# Patient Record
Sex: MALE | Race: WHITE | NOT HISPANIC OR LATINO | URBAN - METROPOLITAN AREA
[De-identification: names, ages, dates, MRNs, and addresses within clinical notes are randomized per-mention and may not be internally consistent; named-entity substitution may affect disease eponyms.]

---

## 2017-04-18 ENCOUNTER — EMERGENCY (EMERGENCY)
Facility: HOSPITAL | Age: 82
LOS: 1 days | Discharge: HOME | End: 2017-04-18
Attending: EMERGENCY MEDICINE | Admitting: EMERGENCY MEDICINE
Payer: MEDICARE

## 2017-04-18 VITALS
DIASTOLIC BLOOD PRESSURE: 51 MMHG | TEMPERATURE: 98 F | HEART RATE: 60 BPM | SYSTOLIC BLOOD PRESSURE: 138 MMHG | OXYGEN SATURATION: 100 % | RESPIRATION RATE: 18 BRPM

## 2017-04-18 VITALS
RESPIRATION RATE: 18 BRPM | DIASTOLIC BLOOD PRESSURE: 55 MMHG | TEMPERATURE: 98 F | SYSTOLIC BLOOD PRESSURE: 173 MMHG | HEART RATE: 60 BPM | OXYGEN SATURATION: 100 %

## 2017-04-18 LAB
ALBUMIN SERPL ELPH-MCNC: 3.9 G/DL — SIGNIFICANT CHANGE UP (ref 3.3–5)
ALP SERPL-CCNC: 76 U/L — SIGNIFICANT CHANGE UP (ref 40–120)
ALT FLD-CCNC: 13 U/L — SIGNIFICANT CHANGE UP (ref 4–41)
AST SERPL-CCNC: 23 U/L — SIGNIFICANT CHANGE UP (ref 4–40)
BASE EXCESS BLDV CALC-SCNC: -0.7 MMOL/L — SIGNIFICANT CHANGE UP
BASOPHILS # BLD AUTO: 0.02 K/UL — SIGNIFICANT CHANGE UP (ref 0–0.2)
BASOPHILS NFR BLD AUTO: 0.2 % — SIGNIFICANT CHANGE UP (ref 0–2)
BILIRUB SERPL-MCNC: 0.3 MG/DL — SIGNIFICANT CHANGE UP (ref 0.2–1.2)
BLOOD GAS VENOUS - CREATININE: 1.95 MG/DL — HIGH (ref 0.5–1.3)
BUN SERPL-MCNC: 39 MG/DL — HIGH (ref 7–23)
CALCIUM SERPL-MCNC: 9.6 MG/DL — SIGNIFICANT CHANGE UP (ref 8.4–10.5)
CHLORIDE BLDV-SCNC: 104 MMOL/L — SIGNIFICANT CHANGE UP (ref 96–108)
CHLORIDE SERPL-SCNC: 102 MMOL/L — SIGNIFICANT CHANGE UP (ref 98–107)
CO2 SERPL-SCNC: 23 MMOL/L — SIGNIFICANT CHANGE UP (ref 22–31)
CREAT SERPL-MCNC: 1.9 MG/DL — HIGH (ref 0.5–1.3)
EOSINOPHIL # BLD AUTO: 0.17 K/UL — SIGNIFICANT CHANGE UP (ref 0–0.5)
EOSINOPHIL NFR BLD AUTO: 2.1 % — SIGNIFICANT CHANGE UP (ref 0–6)
GAS PNL BLDV: 137 MMOL/L — SIGNIFICANT CHANGE UP (ref 136–146)
GLUCOSE BLDV-MCNC: 135 — HIGH (ref 70–99)
GLUCOSE SERPL-MCNC: 137 MG/DL — HIGH (ref 70–99)
HCO3 BLDV-SCNC: 22 MMOL/L — SIGNIFICANT CHANGE UP (ref 20–27)
HCT VFR BLD CALC: 32.5 % — LOW (ref 39–50)
HCT VFR BLDV CALC: 32.1 % — LOW (ref 39–51)
HGB BLD-MCNC: 10.2 G/DL — LOW (ref 13–17)
HGB BLDV-MCNC: 10.4 G/DL — LOW (ref 13–17)
IMM GRANULOCYTES NFR BLD AUTO: 0.2 % — SIGNIFICANT CHANGE UP (ref 0–1.5)
LACTATE BLDV-MCNC: 1.9 MMOL/L — SIGNIFICANT CHANGE UP (ref 0.5–2)
LYMPHOCYTES # BLD AUTO: 0.95 K/UL — LOW (ref 1–3.3)
LYMPHOCYTES # BLD AUTO: 11.5 % — LOW (ref 13–44)
MCHC RBC-ENTMCNC: 31.4 % — LOW (ref 32–36)
MCHC RBC-ENTMCNC: 31.5 PG — SIGNIFICANT CHANGE UP (ref 27–34)
MCV RBC AUTO: 100.3 FL — HIGH (ref 80–100)
MONOCYTES # BLD AUTO: 1.01 K/UL — HIGH (ref 0–0.9)
MONOCYTES NFR BLD AUTO: 12.2 % — SIGNIFICANT CHANGE UP (ref 2–14)
NEUTROPHILS # BLD AUTO: 6.1 K/UL — SIGNIFICANT CHANGE UP (ref 1.8–7.4)
NEUTROPHILS NFR BLD AUTO: 73.8 % — SIGNIFICANT CHANGE UP (ref 43–77)
PCO2 BLDV: 48 MMHG — SIGNIFICANT CHANGE UP (ref 41–51)
PH BLDV: 7.33 PH — SIGNIFICANT CHANGE UP (ref 7.32–7.43)
PLATELET # BLD AUTO: 221 K/UL — SIGNIFICANT CHANGE UP (ref 150–400)
PMV BLD: 10.8 FL — SIGNIFICANT CHANGE UP (ref 7–13)
PO2 BLDV: < 24 MMHG — LOW (ref 35–40)
POTASSIUM BLDV-SCNC: 4.9 MMOL/L — HIGH (ref 3.4–4.5)
POTASSIUM SERPL-MCNC: 5.1 MMOL/L — SIGNIFICANT CHANGE UP (ref 3.5–5.3)
POTASSIUM SERPL-SCNC: 5.1 MMOL/L — SIGNIFICANT CHANGE UP (ref 3.5–5.3)
PROT SERPL-MCNC: 7.2 G/DL — SIGNIFICANT CHANGE UP (ref 6–8.3)
RBC # BLD: 3.24 M/UL — LOW (ref 4.2–5.8)
RBC # FLD: 12.4 % — SIGNIFICANT CHANGE UP (ref 10.3–14.5)
SAO2 % BLDV: 19.5 % — LOW (ref 60–85)
SODIUM SERPL-SCNC: 137 MMOL/L — SIGNIFICANT CHANGE UP (ref 135–145)
WBC # BLD: 8.27 K/UL — SIGNIFICANT CHANGE UP (ref 3.8–10.5)
WBC # FLD AUTO: 8.27 K/UL — SIGNIFICANT CHANGE UP (ref 3.8–10.5)

## 2017-04-18 PROCEDURE — 99284 EMERGENCY DEPT VISIT MOD MDM: CPT | Mod: GC

## 2017-04-18 PROCEDURE — 93971 EXTREMITY STUDY: CPT | Mod: 26,LT

## 2017-04-18 PROCEDURE — 73630 X-RAY EXAM OF FOOT: CPT | Mod: 26,RT

## 2017-04-18 NOTE — ED PROVIDER NOTE - OBJECTIVE STATEMENT
89M PMH DM, HTN, peripheral vascular disease p/w 89M PMH DM, HTN, peripheral vascular disease presents from PMD's office for evaluation of chronic ulcerations and venous stasis ulcers. Pt reports some pain, denies fever/chills/NVD. +ambulatory. Under the care of a visiting wound nurse for his chronic ulcerations.

## 2017-04-18 NOTE — ED PROVIDER NOTE - ATTENDING CONTRIBUTION TO CARE
Dr Bloch- Patient c/o swollen leg with ulcer, seen by podiatry, no acute infection as per podiatry, Well appearing NAD, HEENT nml lungs clear, abd soft, ext right leg swollen with some blisters and 1 cm ulcer on pad under 1st MT head.  plan, labs podiatry to recheck, pulses dopplerable, US right leg to r/o dvt.

## 2017-04-18 NOTE — ED PROVIDER NOTE - MEDICAL DECISION MAKING DETAILS
89M with chronic ulcerations, peripheral vascular disease sent for r/o osteomyelitis. Will evaluate with cbc, cmp, imaging, podiatry vs vascular evaluation.

## 2017-04-18 NOTE — ED PROVIDER NOTE - NOTES
Saw and evaluated patient; wounds all chronic. No concern for osteomyelitis from their perspective. Can be d/c'd home when remainder of evaluation complete. No abx.

## 2017-04-18 NOTE — ED ADULT TRIAGE NOTE - CHIEF COMPLAINT QUOTE
pt sent to ED from PMD for vascular surger consult to r/o osteomylitis and gangrene in bilateral feet.

## 2017-04-18 NOTE — ED ADULT NURSE NOTE - OBJECTIVE STATEMENT
Pt received to spot #8. AAOx4, sent in by his vascular surgeon for r/o osteomyelitis and gangrene to right foot. Ulcer noted underneath right foot approx size of a quarter with tunneling. RLE swollen and redness to be noted. Left ulcer noted to left leg behind ankle. Pt denies fever/chills. MD munoz in progress at bedside. #20g IV placed to left ac, labs drawn and sent. VSS. Pt taken to x-ray at this time. no acute distress. Awaiting further plan of care.

## 2017-04-18 NOTE — ED PROVIDER NOTE - PHYSICAL EXAMINATION
Cap refill in RLE longer than LLE, both extremities warm with chronic ulcerations, slight weeping, no purulent drainage or erythema. RLE edematous compared to LLE.

## 2017-04-19 LAB
SPECIMEN SOURCE: SIGNIFICANT CHANGE UP
SPECIMEN SOURCE: SIGNIFICANT CHANGE UP

## 2017-04-23 LAB
BACTERIA BLD CULT: SIGNIFICANT CHANGE UP
BACTERIA BLD CULT: SIGNIFICANT CHANGE UP

## 2017-04-28 ENCOUNTER — APPOINTMENT (OUTPATIENT)
Dept: WOUND CARE | Facility: CLINIC | Age: 82
End: 2017-04-28

## 2017-04-28 DIAGNOSIS — Z86.39 PERSONAL HISTORY OF OTHER ENDOCRINE, NUTRITIONAL AND METABOLIC DISEASE: ICD-10-CM

## 2017-04-28 DIAGNOSIS — L97.513 NON-PRESSURE CHRONIC ULCER OF OTHER PART OF RIGHT FOOT WITH NECROSIS OF MUSCLE: ICD-10-CM

## 2017-06-13 ENCOUNTER — APPOINTMENT (OUTPATIENT)
Dept: VASCULAR SURGERY | Facility: CLINIC | Age: 82
End: 2017-06-13

## 2017-06-13 VITALS
HEART RATE: 60 BPM | HEIGHT: 67 IN | BODY MASS INDEX: 23.54 KG/M2 | WEIGHT: 150 LBS | SYSTOLIC BLOOD PRESSURE: 180 MMHG | DIASTOLIC BLOOD PRESSURE: 78 MMHG | TEMPERATURE: 98.7 F

## 2017-06-13 DIAGNOSIS — L03.115 CELLULITIS OF RIGHT LOWER LIMB: ICD-10-CM

## 2017-06-13 DIAGNOSIS — I87.2 VENOUS INSUFFICIENCY (CHRONIC) (PERIPHERAL): ICD-10-CM

## 2017-06-13 DIAGNOSIS — I83.93 ASYMPTOMATIC VARICOSE VEINS OF BILATERAL LOWER EXTREMITIES: ICD-10-CM

## 2017-06-13 RX ORDER — BLOOD SUGAR DIAGNOSTIC
STRIP MISCELLANEOUS
Qty: 200 | Refills: 0 | Status: ACTIVE | COMMUNITY
Start: 2016-10-03

## 2017-06-16 ENCOUNTER — INPATIENT (INPATIENT)
Facility: HOSPITAL | Age: 82
LOS: 13 days | Discharge: NURSING, REHAB OR LONG-TERM CARE | End: 2017-06-30
Attending: SURGERY | Admitting: SURGERY
Payer: MEDICARE

## 2017-06-16 VITALS
OXYGEN SATURATION: 99 % | SYSTOLIC BLOOD PRESSURE: 164 MMHG | TEMPERATURE: 98 F | HEART RATE: 60 BPM | RESPIRATION RATE: 15 BRPM | DIASTOLIC BLOOD PRESSURE: 66 MMHG

## 2017-06-16 DIAGNOSIS — L97.509 NON-PRESSURE CHRONIC ULCER OF OTHER PART OF UNSPECIFIED FOOT WITH UNSPECIFIED SEVERITY: ICD-10-CM

## 2017-06-16 DIAGNOSIS — I73.9 PERIPHERAL VASCULAR DISEASE, UNSPECIFIED: ICD-10-CM

## 2017-06-16 LAB
ALBUMIN SERPL ELPH-MCNC: 3.7 G/DL — SIGNIFICANT CHANGE UP (ref 3.3–5)
ALP SERPL-CCNC: 72 U/L — SIGNIFICANT CHANGE UP (ref 40–120)
ALT FLD-CCNC: 12 U/L — SIGNIFICANT CHANGE UP (ref 4–41)
APTT BLD: 26.7 SEC — LOW (ref 27.5–37.4)
AST SERPL-CCNC: 17 U/L — SIGNIFICANT CHANGE UP (ref 4–40)
BASOPHILS # BLD AUTO: 0.03 K/UL — SIGNIFICANT CHANGE UP (ref 0–0.2)
BASOPHILS NFR BLD AUTO: 0.3 % — SIGNIFICANT CHANGE UP (ref 0–2)
BILIRUB SERPL-MCNC: 0.5 MG/DL — SIGNIFICANT CHANGE UP (ref 0.2–1.2)
BLD GP AB SCN SERPL QL: NEGATIVE — SIGNIFICANT CHANGE UP
BUN SERPL-MCNC: 33 MG/DL — HIGH (ref 7–23)
CALCIUM SERPL-MCNC: 9.6 MG/DL — SIGNIFICANT CHANGE UP (ref 8.4–10.5)
CHLORIDE SERPL-SCNC: 103 MMOL/L — SIGNIFICANT CHANGE UP (ref 98–107)
CO2 SERPL-SCNC: 28 MMOL/L — SIGNIFICANT CHANGE UP (ref 22–31)
CREAT SERPL-MCNC: 1.24 MG/DL — SIGNIFICANT CHANGE UP (ref 0.5–1.3)
EOSINOPHIL # BLD AUTO: 0.2 K/UL — SIGNIFICANT CHANGE UP (ref 0–0.5)
EOSINOPHIL NFR BLD AUTO: 2.2 % — SIGNIFICANT CHANGE UP (ref 0–6)
GLUCOSE SERPL-MCNC: 138 MG/DL — HIGH (ref 70–99)
HCT VFR BLD CALC: 33.6 % — LOW (ref 39–50)
HGB BLD-MCNC: 10.2 G/DL — LOW (ref 13–17)
IMM GRANULOCYTES NFR BLD AUTO: 0.1 % — SIGNIFICANT CHANGE UP (ref 0–1.5)
INR BLD: 1.1 — SIGNIFICANT CHANGE UP (ref 0.88–1.17)
LYMPHOCYTES # BLD AUTO: 1.01 K/UL — SIGNIFICANT CHANGE UP (ref 1–3.3)
LYMPHOCYTES # BLD AUTO: 11.3 % — LOW (ref 13–44)
MCHC RBC-ENTMCNC: 30.4 % — LOW (ref 32–36)
MCHC RBC-ENTMCNC: 30.9 PG — SIGNIFICANT CHANGE UP (ref 27–34)
MCV RBC AUTO: 101.8 FL — HIGH (ref 80–100)
MONOCYTES # BLD AUTO: 1.11 K/UL — HIGH (ref 0–0.9)
MONOCYTES NFR BLD AUTO: 12.4 % — SIGNIFICANT CHANGE UP (ref 2–14)
NEUTROPHILS # BLD AUTO: 6.58 K/UL — SIGNIFICANT CHANGE UP (ref 1.8–7.4)
NEUTROPHILS NFR BLD AUTO: 73.7 % — SIGNIFICANT CHANGE UP (ref 43–77)
PLATELET # BLD AUTO: 252 K/UL — SIGNIFICANT CHANGE UP (ref 150–400)
PMV BLD: 10.6 FL — SIGNIFICANT CHANGE UP (ref 7–13)
POTASSIUM SERPL-MCNC: 4.8 MMOL/L — SIGNIFICANT CHANGE UP (ref 3.5–5.3)
POTASSIUM SERPL-SCNC: 4.8 MMOL/L — SIGNIFICANT CHANGE UP (ref 3.5–5.3)
PROT SERPL-MCNC: 6.9 G/DL — SIGNIFICANT CHANGE UP (ref 6–8.3)
PROTHROM AB SERPL-ACNC: 12.4 SEC — SIGNIFICANT CHANGE UP (ref 9.8–13.1)
RBC # BLD: 3.3 M/UL — LOW (ref 4.2–5.8)
RBC # FLD: 12.8 % — SIGNIFICANT CHANGE UP (ref 10.3–14.5)
RH IG SCN BLD-IMP: POSITIVE — SIGNIFICANT CHANGE UP
SODIUM SERPL-SCNC: 142 MMOL/L — SIGNIFICANT CHANGE UP (ref 135–145)
WBC # BLD: 8.94 K/UL — SIGNIFICANT CHANGE UP (ref 3.8–10.5)
WBC # FLD AUTO: 8.94 K/UL — SIGNIFICANT CHANGE UP (ref 3.8–10.5)

## 2017-06-16 PROCEDURE — 99222 1ST HOSP IP/OBS MODERATE 55: CPT

## 2017-06-16 RX ORDER — CILOSTAZOL 100 MG/1
50 TABLET ORAL
Qty: 0 | Refills: 0 | Status: DISCONTINUED | OUTPATIENT
Start: 2017-06-16 | End: 2017-06-19

## 2017-06-16 RX ORDER — COLLAGENASE CLOSTRIDIUM HIST. 250 UNIT/G
1 OINTMENT (GRAM) TOPICAL DAILY
Qty: 0 | Refills: 0 | Status: DISCONTINUED | OUTPATIENT
Start: 2017-06-16 | End: 2017-06-30

## 2017-06-16 RX ORDER — HEPARIN SODIUM 5000 [USP'U]/ML
5000 INJECTION INTRAVENOUS; SUBCUTANEOUS EVERY 8 HOURS
Qty: 0 | Refills: 0 | Status: DISCONTINUED | OUTPATIENT
Start: 2017-06-16 | End: 2017-06-30

## 2017-06-16 RX ORDER — BRIMONIDINE TARTRATE 2 MG/MG
1 SOLUTION/ DROPS OPHTHALMIC DAILY
Qty: 0 | Refills: 0 | Status: DISCONTINUED | OUTPATIENT
Start: 2017-06-16 | End: 2017-06-16

## 2017-06-16 RX ORDER — INSULIN LISPRO 100/ML
VIAL (ML) SUBCUTANEOUS AT BEDTIME
Qty: 0 | Refills: 0 | Status: DISCONTINUED | OUTPATIENT
Start: 2017-06-16 | End: 2017-06-16

## 2017-06-16 RX ORDER — TIMOLOL 0.5 %
1 DROPS OPHTHALMIC (EYE) DAILY
Qty: 0 | Refills: 0 | Status: DISCONTINUED | OUTPATIENT
Start: 2017-06-16 | End: 2017-06-16

## 2017-06-16 RX ORDER — FUROSEMIDE 40 MG
20 TABLET ORAL
Qty: 0 | Refills: 0 | Status: DISCONTINUED | OUTPATIENT
Start: 2017-06-16 | End: 2017-06-19

## 2017-06-16 RX ORDER — BRIMONIDINE TARTRATE 2 MG/MG
1 SOLUTION/ DROPS OPHTHALMIC
Qty: 0 | Refills: 0 | Status: DISCONTINUED | OUTPATIENT
Start: 2017-06-16 | End: 2017-06-30

## 2017-06-16 RX ORDER — PIPERACILLIN AND TAZOBACTAM 4; .5 G/20ML; G/20ML
3.38 INJECTION, POWDER, LYOPHILIZED, FOR SOLUTION INTRAVENOUS EVERY 8 HOURS
Qty: 0 | Refills: 0 | Status: DISCONTINUED | OUTPATIENT
Start: 2017-06-16 | End: 2017-06-29

## 2017-06-16 RX ORDER — PIPERACILLIN AND TAZOBACTAM 4; .5 G/20ML; G/20ML
3.38 INJECTION, POWDER, LYOPHILIZED, FOR SOLUTION INTRAVENOUS ONCE
Qty: 0 | Refills: 0 | Status: COMPLETED | OUTPATIENT
Start: 2017-06-16 | End: 2017-06-16

## 2017-06-16 RX ORDER — TIMOLOL 0.5 %
1 DROPS OPHTHALMIC (EYE)
Qty: 0 | Refills: 0 | Status: DISCONTINUED | OUTPATIENT
Start: 2017-06-16 | End: 2017-06-30

## 2017-06-16 RX ORDER — INSULIN LISPRO 100/ML
VIAL (ML) SUBCUTANEOUS AT BEDTIME
Qty: 0 | Refills: 0 | Status: DISCONTINUED | OUTPATIENT
Start: 2017-06-16 | End: 2017-06-30

## 2017-06-16 RX ORDER — HYDRALAZINE HCL 50 MG
10 TABLET ORAL
Qty: 0 | Refills: 0 | Status: DISCONTINUED | OUTPATIENT
Start: 2017-06-16 | End: 2017-06-30

## 2017-06-16 RX ORDER — CLOPIDOGREL BISULFATE 75 MG/1
75 TABLET, FILM COATED ORAL DAILY
Qty: 0 | Refills: 0 | Status: DISCONTINUED | OUTPATIENT
Start: 2017-06-16 | End: 2017-06-19

## 2017-06-16 RX ORDER — INSULIN LISPRO 100/ML
VIAL (ML) SUBCUTANEOUS
Qty: 0 | Refills: 0 | Status: DISCONTINUED | OUTPATIENT
Start: 2017-06-16 | End: 2017-06-30

## 2017-06-16 RX ORDER — SIMVASTATIN 20 MG/1
20 TABLET, FILM COATED ORAL AT BEDTIME
Qty: 0 | Refills: 0 | Status: DISCONTINUED | OUTPATIENT
Start: 2017-06-16 | End: 2017-06-30

## 2017-06-16 RX ADMIN — SIMVASTATIN 20 MILLIGRAM(S): 20 TABLET, FILM COATED ORAL at 21:44

## 2017-06-16 RX ADMIN — Medication 10 MILLIGRAM(S): at 17:31

## 2017-06-16 RX ADMIN — Medication 20 MILLIGRAM(S): at 17:31

## 2017-06-16 RX ADMIN — PIPERACILLIN AND TAZOBACTAM 25 GRAM(S): 4; .5 INJECTION, POWDER, LYOPHILIZED, FOR SOLUTION INTRAVENOUS at 21:43

## 2017-06-16 RX ADMIN — CILOSTAZOL 50 MILLIGRAM(S): 100 TABLET ORAL at 19:57

## 2017-06-16 RX ADMIN — PIPERACILLIN AND TAZOBACTAM 25 GRAM(S): 4; .5 INJECTION, POWDER, LYOPHILIZED, FOR SOLUTION INTRAVENOUS at 13:52

## 2017-06-16 RX ADMIN — HEPARIN SODIUM 5000 UNIT(S): 5000 INJECTION INTRAVENOUS; SUBCUTANEOUS at 21:44

## 2017-06-16 RX ADMIN — BRIMONIDINE TARTRATE 1 DROP(S): 2 SOLUTION/ DROPS OPHTHALMIC at 17:32

## 2017-06-16 RX ADMIN — PIPERACILLIN AND TAZOBACTAM 200 GRAM(S): 4; .5 INJECTION, POWDER, LYOPHILIZED, FOR SOLUTION INTRAVENOUS at 10:24

## 2017-06-16 RX ADMIN — CLOPIDOGREL BISULFATE 75 MILLIGRAM(S): 75 TABLET, FILM COATED ORAL at 13:06

## 2017-06-16 RX ADMIN — Medication 1 DROP(S): at 17:33

## 2017-06-16 RX ADMIN — HEPARIN SODIUM 5000 UNIT(S): 5000 INJECTION INTRAVENOUS; SUBCUTANEOUS at 13:52

## 2017-06-16 NOTE — PROGRESS NOTE ADULT - SUBJECTIVE AND OBJECTIVE BOX
Patient is a 89y old  Male who presents with a chief complaint of 'My Dr sent me  for redness of right leg' (16 Jun 2017 13:32)      HPI:  89 M w PVD DM HTN p/w 2 wk of RLE redness and swelling. Was seen by visiting doctor who d/w Dr. Aguilar who recommended pt come to the hospital for an angioplasty. Denies pain, fever or chills. Pt sees Dr. Schroeder regularly as out-pt, been applying aquacel ag daily, poor healing due to lack of blood flow.       PAST MEDICAL & SURGICAL HISTORY:  Duodenal ulcer  DM (diabetes mellitus)  HTN (hypertension)  S/P appendectomy: 50 yrs ago      MEDICATIONS  (STANDING):  piperacillin/tazobactam IVPB. 3.375Gram(s) IV Intermittent every 8 hours  clopidogrel Tablet 75milliGRAM(s) Oral daily  heparin  Injectable 5000Unit(s) SubCutaneous every 8 hours  insulin lispro (HumaLOG) corrective regimen sliding scale  SubCutaneous at bedtime  insulin lispro (HumaLOG) corrective regimen sliding scale  SubCutaneous three times a day before meals  furosemide    Tablet 20milliGRAM(s) Oral two times a day  hydrALAZINE 10milliGRAM(s) Oral two times a day  simvastatin 20milliGRAM(s) Oral at bedtime  cilostazol 50milliGRAM(s) Oral two times a day  brimonidine 0.2% Ophthalmic Solution 1Drop(s) Right EYE two times a day  timolol 0.5% Solution 1Drop(s) Right EYE two times a day  collagenase Ointment 1Application(s) Topical daily    MEDICATIONS  (PRN):    Allergies    No Known Allergies    Intolerances    VITALS:    Vital Signs Last 24 Hrs  T(C): 36.7, Max: 36.7 (06-16 @ 17:59)  T(F): 98, Max: 98 (06-16 @ 17:59)  HR: 60 (60 - 61)  BP: 160/59 (152/52 - 167/84)  BP(mean): --  RR: 15 (14 - 20)  SpO2: 94% (94% - 100%)    LABS:                        10.2   8.94  )-----------( 252      ( 16 Jun 2017 07:52 )             33.6       06-16    142  |  103  |  33<H>  ----------------------------<  138<H>  4.8   |  28  |  1.24    Ca    9.6      16 Jun 2017 07:52    TPro  6.9  /  Alb  3.7  /  TBili  0.5  /  DBili  x   /  AST  17  /  ALT  12  /  AlkPhos  72  06-16      CAPILLARY BLOOD GLUCOSE  111 (16 Jun 2017 13:46)  202 (16 Jun 2017 12:22)      PT/INR - ( 16 Jun 2017 07:52 )   PT: 12.4 SEC;   INR: 1.10          PTT - ( 16 Jun 2017 07:52 )  PTT:26.7 SEC    LOWER EXTREMITY PHYSICAL EXAM:    Vasular: DP/PT 0/4, B/L, Temperature gradient slightly cool to R foot, B/L.   Neuro: Epicritic sensation diminished to the level of the forefoot, B/L.  Musculoskeletal/Ortho: No gross pedal deformities noted, B/L.   Skin: R foot sub 1st met head ulcer to subQ, granular base w/ mild fibrosis, slight undermining, slight maceration, no purulence, no erythema or edema, mild serous drainage, no necrosis, erythema noted to the R calf.      RADIOLOGY & ADDITIONAL STUDIES:  Xray foot pending

## 2017-06-16 NOTE — ED ADULT TRIAGE NOTE - CHIEF COMPLAINT QUOTE
Pt sent by MD for admission regarding poor vascular circulation in right leg.  Pt denies any complaints.

## 2017-06-16 NOTE — H&P ADULT - ASSESSMENT
Patient is a 89 year old male with cellulitis and PAD in the RLE.  -Reg diet  -Zosyn for abx coverage  -SQH for DVT px.  -Continue plavix and pletal.  -ISS for diabetic control.  -Plan for OR next week for RLE angiogram.

## 2017-06-16 NOTE — PROGRESS NOTE ADULT - PROBLEM SELECTOR PLAN 1
- Pt seen and examined  - Low concern for infection  - Ulcer stable. No pod sx intervention planned  - Santyl ordered  - Continue with local wound care.   - Will follow while in house  - Angio Mon, will f/u.  - Discussed w/ Dr Schroeder who is in agreement.

## 2017-06-16 NOTE — ED PROVIDER NOTE - ATTENDING CONTRIBUTION TO CARE
DANIEL Attending Note - Dr. Montano 89 M w PVD DM HTN p/w 2 wk of RLE redness and swelling. Was seen by visiting doctor who d/w Dr. Aguilar who recommended pt come to the hospital for an angioplasty. PE: pt is alert and oriented, perrl, ent normal, membranes are moist, neck supple. no lymphadenopathy or thyroid enlargement, No JVD.  Chest clear to P&A, Heart- reg rhythm without murmur, rubs or gallops, radial pulses equal bilaterally.  Abd is soft, non-tender, Bowel sounds are active. no mass or organomegaly. : No CVA tenderness. Neuro:  Pt alert and oriented x 3. Perrl    Distal neurosensory is intact. Motor function is 5/5 strength bilaterally.  No focal deficits. Extremities: Right leg is red swollen to knee and edematous.  Skin: warm and dry.

## 2017-06-16 NOTE — ED ADULT NURSE NOTE - OBJECTIVE STATEMENT
89 yo male received in spot 17.  Pt is A&Ox4.  Pt comes to ED for admission for vascular issue in RLE.  Pt RLE Is swollen and edenomous.  Pt has 3cm x 2cm diabetic ulcer to bottom of right foot.  States the wound is getting worse over last month.  Nurse makes visit to house x3 days a week to treat wound.  Pt was scheduled to have a cath done on Monday 6/19 to determine if there is a clot in leg.  LE apears normal.  20G R arm. 89 yo male received in spot 17.  Pt is A&Ox4.  Pt comes to ED for admission for vascular issue in RLE.  Pt RLE Is swollen and edenomous.  Pt has 3cm x 2cm diabetic ulcer to bottom of right foot.  States the wound is getting worse over last month.  dressing applied.  Nurse makes visit to house x3 days a week to treat wound.  Pt was scheduled to have a cath done on Monday 6/19 to determine if there is a clot in leg.  LE apears normal.  20G R arm.

## 2017-06-16 NOTE — H&P ADULT - HISTORY OF PRESENT ILLNESS
Patient is a 89 year old male with a PMH of DM II and HTN who is having RLE swelling and redness for a couple of months.  Per patient, he tried a dose of abx as an outpatient and it did not help.  He lives in an apartment with a visiting nurse who helps.  He walks at home.  He saw Dr. Aguilar in the office as an outpatient and had NIMO/PVRs which showed a Right NIMO of .48 and a left NIMO of .6.

## 2017-06-17 LAB
BASOPHILS # BLD AUTO: 0.02 K/UL — SIGNIFICANT CHANGE UP (ref 0–0.2)
BASOPHILS NFR BLD AUTO: 0.2 % — SIGNIFICANT CHANGE UP (ref 0–2)
BUN SERPL-MCNC: 29 MG/DL — HIGH (ref 7–23)
CALCIUM SERPL-MCNC: 9 MG/DL — SIGNIFICANT CHANGE UP (ref 8.4–10.5)
CHLORIDE SERPL-SCNC: 100 MMOL/L — SIGNIFICANT CHANGE UP (ref 98–107)
CO2 SERPL-SCNC: 24 MMOL/L — SIGNIFICANT CHANGE UP (ref 22–31)
CREAT SERPL-MCNC: 1.19 MG/DL — SIGNIFICANT CHANGE UP (ref 0.5–1.3)
EOSINOPHIL # BLD AUTO: 0.08 K/UL — SIGNIFICANT CHANGE UP (ref 0–0.5)
EOSINOPHIL NFR BLD AUTO: 0.7 % — SIGNIFICANT CHANGE UP (ref 0–6)
GLUCOSE SERPL-MCNC: 163 MG/DL — HIGH (ref 70–99)
HBA1C BLD-MCNC: 6.6 % — HIGH (ref 4–5.6)
HCT VFR BLD CALC: 34.9 % — LOW (ref 39–50)
HGB BLD-MCNC: 10.9 G/DL — LOW (ref 13–17)
IMM GRANULOCYTES NFR BLD AUTO: 0.2 % — SIGNIFICANT CHANGE UP (ref 0–1.5)
LYMPHOCYTES # BLD AUTO: 1.19 K/UL — SIGNIFICANT CHANGE UP (ref 1–3.3)
LYMPHOCYTES # BLD AUTO: 9.7 % — LOW (ref 13–44)
MAGNESIUM SERPL-MCNC: 1.9 MG/DL — SIGNIFICANT CHANGE UP (ref 1.6–2.6)
MCHC RBC-ENTMCNC: 31.2 % — LOW (ref 32–36)
MCHC RBC-ENTMCNC: 31.3 PG — SIGNIFICANT CHANGE UP (ref 27–34)
MCV RBC AUTO: 100.3 FL — HIGH (ref 80–100)
MONOCYTES # BLD AUTO: 0.79 K/UL — SIGNIFICANT CHANGE UP (ref 0–0.9)
MONOCYTES NFR BLD AUTO: 6.4 % — SIGNIFICANT CHANGE UP (ref 2–14)
NEUTROPHILS # BLD AUTO: 10.16 K/UL — HIGH (ref 1.8–7.4)
NEUTROPHILS NFR BLD AUTO: 82.8 % — HIGH (ref 43–77)
PHOSPHATE SERPL-MCNC: 2.8 MG/DL — SIGNIFICANT CHANGE UP (ref 2.5–4.5)
PLATELET # BLD AUTO: 240 K/UL — SIGNIFICANT CHANGE UP (ref 150–400)
PMV BLD: 10.5 FL — SIGNIFICANT CHANGE UP (ref 7–13)
POTASSIUM SERPL-MCNC: 4 MMOL/L — SIGNIFICANT CHANGE UP (ref 3.5–5.3)
POTASSIUM SERPL-SCNC: 4 MMOL/L — SIGNIFICANT CHANGE UP (ref 3.5–5.3)
RBC # BLD: 3.48 M/UL — LOW (ref 4.2–5.8)
RBC # FLD: 12.6 % — SIGNIFICANT CHANGE UP (ref 10.3–14.5)
SODIUM SERPL-SCNC: 138 MMOL/L — SIGNIFICANT CHANGE UP (ref 135–145)
WBC # BLD: 12.26 K/UL — HIGH (ref 3.8–10.5)
WBC # FLD AUTO: 12.26 K/UL — HIGH (ref 3.8–10.5)

## 2017-06-17 PROCEDURE — 73620 X-RAY EXAM OF FOOT: CPT | Mod: 26,RT

## 2017-06-17 PROCEDURE — 71010: CPT | Mod: 26

## 2017-06-17 PROCEDURE — 99231 SBSQ HOSP IP/OBS SF/LOW 25: CPT

## 2017-06-17 RX ORDER — LOSARTAN POTASSIUM 100 MG/1
100 TABLET, FILM COATED ORAL DAILY
Qty: 0 | Refills: 0 | Status: DISCONTINUED | OUTPATIENT
Start: 2017-06-17 | End: 2017-06-19

## 2017-06-17 RX ORDER — LOSARTAN POTASSIUM 100 MG/1
100 TABLET, FILM COATED ORAL DAILY
Qty: 0 | Refills: 0 | Status: DISCONTINUED | OUTPATIENT
Start: 2017-06-17 | End: 2017-06-17

## 2017-06-17 RX ADMIN — Medication 2: at 17:19

## 2017-06-17 RX ADMIN — PIPERACILLIN AND TAZOBACTAM 25 GRAM(S): 4; .5 INJECTION, POWDER, LYOPHILIZED, FOR SOLUTION INTRAVENOUS at 21:38

## 2017-06-17 RX ADMIN — Medication 2: at 08:11

## 2017-06-17 RX ADMIN — HEPARIN SODIUM 5000 UNIT(S): 5000 INJECTION INTRAVENOUS; SUBCUTANEOUS at 21:39

## 2017-06-17 RX ADMIN — Medication 1 DROP(S): at 05:35

## 2017-06-17 RX ADMIN — Medication 2: at 13:06

## 2017-06-17 RX ADMIN — CILOSTAZOL 50 MILLIGRAM(S): 100 TABLET ORAL at 17:22

## 2017-06-17 RX ADMIN — PIPERACILLIN AND TAZOBACTAM 25 GRAM(S): 4; .5 INJECTION, POWDER, LYOPHILIZED, FOR SOLUTION INTRAVENOUS at 14:16

## 2017-06-17 RX ADMIN — PIPERACILLIN AND TAZOBACTAM 25 GRAM(S): 4; .5 INJECTION, POWDER, LYOPHILIZED, FOR SOLUTION INTRAVENOUS at 05:35

## 2017-06-17 RX ADMIN — CILOSTAZOL 50 MILLIGRAM(S): 100 TABLET ORAL at 05:35

## 2017-06-17 RX ADMIN — BRIMONIDINE TARTRATE 1 DROP(S): 2 SOLUTION/ DROPS OPHTHALMIC at 05:35

## 2017-06-17 RX ADMIN — SIMVASTATIN 20 MILLIGRAM(S): 20 TABLET, FILM COATED ORAL at 21:38

## 2017-06-17 RX ADMIN — Medication 1 DROP(S): at 17:20

## 2017-06-17 RX ADMIN — CLOPIDOGREL BISULFATE 75 MILLIGRAM(S): 75 TABLET, FILM COATED ORAL at 13:06

## 2017-06-17 RX ADMIN — BRIMONIDINE TARTRATE 1 DROP(S): 2 SOLUTION/ DROPS OPHTHALMIC at 17:20

## 2017-06-17 RX ADMIN — Medication 20 MILLIGRAM(S): at 05:35

## 2017-06-17 RX ADMIN — HEPARIN SODIUM 5000 UNIT(S): 5000 INJECTION INTRAVENOUS; SUBCUTANEOUS at 05:35

## 2017-06-17 RX ADMIN — HEPARIN SODIUM 5000 UNIT(S): 5000 INJECTION INTRAVENOUS; SUBCUTANEOUS at 14:20

## 2017-06-17 RX ADMIN — Medication 10 MILLIGRAM(S): at 02:05

## 2017-06-17 RX ADMIN — Medication 20 MILLIGRAM(S): at 17:22

## 2017-06-17 RX ADMIN — Medication 1 APPLICATION(S): at 11:19

## 2017-06-17 RX ADMIN — LOSARTAN POTASSIUM 100 MILLIGRAM(S): 100 TABLET, FILM COATED ORAL at 05:36

## 2017-06-17 RX ADMIN — Medication 10 MILLIGRAM(S): at 17:22

## 2017-06-17 NOTE — PROVIDER CONTACT NOTE (MEDICATION) - SITUATION
Patient moving up in bed; now complaining of and difficulty inhaling. Patient previously ambulating to bathroom with no complaints

## 2017-06-17 NOTE — PHYSICAL THERAPY INITIAL EVALUATION ADULT - PERTINENT HX OF CURRENT PROBLEM, REHAB EVAL
Pt is a 89 year old male with a history  of DM II and HTN who was having RLE swelling and redness for a couple of months.

## 2017-06-17 NOTE — PROGRESS NOTE ADULT - SUBJECTIVE AND OBJECTIVE BOX
C Team Surgery   Progress Note     S: No acute events overnight,     ICU Vital Signs Last 24 Hrs  T(C): 37.1, Max: 37.1 (06-17 @ 05:19)  T(F): 98.7, Max: 98.7 (06-17 @ 05:19)  HR: 62 (60 - 64)  BP: 171/72 (154/57 - 171/72)  BP(mean): --  ABP: --  ABP(mean): --  RR: 20 (14 - 20)  SpO2: 93% (86% - 100%)    I&O's Summary    I & Os for current day (as of 17 Jun 2017 08:38)  =============================================  IN: 100 ml / OUT: 1100 ml / NET: -1000 ml    Gen: NAD   RLE: stable ulcer   Vasc: R AT Dopp, L DP/PT Dopp

## 2017-06-17 NOTE — PROGRESS NOTE ADULT - ASSESSMENT
89 year old male with right first toe ulcer, leg cellulitis   - pain control   - SQH / plavix / pletal   - wean NC as tolerated   - Angio next week   - F/u podiatry recs

## 2017-06-18 LAB
APTT BLD: 28.5 SEC — SIGNIFICANT CHANGE UP (ref 27.5–37.4)
BLD GP AB SCN SERPL QL: NEGATIVE — SIGNIFICANT CHANGE UP
BUN SERPL-MCNC: 30 MG/DL — HIGH (ref 7–23)
CALCIUM SERPL-MCNC: 9 MG/DL — SIGNIFICANT CHANGE UP (ref 8.4–10.5)
CHLORIDE SERPL-SCNC: 100 MMOL/L — SIGNIFICANT CHANGE UP (ref 98–107)
CO2 SERPL-SCNC: 26 MMOL/L — SIGNIFICANT CHANGE UP (ref 22–31)
CREAT SERPL-MCNC: 1.37 MG/DL — HIGH (ref 0.5–1.3)
GLUCOSE SERPL-MCNC: 129 MG/DL — HIGH (ref 70–99)
HCT VFR BLD CALC: 31.1 % — LOW (ref 39–50)
HGB BLD-MCNC: 10.3 G/DL — LOW (ref 13–17)
INR BLD: 1.16 — SIGNIFICANT CHANGE UP (ref 0.88–1.17)
MAGNESIUM SERPL-MCNC: 1.7 MG/DL — SIGNIFICANT CHANGE UP (ref 1.6–2.6)
MCHC RBC-ENTMCNC: 32.4 PG — SIGNIFICANT CHANGE UP (ref 27–34)
MCHC RBC-ENTMCNC: 33.1 % — SIGNIFICANT CHANGE UP (ref 32–36)
MCV RBC AUTO: 97.8 FL — SIGNIFICANT CHANGE UP (ref 80–100)
PHOSPHATE SERPL-MCNC: 2.8 MG/DL — SIGNIFICANT CHANGE UP (ref 2.5–4.5)
PLATELET # BLD AUTO: 221 K/UL — SIGNIFICANT CHANGE UP (ref 150–400)
PMV BLD: 10.5 FL — SIGNIFICANT CHANGE UP (ref 7–13)
POTASSIUM SERPL-MCNC: 3.5 MMOL/L — SIGNIFICANT CHANGE UP (ref 3.5–5.3)
POTASSIUM SERPL-SCNC: 3.5 MMOL/L — SIGNIFICANT CHANGE UP (ref 3.5–5.3)
PROTHROM AB SERPL-ACNC: 13 SEC — SIGNIFICANT CHANGE UP (ref 9.8–13.1)
RBC # BLD: 3.18 M/UL — LOW (ref 4.2–5.8)
RBC # FLD: 12.6 % — SIGNIFICANT CHANGE UP (ref 10.3–14.5)
RH IG SCN BLD-IMP: POSITIVE — SIGNIFICANT CHANGE UP
SODIUM SERPL-SCNC: 141 MMOL/L — SIGNIFICANT CHANGE UP (ref 135–145)
WBC # BLD: 9.29 K/UL — SIGNIFICANT CHANGE UP (ref 3.8–10.5)
WBC # FLD AUTO: 9.29 K/UL — SIGNIFICANT CHANGE UP (ref 3.8–10.5)

## 2017-06-18 PROCEDURE — 93010 ELECTROCARDIOGRAM REPORT: CPT

## 2017-06-18 RX ORDER — SODIUM CHLORIDE 9 MG/ML
1000 INJECTION, SOLUTION INTRAVENOUS
Qty: 0 | Refills: 0 | Status: DISCONTINUED | OUTPATIENT
Start: 2017-06-18 | End: 2017-06-19

## 2017-06-18 RX ORDER — SODIUM CHLORIDE 9 MG/ML
1000 INJECTION, SOLUTION INTRAVENOUS
Qty: 0 | Refills: 0 | Status: DISCONTINUED | OUTPATIENT
Start: 2017-06-18 | End: 2017-06-18

## 2017-06-18 RX ADMIN — PIPERACILLIN AND TAZOBACTAM 25 GRAM(S): 4; .5 INJECTION, POWDER, LYOPHILIZED, FOR SOLUTION INTRAVENOUS at 22:08

## 2017-06-18 RX ADMIN — BRIMONIDINE TARTRATE 1 DROP(S): 2 SOLUTION/ DROPS OPHTHALMIC at 06:09

## 2017-06-18 RX ADMIN — PIPERACILLIN AND TAZOBACTAM 25 GRAM(S): 4; .5 INJECTION, POWDER, LYOPHILIZED, FOR SOLUTION INTRAVENOUS at 15:17

## 2017-06-18 RX ADMIN — Medication 1 APPLICATION(S): at 07:16

## 2017-06-18 RX ADMIN — LOSARTAN POTASSIUM 100 MILLIGRAM(S): 100 TABLET, FILM COATED ORAL at 06:09

## 2017-06-18 RX ADMIN — CILOSTAZOL 50 MILLIGRAM(S): 100 TABLET ORAL at 18:22

## 2017-06-18 RX ADMIN — Medication 20 MILLIGRAM(S): at 18:22

## 2017-06-18 RX ADMIN — BRIMONIDINE TARTRATE 1 DROP(S): 2 SOLUTION/ DROPS OPHTHALMIC at 18:20

## 2017-06-18 RX ADMIN — HEPARIN SODIUM 5000 UNIT(S): 5000 INJECTION INTRAVENOUS; SUBCUTANEOUS at 15:17

## 2017-06-18 RX ADMIN — PIPERACILLIN AND TAZOBACTAM 25 GRAM(S): 4; .5 INJECTION, POWDER, LYOPHILIZED, FOR SOLUTION INTRAVENOUS at 06:08

## 2017-06-18 RX ADMIN — Medication 2: at 18:21

## 2017-06-18 RX ADMIN — HEPARIN SODIUM 5000 UNIT(S): 5000 INJECTION INTRAVENOUS; SUBCUTANEOUS at 22:09

## 2017-06-18 RX ADMIN — Medication 20 MILLIGRAM(S): at 06:09

## 2017-06-18 RX ADMIN — HEPARIN SODIUM 5000 UNIT(S): 5000 INJECTION INTRAVENOUS; SUBCUTANEOUS at 06:09

## 2017-06-18 RX ADMIN — Medication 1 DROP(S): at 06:09

## 2017-06-18 RX ADMIN — Medication 1 DROP(S): at 18:23

## 2017-06-18 RX ADMIN — Medication 10 MILLIGRAM(S): at 06:09

## 2017-06-18 RX ADMIN — CLOPIDOGREL BISULFATE 75 MILLIGRAM(S): 75 TABLET, FILM COATED ORAL at 12:23

## 2017-06-18 RX ADMIN — Medication 10 MILLIGRAM(S): at 18:22

## 2017-06-18 RX ADMIN — SIMVASTATIN 20 MILLIGRAM(S): 20 TABLET, FILM COATED ORAL at 22:08

## 2017-06-18 RX ADMIN — Medication 2: at 12:22

## 2017-06-18 RX ADMIN — CILOSTAZOL 50 MILLIGRAM(S): 100 TABLET ORAL at 06:09

## 2017-06-18 NOTE — CHART NOTE - NSCHARTNOTEFT_GEN_A_CORE
Diagnosis: LLE Ulcer  Procedure: LLE Angiogram    T(C): 36.7, Max: 36.9 (06-17 @ 17:49)  HR: 60 (60 - 62)  BP: 144/57 (133/59 - 165/62)  RR: 17 (17 - 18)  SpO2: 99% (93% - 99%)  Wt(kg): --        Pertinent Labs:                            10.3   9.29  )-----------( 221      ( 18 Jun 2017 05:15 )             31.1       06-18    141  |  100  |  30<H>  ----------------------------<  129<H>  3.5   |  26  |  1.37<H>    Ca    9.0      18 Jun 2017 05:15  Phos  2.8     06-18  Mg     1.7     06-18        PT/INR - ( 18 Jun 2017 05:15 )   PT: 13.0 SEC;   INR: 1.16          PTT - ( 18 Jun 2017 05:15 )  PTT:28.5 SEC      Permit: Signed and in chart    Blood: None needed    Plan:  NPO after midnight  IVF  FS q6  Yecenia Tellez PGY1

## 2017-06-18 NOTE — PROGRESS NOTE ADULT - ASSESSMENT
89 year old male with right first toe ulcer, leg cellulitis   - SQH / plavix / pletal   - Angio next week   - F/u podiatry recs

## 2017-06-18 NOTE — PROGRESS NOTE ADULT - SUBJECTIVE AND OBJECTIVE BOX
Vascular Surgery daily Progress note    S: No acute events overnight. Pain controlled.     T(C): 36.9, Max: 36.9 (06-17 @ 17:49)  HR: 62 (60 - 62)  BP: 149/64 (133/59 - 165/62)  RR: 18 (16 - 20)  SpO2: 94% (93% - 100%)  Wt(kg): --  I&O's Detail    I & Os for current day (as of 18 Jun 2017 07:59)  =============================================  IN:    Total IN: 0 ml  ---------------------------------------------  OUT:    Voided: 975 ml    Total OUT: 975 ml  ---------------------------------------------  Total NET: -975 ml      Gen: NAD   Vasc:  L DP/PT Dopp. R 1st toe ulcer with minimal surrounding cellulitis

## 2017-06-19 DIAGNOSIS — L03.115 CELLULITIS OF RIGHT LOWER LIMB: ICD-10-CM

## 2017-06-19 DIAGNOSIS — I87.2 VENOUS INSUFFICIENCY (CHRONIC) (PERIPHERAL): ICD-10-CM

## 2017-06-19 DIAGNOSIS — I73.9 PERIPHERAL VASCULAR DISEASE, UNSPECIFIED: ICD-10-CM

## 2017-06-19 DIAGNOSIS — I77.1 STRICTURE OF ARTERY: ICD-10-CM

## 2017-06-19 LAB
APTT BLD: 28.4 SEC — SIGNIFICANT CHANGE UP (ref 27.5–37.4)
BUN SERPL-MCNC: 33 MG/DL — HIGH (ref 7–23)
CALCIUM SERPL-MCNC: 9 MG/DL — SIGNIFICANT CHANGE UP (ref 8.4–10.5)
CHLORIDE SERPL-SCNC: 100 MMOL/L — SIGNIFICANT CHANGE UP (ref 98–107)
CO2 SERPL-SCNC: 26 MMOL/L — SIGNIFICANT CHANGE UP (ref 22–31)
CREAT SERPL-MCNC: 1.63 MG/DL — HIGH (ref 0.5–1.3)
GLUCOSE SERPL-MCNC: 143 MG/DL — HIGH (ref 70–99)
HCT VFR BLD CALC: 31.4 % — LOW (ref 39–50)
HGB BLD-MCNC: 10 G/DL — LOW (ref 13–17)
INR BLD: 1.17 — SIGNIFICANT CHANGE UP (ref 0.88–1.17)
MAGNESIUM SERPL-MCNC: 1.8 MG/DL — SIGNIFICANT CHANGE UP (ref 1.6–2.6)
MCHC RBC-ENTMCNC: 31.5 PG — SIGNIFICANT CHANGE UP (ref 27–34)
MCHC RBC-ENTMCNC: 31.8 % — LOW (ref 32–36)
MCV RBC AUTO: 99.1 FL — SIGNIFICANT CHANGE UP (ref 80–100)
PHOSPHATE SERPL-MCNC: 2.9 MG/DL — SIGNIFICANT CHANGE UP (ref 2.5–4.5)
PLATELET # BLD AUTO: 233 K/UL — SIGNIFICANT CHANGE UP (ref 150–400)
PMV BLD: 10.6 FL — SIGNIFICANT CHANGE UP (ref 7–13)
POTASSIUM SERPL-MCNC: 3.4 MMOL/L — LOW (ref 3.5–5.3)
POTASSIUM SERPL-SCNC: 3.4 MMOL/L — LOW (ref 3.5–5.3)
PROTHROM AB SERPL-ACNC: 13.2 SEC — HIGH (ref 9.8–13.1)
RBC # BLD: 3.17 M/UL — LOW (ref 4.2–5.8)
RBC # FLD: 12.7 % — SIGNIFICANT CHANGE UP (ref 10.3–14.5)
SODIUM SERPL-SCNC: 140 MMOL/L — SIGNIFICANT CHANGE UP (ref 135–145)
WBC # BLD: 9.74 K/UL — SIGNIFICANT CHANGE UP (ref 3.8–10.5)
WBC # FLD AUTO: 9.74 K/UL — SIGNIFICANT CHANGE UP (ref 3.8–10.5)

## 2017-06-19 PROCEDURE — 99232 SBSQ HOSP IP/OBS MODERATE 35: CPT

## 2017-06-19 PROCEDURE — 99223 1ST HOSP IP/OBS HIGH 75: CPT | Mod: GC

## 2017-06-19 RX ORDER — ACETYLCYSTEINE 200 MG/ML
1200 VIAL (ML) MISCELLANEOUS
Qty: 0 | Refills: 0 | Status: DISCONTINUED | OUTPATIENT
Start: 2017-06-19 | End: 2017-06-20

## 2017-06-19 RX ORDER — ACETYLCYSTEINE 200 MG/ML
600 VIAL (ML) MISCELLANEOUS
Qty: 0 | Refills: 0 | Status: DISCONTINUED | OUTPATIENT
Start: 2017-06-19 | End: 2017-06-19

## 2017-06-19 RX ORDER — SODIUM CHLORIDE 9 MG/ML
1000 INJECTION INTRAMUSCULAR; INTRAVENOUS; SUBCUTANEOUS
Qty: 0 | Refills: 0 | Status: DISCONTINUED | OUTPATIENT
Start: 2017-06-19 | End: 2017-06-20

## 2017-06-19 RX ORDER — POTASSIUM CHLORIDE 20 MEQ
20 PACKET (EA) ORAL ONCE
Qty: 0 | Refills: 0 | Status: COMPLETED | OUTPATIENT
Start: 2017-06-19 | End: 2017-06-19

## 2017-06-19 RX ADMIN — BRIMONIDINE TARTRATE 1 DROP(S): 2 SOLUTION/ DROPS OPHTHALMIC at 17:47

## 2017-06-19 RX ADMIN — CILOSTAZOL 50 MILLIGRAM(S): 100 TABLET ORAL at 05:49

## 2017-06-19 RX ADMIN — Medication 10 MILLIGRAM(S): at 05:49

## 2017-06-19 RX ADMIN — Medication 1 DROP(S): at 17:46

## 2017-06-19 RX ADMIN — SODIUM CHLORIDE 30 MILLILITER(S): 9 INJECTION INTRAMUSCULAR; INTRAVENOUS; SUBCUTANEOUS at 21:09

## 2017-06-19 RX ADMIN — Medication 20 MILLIGRAM(S): at 05:49

## 2017-06-19 RX ADMIN — HEPARIN SODIUM 5000 UNIT(S): 5000 INJECTION INTRAVENOUS; SUBCUTANEOUS at 13:51

## 2017-06-19 RX ADMIN — PIPERACILLIN AND TAZOBACTAM 25 GRAM(S): 4; .5 INJECTION, POWDER, LYOPHILIZED, FOR SOLUTION INTRAVENOUS at 22:15

## 2017-06-19 RX ADMIN — BRIMONIDINE TARTRATE 1 DROP(S): 2 SOLUTION/ DROPS OPHTHALMIC at 05:50

## 2017-06-19 RX ADMIN — SODIUM CHLORIDE 30 MILLILITER(S): 9 INJECTION INTRAMUSCULAR; INTRAVENOUS; SUBCUTANEOUS at 17:04

## 2017-06-19 RX ADMIN — PIPERACILLIN AND TAZOBACTAM 25 GRAM(S): 4; .5 INJECTION, POWDER, LYOPHILIZED, FOR SOLUTION INTRAVENOUS at 05:48

## 2017-06-19 RX ADMIN — HEPARIN SODIUM 5000 UNIT(S): 5000 INJECTION INTRAVENOUS; SUBCUTANEOUS at 05:50

## 2017-06-19 RX ADMIN — Medication 20 MILLIEQUIVALENT(S): at 07:30

## 2017-06-19 RX ADMIN — Medication 1200 MILLIGRAM(S): at 17:47

## 2017-06-19 RX ADMIN — PIPERACILLIN AND TAZOBACTAM 25 GRAM(S): 4; .5 INJECTION, POWDER, LYOPHILIZED, FOR SOLUTION INTRAVENOUS at 13:50

## 2017-06-19 RX ADMIN — HEPARIN SODIUM 5000 UNIT(S): 5000 INJECTION INTRAVENOUS; SUBCUTANEOUS at 21:18

## 2017-06-19 RX ADMIN — Medication 10 MILLIGRAM(S): at 17:46

## 2017-06-19 RX ADMIN — Medication 1 DROP(S): at 05:50

## 2017-06-19 RX ADMIN — Medication 4: at 12:11

## 2017-06-19 RX ADMIN — SIMVASTATIN 20 MILLIGRAM(S): 20 TABLET, FILM COATED ORAL at 21:18

## 2017-06-19 RX ADMIN — LOSARTAN POTASSIUM 100 MILLIGRAM(S): 100 TABLET, FILM COATED ORAL at 05:49

## 2017-06-19 NOTE — PROGRESS NOTE ADULT - PROBLEM SELECTOR PLAN 1
- Pt seen and examined  - no acute sign of infection  - Ulcer stable. No pod sx intervention planned  - continue with Santyl and local wound care, gauze and comfort, Plesase don't use mepilex, maceration periwound noted.  - Continue with local wound care.   - Angio tues, will f/u.

## 2017-06-19 NOTE — PROGRESS NOTE ADULT - SUBJECTIVE AND OBJECTIVE BOX
Pt resting comfort in bed.    Vital Signs Last 24 Hrs  T(C): 36.9, Max: 37.3  HR: 60 - 63  BP: 145/54 (137/69 - 153/60)  RR: 20   SpO2: 96%     I & Os for 24h ending 18 Jun 2017 07:00  =============================================  IN:  Total IN: 0 ml  ---------------------------------------------  OUT:    Voided: 975 ml  ---------------------------------------------  Total NET: -975 ml    I & Os for current day (as of 19 Jun 2017 06:43)  =============================================  Total IN: 0 ml  ---------------------------------------------  OUT:    Voided: 950 ml  ---------------------------------------------  Total NET: -950 ml    PHYSICAL EXAM:  Pt awake and alert  RLE minimal erythema, right AT signal on Doppler    89M with right first toe ulcer, RLE cellulitis  - Wound as per Podiatry  - continue antibiotics  - angiogram tomorrow VASCULAR SURGERY NOTE     Pt resting comfort in bed.Pt states right leg redness sig improved and feeling better    Vital Signs Last 24 Hrs  T(C): 36.9, Max: 37.3  HR: 60 - 63  BP: 145/54 (137/69 - 153/60)  RR: 20   SpO2: 96%     I & Os for 24h ending 18 Jun 2017 07:00  =============================================  IN:  Total IN: 0 ml  ---------------------------------------------  OUT:    Voided: 975 ml  ---------------------------------------------  Total NET: -975 ml    I & Os for current day (as of 19 Jun 2017 06:43)  =============================================  Total IN: 0 ml  ---------------------------------------------  OUT:    Voided: 950 ml  ---------------------------------------------  Total NET: -950 ml               MEDICATIONS  (STANDING):  piperacillin/tazobactam IVPB. 3.375Gram(s) IV Intermittent every 8 hours  clopidogrel Tablet 75milliGRAM(s) Oral daily  heparin  Injectable 5000Unit(s) SubCutaneous every 8 hours  insulin lispro (HumaLOG) corrective regimen sliding scale  SubCutaneous at bedtime  insulin lispro (HumaLOG) corrective regimen sliding scale  SubCutaneous three times a day before meals  furosemide    Tablet 20milliGRAM(s) Oral two times a day  hydrALAZINE 10milliGRAM(s) Oral two times a day  simvastatin 20milliGRAM(s) Oral at bedtime  cilostazol 50milliGRAM(s) Oral two times a day  brimonidine 0.2% Ophthalmic Solution 1Drop(s) Right EYE two times a day  timolol 0.5% Solution 1Drop(s) Right EYE two times a day  collagenase Ointment 1Application(s) Topical daily  losartan 100milliGRAM(s) Oral daily               10.0   9.74  )-----------( 233      ( 19 Jun 2017 05:05 )             31.4   06-19    140  |  100  |  33<H>  ----------------------------<  143<H>  3.4<L>   |  26  |  1.63<H>    Ca    9.0      19 Jun 2017 05:05  Phos  2.9     06-19  Mg     1.8     06-19    PT/INR - ( 19 Jun 2017 05:05 )   PT: 13.2 SEC;   INR: 1.17          PTT - ( 19 Jun 2017 05:05 )  PTT:28.4 SEC    PHYSICAL EXAM:  Neuro A and O x3  Pt awake and alert  Abd benidn  ID right leg cellultis sig improved  only in the 6-8 cm above the ankle  Vascular right AT signal on Doppler wound dressing c/d/i/    89M with right first toe ulcer, RLE cellulitis  - Wound as per Podiatry  - continue antibiotics  - angiogram tomorrow

## 2017-06-19 NOTE — CONSULT NOTE ADULT - ASSESSMENT
89 yo M       to be d/w attending 89 yo M DM2, HTN, s/p PPM, PAD with RLE erythema/edema, on Abx for cellulitis, awaiting angiogram/possible angioplasty tomorrow. Noted to have rising creatinine, likely SCOT on CKD (II - III?).     SCOT on CKD  - would obtain urinalysis, and urine studies to calculate FeUrea (as patient on diuretics)  - strict INOs, no suprapubic tenderness/fullness and is making urine, however could also consider renal ultrasound to r/o hydronephrosis  - as patient does not appear fluid overloaded, please consider holding lasix at this time  - home medications reviewed with patient and aide at bedside, please consider discontinuing losartan   - renal dose medications, avoid nephrotoxins  - minimize contrast load if possible during angiogram, consider administration of IV fluids pre and post procedure to decrease risk of contrast induced nephropathy    to be d/w attending 89 yo M DM2, HTN, s/p PPM, PAD with RLE erythema/edema, on Abx for cellulitis, awaiting angiogram/possible angioplasty tomorrow. Noted to have rising creatinine, likely SCOT on CKD (II - III?).     SCOT on CKD  - would obtain urinalysis, and urine studies to calculate FeUrea (as patient on diuretics)  - strict INOs, no suprapubic tenderness/fullness and is making urine, however could also consider renal ultrasound to r/o hydronephrosis  - as patient does not appear fluid overloaded, please consider holding lasix at this time  - home medications reviewed with patient and aide at bedside, please consider discontinuing losartan   - renal dose medications, avoid nephrotoxins  - minimize contrast load if possible during angiogram, consider administration of IV fluids pre and post procedure to decrease risk of contrast induced nephropathy    d/w renal attending, plan d/w Surgical PA...

## 2017-06-19 NOTE — CONSULT NOTE ADULT - SUBJECTIVE AND OBJECTIVE BOX
[facility] DIVISION OF KIDNEY DISEASES AND HYPERTENSION -- INITIAL CONSULT NOTE  --------------------------------------------------------------------------------  HPI:        PAST HISTORY  --------------------------------------------------------------------------------  PAST MEDICAL & SURGICAL HISTORY:  Duodenal ulcer  DM (diabetes mellitus)  HTN (hypertension)  S/P appendectomy: 50 yrs ago    FAMILY HISTORY:  No pertinent family history in first degree relatives    PAST SOCIAL HISTORY:        ALLERGIES & MEDICATIONS  --------------------------------------------------------------------------------  Allergies  No Known Allergies  Intolerances    Standing Inpatient Medications  piperacillin/tazobactam IVPB. 3.375Gram(s) IV Intermittent every 8 hours  clopidogrel Tablet 75milliGRAM(s) Oral daily  heparin  Injectable 5000Unit(s) SubCutaneous every 8 hours  insulin lispro (HumaLOG) corrective regimen sliding scale  SubCutaneous at bedtime  insulin lispro (HumaLOG) corrective regimen sliding scale  SubCutaneous three times a day before meals  furosemide    Tablet 20milliGRAM(s) Oral two times a day  hydrALAZINE 10milliGRAM(s) Oral two times a day  simvastatin 20milliGRAM(s) Oral at bedtime  cilostazol 50milliGRAM(s) Oral two times a day  brimonidine 0.2% Ophthalmic Solution 1Drop(s) Right EYE two times a day  timolol 0.5% Solution 1Drop(s) Right EYE two times a day  collagenase Ointment 1Application(s) Topical daily  losartan 100milliGRAM(s) Oral daily    PRN Inpatient Medications      REVIEW OF SYSTEMS  --------------------------------------------------------------------------------  Gen:   Skin:   Head/Eyes/Ears/Mouth:   Respiratory:   CV:   GI:   :   MSK:   Neuro:  Heme:   Endo:   Psych:     All other systems were reviewed and are negative, except as noted.    VITALS/PHYSICAL EXAM  --------------------------------------------------------------------------------  T(C): 36.5, Max: 37.3 (06-19 @ 01:42)  HR: 59 (59 - 63)  BP: 123/52 (123/52 - 153/60)  RR: 19 (17 - 20)  SpO2: 95% (95% - 100%)  Wt(kg): --        I & Os for current day (as of 06-19-17 @ 09:49)  =============================================  IN: 0 ml / OUT: 950 ml / NET: -950 ml    Physical Exam:  	Gen:  	HEENT:   	Pulm:   	CV:   	Back:   	Abd:   	:  	UE:   	LE:   	Neuro:   	Psych:   	Skin:   	Vascular access:    LABS/STUDIES  --------------------------------------------------------------------------------              10.0   9.74  >-----------<  233      [06-19-17 @ 05:05]              31.4     140  |  100  |  33  ----------------------------<  143      [06-19-17 @ 05:05]  3.4   |  26  |  1.63        Ca     9.0     [06-19-17 @ 05:05]      Mg     1.8     [06-19-17 @ 05:05]      Phos  2.9     [06-19-17 @ 05:05]      PT/INR: PT 13.2 , INR 1.17       [06-19-17 @ 05:05]  PTT: 28.4       [06-19-17 @ 05:05]      Creatinine Trend:  SCr 1.63 [06-19 @ 05:05]  SCr 1.37 [06-18 @ 05:15]  SCr 1.19 [06-17 @ 05:23]  SCr 1.24 [06-16 @ 07:52]    Urinalysis - [11-25-15 @ 06:30]      Color YELLOW / Appearance CLEAR / SG 1.019 / pH 5.5      Gluc NEGATIVE / Ketone NEGATIVE  / Bili NEGATIVE / Urobili NORMAL       Blood NEGATIVE / Protein 100 / Leuk Est NEGATIVE / Nitrite NEGATIVE      RBC 0-2 / WBC 0-2 / Hyaline  / Gran  / Sq Epi  / Non Sq Epi  / Bacteria       HbA1c 6.6      [06-17-17 @ 05:23] [facility] DIVISION OF KIDNEY DISEASES AND HYPERTENSION -- INITIAL CONSULT NOTE  --------------------------------------------------------------------------------  HPI: 89 yo M DM2 (A1C 6.6, on PO medications), HTN, s/p PPM, PAD, with RLE swelling and erythema for >1 month, sent in by Dr. Aguilar for angiogram/possible angioplasty (had right NIMO .48 as outpatient). Seen here by podiatry, no acute signs of infection, no podiatry surgical intervention planned at this time. On Zosyn, denies fever, chills, reports swelling and redness in RLE has improved. Planned for angiogram tomorrow...    Cr 1.63 today from 1.37 yesterday. Unclear baseline, but was 1.24 on admission, 1.02 in November 2015. Denies prior history of kidney problems, does not have a nephrologist. Denies dysuria, hesitancy or any other issues with urination. Reports normal PO intake. Did have 1 episode of diarrhea this today.       Home medications: furosemide 20 mg bid, Plavix 75mg, glipizide XL 5 mg, hydralazine 10 mg bid, simvastatin 20 mg bid, Cilostazol 50 mg bid, sucralfate 1 g, combigan eye drops (right eye).       PAST HISTORY  --------------------------------------------------------------------------------  PAST MEDICAL & SURGICAL HISTORY:  Duodenal ulcer  DM (diabetes mellitus)  HTN (hypertension)  S/P appendectomy: 50 yrs ago    FAMILY HISTORY:  No pertinent family history in first degree relatives    PAST SOCIAL HISTORY:  Lives alone, has aide at apartment, denies smoking/alcohol/drugs.      ALLERGIES & MEDICATIONS  --------------------------------------------------------------------------------  Allergies  No Known Allergies  Intolerances    Standing Inpatient Medications  piperacillin/tazobactam IVPB. 3.375Gram(s) IV Intermittent every 8 hours  clopidogrel Tablet 75milliGRAM(s) Oral daily  heparin  Injectable 5000Unit(s) SubCutaneous every 8 hours  insulin lispro (HumaLOG) corrective regimen sliding scale  SubCutaneous at bedtime  insulin lispro (HumaLOG) corrective regimen sliding scale  SubCutaneous three times a day before meals  furosemide    Tablet 20milliGRAM(s) Oral two times a day  hydrALAZINE 10milliGRAM(s) Oral two times a day  simvastatin 20milliGRAM(s) Oral at bedtime  cilostazol 50milliGRAM(s) Oral two times a day  brimonidine 0.2% Ophthalmic Solution 1Drop(s) Right EYE two times a day  timolol 0.5% Solution 1Drop(s) Right EYE two times a day  collagenase Ointment 1Application(s) Topical daily  losartan 100milliGRAM(s) Oral daily    PRN Inpatient Medications      REVIEW OF SYSTEMS  --------------------------------------------------------------------------------  Gen: No fever/chills  Skin: RLE erythema improving  Head/Eyes/Ears/Mouth: no issues  Respiratory: No cough/shortness of breath  CV: No chest pain/palpitations, RLE edema improved  GI: No nausea/vomiting/abdominal pain, 1 episode of diarrhea  : No dysuria/hesitancy/incontinence  MSK: No joint pains  Neuro: No headache/no focal weakness, has decreased sensation RLE  Heme: No bleeding  Endo: diabetes well controlled  Psych: no issues    All other systems were reviewed and are negative, except as noted.    VITALS/PHYSICAL EXAM  --------------------------------------------------------------------------------  T(C): 36.5, Max: 37.3 (06-19 @ 01:42)  HR: 59 (59 - 63)  BP: 123/52 (123/52 - 153/60)  RR: 19 (17 - 20)  SpO2: 95% (95% - 100%)  Wt(kg): --        I & Os for current day (as of 06-19-17 @ 09:49)  =============================================  IN: 0 ml / OUT: 950 ml / NET: -950 ml    Physical Exam:  	Gen: Elderly male, NAD sitting in chair, protective "boot" on right foot  	HEENT: EOMI, MMM  	Pulm: No respiratory distress on RA, speaking in full sentences, CTAB  	CV: S1S2, RRR +PPM  	Back: non-tender to palpation, no R CVA tenderness, very mild L CVA tenderness  	Abd: soft, non-tender to palpation, non-tender +BS  	: no suprapubic tenderness, no tee  	UE: no UE edema  	LE: RLE erythema to mid shin, not warm, no drainage, no LE edema, right foot sub 1st metatarsal head ulcer, granular base, no purulence, no necrosis, right great toe slightly cool to the touch  	Neuro: Alert, orientated, conversant, moving all extremities, decreased sensation to light touch RLE  	Psych: calm  	Skin: ecchymosis forearms, RLE as above  	Vascular access: N/A    LABS/STUDIES  --------------------------------------------------------------------------------              10.0   9.74  >-----------<  233      [06-19-17 @ 05:05]              31.4     140  |  100  |  33  ----------------------------<  143      [06-19-17 @ 05:05]  3.4   |  26  |  1.63        Ca     9.0     [06-19-17 @ 05:05]      Mg     1.8     [06-19-17 @ 05:05]      Phos  2.9     [06-19-17 @ 05:05]      PT/INR: PT 13.2 , INR 1.17       [06-19-17 @ 05:05]  PTT: 28.4       [06-19-17 @ 05:05]      Creatinine Trend:  SCr 1.63 [06-19 @ 05:05]  SCr 1.37 [06-18 @ 05:15]  SCr 1.19 [06-17 @ 05:23]  SCr 1.24 [06-16 @ 07:52]    Urinalysis - [11-25-15 @ 06:30]      Color YELLOW / Appearance CLEAR / SG 1.019 / pH 5.5      Gluc NEGATIVE / Ketone NEGATIVE  / Bili NEGATIVE / Urobili NORMAL       Blood NEGATIVE / Protein 100 / Leuk Est NEGATIVE / Nitrite NEGATIVE      RBC 0-2 / WBC 0-2 / Hyaline  / Gran  / Sq Epi  / Non Sq Epi  / Bacteria       HbA1c 6.6      [06-17-17 @ 05:23] [facility] DIVISION OF KIDNEY DISEASES AND HYPERTENSION -- INITIAL CONSULT NOTE  --------------------------------------------------------------------------------  HPI: 89 yo M DM2 (A1C 6.6, on PO medications), HTN, s/p PPM, PAD, with RLE swelling and erythema for >1 month, sent in by Dr. Aguilar for angiogram/possible angioplasty (had right NIMO .48 as outpatient). Seen here by podiatry, no acute signs of infection, no podiatry surgical intervention planned at this time. On Zosyn, denies fever, chills, reports swelling and redness in RLE has improved. Planned for angiogram tomorrow...    Cr 1.63 today from 1.37 yesterday. Unclear baseline, but was 1.24 on admission, 1.02 in November 2015. Denies prior history of kidney problems, does not have a nephrologist. Denies dysuria, hesitancy or any other issues with urination. Reports normal PO intake. Did have 1 episode of diarrhea this today.       Home medications: furosemide 20 mg bid, Plavix 75mg, glipizide XL 5 mg, hydralazine 10 mg bid, simvastatin 20 mg bid, Cilostazol 50 mg bid, sucralfate 1 g, combigan eye drops (right eye).       PAST HISTORY  --------------------------------------------------------------------------------  PAST MEDICAL & SURGICAL HISTORY:  Duodenal ulcer  DM (diabetes mellitus)  HTN (hypertension)  S/P appendectomy: 50 yrs ago    FAMILY HISTORY:  No pertinent family history in first degree relatives    PAST SOCIAL HISTORY:  Lives alone, has aide at apartment, denies smoking/alcohol/drugs.      ALLERGIES & MEDICATIONS  --------------------------------------------------------------------------------  Allergies  No Known Allergies  Intolerances    Standing Inpatient Medications  piperacillin/tazobactam IVPB. 3.375Gram(s) IV Intermittent every 8 hours  clopidogrel Tablet 75milliGRAM(s) Oral daily  heparin  Injectable 5000Unit(s) SubCutaneous every 8 hours  insulin lispro (HumaLOG) corrective regimen sliding scale  SubCutaneous at bedtime  insulin lispro (HumaLOG) corrective regimen sliding scale  SubCutaneous three times a day before meals  furosemide    Tablet 20milliGRAM(s) Oral two times a day  hydrALAZINE 10milliGRAM(s) Oral two times a day  simvastatin 20milliGRAM(s) Oral at bedtime  cilostazol 50milliGRAM(s) Oral two times a day  brimonidine 0.2% Ophthalmic Solution 1Drop(s) Right EYE two times a day  timolol 0.5% Solution 1Drop(s) Right EYE two times a day  collagenase Ointment 1Application(s) Topical daily  losartan 100milliGRAM(s) Oral daily    PRN Inpatient Medications      REVIEW OF SYSTEMS  --------------------------------------------------------------------------------  Gen: No fever/chills  Skin: RLE erythema improving  Head/Eyes/Ears/Mouth: no issues  Respiratory: No cough/shortness of breath  CV: No chest pain/palpitations, RLE edema improved  GI: No nausea/vomiting/abdominal pain, 1 episode of diarrhea  : No dysuria/hesitancy/incontinence  MSK: No joint pains  Neuro: No headache/no focal weakness, has decreased sensation RLE  Heme: No bleeding  Endo: diabetes well controlled  Psych: no issues    All other systems were reviewed and are negative, except as noted.    VITALS/PHYSICAL EXAM  --------------------------------------------------------------------------------  T(C): 36.5, Max: 37.3 (06-19 @ 01:42)  HR: 59 (59 - 63)  BP: 123/52 (123/52 - 153/60)  RR: 19 (17 - 20)  SpO2: 95% (95% - 100%)  Wt(kg): --        I & Os for current day (as of 06-19-17 @ 09:49)  =============================================  IN: 0 ml / OUT: 950 ml / NET: -950 ml    Physical Exam:  	Gen: Elderly male, NAD sitting in chair, protective "boot" on right foot  	HEENT: EOMI, MMM  	Pulm: No respiratory distress on RA, speaking in full sentences, CTAB  	CV: S1S2, RRR +PPM  	Back: non-tender to palpation, no R CVA tenderness, very mild L CVA tenderness  	Abd: soft, non-tender to palpation, non-tender +BS  	: no suprapubic tenderness, no tee  	UE: no UE edema  	LE: RLE erythema to mid shin, not warm, no drainage, no LE edema, right foot sub 1st metatarsal head ulcer, granular base, no purulence, no necrosis, right great toe slightly cool to the touch  	Neuro: Alert, orientated, conversant, moving all extremities, decreased sensation to light touch RLE  	Psych: calm  	Skin: ecchymosis forearms, RLE as above    LABS/STUDIES  --------------------------------------------------------------------------------              10.0   9.74  >-----------<  233      [06-19-17 @ 05:05]              31.4     140  |  100  |  33  ----------------------------<  143      [06-19-17 @ 05:05]  3.4   |  26  |  1.63        Ca     9.0     [06-19-17 @ 05:05]      Mg     1.8     [06-19-17 @ 05:05]      Phos  2.9     [06-19-17 @ 05:05]      PT/INR: PT 13.2 , INR 1.17       [06-19-17 @ 05:05]  PTT: 28.4       [06-19-17 @ 05:05]      Creatinine Trend:  SCr 1.63 [06-19 @ 05:05]  SCr 1.37 [06-18 @ 05:15]  SCr 1.19 [06-17 @ 05:23]  SCr 1.24 [06-16 @ 07:52]    Urinalysis - [11-25-15 @ 06:30]      Color YELLOW / Appearance CLEAR / SG 1.019 / pH 5.5      Gluc NEGATIVE / Ketone NEGATIVE  / Bili NEGATIVE / Urobili NORMAL       Blood NEGATIVE / Protein 100 / Leuk Est NEGATIVE / Nitrite NEGATIVE      RBC 0-2 / WBC 0-2 / Hyaline  / Gran  / Sq Epi  / Non Sq Epi  / Bacteria       HbA1c 6.6      [06-17-17 @ 05:23]

## 2017-06-19 NOTE — CONSULT NOTE ADULT - SUBJECTIVE AND OBJECTIVE BOX
Patient is a 89y old  Male who presents with a chief complaint of RLE swelling (2017 21:55)  PI: 89 yo M DM2 (A1C 6.6, on PO medications), HTN, s/p PPM, PAD, TIA, CAD with RLE swelling and erythema for >1 month, sent in by Dr. Aguilar for angiogram/possible angioplasty (had right NIMO .48 as outpatient). Seen here by podiatry, no acute signs of infection, no podiatry surgical intervention planned at this time. On Zosyn, denies fever, chills, reports swelling and redness in RLE has improved. Planned for angiogram tomorrow...    Cr 1.63 today from 1.37 yesterday. Cardiology consulted fo evaluation prior to angiogram .      Home medications: furosemide 20 mg bid, Plavix 75mg, glipizide XL 5 mg, hydralazine 10 mg bid, simvastatin 20 mg bid, Cilostazol 50 mg bid, sucralfate 1 g, combigan    HPI:    PAST MEDICAL & SURGICAL HISTORY:  Duodenal ulcer  DM (diabetes mellitus)  HTN (hypertension)  S/P appendectomy: 50 yrs ago      Allergies    No Known Allergies    Intolerances      MEDICATIONS  (STANDING):  piperacillin/tazobactam IVPB. 3.375Gram(s) IV Intermittent every 8 hours  heparin  Injectable 5000Unit(s) SubCutaneous every 8 hours  insulin lispro (HumaLOG) corrective regimen sliding scale  SubCutaneous at bedtime  insulin lispro (HumaLOG) corrective regimen sliding scale  SubCutaneous three times a day before meals  hydrALAZINE 10milliGRAM(s) Oral two times a day  simvastatin 20milliGRAM(s) Oral at bedtime  brimonidine 0.2% Ophthalmic Solution 1Drop(s) Right EYE two times a day  timolol 0.5% Solution 1Drop(s) Right EYE two times a day  collagenase Ointment 1Application(s) Topical daily  sodium chloride 0.9%. 1000milliLiter(s) IV Continuous <Continuous>  acetylcysteine  Oral Solution 1200milliGRAM(s) Oral two times a day    MEDICATIONS  (PRN):      Vital Signs Last 24 Hrs  T(C): 36.9, Max: 37.3 ( @ 01:42)  T(F): 98.5, Max: 99.1 ( @ 01:42)  HR: 68 (59 - 68)  BP: 155/64 (123/52 - 155/64)  BP(mean): --  RR: 16 (16 - 20)  SpO2: 98% (95% - 100%)  Daily     Daily Weight in k.5 (2017 01:42)  I&O's Detail  I & Os for 24h ending 2017 07:00  =============================================  IN:    Total IN: 0 ml  ---------------------------------------------  OUT:    Voided: 950 ml    Total OUT: 950 ml  ---------------------------------------------  Total NET: -950 ml    I & Os for current day (as of 2017 17:44)  =============================================  IN:    Total IN: 0 ml  ---------------------------------------------  OUT:    Voided: 200 ml    Total OUT: 200 ml  ---------------------------------------------  Total NET: -200 ml      PHYSICAL EXAM:  Eyes:Anicteric   Neck:  No Sig JVD  Lungs:  CTA  Cor: SM 2/6   Abd:   Soft NT + BS  Ext:  Trace  Edema   B/l LE dressing Feet  Pulses: reduced  Neuro:  AxOx3    LABS  PT/INR - ( 2017 05:05 )   PT: 13.2 SEC;   INR: 1.17          PTT - ( 2017 05:05 )  PTT:28.4 SEC        CBC Full  -  ( 2017 05:05 )  WBC Count : 9.74 K/uL  Hemoglobin : 10.0 g/dL  Hematocrit : 31.4 %  Platelet Count - Automated : 233 K/uL  Mean Cell Volume : 99.1 fL  Mean Cell Hemoglobin : 31.5 pg  Mean Cell Hemoglobin Concentration : 31.8 %  Auto Neutrophil # : x  Auto Lymphocyte # : x  Auto Monocyte # : x  Auto Eosinophil # : x  Auto Basophil # : x  Auto Neutrophil % : x  Auto Lymphocyte % : x  Auto Monocyte % : x  Auto Eosinophil % : x  Auto Basophil % : x    -    140  |  100  |  33<H>  ----------------------------<  143<H>  3.4<L>   |  26  |  1.63<H>    Ca    9.0      2017 05:05  Phos  2.9       Mg     1.8           Lipid panel  TSH:  Digoxin level    ECHO  17  NL LV function , Mild AS ,Moderate MR , Severe TR , Severe Pulmonary HTN 78 mmHg     Nuclear Stress Old inferior MI ,  No Ischemia  NL EF  56%    ECG:  V- Paced     TELEMETRY: V-paced     RADIOLOGY & ADDITIONAL STUDIES:    ASSESSMENT & PLAN:  Patient with PAD for angiogram of RLE on 17. He is S/P recent cardiac work up with Old Inferior MI no clear evidence OF ischemia . Normal LV function. Echo with Severe TR ,Moderate MR ,Normal LV function & Pulmonary HTN .Agree with D/C  Diuretics and Cozaar . Restart Plavix after Angiogram if ok with Vascular .on IV hydration prior to Angiogram  , Lasix PRN  , Follow electrolytes ,Keep Potassium close to 4 mmol/l . will follow

## 2017-06-19 NOTE — PROGRESS NOTE PEDS - PROBLEM SELECTOR PLAN 1
- Pt seen and examined  - No acute sign of infection noted  - Ulcer stable. No pod sx intervention planned  - Santyl ordered  - Continue with local wound care. please dress with Gauze and comfort, notice some maceration with mepilex  - Angio tues will f/u.

## 2017-06-19 NOTE — PROGRESS NOTE PEDS - SUBJECTIVE AND OBJECTIVE BOX
Patient is a 89y old  Male who presents with a chief complaint of 'My Dr sent me  for redness of right leg' (16 Jun 2017 13:32)      HPI:  89 M w PVD DM HTN p/w 2 wk of RLE redness and swelling. Was seen by visiting doctor who d/w Dr. Aguilar who recommended pt come to the hospital for an angioplasty. Denies pain, fever or chills. Pt sees Dr. Schroeder regularly as out-pt, been applying aquacel ag daily, poor healing due to lack of blood flow.       PAST MEDICAL & SURGICAL HISTORY:  Duodenal ulcer  DM (diabetes mellitus)  HTN (hypertension)  S/P appendectomy: 50 yrs ago      MEDICATIONS  (STANDING):  piperacillin/tazobactam IVPB. 3.375Gram(s) IV Intermittent every 8 hours  clopidogrel Tablet 75milliGRAM(s) Oral daily  heparin  Injectable 5000Unit(s) SubCutaneous every 8 hours  insulin lispro (HumaLOG) corrective regimen sliding scale  SubCutaneous at bedtime  insulin lispro (HumaLOG) corrective regimen sliding scale  SubCutaneous three times a day before meals  furosemide    Tablet 20milliGRAM(s) Oral two times a day  hydrALAZINE 10milliGRAM(s) Oral two times a day  simvastatin 20milliGRAM(s) Oral at bedtime  cilostazol 50milliGRAM(s) Oral two times a day  brimonidine 0.2% Ophthalmic Solution 1Drop(s) Right EYE two times a day  timolol 0.5% Solution 1Drop(s) Right EYE two times a day  collagenase Ointment 1Application(s) Topical daily    MEDICATIONS  (PRN):    Allergies    No Known Allergies    Intolerances  ICU Vital Signs Last 24 Hrs  T(C): 36.9, Max: 37.3 (06-19 @ 01:42)  T(F): 98.5, Max: 99.1 (06-19 @ 01:42)  HR: 61 (60 - 63)  BP: 145/54 (137/69 - 153/60)  BP(mean): --  ABP: --  ABP(mean): --  RR: 20 (17 - 20)  SpO2: 96% (96% - 100%)      LABS:                                   10.0   9.74  )-----------( 233      ( 19 Jun 2017 05:05 )             31.4            LOWER EXTREMITY PHYSICAL EXAM:    Vasular: DP/PT 0/4, B/L, Temperature gradient slightly cool to R foot, B/L.   Neuro: Epicritic sensation diminished to the level of the forefoot, B/L.  Musculoskeletal/Ortho: No gross pedal deformities noted, B/L.   Skin: R foot sub 1st met head ulcer to subQ, granular base w/ mild fibrosis, slight undermining, slight maceration, no purulence, no erythema or edema, mild serous drainage, no necrosis, erythema noted to the R calf.      RADIOLOGY & ADDITIONAL STUDIES:  Xray foot pending

## 2017-06-19 NOTE — PROGRESS NOTE ADULT - SUBJECTIVE AND OBJECTIVE BOX
PRE-OP NOTE  Procedure: right leg angiogram, possible angioplasty, possible stent  Surgeon: Dr. Arthur    CXR:  Mild congestion and edema, small right pleural effusion. No gross left pleural effusion.   EC/19 paced  T&S:   UA: Pending               10.0   9.74  )-----------( 233                   31.4     140  |  100  |  33<H>  ----------------------------<  143<H>  3.4<L>   |  26  |  1.63<H>    Ca    9.0      Phos  2.9     Mg     1.8         89M with right hallux ulcer, cellulitis  - Plan for angiogram tomorrow, NPO p MD, IVF  - Increasing Creatinine, Nephrology consulted, mucomyst and IVF ordered, losartan and lasix D/C'ed  - Contacted PMD, Dr. Porter will evaluate patient, ok to D/C lasix  - Plavix and Pletal D/C'ed

## 2017-06-20 DIAGNOSIS — N18.4 CHRONIC KIDNEY DISEASE, STAGE 4 (SEVERE): ICD-10-CM

## 2017-06-20 DIAGNOSIS — N18.3 CHRONIC KIDNEY DISEASE, STAGE 3 (MODERATE): ICD-10-CM

## 2017-06-20 LAB
APPEARANCE UR: CLEAR — SIGNIFICANT CHANGE UP
APTT BLD: 26.5 SEC — LOW (ref 27.5–37.4)
BILIRUB UR-MCNC: NEGATIVE — SIGNIFICANT CHANGE UP
BLOOD UR QL VISUAL: NEGATIVE — SIGNIFICANT CHANGE UP
BUN SERPL-MCNC: 32 MG/DL — HIGH (ref 7–23)
CALCIUM SERPL-MCNC: 8.9 MG/DL — SIGNIFICANT CHANGE UP (ref 8.4–10.5)
CHLORIDE SERPL-SCNC: 104 MMOL/L — SIGNIFICANT CHANGE UP (ref 98–107)
CO2 SERPL-SCNC: 26 MMOL/L — SIGNIFICANT CHANGE UP (ref 22–31)
COLOR SPEC: YELLOW — SIGNIFICANT CHANGE UP
CREAT SERPL-MCNC: 1.57 MG/DL — HIGH (ref 0.5–1.3)
EOSINOPHIL NFR URNS MANUAL: SIGNIFICANT CHANGE UP (ref 0–0)
GLUCOSE SERPL-MCNC: 137 MG/DL — HIGH (ref 70–99)
GLUCOSE UR-MCNC: NEGATIVE — SIGNIFICANT CHANGE UP
HCT VFR BLD CALC: 32.2 % — LOW (ref 39–50)
HGB BLD-MCNC: 10.4 G/DL — LOW (ref 13–17)
HYALINE CASTS # UR AUTO: SIGNIFICANT CHANGE UP (ref 0–?)
INR BLD: 1.1 — SIGNIFICANT CHANGE UP (ref 0.88–1.17)
KETONES UR-MCNC: NEGATIVE — SIGNIFICANT CHANGE UP
LEUKOCYTE ESTERASE UR-ACNC: NEGATIVE — SIGNIFICANT CHANGE UP
MCHC RBC-ENTMCNC: 32 PG — SIGNIFICANT CHANGE UP (ref 27–34)
MCHC RBC-ENTMCNC: 32.3 % — SIGNIFICANT CHANGE UP (ref 32–36)
MCV RBC AUTO: 99.1 FL — SIGNIFICANT CHANGE UP (ref 80–100)
MUCOUS THREADS # UR AUTO: SIGNIFICANT CHANGE UP
NITRITE UR-MCNC: NEGATIVE — SIGNIFICANT CHANGE UP
PH UR: 6 — SIGNIFICANT CHANGE UP (ref 4.6–8)
PLATELET # BLD AUTO: 225 K/UL — SIGNIFICANT CHANGE UP (ref 150–400)
PMV BLD: 11 FL — SIGNIFICANT CHANGE UP (ref 7–13)
POTASSIUM SERPL-MCNC: 3.8 MMOL/L — SIGNIFICANT CHANGE UP (ref 3.5–5.3)
POTASSIUM SERPL-SCNC: 3.8 MMOL/L — SIGNIFICANT CHANGE UP (ref 3.5–5.3)
PROT UR-MCNC: 30 — SIGNIFICANT CHANGE UP
PROTHROM AB SERPL-ACNC: 12.4 SEC — SIGNIFICANT CHANGE UP (ref 9.8–13.1)
RBC # BLD: 3.25 M/UL — LOW (ref 4.2–5.8)
RBC # FLD: 12.5 % — SIGNIFICANT CHANGE UP (ref 10.3–14.5)
RBC CASTS # UR COMP ASSIST: SIGNIFICANT CHANGE UP (ref 0–?)
SODIUM SERPL-SCNC: 144 MMOL/L — SIGNIFICANT CHANGE UP (ref 135–145)
SP GR SPEC: 1.01 — SIGNIFICANT CHANGE UP (ref 1–1.03)
UROBILINOGEN FLD QL: 0.2 E.U. — SIGNIFICANT CHANGE UP (ref 0.1–0.2)
WBC # BLD: 8.87 K/UL — SIGNIFICANT CHANGE UP (ref 3.8–10.5)
WBC # FLD AUTO: 8.87 K/UL — SIGNIFICANT CHANGE UP (ref 3.8–10.5)
WBC UR QL: SIGNIFICANT CHANGE UP (ref 0–?)

## 2017-06-20 PROCEDURE — 99232 SBSQ HOSP IP/OBS MODERATE 35: CPT

## 2017-06-20 PROCEDURE — 99233 SBSQ HOSP IP/OBS HIGH 50: CPT | Mod: GC

## 2017-06-20 RX ORDER — ASCORBIC ACID 60 MG
500 TABLET,CHEWABLE ORAL DAILY
Qty: 0 | Refills: 0 | Status: COMPLETED | OUTPATIENT
Start: 2017-06-20 | End: 2017-06-23

## 2017-06-20 RX ORDER — ACETYLCYSTEINE 200 MG/ML
1200 VIAL (ML) MISCELLANEOUS
Qty: 0 | Refills: 0 | Status: COMPLETED | OUTPATIENT
Start: 2017-06-20 | End: 2017-06-22

## 2017-06-20 RX ORDER — SODIUM CHLORIDE 9 MG/ML
1000 INJECTION, SOLUTION INTRAVENOUS
Qty: 0 | Refills: 0 | Status: DISCONTINUED | OUTPATIENT
Start: 2017-06-21 | End: 2017-06-22

## 2017-06-20 RX ADMIN — Medication: at 07:38

## 2017-06-20 RX ADMIN — Medication 1200 MILLIGRAM(S): at 06:39

## 2017-06-20 RX ADMIN — HEPARIN SODIUM 5000 UNIT(S): 5000 INJECTION INTRAVENOUS; SUBCUTANEOUS at 05:08

## 2017-06-20 RX ADMIN — PIPERACILLIN AND TAZOBACTAM 25 GRAM(S): 4; .5 INJECTION, POWDER, LYOPHILIZED, FOR SOLUTION INTRAVENOUS at 14:13

## 2017-06-20 RX ADMIN — HEPARIN SODIUM 5000 UNIT(S): 5000 INJECTION INTRAVENOUS; SUBCUTANEOUS at 23:39

## 2017-06-20 RX ADMIN — Medication 10 MILLIGRAM(S): at 18:28

## 2017-06-20 RX ADMIN — HEPARIN SODIUM 5000 UNIT(S): 5000 INJECTION INTRAVENOUS; SUBCUTANEOUS at 14:13

## 2017-06-20 RX ADMIN — Medication 2: at 18:26

## 2017-06-20 RX ADMIN — SODIUM CHLORIDE 30 MILLILITER(S): 9 INJECTION INTRAMUSCULAR; INTRAVENOUS; SUBCUTANEOUS at 11:49

## 2017-06-20 RX ADMIN — Medication 1 APPLICATION(S): at 16:25

## 2017-06-20 RX ADMIN — Medication 1200 MILLIGRAM(S): at 18:29

## 2017-06-20 RX ADMIN — SIMVASTATIN 20 MILLIGRAM(S): 20 TABLET, FILM COATED ORAL at 23:40

## 2017-06-20 RX ADMIN — PIPERACILLIN AND TAZOBACTAM 25 GRAM(S): 4; .5 INJECTION, POWDER, LYOPHILIZED, FOR SOLUTION INTRAVENOUS at 23:40

## 2017-06-20 RX ADMIN — Medication 500 MILLIGRAM(S): at 18:28

## 2017-06-20 RX ADMIN — BRIMONIDINE TARTRATE 1 DROP(S): 2 SOLUTION/ DROPS OPHTHALMIC at 18:29

## 2017-06-20 RX ADMIN — Medication 1 DROP(S): at 18:27

## 2017-06-20 RX ADMIN — Medication 1 DROP(S): at 05:10

## 2017-06-20 RX ADMIN — PIPERACILLIN AND TAZOBACTAM 25 GRAM(S): 4; .5 INJECTION, POWDER, LYOPHILIZED, FOR SOLUTION INTRAVENOUS at 05:10

## 2017-06-20 RX ADMIN — BRIMONIDINE TARTRATE 1 DROP(S): 2 SOLUTION/ DROPS OPHTHALMIC at 05:08

## 2017-06-20 RX ADMIN — Medication 10 MILLIGRAM(S): at 05:10

## 2017-06-20 NOTE — PROGRESS NOTE ADULT - ASSESSMENT
89M with right first toe ulcer, RLE cellulitis  - Wound as per Podiatry  - continue antibiotics  - angiogram tentative for today

## 2017-06-20 NOTE — PROGRESS NOTE ADULT - ASSESSMENT
89 yo M DM2, HTN, s/p PPM, PAD with RLE erythema/edema, on Abx for cellulitis, with SCOT on CKD, Cr stable (1.57 from 1.63), awaiting angiogram today, on NAC and small amount of IV fluids.     SCOT on CKD, awaiting angiogram  - c/w NAC and IV fluid hydration (pre and post procedure) ...   - continue to monitor renal fxn, UOP  - continue to hold losartan  - holding lasix, does not appear fluid overloaded, appreciate cardiology input    to be d/w attending 89 yo M DM2, HTN, s/p PPM, PAD with RLE erythema/edema, on Abx for cellulitis, with SCOT on CKD, Cr stable (1.57 from 1.63), awaiting angiogram today, on NAC and small amount of IV fluids.     SCOT on CKD, awaiting angiogram  - c/w NAC and IV fluid hydration (pre and post procedure) ...   - continue to monitor renal fxn, UOP  - continue to hold losartan  - holding lasix, does not appear fluid overloaded, appreciate cardiology input

## 2017-06-20 NOTE — PROGRESS NOTE ADULT - ASSESSMENT
89 year old male with a PMH of DM II and HTN on 6/16 a/w RLE cellulitis  CKD stage 3 89 year old male with a PMH of DM II and HTN on 6/16 a/w RLE cellulitis  CKD stage 4

## 2017-06-20 NOTE — PROGRESS NOTE ADULT - SUBJECTIVE AND OBJECTIVE BOX
Dr. Caitlin Layne  [facility] [phone]        Patient seen and examined sitting in chair, no complaints        MEDICATIONS  (STANDING):  piperacillin/tazobactam IVPB. 3.375Gram(s) IV Intermittent every 8 hours  heparin  Injectable 5000Unit(s) SubCutaneous every 8 hours  insulin lispro (HumaLOG) corrective regimen sliding scale  SubCutaneous at bedtime  insulin lispro (HumaLOG) corrective regimen sliding scale  SubCutaneous three times a day before meals  hydrALAZINE 10milliGRAM(s) Oral two times a day  simvastatin 20milliGRAM(s) Oral at bedtime  brimonidine 0.2% Ophthalmic Solution 1Drop(s) Right EYE two times a day  timolol 0.5% Solution 1Drop(s) Right EYE two times a day  collagenase Ointment 1Application(s) Topical daily  sodium chloride 0.9%. 1000milliLiter(s) IV Continuous <Continuous>      VITAL:  T(C): , Max: 37.1 ( @ 05:00)  T(F): , Max: 98.8 ( @ 05:00)  HR: 90  BP: 151/56  BP(mean): --  RR: 17  SpO2: 100%  Wt(kg): --    I and O's:  I & Os for 24h ending  @ 07:00  =============================================  IN: 820 ml / OUT: 400 ml / NET: 420 ml    I & Os for current day (as of  @ 12:05)  =============================================  IN: 0 ml / OUT: 200 ml / NET: -200 ml    Height (cm): 177.8 ( @ 23:35)  Weight (kg): 75.1 ( @ 23:35)  BMI (kg/m2): 23.8 ( @ 23:35)  BSA (m2): 1.93 ( @ 23:35)    PHYSICAL EXAM:    Constitutional: NAD  HEENT: PERRLA    Neck:  No JVD  Respiratory: CTAB/L  Cardiovascular: S1 and S2  Gastrointestinal: BS+, soft, NT/ND  Extremities: No peripheral edema; +erythema b/l  : No Isaacs  Skin: No rashes      LABS:                        10.4   8.87  )-----------( 225      ( 2017 05:13 )             32.2     06-20    144  |  104  |  32<H>  ----------------------------<  137<H>  3.8   |  26  |  1.57<H>    Ca    8.9      2017 05:13  Phos  2.9     06-19  Mg     1.8     06-19            Urine Studies:  Urinalysis Basic - ( 2017 00:50 )    Color: YELLOW / Appearance: CLEAR / S.015 / pH: 6.0  Gluc: NEGATIVE / Ketone: NEGATIVE  / Bili: NEGATIVE / Urobili: 0.2 E.U.   Blood: NEGATIVE / Protein: 30 / Nitrite: NEGATIVE   Leuk Esterase: NEGATIVE / RBC: 0-2 / WBC 2-5   Sq Epi: x / Non Sq Epi: x / Bacteria: x Dr. Caitlin Layne  [facility] [phone]    Asked by surgical residents to assume care        Patient seen and examined sitting in chair, no complaints        MEDICATIONS  (STANDING):  piperacillin/tazobactam IVPB. 3.375Gram(s) IV Intermittent every 8 hours  heparin  Injectable 5000Unit(s) SubCutaneous every 8 hours  insulin lispro (HumaLOG) corrective regimen sliding scale  SubCutaneous at bedtime  insulin lispro (HumaLOG) corrective regimen sliding scale  SubCutaneous three times a day before meals  hydrALAZINE 10milliGRAM(s) Oral two times a day  simvastatin 20milliGRAM(s) Oral at bedtime  brimonidine 0.2% Ophthalmic Solution 1Drop(s) Right EYE two times a day  timolol 0.5% Solution 1Drop(s) Right EYE two times a day  collagenase Ointment 1Application(s) Topical daily  sodium chloride 0.9%. 1000milliLiter(s) IV Continuous <Continuous>      VITAL:  T(C): , Max: 37.1 ( @ 05:00)  T(F): , Max: 98.8 ( @ 05:00)  HR: 90  BP: 151/56  BP(mean): --  RR: 17  SpO2: 100%  Wt(kg): --    I and O's:  I & Os for 24h ending  @ 07:00  =============================================  IN: 820 ml / OUT: 400 ml / NET: 420 ml    I & Os for current day (as of  @ 12:05)  =============================================  IN: 0 ml / OUT: 200 ml / NET: -200 ml    Height (cm): 177.8 ( @ 23:35)  Weight (kg): 75.1 ( @ 23:35)  BMI (kg/m2): 23.8 ( @ 23:35)  BSA (m2): 1.93 ( @ 23:35)    PHYSICAL EXAM:    Constitutional: NAD  HEENT: PERRLA    Neck:  No JVD  Respiratory: CTAB/L  Cardiovascular: S1 and S2  Gastrointestinal: BS+, soft, NT/ND  Extremities: No peripheral edema; +erythema b/l  : No Isaacs  Skin: No rashes      LABS:                        10.4   8.87  )-----------( 225      ( 2017 05:13 )             32.2         144  |  104  |  32<H>  ----------------------------<  137<H>  3.8   |  26  |  1.57<H>    Ca    8.9      2017 05:13  Phos  2.9       Mg     1.8                 Urine Studies:  Urinalysis Basic - ( 2017 00:50 )    Color: YELLOW / Appearance: CLEAR / S.015 / pH: 6.0  Gluc: NEGATIVE / Ketone: NEGATIVE  / Bili: NEGATIVE / Urobili: 0.2 E.U.   Blood: NEGATIVE / Protein: 30 / Nitrite: NEGATIVE   Leuk Esterase: NEGATIVE / RBC: 0-2 / WBC 2-5   Sq Epi: x / Non Sq Epi: x / Bacteria: x

## 2017-06-20 NOTE — PROGRESS NOTE ADULT - SUBJECTIVE AND OBJECTIVE BOX
[facility] DIVISION OF KIDNEY DISEASES AND HYPERTENSION -- FOLLOW UP NOTE  --------------------------------------------------------------------------------  Chief Complaint:    24 hour events/subjective:  No acute events overnight. Awaiting angiogram, was NPO for procedure. Denies CP/SOB/Palpitations, no LE edema. No fever/chills/nausea/vomiting. No issues urinating. Still with loose bowel movements (not watery). Some discomfort from sitting on the chair, PCA states will provide patient with cushion.       PAST HISTORY  --------------------------------------------------------------------------------  No significant changes to PMH, PSH, FHx, SHx, unless otherwise noted    ALLERGIES & MEDICATIONS  --------------------------------------------------------------------------------  Allergies  No Known Allergies    Standing Inpatient Medications  piperacillin/tazobactam IVPB. 3.375Gram(s) IV Intermittent every 8 hours  heparin  Injectable 5000Unit(s) SubCutaneous every 8 hours  insulin lispro (HumaLOG) corrective regimen sliding scale  SubCutaneous at bedtime  insulin lispro (HumaLOG) corrective regimen sliding scale  SubCutaneous three times a day before meals  hydrALAZINE 10milliGRAM(s) Oral two times a day  simvastatin 20milliGRAM(s) Oral at bedtime  brimonidine 0.2% Ophthalmic Solution 1Drop(s) Right EYE two times a day  timolol 0.5% Solution 1Drop(s) Right EYE two times a day  collagenase Ointment 1Application(s) Topical daily  sodium chloride 0.9%. 1000milliLiter(s) IV Continuous <Continuous>  acetylcysteine  Oral Solution 1200milliGRAM(s) Oral two times a day    PRN Inpatient Medications    REVIEW OF SYSTEMS  --------------------------------------------------------------------------------  Please see interval HPI    All other systems were reviewed and are negative, except as noted.    VITALS/PHYSICAL EXAM  --------------------------------------------------------------------------------  T(C): 37.1, Max: 37.1 (06-20 @ 05:00)  HR: 64 (59 - 68)  BP: 151/65 (126/62 - 155/64)  RR: 17 (16 - 17)  SpO2: 100% (96% - 100%)  Wt(kg): --  Height (cm): 177.8 (06-19-17 @ 23:35)  Weight (kg): 75.1 (06-19-17 @ 23:35)  BMI (kg/m2): 23.8 (06-19-17 @ 23:35)  BSA (m2): 1.93 (06-19-17 @ 23:35)    I & Os for 24h ending 06-20-17 @ 07:00  =============================================  IN: 820 ml / OUT: 400 ml / NET: 420 ml    I & Os for current day (as of 06-20-17 @ 10:34)  =============================================  IN: 0 ml / OUT: 200 ml / NET: -200 ml    Physical Exam:  	Gen: Elderly male, NAD sitting in chair, wearing boot on right foot  	HEENT: EOMI, MMM  	Pulm: No respiratory distress on RA, speaking in full sentences, CTAB  	CV: S1S2, RRR +PPM  	Abd: soft, non-tender to palpation, non-tender +BS  	: no suprapubic tenderness, no tee  	Extremities: RLE erythema to mid shin, no LE edema  	Neuro: Alert, orientated, conversant, moving all extremities  	Psych: calm  	Skin: ecchymosis forearms, R foot with sub 1st metatarsal head ulcer, dressing in place    LABS/STUDIES  --------------------------------------------------------------------------------              10.4   8.87  >-----------<  225      [06-20-17 @ 05:13]              32.2     144  |  104  |  32  ----------------------------<  137      [06-20-17 @ 05:13]  3.8   |  26  |  1.57        Ca     8.9     [06-20-17 @ 05:13]      Mg     1.8     [06-19-17 @ 05:05]      Phos  2.9     [06-19-17 @ 05:05]      PT/INR: PT 12.4 , INR 1.10       [06-20-17 @ 05:13]  PTT: 26.5       [06-20-17 @ 05:13]    Creatinine Trend:  SCr 1.57 [06-20 @ 05:13]  SCr 1.63 [06-19 @ 05:05]  SCr 1.37 [06-18 @ 05:15]  SCr 1.19 [06-17 @ 05:23]  SCr 1.24 [06-16 @ 07:52]    Urinalysis - [06-20-17 @ 00:50]      Color YELLOW / Appearance CLEAR / SG 1.015 / pH 6.0      Gluc NEGATIVE / Ketone NEGATIVE  / Bili NEGATIVE / Urobili 0.2       Blood NEGATIVE / Protein 30 / Leuk Est NEGATIVE / Nitrite NEGATIVE      RBC 0-2 / WBC 2-5 / Hyaline 2-5 / Gran  / Sq Epi  / Non Sq Epi  / Bacteria   Urine eosinophils: negative

## 2017-06-20 NOTE — PROGRESS NOTE ADULT - SUBJECTIVE AND OBJECTIVE BOX
Vascular Surgery daily Progress note    S: Patient resting comfortably. Pain controlled.     T(C): 37.1, Max: 37.1 (06-20 @ 05:00)  HR: 64 (59 - 68)  BP: 151/65 (123/52 - 155/64)  RR: 17 (16 - 19)  SpO2: 100% (95% - 100%)  Wt(kg): --  I&O's Detail  I & Os for 24h ending 19 Jun 2017 07:00  =============================================  IN:    Total IN: 0 ml  ---------------------------------------------  OUT:    Voided: 950 ml    Total OUT: 950 ml  ---------------------------------------------  Total NET: -950 ml    I & Os for current day (as of 20 Jun 2017 06:49)  =============================================  IN:    sodium chloride 0.9%.: 480 ml    Oral Fluid: 240 ml    IV PiggyBack: 100 ml    Total IN: 820 ml  ---------------------------------------------  OUT:    Voided: 400 ml    Total OUT: 400 ml  ---------------------------------------------  Total NET: 420 ml      Gen: NAD   Ext: improving RLE cellulitis    Vasc: R AT Dopp, L DP/PT Dopp Vascular Surgery daily Progress note    S: Patient resting comfortably. Pain controlled.     T(C): 37.1, Max: 37.1 (06-20 @ 05:00)  HR: 64 (59 - 68)  BP: 151/65 (123/52 - 155/64)  RR: 17 (16 - 19)  SpO2: 100% (95% - 100%)  Wt(kg): --  I&O's Detail  I & Os for 24h ending 19 Jun 2017 07:00  =============================================  IN:    Total IN: 0 ml  ---------------------------------------------  OUT:    Voided: 950 ml    Total OUT: 950 ml  ---------------------------------------------  Total NET: -950 ml    I & Os for current day (as of 20 Jun 2017 06:49)  =============================================  IN:    sodium chloride 0.9%.: 480 ml    Oral Fluid: 240 ml    IV PiggyBack: 100 ml    Total IN: 820 ml  ---------------------------------------------  OUT:    Voided: 400 ml    Total OUT: 400 ml  ---------------------------------------------  Total NET: 420 ml                          10.4   8.87  )-----------( 225      ( 20 Jun 2017 05:13 )             32.2     06-20    144  |  104  |  32<H>  ----------------------------<  137<H>  3.8   |  26  |  1.57<H>    Ca    8.9      20 Jun 2017 05:13  Phos  2.9     06-19  Mg     1.8     06-19    PT/INR - ( 20 Jun 2017 05:13 )   PT: 12.4 SEC;   INR: 1.10          PTT - ( 20 Jun 2017 05:13 )  PTT:26.5 SEC      Gen: NAD   Ext: improving RLE cellulitis    Vasc: R AT Dopp, L DP/PT Dopp no additional ischemic changes at this time

## 2017-06-20 NOTE — PROGRESS NOTE ADULT - PROBLEM SELECTOR PLAN 1
pt creatinine tends to fluctuate between 1.2-1.4  overall stable renal dysfunction  can start ppx for tentative procedure in AM w/ mucomyst, IVF (1ml/kg), and vit C on IV abx

## 2017-06-20 NOTE — PROGRESS NOTE ADULT - SUBJECTIVE AND OBJECTIVE BOX
FOLLOW UP VISIT:  SYMPTOMS:Patient comfortable , OOB to chair earlier ,   Denies Cp , Sob ,Dizziness or Palpitation   For RLE angiogram in AM     MEDICATIONS  (STANDING):  piperacillin/tazobactam IVPB. 3.375Gram(s) IV Intermittent every 8 hours  heparin  Injectable 5000Unit(s) SubCutaneous every 8 hours  insulin lispro (HumaLOG) corrective regimen sliding scale  SubCutaneous at bedtime  insulin lispro (HumaLOG) corrective regimen sliding scale  SubCutaneous three times a day before meals  hydrALAZINE 10milliGRAM(s) Oral two times a day  simvastatin 20milliGRAM(s) Oral at bedtime  brimonidine 0.2% Ophthalmic Solution 1Drop(s) Right EYE two times a day  timolol 0.5% Solution 1Drop(s) Right EYE two times a day  collagenase Ointment 1Application(s) Topical daily  acetylcysteine  Oral Solution 1200milliGRAM(s) Oral two times a day  ascorbic acid 500milliGRAM(s) Oral daily    MEDICATIONS  (PRN):      Vital Signs Last 24 Hrs  T(C): 36.3, Max: 37.1 (-20 @ 05:00)  T(F): 97.3, Max: 98.8 (06-20 @ 05:00)  HR: 60 (59 - 90)  BP: 161/66 (140/57 - 161/66)  BP(mean): --  RR: 18 (17 - 18)  SpO2: 100% (96% - 100%)    Daily Height in cm: 177.8 (2017 23:35)    Daily     I&O's Detail  I & Os for 24h ending 2017 07:00  =============================================  IN:    sodium chloride 0.9%: 480 ml    Oral Fluid: 240 ml    IV PiggyBack: 100 ml    Total IN: 820 ml  ---------------------------------------------  OUT:    Voided: 400 ml    Total OUT: 400 ml  ---------------------------------------------  Total NET: 420 ml    I & Os for current day (as of 2017 17:34)  =============================================  IN:    sodium chloride 0.9%: 120 ml    IV PiggyBack: 100 ml    Total IN: 220 ml  ---------------------------------------------  OUT:    Voided: 200 ml    Total OUT: 200 ml  ---------------------------------------------  Total NET: 20 ml      Physical Exam  Eyes  anicteric   Neck no JVD   Lungs  CTa   Cor  SM   3/6  +ppm  Abd  soft Nt + bs  Ext  trace edema  dressing in place   Pulses  reduced  Neuro AxOx3    LABS  PT/INR - ( 2017 05:13 )   PT: 12.4 SEC;   INR: 1.10          PTT - ( 2017 05:13 )  PTT:26.5 SEC  Urinalysis Basic - ( 2017 00:50 )    Color: YELLOW / Appearance: CLEAR / S.015 / pH: 6.0  Gluc: NEGATIVE / Ketone: NEGATIVE  / Bili: NEGATIVE / Urobili: 0.2 E.U.   Blood: NEGATIVE / Protein: 30 / Nitrite: NEGATIVE   Leuk Esterase: NEGATIVE / RBC: 0-2 / WBC 2-5   Sq Epi: x / Non Sq Epi: x / Bacteria: x          CBC Full  -  ( 2017 05:13 )  WBC Count : 8.87 K/uL  Hemoglobin : 10.4 g/dL  Hematocrit : 32.2 %  Platelet Count - Automated : 225 K/uL  Mean Cell Volume : 99.1 fL  Mean Cell Hemoglobin : 32.0 pg  Mean Cell Hemoglobin Concentration : 32.3 %  Auto Neutrophil # : x  Auto Lymphocyte # : x  Auto Monocyte # : x  Auto Eosinophil # : x  Auto Basophil # : x  Auto Neutrophil % : x  Auto Lymphocyte % : x  Auto Monocyte % : x  Auto Eosinophil % : x  Auto Basophil % : x    06-20    144  |  104  |  32<H>  ----------------------------<  137<H>  3.8   |  26  |  1.57<H>    Ca    8.9      2017 05:13  Phos  2.9     06-19  Mg     1.8     06-19      Lipid panel  TSH:  Digoxin level    ECHO  Nl LV function   Mild As , Severe TR , Moderate MR  LAE , Pulmonary HTN     Nuclear Stress Inferior Infarct , no ischemia     ECG: V - paced     Telemetry: V paced     RADIOLOGY & ADDITIONAL STUDIES:    ASSESSMENT & PLAN: 89 year old male with Hx of HTN ,CAD ,TIA ,CRI,  PAD ,DM admitted  with RLE cellulitis  & R DF ulcer ( 2x3) . Patient off Lasix , currently on IV fluid Mucomyst & Zosyn . He is with recent cardiac w/u ,NL LV function ,Severe TR ,Pulmonary HTN & TR . He is Schdueled for RLE angiogram in AM . IV fluid on Hold to be restarted in AM prior to Angiogram .   Follow I s Os  ,Lasix PRN .  Continue Current BP meds , will Reassess BP after  angiogram while off IV fluid .

## 2017-06-20 NOTE — PROGRESS NOTE ADULT - PROBLEM SELECTOR PLAN 3
LE angio cancelled today 2/2 scheduling issue  plan per vascular surgery Pt was update re YONATHAN(in laymans language.  He wishes to proceed)  1/2 ns in am  Mucomyst and Vit C

## 2017-06-21 LAB
APTT BLD: 28.7 SEC — SIGNIFICANT CHANGE UP (ref 27.5–37.4)
BLD GP AB SCN SERPL QL: NEGATIVE — SIGNIFICANT CHANGE UP
BUN SERPL-MCNC: 28 MG/DL — HIGH (ref 7–23)
CALCIUM SERPL-MCNC: 9.2 MG/DL — SIGNIFICANT CHANGE UP (ref 8.4–10.5)
CHLORIDE SERPL-SCNC: 99 MMOL/L — SIGNIFICANT CHANGE UP (ref 98–107)
CO2 SERPL-SCNC: 22 MMOL/L — SIGNIFICANT CHANGE UP (ref 22–31)
CREAT SERPL-MCNC: 1.28 MG/DL — SIGNIFICANT CHANGE UP (ref 0.5–1.3)
GLUCOSE SERPL-MCNC: 143 MG/DL — HIGH (ref 70–99)
HCT VFR BLD CALC: 31.7 % — LOW (ref 39–50)
HGB BLD-MCNC: 10 G/DL — LOW (ref 13–17)
INR BLD: 1.17 — SIGNIFICANT CHANGE UP (ref 0.88–1.17)
MCHC RBC-ENTMCNC: 31.1 PG — SIGNIFICANT CHANGE UP (ref 27–34)
MCHC RBC-ENTMCNC: 31.5 % — LOW (ref 32–36)
MCV RBC AUTO: 98.4 FL — SIGNIFICANT CHANGE UP (ref 80–100)
PLATELET # BLD AUTO: 219 K/UL — SIGNIFICANT CHANGE UP (ref 150–400)
PMV BLD: 10.8 FL — SIGNIFICANT CHANGE UP (ref 7–13)
POTASSIUM SERPL-MCNC: 3.3 MMOL/L — LOW (ref 3.5–5.3)
POTASSIUM SERPL-SCNC: 3.3 MMOL/L — LOW (ref 3.5–5.3)
PROTHROM AB SERPL-ACNC: 13.1 SEC — SIGNIFICANT CHANGE UP (ref 9.8–13.1)
RBC # BLD: 3.22 M/UL — LOW (ref 4.2–5.8)
RBC # FLD: 12.6 % — SIGNIFICANT CHANGE UP (ref 10.3–14.5)
RH IG SCN BLD-IMP: POSITIVE — SIGNIFICANT CHANGE UP
SODIUM SERPL-SCNC: 138 MMOL/L — SIGNIFICANT CHANGE UP (ref 135–145)
WBC # BLD: 11.6 K/UL — HIGH (ref 3.8–10.5)
WBC # FLD AUTO: 11.6 K/UL — HIGH (ref 3.8–10.5)

## 2017-06-21 PROCEDURE — 75710 ARTERY X-RAYS ARM/LEG: CPT | Mod: 26,RT

## 2017-06-21 PROCEDURE — 99223 1ST HOSP IP/OBS HIGH 75: CPT | Mod: GC

## 2017-06-21 PROCEDURE — 36245 INS CATH ABD/L-EXT ART 1ST: CPT | Mod: RT

## 2017-06-21 PROCEDURE — 75625 CONTRAST EXAM ABDOMINL AORTA: CPT | Mod: 26

## 2017-06-21 RX ORDER — METOPROLOL TARTRATE 50 MG
25 TABLET ORAL
Qty: 0 | Refills: 0 | Status: DISCONTINUED | OUTPATIENT
Start: 2017-06-21 | End: 2017-06-30

## 2017-06-21 RX ADMIN — HEPARIN SODIUM 5000 UNIT(S): 5000 INJECTION INTRAVENOUS; SUBCUTANEOUS at 19:38

## 2017-06-21 RX ADMIN — Medication: at 15:00

## 2017-06-21 RX ADMIN — Medication 10 MILLIGRAM(S): at 07:19

## 2017-06-21 RX ADMIN — PIPERACILLIN AND TAZOBACTAM 25 GRAM(S): 4; .5 INJECTION, POWDER, LYOPHILIZED, FOR SOLUTION INTRAVENOUS at 21:29

## 2017-06-21 RX ADMIN — PIPERACILLIN AND TAZOBACTAM 25 GRAM(S): 4; .5 INJECTION, POWDER, LYOPHILIZED, FOR SOLUTION INTRAVENOUS at 07:19

## 2017-06-21 RX ADMIN — Medication 500 MILLIGRAM(S): at 15:37

## 2017-06-21 RX ADMIN — Medication 1200 MILLIGRAM(S): at 19:40

## 2017-06-21 RX ADMIN — SIMVASTATIN 20 MILLIGRAM(S): 20 TABLET, FILM COATED ORAL at 21:29

## 2017-06-21 RX ADMIN — BRIMONIDINE TARTRATE 1 DROP(S): 2 SOLUTION/ DROPS OPHTHALMIC at 21:28

## 2017-06-21 RX ADMIN — BRIMONIDINE TARTRATE 1 DROP(S): 2 SOLUTION/ DROPS OPHTHALMIC at 11:00

## 2017-06-21 RX ADMIN — Medication 1200 MILLIGRAM(S): at 07:19

## 2017-06-21 RX ADMIN — Medication 10 MILLIGRAM(S): at 19:40

## 2017-06-21 RX ADMIN — Medication 1 DROP(S): at 11:00

## 2017-06-21 RX ADMIN — PIPERACILLIN AND TAZOBACTAM 25 GRAM(S): 4; .5 INJECTION, POWDER, LYOPHILIZED, FOR SOLUTION INTRAVENOUS at 15:00

## 2017-06-21 RX ADMIN — Medication 1 DROP(S): at 21:29

## 2017-06-21 NOTE — CONSULT NOTE ADULT - ASSESSMENT
Mr Bruno is a 89 year old male with a history of PVD, pacemaker, diastolic dysfunction who comes in for cellulitis and found to have RLE occlusion requiring a fem-pop bypapss. He was diagnosed with pulmonary hypertension and requires optimization. At this point there is no evidence to suggest that the patient has pulmonary hypertension due to anything other than Group II which is primarily cardiac in nature. Though group 4 is a possibility (CTEPH) he is not hypoxemic nor does he have a history of DVT or PE in the past. It is unlikely his pulm hypertension is due to a rheumatologic disease nor does it appear to be due to a primary pulmonary disease. He is too told to have an onset of primary pulmonary hypertension, and he does not meet any of the other diagnosis for group V pulmonary hypertension.     The treatment of Group II pulmonary hypertension is diuresis or fixing  the underlying cardiac problem. Therefore, he should be optimized from a cardiology stand point, and he should be diuresed daily to maintain a negative fluid balance.     - Daily diuresis to maintain negative fluid balance before and after surgery.  - Would speak with anesthesia about performing the surgery without the use of intubation if possible.   - Optimize patient from a cardiology stand point  - If the risk outweights the benefit, the patient should proceed with his surgery. Mr Bruno is a 89 year old male with a history of PVD, pacemaker, diastolic dysfunction who comes in for cellulitis and found to have RLE occlusion requiring a fem-pop bypapss. He was diagnosed with pulmonary hypertension and requires optimization. At this point there is no evidence to suggest that the patient has pulmonary hypertension due to anything other than Group II which is primarily cardiac in nature. Though group 4 is a possibility (CTEPH) he is not hypoxemic nor does he have a history of DVT or PE in the past. It is unlikely his pulm hypertension is due to a rheumatologic disease nor does it appear to be due to a primary pulmonary disease. He is too told to have an onset of primary pulmonary hypertension, and he does not meet any of the other diagnosis for group V pulmonary hypertension.     The treatment of Group II pulmonary hypertension is diuresis or fixing  the underlying cardiac problem. Therefore, he should be optimized from a cardiology stand point, and he should be diuresed daily to maintain a negative fluid balance.     - Please get official echo to evaluate pulmonary hypertension  - Daily diuresis to maintain negative fluid balance before and after surgery.  - Would speak with anesthesia about performing the surgery without the use of intubation if possible.   - Optimize patient from a cardiology stand point  - If the risk outweights the benefit, the patient should proceed with his surgery.    Pulmonary Consult Service  Jason Jesus DO MPH  [id number]

## 2017-06-21 NOTE — PROGRESS NOTE ADULT - SUBJECTIVE AND OBJECTIVE BOX
C Team Surgery   Progress Note     S: No acute events overnight     ICU Vital Signs Last 24 Hrs  T(C): 37.1, Max: 37.3 (06-20 @ 21:17)  T(F): 98.8, Max: 99.1 (06-20 @ 21:17)  HR: 61 (60 - 90)  BP: 157/57 (151/56 - 161/66)  BP(mean): --  ABP: --  ABP(mean): --  RR: 18 (17 - 18)  SpO2: 96% (96% - 100%)  I&O's Summary  I & Os for 24h ending 20 Jun 2017 07:00  =============================================  IN: 820 ml / OUT: 400 ml / NET: 420 ml    I & Os for current day (as of 21 Jun 2017 06:49)  =============================================  IN: 220 ml / OUT: 750 ml / NET: -530 ml    Gen: NAD   RLE: stable leg cellulitis   Vasc: R AT Dopp/ L DP/PT Dopp

## 2017-06-21 NOTE — CONSULT NOTE ADULT - ATTENDING COMMENTS
Amarilys on CKD. would stop Losartan as was not on it at home and may have contributed to his AMARILYS.   his edema is much improved so would stop lasix as well.  would give mucocyst 600-1200mg two doses before and after the contrast,   gentle hydration 6 hours before and after the contrast,   would not expect worsening od the renal function as amount of contrast is usually minimal with such procedures,.   D/W Dr. Fierro
Reportedly severe pulm hypertension on outpt TTE, which is likely 2/2 left heart disease. Would suggest repeat TTE. If that also shows evidence of severe pulm HTN then would avoid GA and all procedures should be performed under regional anesthesia. Would keep overall negative fluid balance.

## 2017-06-21 NOTE — PROGRESS NOTE ADULT - SUBJECTIVE AND OBJECTIVE BOX
FOLLOW UP VISIT:  SYMPTOMS :Patient Comfortable  S/P Angiogram Of RLE , Denies Cp ,Sob   For Possible RLE Bypass    MEDICATIONS  (STANDING):  piperacillin/tazobactam IVPB. 3.375Gram(s) IV Intermittent every 8 hours  heparin  Injectable 5000Unit(s) SubCutaneous every 8 hours  insulin lispro (HumaLOG) corrective regimen sliding scale  SubCutaneous at bedtime  insulin lispro (HumaLOG) corrective regimen sliding scale  SubCutaneous three times a day before meals  hydrALAZINE 10milliGRAM(s) Oral two times a day  simvastatin 20milliGRAM(s) Oral at bedtime  brimonidine 0.2% Ophthalmic Solution 1Drop(s) Right EYE two times a day  timolol 0.5% Solution 1Drop(s) Right EYE two times a day  collagenase Ointment 1Application(s) Topical daily  acetylcysteine  Oral Solution 1200milliGRAM(s) Oral two times a day  ascorbic acid 500milliGRAM(s) Oral daily  sodium chloride 0.45%. 1000milliLiter(s) IV Continuous <Continuous>    MEDICATIONS  (PRN):      Vital Signs Last 24 Hrs  T(C): 37.6, Max: 37.6 ( @ 17:42)  T(F): 99.7, Max: 99.7 (- @ 17:42)  HR: 60 (60 - 62)  BP: 171/66 (153/58 - 173/65)  BP(mean): --  RR: 16 (16 - 18)  SpO2: 96% (93% - 100%)    Daily     Daily Weight in k.7 (2017 01:34)    I&O's Detail  I & Os for 24h ending 2017 07:00  =============================================  IN:    sodium chloride 0.9%: 120 ml    IV PiggyBack: 100 ml    Total IN: 220 ml  ---------------------------------------------  OUT:    Voided: 750 ml    Total OUT: 750 ml  ---------------------------------------------  Total NET: -530 ml    I & Os for current day (as of 2017 19:57)  =============================================  IN:    Total IN: 0 ml  ---------------------------------------------  OUT:    Voided: 450 ml    Total OUT: 450 ml  ---------------------------------------------  Total NET: -450 ml      Physical Exam  Eyes Anicteric  Neck  No JVD  Lungs  CTa  Cor  SM 3/6   Abd  Soft Nt + BS   Ext  Dressing in place   Pulses  Reduced   Neuro  AxOx3     LABS  PT/INR - ( 2017 06:00 )   PT: 13.1 SEC;   INR: 1.17          PTT - ( 2017 06:00 )  PTT:28.7 SEC  Urinalysis Basic - ( 2017 00:50 )    Color: YELLOW / Appearance: CLEAR / S.015 / pH: 6.0  Gluc: NEGATIVE / Ketone: NEGATIVE  / Bili: NEGATIVE / Urobili: 0.2 E.U.   Blood: NEGATIVE / Protein: 30 / Nitrite: NEGATIVE   Leuk Esterase: NEGATIVE / RBC: 0-2 / WBC 2-5   Sq Epi: x / Non Sq Epi: x / Bacteria: x          CBC Full  -  ( 2017 06:00 )  WBC Count : 11.60 K/uL  Hemoglobin : 10.0 g/dL  Hematocrit : 31.7 %  Platelet Count - Automated : 219 K/uL  Mean Cell Volume : 98.4 fL  Mean Cell Hemoglobin : 31.1 pg  Mean Cell Hemoglobin Concentration : 31.5 %  Auto Neutrophil # : x  Auto Lymphocyte # : x  Auto Monocyte # : x  Auto Eosinophil # : x  Auto Basophil # : x  Auto Neutrophil % : x  Auto Lymphocyte % : x  Auto Monocyte % : x  Auto Eosinophil % : x  Auto Basophil % : x    -    138  |  99  |  28<H>  ----------------------------<  143<H>  3.3<L>   |  22  |  1.28    Ca    9.2      2017 06:00      Lipid panel  TSH:  Digoxin level    ECHO NL LV function ,Sever TR ,PPM ,Moderate MR ,mild AR     Nuclear Stress  Inferior Infarct , No Ischemia    ECG: V -Paced     Telemetry:    RADIOLOGY & ADDITIONAL STUDIES:    ASSESSMENT & PLAN :89 year old male with Hx of HTN ,CAD ,TIA ,CRI,  PAD ,DM admitted  with RLE cellulitis  & R DF ulcer ( 2x3) . Patient off Lasix , currently on IV fluid & Zosyn . He is with recent cardiac w/u ,NL LV function ,Severe TR ,Pulmonary HTN . PPM & Moderate MR. 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 Patient with Ischemic RLE s/p Angiogram for possible RLE bypass surgery . .Start lopressor 25 mg q12h .Follow SBP.If persistent HTN increase Hydralazine to 10 mg q8h . Continue Zocor . Patient with significant Pulmonary HTn ,Pulmonary Consult .Keep potassium close to 4 mmol/l .

## 2017-06-21 NOTE — PROGRESS NOTE ADULT - SUBJECTIVE AND OBJECTIVE BOX
Dr. Caitlin Layne  [facility] [phone]        Patient seen and examined @ bedside, no new complaints        MEDICATIONS  (STANDING):  piperacillin/tazobactam IVPB. 3.375Gram(s) IV Intermittent every 8 hours  heparin  Injectable 5000Unit(s) SubCutaneous every 8 hours  insulin lispro (HumaLOG) corrective regimen sliding scale  SubCutaneous at bedtime  insulin lispro (HumaLOG) corrective regimen sliding scale  SubCutaneous three times a day before meals  hydrALAZINE 10milliGRAM(s) Oral two times a day  simvastatin 20milliGRAM(s) Oral at bedtime  brimonidine 0.2% Ophthalmic Solution 1Drop(s) Right EYE two times a day  timolol 0.5% Solution 1Drop(s) Right EYE two times a day  collagenase Ointment 1Application(s) Topical daily  acetylcysteine  Oral Solution 1200milliGRAM(s) Oral two times a day  ascorbic acid 500milliGRAM(s) Oral daily  sodium chloride 0.45%. 1000milliLiter(s) IV Continuous <Continuous>      VITAL:  T(C): , Max: 37.5 ( @ 07:07)  T(F): , Max: 99.5 ( @ 07:07)  HR: 61  BP: 173/65  BP(mean): --  RR: 18  SpO2: 93%  Wt(kg): --    I and O's:    I & Os for current day (as of  @ 09:19)  =============================================  IN: 220 ml / OUT: 750 ml / NET: -530 ml        PHYSICAL EXAM:    Constitutional: NAD  HEENT: PERRLA    Neck:  No JVD  Respiratory: CTAB/L  Cardiovascular: S1 and S2  Gastrointestinal: BS+, soft, NT/ND  Extremities: No peripheral edema  : No Isaacs  Skin: No rashes      LABS:                        10.0   11.60 )-----------( 219      ( 2017 06:00 )             31.7     06-21    138  |  99  |  28<H>  ----------------------------<  143<H>  3.3   |  22  |  1.28    Ca    9.2      2017 06:00            Urine Studies:  Urinalysis Basic - ( 2017 00:50 )    Color: YELLOW / Appearance: CLEAR / S.015 / pH: 6.0  Gluc: NEGATIVE / Ketone: NEGATIVE  / Bili: NEGATIVE / Urobili: 0.2 E.U.   Blood: NEGATIVE / Protein: 30 / Nitrite: NEGATIVE   Leuk Esterase: NEGATIVE / RBC: 0-2 / WBC 2-5   Sq Epi: x / Non Sq Epi: x / Bacteria: x            RADIOLOGY & ADDITIONAL STUDIES:    [address] [place] [address] [id number] [phone]  Cath Lab Report -- Comprehensive Report  Patient: PHILOMENA ANGULO  Study date: 2017  Account number: [id number]  MR number: [id number]  : 10/15/1921  Gender: Male  Race: W  Case Physician(s):  Miguelito Aguilar M.D.  Referring Physician:  Miguelito Aguilar M.D.  INDICATIONS: 89 y old male w rt foot ischemic ulcer and arterial insuff  PROCEDURE:  --  Untys-bhcn-tklauzqgi angiography.  --  Right leg angiography.  --  Right common iliac angiography.  --  Failed Hemostasis with Mynx.  TECHNIQUE: Oxygen 2 L/min.  Local anesthetic given. Left femoral artery access. Nqlbo-zchv-xgusncygw  angiography. A catheter was positioned. Right leg angiography. A catheter  was positioned. Right common iliac angiography. A catheter was positioned.  Failed Hemostasis with Mynx. RADIATION EXPOSURE: 4.1 min.  CONTRAST GIVEN: Visipaque 19 ml.  MEDICATIONS GIVEN: Fentanyl, 25 mcg, IV. Midazolam, 0.5 mg, IV. Midazolam,  0.5 mg, IV. Fentanyl, 25 mcg, IV. 2% Lidocaine, 10 ml, subcutaneously.  LEFT LOWER EXTREMITY VESSELS: Ostial left common iliac: Angiography showed  mild atherosclerosis. left common fem artery access 5 fr sheath placed  using guidewire and ABS Medical tech omniflush catheter was advanced  reformed and parked into the infrarenal aorta dx aortogram w corona iliac  runoff performed then using guidewire and ABS Medical tech the omniflush  catheter was advanced reformed and parked into the distal right external  iliac artery rle angio performed. the infrarenalm aorta is ectatic and  calcified Proximal left common iliac: Angiography showed mild  atherosclerosis. the artery is ectatic and calcified Mid left common  iliac: Angiography showed mild atherosclerosis.the artery is ectatic and  calcified Distal left common iliac: Angiography showed mild  atherosclerosis.the artery is ectatic and calcified Proximal left internal  iliac: Angiography showed mild atherosclerosis. Mid left internal iliac:  Angiography showed mild atherosclerosis. Distal left internal iliac:  Angiography showed mild atherosclerosis. Proximal left external iliac:  Angiography showed mild atherosclerosis. Mid left external iliac:  Angiography showed mild atherosclerosis. Distal leftexternal iliac:  Angiography showed mild atherosclerosis. Proximal left common femoral:  Angiography showed mild atherosclerosis. Mid left common femoral:  Angiography showed mild atherosclerosis. Distal left common femoral:  Angiography showed mild atherosclerosis.  RIGHT LOWER EXTREMITY VESSELS: Ostial right common iliac: Angiography  showed mild atherosclerosis. Proximal right common iliac: Angiography  showed mild atherosclerosis.the artery is ectatic and calcified Mid right  common iliac: Angiography showed mild atherosclerosis.the artery is  ectatic and calcified Distal right common iliac: Angiography showed mild  atherosclerosis. Proximal right internal iliac: Angiography showed mild  atherosclerosis. Mid right internal iliac: Angiography showed mild  atherosclerosis. Distal right internal iliac: Angiography showed mild  atherosclerosis. Proximal right external iliac: Angiography showed mild  atherosclerosis. Mid right external iliac: Angiography showed mild  atherosclerosis. Distal right external iliac: Angiography showed mild  atherosclerosis. Proximal right common femoral: Angiography showed mild  atherosclerosis. Mid right common femoral: Angiography showed mild  atherosclerosis. Distal right common femoral: Angiography showed mild  atherosclerosis. Ostial right superficial femoral: Angiography showed mild  atherosclerosis. Proximal right superficial femoral: There was a 100 %  stenosis. Mid right superficial femoral: There was a 100 % stenosis.  Distal right superficial femoral: Angiography showed mild atherosclerosis.  Ostial right deep femoral: Angiography showed mild atherosclerosis.  Proximal right deep femoral: Angiography showed mild atherosclerosis. Mid  right deep femoral: Angiography showed mild atherosclerosis. Distal right  deep femoral: Angiography showed mild atherosclerosis. Proximal right  popliteal: Angiography showed mild atherosclerosis. Mid right popliteal:  Angiography showed mild atherosclerosis. Distal right popliteal:  Angiography showed mild atherosclerosis. Ostial right anterior tibial:  There was a 100 % stenosis. Proximal right anterior tibial: There was a  100 % stenosis. Mid right anterior tibial: There was a 100 % stenosis.  Distal right anterior tibial: There was a 100 % stenosis. 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  Ostial right tibio-peroneal: Angiography showed mild atherosclerosis.  Ostial right posterior tibial: There was a 100 % stenosis. Proximal right  posterior tibial: There was a 100 % stenosis. Mid right posterior tibial:  There was a 100 % stenosis. Distal right posterior tibial: There was a 100  % stenosis. Ostial right peroneal: Angiography showed mild  atherosclerosis. Proximal right peroneal: Angiography showed mild  atherosclerosis. Mid right peroneal: There was a 100 % stenosis. Distal  right peroneal: Angiography showed mild atherosclerosis. this vessel  reconstitutes from unnamed collaterals there is flow via the posterior  division into the medial and lateral plantar vessels  Prepared and signed by

## 2017-06-21 NOTE — CONSULT NOTE ADULT - SUBJECTIVE AND OBJECTIVE BOX
HPI:    PAST MEDICAL & SURGICAL HISTORY:  Duodenal ulcer  DM (diabetes mellitus)  HTN (hypertension)  S/P appendectomy: 50 yrs ago  s/p PPM  PAD   TIA   CAD     FAMILY HISTORY:  No pertinent family history in first degree relatives      SOCIAL HISTORY:  Smoking: [ ] Never Smoked [ ] Former Smoker (__ packs x ___ years) [ ] Current Smoker  (__ packs x ___ years)  Substance Use: [ ] Never Used [ ] Used ____  EtOH Use:  Marital Status: [ ] Single [ ]  [ ]  [ ]   Sexual History:   Occupation:  Recent Travel:  Country of Birth:  Advance Directives:    Allergies    No Known Allergies    Intolerances        HOME MEDICATIONS:    REVIEW OF SYSTEMS:  Constitutional: [ ] fevers [ ] chills [ ] weight loss [ ] weight gain  HEENT: [ ] dry eyes [ ] eye irritation [ ] postnasal drip [ ] nasal congestion  CV: [ ] chest pain [ ] orthopnea [ ] palpitations [ ] murmur  Resp: [ ] cough [ ] shortness of breath [ ] dyspnea [ ] wheezing [ ] sputum [ ] hemoptysis  GI: [ ] nausea [ ] vomiting [ ] diarrhea [ ] constipation [ ] abd pain [ ] dysphagia   : [ ] dysuria [ ] nocturia [ ] hematuria [ ] increased urinary frequency  Musculoskeletal: [ ] back pain [ ] myalgias [ ] arthralgias [ ] fracture  Skin: [ ] rash [ ] itch  Neurological: [ ] headache [ ] dizziness [ ] syncope [ ] weakness [ ] numbness  Psychiatric: [ ] anxiety [ ] depression  Endocrine: [ ] diabetes [ ] thyroid problem  Hematologic/Lymphatic: [ ] anemia [ ] bleeding problem  Allergic/Immunologic: [ ] itchy eyes [ ] nasal discharge [ ] hives [ ] angioedema  [ ] All other systems negative  [ ] Unable to assess ROS because ________    OBJECTIVE:  ICU Vital Signs Last 24 Hrs  T(C): 37.5, Max: 37.5 (- @ 07:07)  T(F): 99.5, Max: 99.5 (- @ 07:07)  HR: 61 (60 - 90)  BP: 173/65 (151/56 - 173/65)  BP(mean): --  ABP: --  ABP(mean): --  RR: 18 (17 - 18)  SpO2: 93% (93% - 100%)        I & Os for current day (as of  @ 09:35)  =============================================  IN: 220 ml / OUT: 750 ml / NET: -530 ml    CAPILLARY BLOOD GLUCOSE  158 (2017 06:58)      PHYSICAL EXAM:  General:   HEENT:   Lymph Nodes:  Neck:   Respiratory:   Cardiovascular:   Abdomen:   Extremities:   Skin:   Neurological:  Psychiatry:    HOSPITAL MEDICATIONS:  MEDICATIONS  (STANDING):  piperacillin/tazobactam IVPB. 3.375Gram(s) IV Intermittent every 8 hours  heparin  Injectable 5000Unit(s) SubCutaneous every 8 hours  insulin lispro (HumaLOG) corrective regimen sliding scale  SubCutaneous at bedtime  insulin lispro (HumaLOG) corrective regimen sliding scale  SubCutaneous three times a day before meals  hydrALAZINE 10milliGRAM(s) Oral two times a day  simvastatin 20milliGRAM(s) Oral at bedtime  brimonidine 0.2% Ophthalmic Solution 1Drop(s) Right EYE two times a day  timolol 0.5% Solution 1Drop(s) Right EYE two times a day  collagenase Ointment 1Application(s) Topical daily  acetylcysteine  Oral Solution 1200milliGRAM(s) Oral two times a day  ascorbic acid 500milliGRAM(s) Oral daily  sodium chloride 0.45%. 1000milliLiter(s) IV Continuous <Continuous>    MEDICATIONS  (PRN):      LABS:                        10.0   11.60 )-----------( 219      ( 2017 06:00 )             31.7     Hgb Trend: 10.0<--, 10.4<--, 10.0<--, 10.3<--, 10.9<--      138  |  99  |  28<H>  ----------------------------<  143<H>  3.3<L>   |  22  |  1.28    Ca    9.2      2017 06:00      Creatinine Trend: 1.28<--, 1.57<--, 1.63<--, 1.37<--, 1.19<--, 1.24<--  PT/INR - ( 2017 06:00 )   PT: 13.1 SEC;   INR: 1.17          PTT - ( 2017 06:00 )  PTT:28.7 SEC  Urinalysis Basic - ( 2017 00:50 )    Color: YELLOW / Appearance: CLEAR / S.015 / pH: 6.0  Gluc: NEGATIVE / Ketone: NEGATIVE  / Bili: NEGATIVE / Urobili: 0.2 E.U.   Blood: NEGATIVE / Protein: 30 / Nitrite: NEGATIVE   Leuk Esterase: NEGATIVE / RBC: 0-2 / WBC 2-5   Sq Epi: x / Non Sq Epi: x / Bacteria: x      MICROBIOLOGY:     RADIOLOGY:  [x ] Reviewed and interpreted by me    PULMONARY FUNCTION TESTS: None in allscripts    ECHO  17  NL LV function , Mild AS ,Moderate MR , Severe TR , Severe Pulmonary HTN 78 mmHg HPI:  Mr. Bruno is a 89 year old male with PAD and CAD who was sent in by Dr. Aguilar for an angiogram due to NIMO of 0.48. He has also experienced RLE swelling and erythema for >1 month. He was seen in the ED by podiatry who suggested there was no acute signs of infection and no surgical intervention planned at that time. Was ordered Zosyn for what appears to be RLE cellulitis. He underwent an angiogram of his RLE yesterday and is now suggested to received a femoral bypass due to the occlusion. Consult was for medial optimization of his pulmonary hypertension. Per the patient, he is unaware of his pulmonary hypertension. Records on Allscripts suggest that his echo in  showed mild pulmonary hypertension but an echo in 2017 shows moderate to severe pulmonary pressures. He has never seen a pulmonologist. Is suppose to be on diuretics for his diastolic heart disease.     PAST MEDICAL & SURGICAL HISTORY:  Duodenal ulcer  DM (diabetes mellitus)  HTN (hypertension)  S/P appendectomy: 50 yrs ago  s/p PPM  PAD   TIA   CAD     FAMILY HISTORY:  No pertinent family history in first degree relatives      SOCIAL HISTORY:  Smoking: [ ] Never Smoked [ ] Former Smoker (__ packs x ___ years) [ ] Current Smoker  (__ packs x ___ years)  Substance Use: [ ] Never Used [ ] Used ____  EtOH Use:  Marital Status: [ ] Single [ ]  [ ]  [ ]   Sexual History:   Occupation:  Recent Travel:  Country of Birth:  Advance Directives:    Allergies    No Known Allergies    Intolerances        HOME MEDICATIONS: as per Med rec    REVIEW OF SYSTEMS:  Constitutional: [ ] fevers [ ] chills [ ] weight loss [ ] weight gain  HEENT: [ ] dry eyes [ ] eye irritation [ ] postnasal drip [ ] nasal congestion  CV: [ ] chest pain [ ] orthopnea [ ] palpitations [ ] murmur  Resp: [ ] cough [ ] shortness of breath [ ] dyspnea [ ] wheezing [ ] sputum [ ] hemoptysis  GI: [ ] nausea [ ] vomiting [ ] diarrhea [ ] constipation [ ] abd pain [ ] dysphagia   : [ ] dysuria [ ] nocturia [ ] hematuria [ ] increased urinary frequency  Musculoskeletal: [ ] back pain [ ] myalgias [ ] arthralgias [ ] fracture  Skin: [ ] rash [ ] itch  Neurological: [ ] headache [ ] dizziness [ ] syncope [ ] weakness [ ] numbness  Psychiatric: [ ] anxiety [ ] depression  Endocrine: [ ] diabetes [ ] thyroid problem  Hematologic/Lymphatic: [ ] anemia [ ] bleeding problem  Allergic/Immunologic: [ ] itchy eyes [ ] nasal discharge [ ] hives [ ] angioedema  [ ] All other systems negative  [ ] Unable to assess ROS because ________    OBJECTIVE:  ICU Vital Signs Last 24 Hrs  T(C): 37.5, Max: 37.5 ( @ 07:07)  T(F): 99.5, Max: 99.5 ( @ 07:07)  HR: 61 (60 - 90)  BP: 173/65 (151/56 - 173/65)  BP(mean): --  ABP: --  ABP(mean): --  RR: 18 (17 - 18)  SpO2: 93% (93% - 100%)        I & Os for current day (as of  @ 09:35)  =============================================  IN: 220 ml / OUT: 750 ml / NET: -530 ml    CAPILLARY BLOOD GLUCOSE  158 (2017 06:58)      PHYSICAL EXAM:  General:   HEENT:   Lymph Nodes:  Neck:   Respiratory:   Cardiovascular:   Abdomen:   Extremities:   Skin:   Neurological:  Psychiatry:    HOSPITAL MEDICATIONS:  MEDICATIONS  (STANDING):  piperacillin/tazobactam IVPB. 3.375Gram(s) IV Intermittent every 8 hours  heparin  Injectable 5000Unit(s) SubCutaneous every 8 hours  insulin lispro (HumaLOG) corrective regimen sliding scale  SubCutaneous at bedtime  insulin lispro (HumaLOG) corrective regimen sliding scale  SubCutaneous three times a day before meals  hydrALAZINE 10milliGRAM(s) Oral two times a day  simvastatin 20milliGRAM(s) Oral at bedtime  brimonidine 0.2% Ophthalmic Solution 1Drop(s) Right EYE two times a day  timolol 0.5% Solution 1Drop(s) Right EYE two times a day  collagenase Ointment 1Application(s) Topical daily  acetylcysteine  Oral Solution 1200milliGRAM(s) Oral two times a day  ascorbic acid 500milliGRAM(s) Oral daily  sodium chloride 0.45%. 1000milliLiter(s) IV Continuous <Continuous>    MEDICATIONS  (PRN):      LABS:                        10.0   11.60 )-----------( 219      ( 2017 06:00 )             31.7     Hgb Trend: 10.0<--, 10.4<--, 10.0<--, 10.3<--, 10.9<--  06-21    138  |  99  |  28<H>  ----------------------------<  143<H>  3.3<L>   |  22  |  1.28    Ca    9.2      2017 06:00      Creatinine Trend: 1.28<--, 1.57<--, 1.63<--, 1.37<--, 1.19<--, 1.24<--  PT/INR - ( 2017 06:00 )   PT: 13.1 SEC;   INR: 1.17          PTT - ( 2017 06:00 )  PTT:28.7 SEC  Urinalysis Basic - ( 2017 00:50 )    Color: YELLOW / Appearance: CLEAR / S.015 / pH: 6.0  Gluc: NEGATIVE / Ketone: NEGATIVE  / Bili: NEGATIVE / Urobili: 0.2 E.U.   Blood: NEGATIVE / Protein: 30 / Nitrite: NEGATIVE   Leuk Esterase: NEGATIVE / RBC: 0-2 / WBC 2-5   Sq Epi: x / Non Sq Epi: x / Bacteria: x      MICROBIOLOGY:     RADIOLOGY:  [x ] Reviewed and interpreted by me  CXR:     PULMONARY FUNCTION TESTS: None in allscripts    ECHO  17  NL LV function , Mild AS ,Moderate MR , Severe TR , Severe Pulmonary HTN 78 mmHg HPI:  Mr. Bruno is a 89 year old male with PAD and CAD who was sent in by Dr. Aguilar for an angiogram due to NIMO of 0.48. He has also experienced RLE swelling and erythema for >1 month. He was seen in the ED by podiatry who suggested there was no acute signs of infection and no surgical intervention planned at that time. Was ordered Zosyn for what appears to be RLE cellulitis. He underwent an angiogram of his RLE yesterday and is now suggested to received a femoral bypass due to the occlusion. Consult was for medial optimization of his pulmonary hypertension. Per the patient, he is unaware of his pulmonary hypertension. Records on [facility] suggest that his echo in  showed mild pulmonary hypertension but an echo in 2017 shows moderate to severe pulmonary pressures. He has never seen a pulmonologist. Is suppose to be on diuretics for his diastolic heart disease.     PAST MEDICAL & SURGICAL HISTORY:  Duodenal ulcer  DM (diabetes mellitus)  HTN (hypertension)  S/P appendectomy: 50 yrs ago  s/p PPM  PAD   TIA   CAD     FAMILY HISTORY:  No pertinent family history in first degree relatives      SOCIAL HISTORY:  Smoking: [ ] Never Smoked [x ] Former Smoker (1 packs x  50 years) [ ] Current Smoker  (__ packs x ___ years)  Substance Use: [ x] Never Used [ ] Used ____  EtOH Use: Denies  Marital Status: [ ] Single [ ]  [ ]  [x ]   Sexual History: Noncontributory  Occupation: Retired  Recent Travel: None  Country of Birth: [place]  Advance Directives: Full    Allergies    No Known Allergies    Intolerances        HOME MEDICATIONS: as per Med rec    REVIEW OF SYSTEMS:  Constitutional: [ ] fevers [ ] chills [ ] weight loss [ ] weight gain  HEENT: [ ] dry eyes [ ] eye irritation [ ] postnasal drip [ ] nasal congestion  CV: [ ] chest pain [ ] orthopnea [ ] palpitations [ ] murmur  Resp: [ ] cough [ ] shortness of breath [ ] dyspnea [ ] wheezing [ ] sputum [ ] hemoptysis  GI: [ ] nausea [ ] vomiting [ ] diarrhea [ ] constipation [ ] abd pain [ ] dysphagia   : [ ] dysuria [ ] nocturia [ ] hematuria [ ] increased urinary frequency  Musculoskeletal: [ ] back pain [ ] myalgias [ ] arthralgias [ ] fracture  Skin: [x ] rash [ ] itch  Neurological: [ ] headache [ ] dizziness [ ] syncope [ ] weakness [ ] numbness  Psychiatric: [ ] anxiety [ ] depression  Endocrine: [ ] diabetes [ ] thyroid problem  Hematologic/Lymphatic: [ ] anemia [ ] bleeding problem  Allergic/Immunologic: [ ] itchy eyes [ ] nasal discharge [ ] hives [ ] angioedema  [ x] All other systems negative  [ ] Unable to assess ROS because ________    OBJECTIVE:  ICU Vital Signs Last 24 Hrs  T(C): 37.5, Max: 37.5 ( @ 07:07)  T(F): 99.5, Max: 99.5 ( @ 07:07)  HR: 61 (60 - 90)  BP: 173/65 (151/56 - 173/65)  BP(mean): --  ABP: --  ABP(mean): --  RR: 18 (17 - 18)  SpO2: 93% (93% - 100%)        I & Os for current day (as of  @ 09:35)  =============================================  IN: 220 ml / OUT: 750 ml / NET: -530 ml    CAPILLARY BLOOD GLUCOSE  158 (2017 06:58)      PHYSICAL EXAM:  General: UNAD  HEENT: edentulous  Lymph Nodes: Non appreciated  Neck: Normal circumference  Respiratory: CTA B/L  Cardiovascular:  RRR, Normal S1 and S2, No M/R/G  Abdomen: SNT, +  Extremities:   Skin:   Neurological:  Psychiatry:    HOSPITAL MEDICATIONS:  MEDICATIONS  (STANDING):  piperacillin/tazobactam IVPB. 3.375Gram(s) IV Intermittent every 8 hours  heparin  Injectable 5000Unit(s) SubCutaneous every 8 hours  insulin lispro (HumaLOG) corrective regimen sliding scale  SubCutaneous at bedtime  insulin lispro (HumaLOG) corrective regimen sliding scale  SubCutaneous three times a day before meals  hydrALAZINE 10milliGRAM(s) Oral two times a day  simvastatin 20milliGRAM(s) Oral at bedtime  brimonidine 0.2% Ophthalmic Solution 1Drop(s) Right EYE two times a day  timolol 0.5% Solution 1Drop(s) Right EYE two times a day  collagenase Ointment 1Application(s) Topical daily  acetylcysteine  Oral Solution 1200milliGRAM(s) Oral two times a day  ascorbic acid 500milliGRAM(s) Oral daily  sodium chloride 0.45%. 1000milliLiter(s) IV Continuous <Continuous>    MEDICATIONS  (PRN):      LABS:                        10.0   11.60 )-----------( 219      ( 2017 06:00 )             31.7     Hgb Trend: 10.0<--, 10.4<--, 10.0<--, 10.3<--, 10.9<--  06-21    138  |  99  |  28<H>  ----------------------------<  143<H>  3.3<L>   |  22  |  1.28    Ca    9.2      2017 06:00      Creatinine Trend: 1.28<--, 1.57<--, 1.63<--, 1.37<--, 1.19<--, 1.24<--  PT/INR - ( 2017 06:00 )   PT: 13.1 SEC;   INR: 1.17          PTT - ( 2017 06:00 )  PTT:28.7 SEC  Urinalysis Basic - ( 2017 00:50 )    Color: YELLOW / Appearance: CLEAR / S.015 / pH: 6.0  Gluc: NEGATIVE / Ketone: NEGATIVE  / Bili: NEGATIVE / Urobili: 0.2 E.U.   Blood: NEGATIVE / Protein: 30 / Nitrite: NEGATIVE   Leuk Esterase: NEGATIVE / RBC: 0-2 / WBC 2-5   Sq Epi: x / Non Sq Epi: x / Bacteria: x      MICROBIOLOGY:     RADIOLOGY:  [x ] Reviewed and interpreted by me  CXR:     PULMONARY FUNCTION TESTS: None in allscripts    ECHO  17  NL LV function , Mild AS ,Moderate MR , Severe TR , Severe Pulmonary HTN 78 mmHg

## 2017-06-21 NOTE — PROGRESS NOTE ADULT - PROBLEM SELECTOR PLAN 2
s/p LE angio w/ 19ml contrast load  no intervention, just diagnostic  management per vascular, report as above s/p LE angio w/ 19ml contrast load  no intervention, just diagnostic  management per vascular, report as above.

## 2017-06-22 LAB
BUN SERPL-MCNC: 28 MG/DL — HIGH (ref 7–23)
CALCIUM SERPL-MCNC: 9.2 MG/DL — SIGNIFICANT CHANGE UP (ref 8.4–10.5)
CHLORIDE SERPL-SCNC: 97 MMOL/L — LOW (ref 98–107)
CO2 SERPL-SCNC: 23 MMOL/L — SIGNIFICANT CHANGE UP (ref 22–31)
CREAT SERPL-MCNC: 1.26 MG/DL — SIGNIFICANT CHANGE UP (ref 0.5–1.3)
GLUCOSE SERPL-MCNC: 147 MG/DL — HIGH (ref 70–99)
HCT VFR BLD CALC: 33.4 % — LOW (ref 39–50)
HGB BLD-MCNC: 10.7 G/DL — LOW (ref 13–17)
MCHC RBC-ENTMCNC: 31.4 PG — SIGNIFICANT CHANGE UP (ref 27–34)
MCHC RBC-ENTMCNC: 32 % — SIGNIFICANT CHANGE UP (ref 32–36)
MCV RBC AUTO: 97.9 FL — SIGNIFICANT CHANGE UP (ref 80–100)
PLATELET # BLD AUTO: 218 K/UL — SIGNIFICANT CHANGE UP (ref 150–400)
PMV BLD: 11.2 FL — SIGNIFICANT CHANGE UP (ref 7–13)
POTASSIUM SERPL-MCNC: 3.6 MMOL/L — SIGNIFICANT CHANGE UP (ref 3.5–5.3)
POTASSIUM SERPL-SCNC: 3.6 MMOL/L — SIGNIFICANT CHANGE UP (ref 3.5–5.3)
RBC # BLD: 3.41 M/UL — LOW (ref 4.2–5.8)
RBC # FLD: 12.6 % — SIGNIFICANT CHANGE UP (ref 10.3–14.5)
SODIUM SERPL-SCNC: 139 MMOL/L — SIGNIFICANT CHANGE UP (ref 135–145)
WBC # BLD: 12.3 K/UL — HIGH (ref 3.8–10.5)
WBC # FLD AUTO: 12.3 K/UL — HIGH (ref 3.8–10.5)

## 2017-06-22 PROCEDURE — 93306 TTE W/DOPPLER COMPLETE: CPT | Mod: 26

## 2017-06-22 PROCEDURE — 99232 SBSQ HOSP IP/OBS MODERATE 35: CPT

## 2017-06-22 PROCEDURE — 73080 X-RAY EXAM OF ELBOW: CPT | Mod: 26,RT

## 2017-06-22 RX ORDER — SODIUM CHLORIDE 9 MG/ML
1000 INJECTION INTRAMUSCULAR; INTRAVENOUS; SUBCUTANEOUS
Qty: 0 | Refills: 0 | Status: DISCONTINUED | OUTPATIENT
Start: 2017-06-22 | End: 2017-06-23

## 2017-06-22 RX ORDER — POTASSIUM CHLORIDE 20 MEQ
40 PACKET (EA) ORAL ONCE
Qty: 0 | Refills: 0 | Status: COMPLETED | OUTPATIENT
Start: 2017-06-22 | End: 2017-06-22

## 2017-06-22 RX ADMIN — HEPARIN SODIUM 5000 UNIT(S): 5000 INJECTION INTRAVENOUS; SUBCUTANEOUS at 14:53

## 2017-06-22 RX ADMIN — Medication: at 07:28

## 2017-06-22 RX ADMIN — HEPARIN SODIUM 5000 UNIT(S): 5000 INJECTION INTRAVENOUS; SUBCUTANEOUS at 23:02

## 2017-06-22 RX ADMIN — Medication 25 MILLIGRAM(S): at 17:38

## 2017-06-22 RX ADMIN — Medication 2: at 17:34

## 2017-06-22 RX ADMIN — HEPARIN SODIUM 5000 UNIT(S): 5000 INJECTION INTRAVENOUS; SUBCUTANEOUS at 00:47

## 2017-06-22 RX ADMIN — PIPERACILLIN AND TAZOBACTAM 25 GRAM(S): 4; .5 INJECTION, POWDER, LYOPHILIZED, FOR SOLUTION INTRAVENOUS at 05:02

## 2017-06-22 RX ADMIN — Medication 1200 MILLIGRAM(S): at 05:01

## 2017-06-22 RX ADMIN — PIPERACILLIN AND TAZOBACTAM 25 GRAM(S): 4; .5 INJECTION, POWDER, LYOPHILIZED, FOR SOLUTION INTRAVENOUS at 23:04

## 2017-06-22 RX ADMIN — Medication 40 MILLIEQUIVALENT(S): at 09:23

## 2017-06-22 RX ADMIN — HEPARIN SODIUM 5000 UNIT(S): 5000 INJECTION INTRAVENOUS; SUBCUTANEOUS at 05:01

## 2017-06-22 RX ADMIN — PIPERACILLIN AND TAZOBACTAM 25 GRAM(S): 4; .5 INJECTION, POWDER, LYOPHILIZED, FOR SOLUTION INTRAVENOUS at 14:53

## 2017-06-22 RX ADMIN — Medication 1 APPLICATION(S): at 09:20

## 2017-06-22 RX ADMIN — Medication 1 DROP(S): at 17:37

## 2017-06-22 RX ADMIN — BRIMONIDINE TARTRATE 1 DROP(S): 2 SOLUTION/ DROPS OPHTHALMIC at 05:01

## 2017-06-22 RX ADMIN — BRIMONIDINE TARTRATE 1 DROP(S): 2 SOLUTION/ DROPS OPHTHALMIC at 17:36

## 2017-06-22 RX ADMIN — SIMVASTATIN 20 MILLIGRAM(S): 20 TABLET, FILM COATED ORAL at 23:02

## 2017-06-22 RX ADMIN — Medication 10 MILLIGRAM(S): at 05:01

## 2017-06-22 RX ADMIN — Medication 25 MILLIGRAM(S): at 05:02

## 2017-06-22 RX ADMIN — Medication 4: at 13:18

## 2017-06-22 RX ADMIN — Medication 1 DROP(S): at 05:02

## 2017-06-22 RX ADMIN — Medication 10 MILLIGRAM(S): at 17:38

## 2017-06-22 RX ADMIN — Medication 500 MILLIGRAM(S): at 13:19

## 2017-06-22 NOTE — PROGRESS NOTE ADULT - SUBJECTIVE AND OBJECTIVE BOX
[facility] [phone]        Patient seen and examined in bed.  He felt well and offered no complaints        MEDICATIONS  (STANDING):  piperacillin/tazobactam IVPB. 3.375Gram(s) IV Intermittent every 8 hours  heparin  Injectable 5000Unit(s) SubCutaneous every 8 hours  insulin lispro (HumaLOG) corrective regimen sliding scale  SubCutaneous at bedtime  insulin lispro (HumaLOG) corrective regimen sliding scale  SubCutaneous three times a day before meals  hydrALAZINE 10milliGRAM(s) Oral two times a day  simvastatin 20milliGRAM(s) Oral at bedtime  brimonidine 0.2% Ophthalmic Solution 1Drop(s) Right EYE two times a day  timolol 0.5% Solution 1Drop(s) Right EYE two times a day  collagenase Ointment 1Application(s) Topical daily  ascorbic acid 500milliGRAM(s) Oral daily  metoprolol 25milliGRAM(s) Oral two times a day      VITAL:  T(C): , Max: 37.6 (06-21 @ 17:42)  T(F): , Max: 99.7 (06-21 @ 17:42)  HR: 60  BP: 142/56  BP(mean): --  RR: 17  SpO2: 99%  Wt(kg): --    I and O's:    I & Os for current day (as of 06-22 @ 16:13)  =============================================  IN: 0 ml / OUT: 650 ml / NET: -650 ml        PHYSICAL EXAM:    Constitutional: NAD  HEENT: PERRLA    Neck:  No JVD  Respiratory: CTAB/L  Cardiovascular: S1 and S2  Gastrointestinal: BS+, soft, NT/ND  Extremities: No peripheral edema  Neurological: A/O x 3, no focal deficits  Psychiatric: Normal mood, normal affect  : No Isaacs  Skin: No rashes  Access: Not applicable    LABS:                        10.7   12.30 )-----------( 218      ( 22 Jun 2017 06:15 )             33.4     06-22    139  |  97<L>  |  28<H>  ----------------------------<  147<H>  3.6   |  23  |  1.26    Ca    9.2      22 Jun 2017 06:15            Urine Studies:          RADIOLOGY & ADDITIONAL STUDIES:

## 2017-06-22 NOTE — PROGRESS NOTE ADULT - SUBJECTIVE AND OBJECTIVE BOX
Patient is a 89y old  Male who presents with a chief complaint of RLE swelling (16 Jun 2017 21:55)      Vascular Surgery Attending Progress Note    Interval HPI: pt has no new c/o    Medications:  piperacillin/tazobactam IVPB. 3.375Gram(s) IV Intermittent every 8 hours  heparin  Injectable 5000Unit(s) SubCutaneous every 8 hours  insulin lispro (HumaLOG) corrective regimen sliding scale  SubCutaneous at bedtime  insulin lispro (HumaLOG) corrective regimen sliding scale  SubCutaneous three times a day before meals  hydrALAZINE 10milliGRAM(s) Oral two times a day  simvastatin 20milliGRAM(s) Oral at bedtime  brimonidine 0.2% Ophthalmic Solution 1Drop(s) Right EYE two times a day  timolol 0.5% Solution 1Drop(s) Right EYE two times a day  collagenase Ointment 1Application(s) Topical daily  ascorbic acid 500milliGRAM(s) Oral daily  metoprolol 25milliGRAM(s) Oral two times a day      Vital Signs Last 24 Hrs  T(C): 36.6, Max: 37.6 (06-21 @ 17:42)  T(F): 97.9, Max: 99.7 (06-21 @ 17:42)  HR: 62 (60 - 69)  BP: 155/75 (155/75 - 171/66)  BP(mean): --  RR: 16 (16 - 18)  SpO2: 98% (94% - 98%)  I&O's Summary    I & Os for current day (as of 22 Jun 2017 11:50)  =============================================  IN: 0 ml / OUT: 650 ml / NET: -650 ml      Physical Exam:  Neuro  A&Ox3 VSS  Vascular:  right leg stable no additional ischemia  Extremity: dressing c/d/i/  Musculoskeletal: wnl    LABS:                        10.7   12.30 )-----------( 218      ( 22 Jun 2017 06:15 )             33.4     06-22    139  |  97<L>  |  28<H>  ----------------------------<  147<H>  3.6   |  23  |  1.26    Ca    9.2      22 Jun 2017 06:15      PT/INR - ( 21 Jun 2017 06:00 )   PT: 13.1 SEC;   INR: 1.17          PTT - ( 21 Jun 2017 06:00 )  PTT:28.7 SEC    HINA SOTOMAYOR MD  [phone]

## 2017-06-22 NOTE — PROGRESS NOTE ADULT - SUBJECTIVE AND OBJECTIVE BOX
Vascular Surgery daily Progress note    S: No acute events overnight. Pain controlled.     T(C): 36.9, Max: 37.6 (06-21 @ 17:42)  HR: 63 (60 - 69)  BP: 157/62 (156/61 - 173/65)  RR: 18 (16 - 18)  SpO2: 94% (93% - 100%)  Wt(kg): --  I&O's Detail  I & Os for 24h ending 21 Jun 2017 07:00  =============================================  IN:    sodium chloride 0.9%: 120 ml    IV PiggyBack: 100 ml    Total IN: 220 ml  ---------------------------------------------  OUT:    Voided: 750 ml    Total OUT: 750 ml  ---------------------------------------------  Total NET: -530 ml    I & Os for current day (as of 22 Jun 2017 06:52)  =============================================  IN:    Total IN: 0 ml  ---------------------------------------------  OUT:    Voided: 650 ml    Total OUT: 650 ml  ---------------------------------------------  Total NET: -650 ml      Gen: NAD   RLE: stable leg cellulitis   Vasc: R AT Dopp/ L DP/PT Dopp

## 2017-06-22 NOTE — PROGRESS NOTE ADULT - SUBJECTIVE AND OBJECTIVE BOX
FOLLOW UP VISIT:  SYMPTOMS:  Patient comfortable , Denies Cp, Sob or Dizziness , Supine in Bed   He agreed for RLE bypass , pulmonary consult appreciated   MEDICATIONS  (STANDING):  piperacillin/tazobactam IVPB. 3.375Gram(s) IV Intermittent every 8 hours  heparin  Injectable 5000Unit(s) SubCutaneous every 8 hours  insulin lispro (HumaLOG) corrective regimen sliding scale  SubCutaneous at bedtime  insulin lispro (HumaLOG) corrective regimen sliding scale  SubCutaneous three times a day before meals  hydrALAZINE 10milliGRAM(s) Oral two times a day  simvastatin 20milliGRAM(s) Oral at bedtime  brimonidine 0.2% Ophthalmic Solution 1Drop(s) Right EYE two times a day  timolol 0.5% Solution 1Drop(s) Right EYE two times a day  collagenase Ointment 1Application(s) Topical daily  ascorbic acid 500milliGRAM(s) Oral daily  metoprolol 25milliGRAM(s) Oral two times a day  sodium chloride 0.9%. 1000milliLiter(s) IV Continuous <Continuous>    MEDICATIONS  (PRN):      Vital Signs Last 24 Hrs  T(C): 36.7, Max: 37.3 (06-21 @ 22:14)  T(F): 98.1, Max: 99.2 (06-21 @ 22:14)  HR: 60 (60 - 69)  BP: 148/56 (142/56 - 157/62)  BP(mean): --  RR: 18 (16 - 18)  SpO2: 96% (94% - 99%)    Daily     Daily     I&O's Detail    I & Os for current day (as of 22 Jun 2017 18:20)  =============================================  IN:    Total IN: 0 ml  ---------------------------------------------  OUT:    Voided: 650 ml    Total OUT: 650 ml  ---------------------------------------------  Total NET: -650 ml      Physical Exam  Eyes  anicteric   Neck  no JVD  Lungs  CTA  Cor  SM 3/6   Abd  Soft NT + BS  Ext  RLE dressing   Pulses  reduced  Neuro  AxOx3    LABS  PT/INR - ( 21 Jun 2017 06:00 )   PT: 13.1 SEC;   INR: 1.17          PTT - ( 21 Jun 2017 06:00 )  PTT:28.7 SEC        CBC Full  -  ( 22 Jun 2017 06:15 )  WBC Count : 12.30 K/uL  Hemoglobin : 10.7 g/dL  Hematocrit : 33.4 %  Platelet Count - Automated : 218 K/uL  Mean Cell Volume : 97.9 fL  Mean Cell Hemoglobin : 31.4 pg  Mean Cell Hemoglobin Concentration : 32.0 %  Auto Neutrophil # : x  Auto Lymphocyte # : x  Auto Monocyte # : x  Auto Eosinophil # : x  Auto Basophil # : x  Auto Neutrophil % : x  Auto Lymphocyte % : x  Auto Monocyte % : x  Auto Eosinophil % : x  Auto Basophil % : x    06-22    139  |  97<L>  |  28<H>  ----------------------------<  147<H>  3.6   |  23  |  1.26    Ca    9.2      22 Jun 2017 06:15      Lipid panel  TSH:  Digoxin level    ECHO 6/22/17 MAC ,Mild MR ,AR & Tr PAPs  47 mmhg  , Mild Pulmonary HTN , NL LV function ,PPM wire     Nuclear Stress    ECG:    Telemetry:    RADIOLOGY & ADDITIONAL STUDIES:    ASSESSMENT & PLAN:5 year old male with Hx of HTN ,CAD ,TIA ,CRI,  PAD ,DM admitted  with RLE cellulitis  & R DF ulcer ( 2x3) . . Patient with Ischemic RLE s/p Angiogram for RLE bypass surgery   ECHO with NL LV function ,Mild MR ,TR ,AR   PAPs 47 mmhg , .He is on  lopressor 25 mg q12h .Improved SBP . based on  PE recent Cardiac evaluation, ( old inferior MI ,No ischemia , NL LV function) . He is optimized from Cardio -Vascular stand point for RLE vascular surgery  under General anesthesia . He is at High franco-operative risk . Post -op SICU care , Adequate pain management , keep SBP less than 160/90 mmhg & HR less than 90 bpm . Consider IV vasotec & lopressor for BP & rate control.

## 2017-06-22 NOTE — PROGRESS NOTE ADULT - ASSESSMENT
89M with right first toe ulcer, RLE cellulitis  - [facility] [place]  - f/u pulm recs  - OR planning for RLE bypass  - Zosyn

## 2017-06-22 NOTE — PRE-OP CHECKLIST - 2.
sacrum area with 1x1x0.04cm stage 2 pressure ulcer. left hip 1x1cm blister wound. sacrum area with 1x1x0.04cm stage 2 pressure ulcer. left hip 1x1cm blister wound.left forearm posterior area with dressing covered poss skin tear.

## 2017-06-23 ENCOUNTER — TRANSCRIPTION ENCOUNTER (OUTPATIENT)
Age: 82
End: 2017-06-23

## 2017-06-23 LAB
APTT BLD: 26.7 SEC — LOW (ref 27.5–37.4)
BASE EXCESS BLDA CALC-SCNC: -2.2 MMOL/L — SIGNIFICANT CHANGE UP
BASE EXCESS BLDA CALC-SCNC: -4.1 MMOL/L — SIGNIFICANT CHANGE UP
BLD GP AB SCN SERPL QL: NEGATIVE — SIGNIFICANT CHANGE UP
BUN SERPL-MCNC: 39 MG/DL — HIGH (ref 7–23)
CA-I BLDA-SCNC: 1.18 MMOL/L — SIGNIFICANT CHANGE UP (ref 1.15–1.29)
CA-I BLDA-SCNC: 1.21 MMOL/L — SIGNIFICANT CHANGE UP (ref 1.15–1.29)
CALCIUM SERPL-MCNC: 9 MG/DL — SIGNIFICANT CHANGE UP (ref 8.4–10.5)
CHLORIDE SERPL-SCNC: 103 MMOL/L — SIGNIFICANT CHANGE UP (ref 98–107)
CO2 SERPL-SCNC: 22 MMOL/L — SIGNIFICANT CHANGE UP (ref 22–31)
CREAT SERPL-MCNC: 1.39 MG/DL — HIGH (ref 0.5–1.3)
CRP SERPL-MCNC: 102.6 MG/L — HIGH (ref 0.3–5)
ERYTHROCYTE [SEDIMENTATION RATE] IN BLOOD: 98 MM/HR — HIGH (ref 1–15)
GLUCOSE BLDA-MCNC: 142 MG/DL — HIGH (ref 70–99)
GLUCOSE BLDA-MCNC: 154 MG/DL — HIGH (ref 70–99)
GLUCOSE SERPL-MCNC: 141 MG/DL — HIGH (ref 70–99)
HCO3 BLDA-SCNC: 21 MMOL/L — LOW (ref 22–26)
HCO3 BLDA-SCNC: 23 MMOL/L — SIGNIFICANT CHANGE UP (ref 22–26)
HCT VFR BLD CALC: 30.9 % — LOW (ref 39–50)
HCT VFR BLDA CALC: 27.3 % — LOW (ref 39–51)
HCT VFR BLDA CALC: 27.6 % — LOW (ref 39–51)
HGB BLD-MCNC: 9.9 G/DL — LOW (ref 13–17)
HGB BLDA-MCNC: 8.8 G/DL — LOW (ref 13–17)
HGB BLDA-MCNC: 8.9 G/DL — LOW (ref 13–17)
INR BLD: 1.1 — SIGNIFICANT CHANGE UP (ref 0.88–1.17)
MAGNESIUM SERPL-MCNC: 2.1 MG/DL — SIGNIFICANT CHANGE UP (ref 1.6–2.6)
MCHC RBC-ENTMCNC: 31.1 PG — SIGNIFICANT CHANGE UP (ref 27–34)
MCHC RBC-ENTMCNC: 32 % — SIGNIFICANT CHANGE UP (ref 32–36)
MCV RBC AUTO: 97.2 FL — SIGNIFICANT CHANGE UP (ref 80–100)
PCO2 BLDA: 31 MMHG — LOW (ref 35–48)
PCO2 BLDA: 32 MMHG — LOW (ref 35–48)
PH BLDA: 7.4 PH — SIGNIFICANT CHANGE UP (ref 7.35–7.45)
PH BLDA: 7.46 PH — HIGH (ref 7.35–7.45)
PHOSPHATE SERPL-MCNC: 2.6 MG/DL — SIGNIFICANT CHANGE UP (ref 2.5–4.5)
PLATELET # BLD AUTO: 211 K/UL — SIGNIFICANT CHANGE UP (ref 150–400)
PMV BLD: 10.8 FL — SIGNIFICANT CHANGE UP (ref 7–13)
PO2 BLDA: 129 MMHG — HIGH (ref 83–108)
PO2 BLDA: 137 MMHG — HIGH (ref 83–108)
POTASSIUM BLDA-SCNC: 3.9 MMOL/L — SIGNIFICANT CHANGE UP (ref 3.4–4.5)
POTASSIUM BLDA-SCNC: 4.2 MMOL/L — SIGNIFICANT CHANGE UP (ref 3.4–4.5)
POTASSIUM SERPL-MCNC: 3.5 MMOL/L — SIGNIFICANT CHANGE UP (ref 3.5–5.3)
POTASSIUM SERPL-SCNC: 3.5 MMOL/L — SIGNIFICANT CHANGE UP (ref 3.5–5.3)
PROTHROM AB SERPL-ACNC: 12.3 SEC — SIGNIFICANT CHANGE UP (ref 9.8–13.1)
RBC # BLD: 3.18 M/UL — LOW (ref 4.2–5.8)
RBC # FLD: 12.5 % — SIGNIFICANT CHANGE UP (ref 10.3–14.5)
RH IG SCN BLD-IMP: POSITIVE — SIGNIFICANT CHANGE UP
SAO2 % BLDA: 99.3 % — HIGH (ref 95–99)
SAO2 % BLDA: 99.6 % — HIGH (ref 95–99)
SODIUM BLDA-SCNC: 135 MMOL/L — LOW (ref 136–146)
SODIUM BLDA-SCNC: 137 MMOL/L — SIGNIFICANT CHANGE UP (ref 136–146)
SODIUM SERPL-SCNC: 138 MMOL/L — SIGNIFICANT CHANGE UP (ref 135–145)
WBC # BLD: 8.19 K/UL — SIGNIFICANT CHANGE UP (ref 3.8–10.5)
WBC # FLD AUTO: 8.19 K/UL — SIGNIFICANT CHANGE UP (ref 3.8–10.5)

## 2017-06-23 PROCEDURE — 99233 SBSQ HOSP IP/OBS HIGH 50: CPT | Mod: GC

## 2017-06-23 PROCEDURE — 71010: CPT | Mod: 26

## 2017-06-23 PROCEDURE — 35656 BPG FEMORAL-POPLITEAL: CPT | Mod: RT

## 2017-06-23 RX ORDER — POTASSIUM PHOSPHATE, MONOBASIC POTASSIUM PHOSPHATE, DIBASIC 236; 224 MG/ML; MG/ML
15 INJECTION, SOLUTION INTRAVENOUS ONCE
Qty: 0 | Refills: 0 | Status: COMPLETED | OUTPATIENT
Start: 2017-06-23 | End: 2017-06-23

## 2017-06-23 RX ORDER — POTASSIUM CHLORIDE 20 MEQ
40 PACKET (EA) ORAL ONCE
Qty: 0 | Refills: 0 | Status: COMPLETED | OUTPATIENT
Start: 2017-06-23 | End: 2017-06-23

## 2017-06-23 RX ORDER — SODIUM CHLORIDE 9 MG/ML
1000 INJECTION INTRAMUSCULAR; INTRAVENOUS; SUBCUTANEOUS
Qty: 0 | Refills: 0 | Status: DISCONTINUED | OUTPATIENT
Start: 2017-06-23 | End: 2017-06-23

## 2017-06-23 RX ORDER — ACETAMINOPHEN 500 MG
650 TABLET ORAL EVERY 6 HOURS
Qty: 0 | Refills: 0 | Status: COMPLETED | OUTPATIENT
Start: 2017-06-23 | End: 2017-06-24

## 2017-06-23 RX ORDER — OXYCODONE HYDROCHLORIDE 5 MG/1
2.5 TABLET ORAL EVERY 4 HOURS
Qty: 0 | Refills: 0 | Status: DISCONTINUED | OUTPATIENT
Start: 2017-06-23 | End: 2017-06-29

## 2017-06-23 RX ORDER — ONDANSETRON 8 MG/1
4 TABLET, FILM COATED ORAL ONCE
Qty: 0 | Refills: 0 | Status: DISCONTINUED | OUTPATIENT
Start: 2017-06-23 | End: 2017-06-23

## 2017-06-23 RX ORDER — HYDROMORPHONE HYDROCHLORIDE 2 MG/ML
0.25 INJECTION INTRAMUSCULAR; INTRAVENOUS; SUBCUTANEOUS
Qty: 0 | Refills: 0 | Status: DISCONTINUED | OUTPATIENT
Start: 2017-06-23 | End: 2017-06-23

## 2017-06-23 RX ORDER — OXYCODONE HYDROCHLORIDE 5 MG/1
5 TABLET ORAL EVERY 4 HOURS
Qty: 0 | Refills: 0 | Status: DISCONTINUED | OUTPATIENT
Start: 2017-06-23 | End: 2017-06-29

## 2017-06-23 RX ORDER — SODIUM CHLORIDE 9 MG/ML
3 INJECTION INTRAMUSCULAR; INTRAVENOUS; SUBCUTANEOUS EVERY 8 HOURS
Qty: 0 | Refills: 0 | Status: DISCONTINUED | OUTPATIENT
Start: 2017-06-23 | End: 2017-06-26

## 2017-06-23 RX ORDER — CILOSTAZOL 100 MG/1
50 TABLET ORAL
Qty: 0 | Refills: 0 | Status: DISCONTINUED | OUTPATIENT
Start: 2017-06-23 | End: 2017-06-30

## 2017-06-23 RX ADMIN — HEPARIN SODIUM 5000 UNIT(S): 5000 INJECTION INTRAVENOUS; SUBCUTANEOUS at 13:58

## 2017-06-23 RX ADMIN — Medication 500 MILLIGRAM(S): at 12:01

## 2017-06-23 RX ADMIN — PIPERACILLIN AND TAZOBACTAM 25 GRAM(S): 4; .5 INJECTION, POWDER, LYOPHILIZED, FOR SOLUTION INTRAVENOUS at 05:57

## 2017-06-23 RX ADMIN — Medication 25 MILLIGRAM(S): at 17:36

## 2017-06-23 RX ADMIN — Medication 10 MILLIGRAM(S): at 17:36

## 2017-06-23 RX ADMIN — SODIUM CHLORIDE 30 MILLILITER(S): 9 INJECTION INTRAMUSCULAR; INTRAVENOUS; SUBCUTANEOUS at 09:07

## 2017-06-23 RX ADMIN — Medication 40 MILLIEQUIVALENT(S): at 09:06

## 2017-06-23 RX ADMIN — BRIMONIDINE TARTRATE 1 DROP(S): 2 SOLUTION/ DROPS OPHTHALMIC at 17:36

## 2017-06-23 RX ADMIN — Medication 10 MILLIGRAM(S): at 05:56

## 2017-06-23 RX ADMIN — Medication 1 DROP(S): at 05:55

## 2017-06-23 RX ADMIN — HEPARIN SODIUM 5000 UNIT(S): 5000 INJECTION INTRAVENOUS; SUBCUTANEOUS at 05:56

## 2017-06-23 RX ADMIN — Medication 25 MILLIGRAM(S): at 05:56

## 2017-06-23 RX ADMIN — BRIMONIDINE TARTRATE 1 DROP(S): 2 SOLUTION/ DROPS OPHTHALMIC at 05:56

## 2017-06-23 RX ADMIN — PIPERACILLIN AND TAZOBACTAM 25 GRAM(S): 4; .5 INJECTION, POWDER, LYOPHILIZED, FOR SOLUTION INTRAVENOUS at 13:58

## 2017-06-23 RX ADMIN — HYDROMORPHONE HYDROCHLORIDE 0.25 MILLIGRAM(S): 2 INJECTION INTRAMUSCULAR; INTRAVENOUS; SUBCUTANEOUS at 23:00

## 2017-06-23 RX ADMIN — PIPERACILLIN AND TAZOBACTAM 25 GRAM(S): 4; .5 INJECTION, POWDER, LYOPHILIZED, FOR SOLUTION INTRAVENOUS at 22:49

## 2017-06-23 RX ADMIN — SODIUM CHLORIDE 30 MILLILITER(S): 9 INJECTION INTRAMUSCULAR; INTRAVENOUS; SUBCUTANEOUS at 00:21

## 2017-06-23 RX ADMIN — Medication 1 DROP(S): at 17:36

## 2017-06-23 RX ADMIN — SODIUM CHLORIDE 70 MILLILITER(S): 9 INJECTION INTRAMUSCULAR; INTRAVENOUS; SUBCUTANEOUS at 17:37

## 2017-06-23 RX ADMIN — Medication 1 APPLICATION(S): at 06:52

## 2017-06-23 RX ADMIN — HYDROMORPHONE HYDROCHLORIDE 0.25 MILLIGRAM(S): 2 INJECTION INTRAMUSCULAR; INTRAVENOUS; SUBCUTANEOUS at 22:49

## 2017-06-23 RX ADMIN — POTASSIUM PHOSPHATE, MONOBASIC POTASSIUM PHOSPHATE, DIBASIC 62.5 MILLIMOLE(S): 236; 224 INJECTION, SOLUTION INTRAVENOUS at 09:06

## 2017-06-23 NOTE — PROGRESS NOTE ADULT - SUBJECTIVE AND OBJECTIVE BOX
CHIEF COMPLAINT:    Interval Events: Afebrile    REVIEW OF SYSTEMS:    X ] All other systems negative  [ ] Unable to assess ROS because ________    OBJECTIVE:  ICU Vital Signs Last 24 Hrs  T(C): 36.6, Max: 36.9 (06-22 @ 14:51)  T(F): 97.9, Max: 98.4 (06-22 @ 14:51)  HR: 67 (60 - 67)  BP: 157/64 (139/62 - 157/64)  BP(mean): --  ABP: --  ABP(mean): --  RR: 17 (16 - 18)  SpO2: 95% (95% - 99%)        I & Os for current day (as of 06-23 @ 08:28)  =============================================  IN: 290 ml / OUT: 425 ml / NET: -135 ml    CAPILLARY BLOOD GLUCOSE  146 (23 Jun 2017 07:08)      PHYSICAL EXAM:  General: UNAD  HEENT: edentulous  Lymph Nodes: Non appreciated  Neck: Normal circumference  Respiratory: CTA B/L  Cardiovascular:  RRR, Normal S1 and S2, No M/R/G  Abdomen: SNT, +  Extremities:   Skin:   Neurological:  Psychiatry:    HOSPITAL MEDICATIONS:  MEDICATIONS  (STANDING):  piperacillin/tazobactam IVPB. 3.375Gram(s) IV Intermittent every 8 hours  heparin  Injectable 5000Unit(s) SubCutaneous every 8 hours  insulin lispro (HumaLOG) corrective regimen sliding scale  SubCutaneous at bedtime  insulin lispro (HumaLOG) corrective regimen sliding scale  SubCutaneous three times a day before meals  hydrALAZINE 10milliGRAM(s) Oral two times a day  simvastatin 20milliGRAM(s) Oral at bedtime  brimonidine 0.2% Ophthalmic Solution 1Drop(s) Right EYE two times a day  timolol 0.5% Solution 1Drop(s) Right EYE two times a day  collagenase Ointment 1Application(s) Topical daily  ascorbic acid 500milliGRAM(s) Oral daily  metoprolol 25milliGRAM(s) Oral two times a day  sodium chloride 0.9%. 1000milliLiter(s) IV Continuous <Continuous>  potassium chloride    Tablet ER 40milliEquivalent(s) Oral once  potassium phosphate IVPB 15milliMole(s) IV Intermittent once    MEDICATIONS  (PRN):      LABS:                        9.9    8.19  )-----------( 211      ( 23 Jun 2017 05:19 )             30.9     06-23    138  |  103  |  39<H>  ----------------------------<  141<H>  3.5   |  22  |  1.39<H>    Ca    9.0      23 Jun 2017 05:19  Phos  2.6     06-23  Mg     2.1     06-23      PT/INR - ( 23 Jun 2017 05:19 )   PT: 12.3 SEC;   INR: 1.10          PTT - ( 23 Jun 2017 05:19 )  PTT:26.7 SEC          MICROBIOLOGY:     RADIOLOGY:  [X ] Reviewed and interpreted by me    A+P    Mr Bruno is a 89 year old male with a history of PVD, pacemaker, diastolic dysfunction who comes in for cellulitis and found to have RLE occlusion requiring a fem-pop bypapss. He was diagnosed with pulmonary hypertension and requires optimization. At this point there is no evidence to suggest that the patient has pulmonary hypertension due to anything other than Group II which is primarily cardiac in nature. Though group 4 is a possibility (CTEPH) he is not hypoxemic nor does he have a history of DVT or PE in the past. It is unlikely his pulm hypertension is due to a rheumatologic disease nor does it appear to be due to a primary pulmonary disease. He is too told to have an onset of primary pulmonary hypertension, and he does not meet any of the other diagnosis for group V pulmonary hypertension.     The treatment of Group II pulmonary hypertension is diuresis or fixing  the underlying cardiac problem. Therefore, he should be optimized from a cardiology stand point, and he should be diuresed daily to maintain a negative fluid balance.     - Daily diuresis to maintain negative fluid balance before and after surgery.  - Would speak with anesthesia about performing the surgery without the use of intubation if possible as the fall in blood pressure with induction of GA can be exaggerated in these patients with pulmonary HTN  - His risk of postoperative pulmonary complications is intermediate(13.2%) by the ARISCAT score with the caveat that his score is borderline for high risk("high risk" is 45 and his score is 43). May benefit from post op incentive spirometer    Thank you for the privilege of assisting in Mr. Bruno's care  Please feel free to contact us with any ? at [phone]

## 2017-06-23 NOTE — PROGRESS NOTE ADULT - ASSESSMENT
A/P: 89yo male right foot submet head 1 wound down to bone   ·	Patient seen and examined   ·	No surgical plan at this time   ·	Will continue to monitor appearance   ·	Continue with daily dressing changes with santyl and DSD  ·	Continue with IV antibiotics

## 2017-06-23 NOTE — PROVIDER CONTACT NOTE (OTHER) - SITUATION
pt complaining of R elbow pain, pt denies any numbness or tingling but when asked to move fingers, pt unable to do so

## 2017-06-23 NOTE — PROGRESS NOTE ADULT - ASSESSMENT
89M with right first toe ulcer, RLE cellulitis  - [facility]  - f/u pulm recs  - OR planning for RLE bypass  - Zosyn 89M with right first toe ulcer, RLE cellulitis  - f/u pulm recs  - f/u official read of R elbow x ray. Ortho consult  - OR planning for RLE bypass tentative for today  - Zosyn

## 2017-06-23 NOTE — PROGRESS NOTE ADULT - SUBJECTIVE AND OBJECTIVE BOX
HPI:   This is a 89yMale admitted for management of RLE ulcer pending RLE bypass. He developed pain and decreased ROM of R elbow while in hospital. Atraumatic. No antecedent symptoms. No previous pain in elbow. XR shows no evidence of acute trauma, but extensive degenerative changes. Upon exam, he also complained of pain in wrist and hand, particularly over 4th MC joint. Of note, has history of got    Review of systems: Denies fever, chills, nausea, vomiting, recent infection, previous fractures.    Past Medical History: HTN, HLD, DM2, gout    Medications: MEDICATIONS  (STANDING):  piperacillin/tazobactam IVPB. 3.375Gram(s) IV Intermittent every 8 hours  heparin  Injectable 5000Unit(s) SubCutaneous every 8 hours  insulin lispro (HumaLOG) corrective regimen sliding scale  SubCutaneous at bedtime  insulin lispro (HumaLOG) corrective regimen sliding scale  SubCutaneous three times a day before meals  hydrALAZINE 10milliGRAM(s) Oral two times a day  simvastatin 20milliGRAM(s) Oral at bedtime  brimonidine 0.2% Ophthalmic Solution 1Drop(s) Right EYE two times a day  timolol 0.5% Solution 1Drop(s) Right EYE two times a day  collagenase Ointment 1Application(s) Topical daily  metoprolol 25milliGRAM(s) Oral two times a day  sodium chloride 0.9%. 1000milliLiter(s) IV Continuous <Continuous>    T(C): 37, Max: 37 (06-23 @ 11:03)  HR: 60 (60 - 67)  BP: 145/72 (139/62 - 157/64)  RR: 16 (16 - 18)  SpO2: 99% (95% - 99%)  Wt(kg): --                          9.9    8.19  )-----------( 211      ( 23 Jun 2017 05:19 )             30.9     06-23    138  |  103  |  39<H>  ----------------------------<  141<H>  3.5   |  22  |  1.39<H>    Ca    9.0      23 Jun 2017 05:19  Phos  2.6     06-23  Mg     2.1     06-23    XR R elbow: extensive degenerative changes, mild effusion, no fracture/dislocation    EXAM:  Awake, Alert and in no acute distress  Pleasant and cooperative.  - Gen: No erythema, minimal swelling about elbow, moderate enlargement of olecranon bursa; elbow hedl in 90' of flexion, ROM ; TTP directly over olecranon and 4th MC head; SILT VSF/VIF/FDWS; TUP/A/AOK intact; decreased ROM at wrist and hand      A/P: This is a 89y Male who presents today with RUE pain in elbow, wrist and hand - likely olecranon bursitis vs crystalline arthropathy    - Pain control  - NSAIDs if not medically contraindicated  - RUE WBAT  - No acute orthopedic intervention  - Should follow up with Dr Garcia as outpatient in 1-2 wk - [phone]

## 2017-06-23 NOTE — PROGRESS NOTE ADULT - SUBJECTIVE AND OBJECTIVE BOX
Dr. Caitlin Layne  [facility] [phone]        Patient seen and examined @ bedside, no new complaints        MEDICATIONS  (STANDING):  piperacillin/tazobactam IVPB. 3.375Gram(s) IV Intermittent every 8 hours  heparin  Injectable 5000Unit(s) SubCutaneous every 8 hours  insulin lispro (HumaLOG) corrective regimen sliding scale  SubCutaneous at bedtime  insulin lispro (HumaLOG) corrective regimen sliding scale  SubCutaneous three times a day before meals  hydrALAZINE 10milliGRAM(s) Oral two times a day  simvastatin 20milliGRAM(s) Oral at bedtime  brimonidine 0.2% Ophthalmic Solution 1Drop(s) Right EYE two times a day  timolol 0.5% Solution 1Drop(s) Right EYE two times a day  collagenase Ointment 1Application(s) Topical daily  ascorbic acid 500milliGRAM(s) Oral daily  metoprolol 25milliGRAM(s) Oral two times a day  sodium chloride 0.9%. 1000milliLiter(s) IV Continuous <Continuous>      VITAL:  T(C): , Max: 36.9 (06-22 @ 14:51)  T(F): , Max: 98.4 (06-22 @ 14:51)  HR: 67  BP: 157/64  BP(mean): --  RR: 17  SpO2: 95%  Wt(kg): --    I and O's:  I & Os for 24h ending 06-23 @ 07:00  =============================================  IN: 290 ml / OUT: 425 ml / NET: -135 ml    I & Os for current day (as of 06-23 @ 10:01)  =============================================  IN: 0 ml / OUT: 200 ml / NET: -200 ml        PHYSICAL EXAM:    Constitutional: NAD  HEENT: PERRLA    Neck:  No JVD  Respiratory: CTAB/L  Cardiovascular: S1 and S2  Gastrointestinal: BS+, soft, NT/ND  Extremities: No peripheral edema  : No Isaacs  Skin: No rashes      LABS:                        9.9    8.19  )-----------( 211      ( 23 Jun 2017 05:19 )             30.9     06-23    138  |  103  |  39<H>  ----------------------------<  141<H>  3.5   |  22  |  1.39<H>    Ca    9.0      23 Jun 2017 05:19  Phos  2.6     06-23  Mg     2.1     06-23            Urine Studies:          RADIOLOGY & ADDITIONAL STUDIES:

## 2017-06-23 NOTE — PROGRESS NOTE ADULT - SUBJECTIVE AND OBJECTIVE BOX
Vascular Surgery daily Progress note    S: No acute events overnight. Pain controlled. RN informed coverage overnight that patient is c/o weakness of right hand, 2/2 elbow pain. States it has been present for a long time        Gen: NAD   RLE: stable leg cellulitis   Vasc: R AT Dopp/ L DP/PT Dopp Vascular Surgery daily Progress note    S: Patient c/o weakness of right hand, 2/2 elbow pain. X ray of R elbow performed overnight, prelim shows possible effusion.     T(C): 36.6, Max: 36.9 (06-22 @ 14:51)  HR: 67 (60 - 67)  BP: 157/64 (139/62 - 157/64)  RR: 17 (16 - 18)  SpO2: 95% (95% - 99%)  Wt(kg): --  I&O's Detail    I & Os for current day (as of 23 Jun 2017 07:19)  =============================================  IN:    IV PiggyBack: 100 ml    sodium chloride 0.9%.: 90 ml    Total IN: 190 ml  ---------------------------------------------  OUT:    Voided: 225 ml    Total OUT: 225 ml  ---------------------------------------------  Total NET: -35 ml      Gen: NAD   RLE: stable leg cellulitis   RUE: erythema and warmth of right elbow  Vasc: R AT Dopp/ L DP/PT Dopp

## 2017-06-23 NOTE — PROGRESS NOTE ADULT - SUBJECTIVE AND OBJECTIVE BOX
Patient seen at bedside resting comfortably in NAD     Vital Signs Last 24 Hrs  T(C): 36.6, Max: 36.9 (06-22 @ 14:51)  T(F): 97.9, Max: 98.4 (06-22 @ 14:51)  HR: 67 (60 - 67)  BP: 157/64 (139/62 - 157/64)  BP(mean): --  RR: 17 (16 - 18)  SpO2: 95% (95% - 99%)                          9.9    8.19  )-----------( 211      ( 23 Jun 2017 05:19 )             30.9     06-23    138  |  103  |  39<H>  ----------------------------<  141<H>  3.5   |  22  |  1.39<H>    Ca    9.0      23 Jun 2017 05:19  Phos  2.6     06-23  Mg     2.1     06-23      BISHOP: right foot submet 1 wound down to bone. Dressing left clean, dry and intact with no evidence of strikethrough present.

## 2017-06-24 LAB
BASOPHILS # BLD AUTO: 0.01 K/UL — SIGNIFICANT CHANGE UP (ref 0–0.2)
BASOPHILS NFR BLD AUTO: 0.1 % — SIGNIFICANT CHANGE UP (ref 0–2)
BUN SERPL-MCNC: 34 MG/DL — HIGH (ref 7–23)
BUN SERPL-MCNC: 34 MG/DL — HIGH (ref 7–23)
CALCIUM SERPL-MCNC: 8.6 MG/DL — SIGNIFICANT CHANGE UP (ref 8.4–10.5)
CALCIUM SERPL-MCNC: 8.6 MG/DL — SIGNIFICANT CHANGE UP (ref 8.4–10.5)
CHLORIDE SERPL-SCNC: 100 MMOL/L — SIGNIFICANT CHANGE UP (ref 98–107)
CHLORIDE SERPL-SCNC: 100 MMOL/L — SIGNIFICANT CHANGE UP (ref 98–107)
CO2 SERPL-SCNC: 16 MMOL/L — LOW (ref 22–31)
CO2 SERPL-SCNC: 16 MMOL/L — LOW (ref 22–31)
CREAT SERPL-MCNC: 1.16 MG/DL — SIGNIFICANT CHANGE UP (ref 0.5–1.3)
CREAT SERPL-MCNC: 1.16 MG/DL — SIGNIFICANT CHANGE UP (ref 0.5–1.3)
EOSINOPHIL # BLD AUTO: 0.01 K/UL — SIGNIFICANT CHANGE UP (ref 0–0.5)
EOSINOPHIL NFR BLD AUTO: 0.1 % — SIGNIFICANT CHANGE UP (ref 0–6)
GLUCOSE SERPL-MCNC: 193 MG/DL — HIGH (ref 70–99)
GLUCOSE SERPL-MCNC: 193 MG/DL — HIGH (ref 70–99)
HCT VFR BLD CALC: 33.2 % — LOW (ref 39–50)
HCT VFR BLD CALC: 33.2 % — LOW (ref 39–50)
HGB BLD-MCNC: 10.7 G/DL — LOW (ref 13–17)
HGB BLD-MCNC: 10.7 G/DL — LOW (ref 13–17)
IMM GRANULOCYTES NFR BLD AUTO: 0.2 % — SIGNIFICANT CHANGE UP (ref 0–1.5)
LYMPHOCYTES # BLD AUTO: 0.48 K/UL — LOW (ref 1–3.3)
LYMPHOCYTES # BLD AUTO: 3.9 % — LOW (ref 13–44)
MAGNESIUM SERPL-MCNC: 1.9 MG/DL — SIGNIFICANT CHANGE UP (ref 1.6–2.6)
MCHC RBC-ENTMCNC: 31 PG — SIGNIFICANT CHANGE UP (ref 27–34)
MCHC RBC-ENTMCNC: 31 PG — SIGNIFICANT CHANGE UP (ref 27–34)
MCHC RBC-ENTMCNC: 32.2 % — SIGNIFICANT CHANGE UP (ref 32–36)
MCHC RBC-ENTMCNC: 32.2 % — SIGNIFICANT CHANGE UP (ref 32–36)
MCV RBC AUTO: 96.2 FL — SIGNIFICANT CHANGE UP (ref 80–100)
MCV RBC AUTO: 96.2 FL — SIGNIFICANT CHANGE UP (ref 80–100)
MONOCYTES # BLD AUTO: 1.04 K/UL — HIGH (ref 0–0.9)
MONOCYTES NFR BLD AUTO: 8.5 % — SIGNIFICANT CHANGE UP (ref 2–14)
NEUTROPHILS # BLD AUTO: 10.72 K/UL — HIGH (ref 1.8–7.4)
NEUTROPHILS NFR BLD AUTO: 87.2 % — HIGH (ref 43–77)
PHOSPHATE SERPL-MCNC: 3.5 MG/DL — SIGNIFICANT CHANGE UP (ref 2.5–4.5)
PLATELET # BLD AUTO: 246 K/UL — SIGNIFICANT CHANGE UP (ref 150–400)
PLATELET # BLD AUTO: 246 K/UL — SIGNIFICANT CHANGE UP (ref 150–400)
PMV BLD: 10.5 FL — SIGNIFICANT CHANGE UP (ref 7–13)
PMV BLD: 10.5 FL — SIGNIFICANT CHANGE UP (ref 7–13)
POTASSIUM SERPL-MCNC: 4.4 MMOL/L — SIGNIFICANT CHANGE UP (ref 3.5–5.3)
POTASSIUM SERPL-MCNC: 4.4 MMOL/L — SIGNIFICANT CHANGE UP (ref 3.5–5.3)
POTASSIUM SERPL-SCNC: 4.4 MMOL/L — SIGNIFICANT CHANGE UP (ref 3.5–5.3)
POTASSIUM SERPL-SCNC: 4.4 MMOL/L — SIGNIFICANT CHANGE UP (ref 3.5–5.3)
RBC # BLD: 3.45 M/UL — LOW (ref 4.2–5.8)
RBC # BLD: 3.45 M/UL — LOW (ref 4.2–5.8)
RBC # FLD: 13.7 % — SIGNIFICANT CHANGE UP (ref 10.3–14.5)
RBC # FLD: 13.7 % — SIGNIFICANT CHANGE UP (ref 10.3–14.5)
SODIUM SERPL-SCNC: 137 MMOL/L — SIGNIFICANT CHANGE UP (ref 135–145)
SODIUM SERPL-SCNC: 137 MMOL/L — SIGNIFICANT CHANGE UP (ref 135–145)
WBC # BLD: 12.28 K/UL — HIGH (ref 3.8–10.5)
WBC # BLD: 12.28 K/UL — HIGH (ref 3.8–10.5)
WBC # FLD AUTO: 12.28 K/UL — HIGH (ref 3.8–10.5)
WBC # FLD AUTO: 12.28 K/UL — HIGH (ref 3.8–10.5)

## 2017-06-24 RX ORDER — FUROSEMIDE 40 MG
20 TABLET ORAL DAILY
Qty: 0 | Refills: 0 | Status: DISCONTINUED | OUTPATIENT
Start: 2017-06-24 | End: 2017-06-26

## 2017-06-24 RX ORDER — DEXTROSE MONOHYDRATE, SODIUM CHLORIDE, AND POTASSIUM CHLORIDE 50; .745; 4.5 G/1000ML; G/1000ML; G/1000ML
1000 INJECTION, SOLUTION INTRAVENOUS
Qty: 0 | Refills: 0 | Status: DISCONTINUED | OUTPATIENT
Start: 2017-06-24 | End: 2017-06-24

## 2017-06-24 RX ORDER — ASPIRIN/CALCIUM CARB/MAGNESIUM 324 MG
81 TABLET ORAL DAILY
Qty: 0 | Refills: 0 | Status: DISCONTINUED | OUTPATIENT
Start: 2017-06-24 | End: 2017-06-30

## 2017-06-24 RX ADMIN — DEXTROSE MONOHYDRATE, SODIUM CHLORIDE, AND POTASSIUM CHLORIDE 70 MILLILITER(S): 50; .745; 4.5 INJECTION, SOLUTION INTRAVENOUS at 19:59

## 2017-06-24 RX ADMIN — PIPERACILLIN AND TAZOBACTAM 25 GRAM(S): 4; .5 INJECTION, POWDER, LYOPHILIZED, FOR SOLUTION INTRAVENOUS at 22:33

## 2017-06-24 RX ADMIN — PIPERACILLIN AND TAZOBACTAM 25 GRAM(S): 4; .5 INJECTION, POWDER, LYOPHILIZED, FOR SOLUTION INTRAVENOUS at 15:18

## 2017-06-24 RX ADMIN — SODIUM CHLORIDE 3 MILLILITER(S): 9 INJECTION INTRAMUSCULAR; INTRAVENOUS; SUBCUTANEOUS at 21:53

## 2017-06-24 RX ADMIN — OXYCODONE HYDROCHLORIDE 5 MILLIGRAM(S): 5 TABLET ORAL at 19:53

## 2017-06-24 RX ADMIN — SODIUM CHLORIDE 3 MILLILITER(S): 9 INJECTION INTRAMUSCULAR; INTRAVENOUS; SUBCUTANEOUS at 15:09

## 2017-06-24 RX ADMIN — Medication 1 APPLICATION(S): at 05:56

## 2017-06-24 RX ADMIN — Medication 25 MILLIGRAM(S): at 18:36

## 2017-06-24 RX ADMIN — Medication: at 12:45

## 2017-06-24 RX ADMIN — OXYCODONE HYDROCHLORIDE 5 MILLIGRAM(S): 5 TABLET ORAL at 12:30

## 2017-06-24 RX ADMIN — HEPARIN SODIUM 5000 UNIT(S): 5000 INJECTION INTRAVENOUS; SUBCUTANEOUS at 15:18

## 2017-06-24 RX ADMIN — Medication 2: at 06:40

## 2017-06-24 RX ADMIN — HEPARIN SODIUM 5000 UNIT(S): 5000 INJECTION INTRAVENOUS; SUBCUTANEOUS at 22:33

## 2017-06-24 RX ADMIN — BRIMONIDINE TARTRATE 1 DROP(S): 2 SOLUTION/ DROPS OPHTHALMIC at 18:35

## 2017-06-24 RX ADMIN — Medication 81 MILLIGRAM(S): at 12:01

## 2017-06-24 RX ADMIN — Medication 1 DROP(S): at 05:55

## 2017-06-24 RX ADMIN — SIMVASTATIN 20 MILLIGRAM(S): 20 TABLET, FILM COATED ORAL at 22:32

## 2017-06-24 RX ADMIN — HEPARIN SODIUM 5000 UNIT(S): 5000 INJECTION INTRAVENOUS; SUBCUTANEOUS at 05:55

## 2017-06-24 RX ADMIN — Medication 10 MILLIGRAM(S): at 05:56

## 2017-06-24 RX ADMIN — OXYCODONE HYDROCHLORIDE 5 MILLIGRAM(S): 5 TABLET ORAL at 18:34

## 2017-06-24 RX ADMIN — Medication 1 DROP(S): at 18:35

## 2017-06-24 RX ADMIN — Medication 1: at 22:53

## 2017-06-24 RX ADMIN — CILOSTAZOL 50 MILLIGRAM(S): 100 TABLET ORAL at 05:56

## 2017-06-24 RX ADMIN — Medication 10 MILLIGRAM(S): at 18:36

## 2017-06-24 RX ADMIN — OXYCODONE HYDROCHLORIDE 5 MILLIGRAM(S): 5 TABLET ORAL at 12:00

## 2017-06-24 RX ADMIN — Medication 20 MILLIGRAM(S): at 23:40

## 2017-06-24 RX ADMIN — CILOSTAZOL 50 MILLIGRAM(S): 100 TABLET ORAL at 18:36

## 2017-06-24 RX ADMIN — Medication 2: at 18:34

## 2017-06-24 RX ADMIN — PIPERACILLIN AND TAZOBACTAM 25 GRAM(S): 4; .5 INJECTION, POWDER, LYOPHILIZED, FOR SOLUTION INTRAVENOUS at 06:08

## 2017-06-24 RX ADMIN — Medication 25 MILLIGRAM(S): at 05:52

## 2017-06-24 RX ADMIN — BRIMONIDINE TARTRATE 1 DROP(S): 2 SOLUTION/ DROPS OPHTHALMIC at 05:56

## 2017-06-24 RX ADMIN — SODIUM CHLORIDE 3 MILLILITER(S): 9 INJECTION INTRAMUSCULAR; INTRAVENOUS; SUBCUTANEOUS at 06:08

## 2017-06-24 NOTE — PROGRESS NOTE ADULT - SUBJECTIVE AND OBJECTIVE BOX
Vascular Surgery daily Progress note    S: Patient underwent bypass yesterday evening, monitored in the PACU overnight    Gen: NAD   RLE: stable leg cellulitis. Dressing C/D/I   RUE: erythema and warmth of right elbow  Vasc: R AT Dopp/ L DP/PT Dopp Vascular Surgery daily Progress note    S: Patient underwent bypass yesterday evening, monitored in the PACU overnight    ICU Vital Signs Last 24 Hrs  T(C): 36.8, Max: 37.2 (06-23 @ 18:22)  T(F): 98.2, Max: 99 (06-23 @ 18:22)  HR: 60 (60 - 61)  BP: 130/42 (15/- - 166/68)  BP(mean): --  ABP: 119/30 (119/30 - 171/95)  ABP(mean): --  RR: 19 (15 - 25)  SpO2: 98% (94% - 100%)  I&O's Summary  I & Os for 24h ending 24 Jun 2017 07:00  =============================================  IN: 588 ml / OUT: 610 ml / NET: -22 ml    I & Os for current day (as of 24 Jun 2017 08:08)  =============================================  IN: 120 ml / OUT: 25 ml / NET: 95 ml      Gen: NAD   RLE: stable leg cellulitis. Dressing C/D/I   RUE: erythema and warmth of right elbow  Vasc: R AT Dopp/ L DP/PT Dopp Vascular Surgery daily Progress note    S: Patient underwent bypass yesterday evening, monitored in the PACU overnight    ICU Vital Signs Last 24 Hrs  T(C): 36.8, Max: 37.2 (06-23 @ 18:22)  T(F): 98.2, Max: 99 (06-23 @ 18:22)  HR: 60 (60 - 61)  BP: 130/42 (15/- - 166/68)  BP(mean): --  ABP: 119/30 (119/30 - 171/95)  ABP(mean): --  RR: 19 (15 - 25)  SpO2: 98% (94% - 100%)  I&O's Summary  I & Os for 24h ending 24 Jun 2017 07:00  =============================================  IN: 588 ml / OUT: 610 ml / NET: -22 ml    I & Os for current day (as of 24 Jun 2017 08:08)  =============================================  IN: 120 ml / OUT: 25 ml / NET: 95 ml      Gen: NAD   RLE: stable leg cellulitis. Dressing C/D/I rle warm w increased cap refill and perfusion  RUE: erythema and warmth of right elbow  Vasc: R AT Dopp/ L DP/PT Dopp

## 2017-06-24 NOTE — PROGRESS NOTE ADULT - ASSESSMENT
Assessment:89y Male s/p right femoral to above knee popliteal bypass with PTFE, doing well and recovering in PACU overnight    Plan:  IV lock  Consistent carbohydrate diet  q1 hour vascular checks while in PACU  ISS  Resume ASA and Pletal in AM  c/w Zosyn  SQH for DVT ppx  Pain/nausea control PRN  Incentive spirometer/OOB/Ambulate  Yecenia Tellez PGY1

## 2017-06-24 NOTE — PROGRESS NOTE ADULT - SUBJECTIVE AND OBJECTIVE BOX
FOLLOW UP VISIT:  SYMPTOMS:  Patient comfortable ,denies Cp ,Sob b  POD #2 R fem-pop bypass  MEDICATIONS  (STANDING):  piperacillin/tazobactam IVPB. 3.375Gram(s) IV Intermittent every 8 hours  heparin  Injectable 5000Unit(s) SubCutaneous every 8 hours  insulin lispro (HumaLOG) corrective regimen sliding scale  SubCutaneous at bedtime  insulin lispro (HumaLOG) corrective regimen sliding scale  SubCutaneous three times a day before meals  hydrALAZINE 10milliGRAM(s) Oral two times a day  simvastatin 20milliGRAM(s) Oral at bedtime  brimonidine 0.2% Ophthalmic Solution 1Drop(s) Right EYE two times a day  timolol 0.5% Solution 1Drop(s) Right EYE two times a day  collagenase Ointment 1Application(s) Topical daily  metoprolol 25milliGRAM(s) Oral two times a day  sodium chloride 0.9% lock flush 3milliLiter(s) IV Push every 8 hours  cilostazol 50milliGRAM(s) Oral two times a day  aspirin  chewable 81milliGRAM(s) Oral daily  dextrose 5% + sodium chloride 0.45% with potassium chloride 20 mEq/L 1000milliLiter(s) IV Continuous <Continuous>    MEDICATIONS  (PRN):  oxyCODONE IR 2.5milliGRAM(s) Oral every 4 hours PRN Moderate Pain (4 - 6)  oxyCODONE IR 5milliGRAM(s) Oral every 4 hours PRN Severe Pain (7 - 10)      Vital Signs Last 24 Hrs  T(C): 36.8, Max: 37 (06-24 @ 09:21)  T(F): 98.3, Max: 98.6 (06-24 @ 09:21)  HR: 58 (58 - 66)  BP: 117/39 (15/- - 166/68)  BP(mean): 60 (60 - 77)  RR: 18 (15 - 25)  SpO2: 98% (94% - 100%)    Daily     Daily     I&O's Detail  I & Os for 24h ending 24 Jun 2017 07:00  =============================================  IN:    Oral Fluid: 340 ml    Platelets - Single Donor: 223 ml    IV PiggyBack: 25 ml    Total IN: 588 ml  ---------------------------------------------  OUT:    Voided: 400 ml    Indwelling Catheter - Urethral: 210 ml    Total OUT: 610 ml  ---------------------------------------------  Total NET: -22 ml    I & Os for current day (as of 24 Jun 2017 22:42)  =============================================  IN:    Oral Fluid: 600 ml    dextrose 5% + sodium chloride 0.45% with potassium chloride 20 mEq/L: 280 ml    IV PiggyBack: 100 ml    Total IN: 980 ml  ---------------------------------------------  OUT:    Indwelling Catheter - Urethral: 275 ml    Total OUT: 275 ml  ---------------------------------------------  Total NET: 705 ml      Physical Exam  EyesAnicteric   Neck No sig JVD  Lungs Scattered Rhonchi at bases   Cor  SM 2/6   Abd  obese NT + BS   Ext  Dressing in place   Pulses reduced  Neuro  Axo x3  Isaacs in place    LABS  PT/INR - ( 23 Jun 2017 05:19 )   PT: 12.3 SEC;   INR: 1.10          PTT - ( 23 Jun 2017 05:19 )  PTT:26.7 SEC        CBC Full  -  ( 24 Jun 2017 05:10 )  WBC Count : 12.28 K/uL  Hemoglobin : 10.7 g/dL  Hematocrit : 33.2 %  Platelet Count - Automated : 246 K/uL  Mean Cell Volume : 96.2 fL  Mean Cell Hemoglobin : 31.0 pg  Mean Cell Hemoglobin Concentration : 32.2 %  Auto Neutrophil # : 10.72 K/uL  Auto Lymphocyte # : 0.48 K/uL  Auto Monocyte # : 1.04 K/uL  Auto Eosinophil # : 0.01 K/uL  Auto Basophil # : 0.01 K/uL  Auto Neutrophil % : 87.2 %  Auto Lymphocyte % : 3.9 %  Auto Monocyte % : 8.5 %  Auto Eosinophil % : 0.1 %  Auto Basophil % : 0.1 %    06-24    137  |  100  |  34<H>  ----------------------------<  193<H>  4.4   |  16<L>  |  1.16    Ca    8.6      24 Jun 2017 05:10  Phos  3.5     06-24  Mg     1.9     06-24      Lipid panel  TSH:  Digoxin level    ECHO    Nuclear Stress    ECG:    Telemetry:    RADIOLOGY & ADDITIONAL STUDIES:    ASSESSMENT & PLAN:  89 year old male with Hx of CAD ,PAD ,TIA ,DM  pulmonary HTN ,TR ,MR  S/P R fem-pop bypass. Hemodynamically stable on IV abx & fluid . Follow I s & Os . Lasix PRN .  Continue Lopressor & Lipitor .

## 2017-06-24 NOTE — CONSULT NOTE ADULT - SUBJECTIVE AND OBJECTIVE BOX
HPI:  Mr. Bruno is a 89 year old male with PAD, CAD, DM who was sent in for evaluation and treatment of PVD and RLE cellulitis, nonhealing wound.  He underwent an angiogram of his RLE showing 100% stenosis of right SFA.  He underwent Right femoral to above knee popliteal bypass with PTFE and tolerated the procedure well.  	      PAST MEDICAL & SURGICAL HISTORY:  Duodenal ulcer  DM (diabetes mellitus)  HTN (hypertension)  S/P appendectomy: 50 yrs ago  s/p PPM  PAD   TIA   CAD   Pulmonary hypertension    FAMILY HISTORY:  No pertinent family history in first degree relatives    SOCIAL HISTORY:  No smoking, no drug abuse.    Allergies NDKA    HOME MEDICATIONS: as per Med rec    REVIEW OF SYSTEMS:  Constitutional: [ ] fevers [ ] chills [ ] weight loss [ ] weight gain  HEENT: [ ] dry eyes [ ] eye irritation [ ] postnasal drip [ ] nasal congestion  CV: [ ] chest pain [ ] orthopnea [ ] palpitations [ ] murmur  Resp: [ ] cough [ ] shortness of breath [ ] dyspnea [ ] wheezing [ ] sputum [ ] hemoptysis  GI: [ ] nausea [ ] vomiting [ ] diarrhea [ ] constipation [ ] abd pain [ ] dysphagia   : [ ] dysuria [ ] nocturia [ ] hematuria [ ] increased urinary frequency  Musculoskeletal: [ ] back pain [ ] myalgias [ ] arthralgias [ ] fracture  Skin: [x ] rash [ ] itch  Neurological: [ ] headache [ ] dizziness [ ] syncope [ ] weakness [ ] numbness  Psychiatric: [ ] anxiety [ ] depression  Endocrine: [ ] diabetes [ ] thyroid problem  Hematologic/Lymphatic: [ ] anemia [ ] bleeding problem  Allergic/Immunologic: [ ] itchy eyes [ ] nasal discharge [ ] hives [ ] angioedema  [ x] All other systems negative  [ ] Unable to assess ROS because ________    T(C): 36.8, Max: 37.2 (- @ 18:22)  HR: 60 (60 - 61)  BP: 140/49 (15/- - 166/68)  BP(mean): --  ABP: 136/35 (136/35 - 171/95)  ABP(mean): --  RR: 25 (15 - 25)  SpO2: 98% (94% - 100%)  Wt(kg): --  CVP(mm Hg): --  CI: --  CAPILLARY BLOOD GLUCOSE  193 (:30)  135 (2017 16:53)  144 (2017 12:08)   N/A      I & Os for current day (as of  @ 07:38)  =============================================  IN:    Oral Fluid: 340 ml    Platelets - Single Donor: 223 ml    IV PiggyBack: 25 ml    Total IN: 588 ml  ---------------------------------------------  OUT:    Voided: 400 ml    Indwelling Catheter - Urethral: 210 ml    Total OUT: 610 ml  ---------------------------------------------  Total NET: -22 ml      PHYSICAL EXAM:  General: no distress, alert   HEENT: CN grossly intact  Respiratory: no respiratory distress  Cardiovascular:  RRR, Normal S1 and S2  Abdomen: soft, nondistended  Extremities: warm, doppler signals DP/PT on RLE, palpable popliteal and femoral pulses  Skin: warm, normal color  Neurological: no focal neurologic deficits    LABS:  CBC ( @ 05:10)                          10.7<L>                   12.28<H>  )--------------(  246        87.2<H>% Neuts, 3.9<L>% Lymphs, ANC: 10.72<H>                          33.2<L>  CBC ( @ 05:19)                          9.9<L>                   8.19    )--------------(  211        --    % Neuts, --    % Lymphs, ANC: --                              30.9<L>    BMP ( @ 05:10)       137     |  100     |  34<H> 			Ca++ --      Ca 8.6          ---------------------------------( 193<H>		Mg 1.9          4.4     |  16<L>   |  1.16  			Ph 3.5     BMP ( @ 05:19)       138     |  103     |  39<H> 			Ca++ --      Ca 9.0          ---------------------------------( 141<H>		Mg 2.1          3.5     |  22      |  1.39<H>			Ph 2.6         Coags ( @ 05:19)  aPTT 26.7<L> / INR 1.10 / PT 12.3      ABG ( @ 21:20)     7.40 / 32<L> / 129<H> / 21<L> / -4.1 / 99.3<H>%     Lactate:    ABG ( @ 19:34)     7.46<H> / 31<L> / 137<H> / 23 / -2.2 / 99.6<H>%     Lactate:        Urinalysis ( @ 00:50):     Color: YELLOW / Appearance: CLEAR / S.015 / pH: 6.0 / Gluc: NEGATIVE / Ketones: NEGATIVE / Bili: NEGATIVE / Urobili: 0.2 / Protein :30 / Nitrites: NEGATIVE / Leuk.Est: NEGATIVE / RBC: 0-2 / WBC: 2-5 / Sq Epi:  / Non Sq Epi:  / Bacteria        A/P: 89M with cellulitis, nonhealing wound of right lower extremity s/p revascularization w/ right femoral to above knee popliteal PTFE bypass, doing well postoperatively.  Avoid narcotics for pain control   Regular diet  Monitor respiratory status, low threshold for diuresis  Physical therapy to help patient out of bed, walk   Discussed with Dr. Lobo PGY-2

## 2017-06-24 NOTE — PROGRESS NOTE ADULT - SUBJECTIVE AND OBJECTIVE BOX
Patient seen and examined lying in bed s/p Right femoral to above knee popliteal bypass yesterday without complaint, reports feeling well. Niece at bedside, update provided.    REVIEW OF SYSTEMS:  CONSTITUTIONAL: No weakness, fevers or chills  EYES/ENT: No visual changes;  No vertigo or throat pain   NECK: No pain or stiffness  RESPIRATORY: No cough, wheezing, hemoptysis; No shortness of breath  CARDIOVASCULAR: No chest pain or palpitations  GASTROINTESTINAL: No abdominal or epigastric pain. No nausea, vomiting, or hematemesis; No diarrhea or constipation. No melena or hematochezia.  GENITOURINARY: + tee  NEUROLOGICAL: No numbness or weakness  SKIN: No itching, burning, rashes, or lesions   All other review of systems is negative unless indicated above.      MEDICATIONS  (STANDING):  piperacillin/tazobactam IVPB. 3.375Gram(s) IV Intermittent every 8 hours  heparin  Injectable 5000Unit(s) SubCutaneous every 8 hours  insulin lispro (HumaLOG) corrective regimen sliding scale  SubCutaneous at bedtime  insulin lispro (HumaLOG) corrective regimen sliding scale  SubCutaneous three times a day before meals  hydrALAZINE 10milliGRAM(s) Oral two times a day  simvastatin 20milliGRAM(s) Oral at bedtime  brimonidine 0.2% Ophthalmic Solution 1Drop(s) Right EYE two times a day  timolol 0.5% Solution 1Drop(s) Right EYE two times a day  collagenase Ointment 1Application(s) Topical daily  metoprolol 25milliGRAM(s) Oral two times a day  sodium chloride 0.9% lock flush 3milliLiter(s) IV Push every 8 hours  cilostazol 50milliGRAM(s) Oral two times a day  aspirin  chewable 81milliGRAM(s) Oral daily    MEDICATIONS  (PRN):  oxyCODONE IR 2.5milliGRAM(s) Oral every 4 hours PRN Moderate Pain (4 - 6)  oxyCODONE IR 5milliGRAM(s) Oral every 4 hours PRN Severe Pain (7 - 10)      VITAL:  T(F): , Max: 99 (06-23 @ 18:22)  HR: 60  BP: 141/56  BP(mean): 77  RR: 18  SpO2: 98%  Wt(kg): --    I and O's:  I & Os for 24h ending 06-24 @ 07:00  =============================================  IN: 588 ml / OUT: 610 ml / NET: -22 ml    I & Os for current day (as of 06-24 @ 11:52)  =============================================  IN: 120 ml / OUT: 25 ml / NET: 95 ml    Height (cm): 177.8 (06-23 @ 16:52)  Weight (kg): 75.1 (06-23 @ 16:52)  BMI (kg/m2): 23.8 (06-23 @ 16:52)  BSA (m2): 1.93 (06-23 @ 16:52)    PHYSICAL EXAM:  Constitutional: NAD  HEENT: PERRLA, EOMI,  MMM  Neck: No LAD, No JVD  Respiratory: CTAB  Cardiovascular: S1 and S2  Gastrointestinal: BS+, soft, NT/ND  Extremities: No peripheral edema, LLE with dsg  Neurological: A/O x 3, no focal deficits  : + Tee  Access: Not applicable    LABS:                          10.7   12.28 )-----------( 246      ( 24 Jun 2017 05:10 )             33.2                         9.9    8.19  )-----------( 211      ( 23 Jun 2017 05:19 )             30.9                         10.7   12.30 )-----------( 218      ( 22 Jun 2017 06:15 )             33.4     06-24    137  |  100  |  34<H>        --<  193<H>  4.4   |  16<L>  |  1.16  06-23    138  |  103  |  39<H>  ----------------------------<  141<H>  3.5   |  22  |  1.39<H>  06-22    139  |  97<L>  |  28<H>  ----------------------------<  147<H>  3.6   |  23  |  1.26    Phos  3.5     06-24  Phos  2.6     06-23  Mg     1.9     06-24  Mg     2.1     06-23        Urine Studies:          RADIOLOGY & ADDITIONAL STUDIES:    CXR 6/23    FINDINGS:   A left-sided single lead pacemaker is noted.  There are indistinct pulmonary vascular markings in the bilateral lung   bases, suggestive of mild interstitial pulmonary edema. No pleural   effusions or pneumothoraces are seen bilaterally.  The heart size is enlarged..    IMPRESSION:     Mild interstitial pulmonary edema congestion.      Shade Andrade NP  [facility]  [phone]

## 2017-06-24 NOTE — PROGRESS NOTE ADULT - SUBJECTIVE AND OBJECTIVE BOX
Surgery Post-Op Note    Pre-Op Dx: Limb ischemia  Procedure: Right femoral to above knee popliteal bypass with PTFE    Surgeon: Lauren    Subjective:   Pt seen and examined at the bedside. Pt w/ no complaints. Patient states he has no pain. Patient requested a blanket.  UOP was moderate to relatively low since surgery; SICU made aware and chose to not start fluids.    Vital Signs Last 24 Hrs  T(C): 36.5, Max: 37.2 (06-23 @ 18:22)  T(F): 97.7, Max: 99 (06-23 @ 18:22)  HR: 60 (60 - 67)  BP: 152/62 (145/72 - 166/68)  BP(mean): --  RR: 23 (15 - 25)  SpO2: 98% (94% - 100%)    Physical Exam:  General: NAD, resting comfortably in bed with blankets  Neuro: Alert and oriented, no focal deficits  Pulmonary: Nonlabored breathing, no respiratory distress  Extremities: Right groin access site with 4x4 and tegaderm, C/D/I. groin site is soft, no swelling, no hematoma.   Leg wrapped in ACE, C/D/I  Dopplerable PT, peroneal signals

## 2017-06-24 NOTE — PROGRESS NOTE ADULT - PROBLEM SELECTOR PLAN 3
mild azotemia likely in setting of contrast nephropathy, renal fxn remains stable. IVF d/c'd due to pulmonary edema noted on CXR. Monitor urine output encourage po intake.

## 2017-06-24 NOTE — PROGRESS NOTE ADULT - ASSESSMENT
89M w/ RLE cellulitis, now POD1 from R femoral to above knee popliteal bypass with PTFE  - start ASA and pletal today  - AM labs  - Pain control 89M w/ RLE cellulitis, now POD1 from R femoral to above knee popliteal bypass with PTFE  - start ASA and pletal today  - AM labs  - Pain control  - f/u SICU recs

## 2017-06-25 LAB
APPEARANCE UR: CLEAR — SIGNIFICANT CHANGE UP
BILIRUB UR-MCNC: NEGATIVE — SIGNIFICANT CHANGE UP
BLOOD UR QL VISUAL: HIGH
BUN SERPL-MCNC: 44 MG/DL — HIGH (ref 7–23)
BUN SERPL-MCNC: 45 MG/DL — HIGH (ref 7–23)
CALCIUM SERPL-MCNC: 8.6 MG/DL — SIGNIFICANT CHANGE UP (ref 8.4–10.5)
CALCIUM SERPL-MCNC: 8.7 MG/DL — SIGNIFICANT CHANGE UP (ref 8.4–10.5)
CHLORIDE SERPL-SCNC: 96 MMOL/L — LOW (ref 98–107)
CHLORIDE SERPL-SCNC: 98 MMOL/L — SIGNIFICANT CHANGE UP (ref 98–107)
CHLORIDE UR-SCNC: < 20 MMOL/L — SIGNIFICANT CHANGE UP
CO2 SERPL-SCNC: 18 MMOL/L — LOW (ref 22–31)
CO2 SERPL-SCNC: 19 MMOL/L — LOW (ref 22–31)
COLOR SPEC: YELLOW — SIGNIFICANT CHANGE UP
CREAT SERPL-MCNC: 1.59 MG/DL — HIGH (ref 0.5–1.3)
CREAT SERPL-MCNC: 1.62 MG/DL — HIGH (ref 0.5–1.3)
GLUCOSE SERPL-MCNC: 226 MG/DL — HIGH (ref 70–99)
GLUCOSE SERPL-MCNC: 229 MG/DL — HIGH (ref 70–99)
GLUCOSE UR-MCNC: NEGATIVE — SIGNIFICANT CHANGE UP
HCT VFR BLD CALC: 31 % — LOW (ref 39–50)
HGB BLD-MCNC: 10.2 G/DL — LOW (ref 13–17)
HYALINE CASTS # UR AUTO: SIGNIFICANT CHANGE UP (ref 0–?)
KETONES UR-MCNC: NEGATIVE — SIGNIFICANT CHANGE UP
LEUKOCYTE ESTERASE UR-ACNC: SIGNIFICANT CHANGE UP
MCHC RBC-ENTMCNC: 31.4 PG — SIGNIFICANT CHANGE UP (ref 27–34)
MCHC RBC-ENTMCNC: 32.9 % — SIGNIFICANT CHANGE UP (ref 32–36)
MCV RBC AUTO: 95.4 FL — SIGNIFICANT CHANGE UP (ref 80–100)
MUCOUS THREADS # UR AUTO: SIGNIFICANT CHANGE UP
NITRITE UR-MCNC: NEGATIVE — SIGNIFICANT CHANGE UP
NON-SQ EPI CELLS # UR AUTO: <1 — SIGNIFICANT CHANGE UP
PH UR: 5.5 — SIGNIFICANT CHANGE UP (ref 4.6–8)
PLATELET # BLD AUTO: 247 K/UL — SIGNIFICANT CHANGE UP (ref 150–400)
PMV BLD: 10.5 FL — SIGNIFICANT CHANGE UP (ref 7–13)
POTASSIUM SERPL-MCNC: 4.1 MMOL/L — SIGNIFICANT CHANGE UP (ref 3.5–5.3)
POTASSIUM SERPL-MCNC: 4.2 MMOL/L — SIGNIFICANT CHANGE UP (ref 3.5–5.3)
POTASSIUM SERPL-SCNC: 4.1 MMOL/L — SIGNIFICANT CHANGE UP (ref 3.5–5.3)
POTASSIUM SERPL-SCNC: 4.2 MMOL/L — SIGNIFICANT CHANGE UP (ref 3.5–5.3)
POTASSIUM UR-SCNC: 34.9 MEQ/L — SIGNIFICANT CHANGE UP
PROT UR-MCNC: 30 — SIGNIFICANT CHANGE UP
RBC # BLD: 3.25 M/UL — LOW (ref 4.2–5.8)
RBC # FLD: 13.4 % — SIGNIFICANT CHANGE UP (ref 10.3–14.5)
RBC CASTS # UR COMP ASSIST: HIGH (ref 0–?)
SODIUM SERPL-SCNC: 131 MMOL/L — LOW (ref 135–145)
SODIUM SERPL-SCNC: 133 MMOL/L — LOW (ref 135–145)
SODIUM UR-SCNC: < 20 MEQ/L — SIGNIFICANT CHANGE UP
SP GR SPEC: 1.01 — SIGNIFICANT CHANGE UP (ref 1–1.03)
UROBILINOGEN FLD QL: NORMAL E.U. — SIGNIFICANT CHANGE UP (ref 0.1–0.2)
WBC # BLD: 11.65 K/UL — HIGH (ref 3.8–10.5)
WBC # FLD AUTO: 11.65 K/UL — HIGH (ref 3.8–10.5)
WBC UR QL: SIGNIFICANT CHANGE UP (ref 0–?)

## 2017-06-25 PROCEDURE — 71010: CPT | Mod: 26

## 2017-06-25 PROCEDURE — 99223 1ST HOSP IP/OBS HIGH 75: CPT

## 2017-06-25 RX ADMIN — SODIUM CHLORIDE 3 MILLILITER(S): 9 INJECTION INTRAMUSCULAR; INTRAVENOUS; SUBCUTANEOUS at 21:51

## 2017-06-25 RX ADMIN — Medication 4: at 18:31

## 2017-06-25 RX ADMIN — Medication 81 MILLIGRAM(S): at 12:27

## 2017-06-25 RX ADMIN — PIPERACILLIN AND TAZOBACTAM 25 GRAM(S): 4; .5 INJECTION, POWDER, LYOPHILIZED, FOR SOLUTION INTRAVENOUS at 13:46

## 2017-06-25 RX ADMIN — OXYCODONE HYDROCHLORIDE 5 MILLIGRAM(S): 5 TABLET ORAL at 18:31

## 2017-06-25 RX ADMIN — Medication 1 DROP(S): at 05:44

## 2017-06-25 RX ADMIN — PIPERACILLIN AND TAZOBACTAM 25 GRAM(S): 4; .5 INJECTION, POWDER, LYOPHILIZED, FOR SOLUTION INTRAVENOUS at 21:58

## 2017-06-25 RX ADMIN — Medication 10 MILLIGRAM(S): at 17:32

## 2017-06-25 RX ADMIN — HEPARIN SODIUM 5000 UNIT(S): 5000 INJECTION INTRAVENOUS; SUBCUTANEOUS at 13:46

## 2017-06-25 RX ADMIN — Medication 25 MILLIGRAM(S): at 05:43

## 2017-06-25 RX ADMIN — Medication 10 MILLIGRAM(S): at 05:43

## 2017-06-25 RX ADMIN — CILOSTAZOL 50 MILLIGRAM(S): 100 TABLET ORAL at 05:43

## 2017-06-25 RX ADMIN — Medication 25 MILLIGRAM(S): at 17:32

## 2017-06-25 RX ADMIN — Medication 1: at 21:59

## 2017-06-25 RX ADMIN — CILOSTAZOL 50 MILLIGRAM(S): 100 TABLET ORAL at 17:32

## 2017-06-25 RX ADMIN — PIPERACILLIN AND TAZOBACTAM 25 GRAM(S): 4; .5 INJECTION, POWDER, LYOPHILIZED, FOR SOLUTION INTRAVENOUS at 05:44

## 2017-06-25 RX ADMIN — Medication 4: at 08:43

## 2017-06-25 RX ADMIN — BRIMONIDINE TARTRATE 1 DROP(S): 2 SOLUTION/ DROPS OPHTHALMIC at 17:32

## 2017-06-25 RX ADMIN — SODIUM CHLORIDE 3 MILLILITER(S): 9 INJECTION INTRAMUSCULAR; INTRAVENOUS; SUBCUTANEOUS at 13:46

## 2017-06-25 RX ADMIN — Medication 4: at 12:26

## 2017-06-25 RX ADMIN — OXYCODONE HYDROCHLORIDE 5 MILLIGRAM(S): 5 TABLET ORAL at 19:25

## 2017-06-25 RX ADMIN — BRIMONIDINE TARTRATE 1 DROP(S): 2 SOLUTION/ DROPS OPHTHALMIC at 05:44

## 2017-06-25 RX ADMIN — HEPARIN SODIUM 5000 UNIT(S): 5000 INJECTION INTRAVENOUS; SUBCUTANEOUS at 05:44

## 2017-06-25 RX ADMIN — SODIUM CHLORIDE 3 MILLILITER(S): 9 INJECTION INTRAMUSCULAR; INTRAVENOUS; SUBCUTANEOUS at 05:35

## 2017-06-25 RX ADMIN — Medication 20 MILLIGRAM(S): at 05:44

## 2017-06-25 RX ADMIN — Medication 1 DROP(S): at 17:32

## 2017-06-25 RX ADMIN — Medication 1 APPLICATION(S): at 05:45

## 2017-06-25 RX ADMIN — HEPARIN SODIUM 5000 UNIT(S): 5000 INJECTION INTRAVENOUS; SUBCUTANEOUS at 21:59

## 2017-06-25 RX ADMIN — SIMVASTATIN 20 MILLIGRAM(S): 20 TABLET, FILM COATED ORAL at 21:59

## 2017-06-25 NOTE — PROGRESS NOTE ADULT - SUBJECTIVE AND OBJECTIVE BOX
Surgery Post-Op Note  C Team Surgery     Subjective: No acute events overnight     ICU Vital Signs Last 24 Hrs  T(C): 37.1, Max: 37.1 (06-25 @ 05:31)  T(F): 98.8, Max: 98.8 (06-25 @ 05:31)  HR: 60 (58 - 66)  BP: 141/44 (117/39 - 141/56)  BP(mean): 60 (60 - 77)  ABP: --  ABP(mean): --  RR: 18 (18 - 18)  SpO2: 94% (94% - 98%)    I&O's Summary    I & Os for current day (as of 25 Jun 2017 08:42)  =============================================  IN: 980 ml / OUT: 475 ml / NET: 505 ml        Physical Exam:  Gen: NAD   RLE: incision c / d / i   Vasc: L DP / PT Dopp

## 2017-06-25 NOTE — PROGRESS NOTE ADULT - SUBJECTIVE AND OBJECTIVE BOX
Patient seen and examined lying in bed reports feeling well except for some R elbow and R knee discomfort. No new events noted overnight.    REVIEW OF SYSTEMS:  CONSTITUTIONAL: No weakness, fevers or chills  EYES/ENT: No visual changes;  No vertigo or throat pain   NECK: No pain or stiffness  RESPIRATORY: No cough, wheezing, hemoptysis; No shortness of breath  CARDIOVASCULAR: No chest pain or palpitations  GASTROINTESTINAL: No abdominal or epigastric pain. No nausea, vomiting, or hematemesis; No diarrhea or constipation. No melena or hematochezia.  GENITOURINARY: + tee  NEUROLOGICAL: No numbness or weakness  SKIN: No itching, burning, rashes, or lesions   All other review of systems is negative unless indicated above.      MEDICATIONS  (STANDING):  piperacillin/tazobactam IVPB. 3.375Gram(s) IV Intermittent every 8 hours  heparin  Injectable 5000Unit(s) SubCutaneous every 8 hours  insulin lispro (HumaLOG) corrective regimen sliding scale  SubCutaneous at bedtime  insulin lispro (HumaLOG) corrective regimen sliding scale  SubCutaneous three times a day before meals  hydrALAZINE 10milliGRAM(s) Oral two times a day  simvastatin 20milliGRAM(s) Oral at bedtime  brimonidine 0.2% Ophthalmic Solution 1Drop(s) Right EYE two times a day  timolol 0.5% Solution 1Drop(s) Right EYE two times a day  collagenase Ointment 1Application(s) Topical daily  metoprolol 25milliGRAM(s) Oral two times a day  sodium chloride 0.9% lock flush 3milliLiter(s) IV Push every 8 hours  cilostazol 50milliGRAM(s) Oral two times a day  aspirin  chewable 81milliGRAM(s) Oral daily  furosemide    Tablet 20milliGRAM(s) Oral daily    MEDICATIONS  (PRN):  oxyCODONE IR 2.5milliGRAM(s) Oral every 4 hours PRN Moderate Pain (4 - 6)  oxyCODONE IR 5milliGRAM(s) Oral every 4 hours PRN Severe Pain (7 - 10)      VITAL:  T(F): , Max: 98.8 (06-25 @ 05:31)  HR: 60  BP: 141/44  BP(mean): 60  RR: 18  SpO2: 94%  Wt(kg): --    I and O's:  I & Os for 24h ending 06-25 @ 07:00  =============================================  IN: 980 ml / OUT: 475 ml / NET: 505 ml    I & Os for current day (as of 06-25 @ 10:28)  =============================================  IN: 50 ml / OUT: 175 ml / NET: -125 ml        PHYSICAL EXAM:    Constitutional: NAD  HEENT: PERRLA  Neck: No JVD  Respiratory: Clear to diminished anterolaterally  Cardiovascular: S1 and S2  Gastrointestinal: BS+, soft, NT/ND  Extremities: No peripheral edema  Neurological: A/O x 3, no focal deficits  Psychiatric: Normal mood, normal affect  : + Tee  Skin: No rashes  Access: Not applicable    LABS:                        10.2   11.65 )-----------( 247      ( 25 Jun 2017 05:40 )             31.0                         10.7   12.28 )-----------( 246      ( 24 Jun 2017 05:10 )             33.2                         9.9    8.19  )-----------( 211      ( 23 Jun 2017 05:19 )             30.9     06-25    131<L>  |  96<L>  |  45<H>  ----------------------------<  226<H>  4.1   |  19<L>  |  1.59<H>  06-24    137  |  100  |  34<H>  ----------------------------<  193<H>  4.4   |  16<L>  |  1.16  06-23    138  |  103  |  39<H>  ----------------------------<  141<H>  3.5   |  22  |  1.39<H>    Phos  3.5     06-24  Phos  2.6     06-23  Mg     1.9     06-24  Mg     2.1     06-23      RADIOLOGY & ADDITIONAL STUDIES:  6/23 CXR  FINDINGS:   A left-sided single lead pacemaker is noted.  There are indistinct pulmonary vascular markings in the bilateral lung   bases, suggestive of mild interstitial pulmonary edema. No pleural   effusions or pneumothoraces are seen bilaterally.  The heart size is enlarged..    IMPRESSION:   Mild interstitial pulmonary edema congestion.    Shade Andrade NP  [facility]  [phone]

## 2017-06-25 NOTE — PROGRESS NOTE ADULT - ASSESSMENT
89y Male s/p right femoral to above knee popliteal bypass with PTFE  - pain control   - sqh, plavix , pletal   - zosyn  - f/u bmp   - f/u Cr  - f/u renal recs

## 2017-06-25 NOTE — PROGRESS NOTE ADULT - SUBJECTIVE AND OBJECTIVE BOX
FOLLOW UP VISIT:  SYMPTOMS:  Patient supine in bed in NAD, Denies Cp ,Sob ,Dizziness   MEDICATIONS  (STANDING):  piperacillin/tazobactam IVPB. 3.375Gram(s) IV Intermittent every 8 hours  heparin  Injectable 5000Unit(s) SubCutaneous every 8 hours  insulin lispro (HumaLOG) corrective regimen sliding scale  SubCutaneous at bedtime  insulin lispro (HumaLOG) corrective regimen sliding scale  SubCutaneous three times a day before meals  hydrALAZINE 10milliGRAM(s) Oral two times a day  simvastatin 20milliGRAM(s) Oral at bedtime  brimonidine 0.2% Ophthalmic Solution 1Drop(s) Right EYE two times a day  timolol 0.5% Solution 1Drop(s) Right EYE two times a day  collagenase Ointment 1Application(s) Topical daily  metoprolol 25milliGRAM(s) Oral two times a day  sodium chloride 0.9% lock flush 3milliLiter(s) IV Push every 8 hours  cilostazol 50milliGRAM(s) Oral two times a day  aspirin  chewable 81milliGRAM(s) Oral daily  furosemide    Tablet 20milliGRAM(s) Oral daily    MEDICATIONS  (PRN):  oxyCODONE IR 2.5milliGRAM(s) Oral every 4 hours PRN Moderate Pain (4 - 6)  oxyCODONE IR 5milliGRAM(s) Oral every 4 hours PRN Severe Pain (7 - 10)      Vital Signs Last 24 Hrs  T(C): 37.1, Max: 37.1 ( @ 05:31)  T(F): 98.7, Max: 98.8 ( @ 05:31)  HR: 66 (58 - 66)  BP: 145/44 (117/39 - 145/44)  BP(mean): 60 (60 - 68)  RR: 18 (18 - 18)  SpO2: 94% (94% - 98%)    Daily     Daily Weight in k.6 (2017 05:31)    I&O's Detail  I & Os for 24h ending 2017 07:00  =============================================  IN:    Oral Fluid: 600 ml    dextrose 5% + sodium chloride 0.45% with potassium chloride 20 mEq/L: 280 ml    IV PiggyBack: 100 ml    Total IN: 980 ml  ---------------------------------------------  OUT:    Indwelling Catheter - Urethral: 475 ml    Total OUT: 475 ml  ---------------------------------------------  Total NET: 505 ml    I & Os for current day (as of 2017 17:26)  =============================================  IN:    Oral Fluid: 50 ml    Total IN: 50 ml  ---------------------------------------------  OUT:    Indwelling Catheter - Urethral: 375 ml    Total OUT: 375 ml  ---------------------------------------------  Total NET: -325 ml      Physical Exam  Eyes anicteric   Neck  + JVD  Lungs scattered Rhonchi  Cor  SM 2/6  +ppm  Abd  Soft NT + BS   Ext  Dressing in Place  Pulses  reduced   Neuro  AxOx3  Isaacs removed   LABS    Urinalysis Basic - ( 2017 14:33 )    Color: YELLOW / Appearance: CLEAR / S.015 / pH: 5.5  Gluc: NEGATIVE / Ketone: NEGATIVE  / Bili: NEGATIVE / Urobili: NORMAL E.U.   Blood: TRACE / Protein: 30 / Nitrite: NEGATIVE   Leuk Esterase: TRACE / RBC: 5-10 / WBC 2-5   Sq Epi: x / Non Sq Epi: x / Bacteria: x          CBC Full  -  ( 2017 05:40 )  WBC Count : 11.65 K/uL  Hemoglobin : 10.2 g/dL  Hematocrit : 31.0 %  Platelet Count - Automated : 247 K/uL  Mean Cell Volume : 95.4 fL  Mean Cell Hemoglobin : 31.4 pg  Mean Cell Hemoglobin Concentration : 32.9 %  Auto Neutrophil # : x  Auto Lymphocyte # : x  Auto Monocyte # : x  Auto Eosinophil # : x  Auto Basophil # : x  Auto Neutrophil % : x  Auto Lymphocyte % : x  Auto Monocyte % : x  Auto Eosinophil % : x  Auto Basophil % : x    06-25    133<L>  |  98  |  44<H>  ----------------------------<  229<H>  4.2   |  18<L>  |  1.62<H>    Ca    8.7      2017 11:48  Phos  3.5     06-24  Mg     1.9     06-24      Lipid panel  TSH:  Digoxin level    ECHO    Nuclear Stress    ECG:    Telemetry: V paced    RADIOLOGY & ADDITIONAL STUDIES:    ASSESSMENT & PLAN: 89 year old male with Hx of HTN ,DM ,CAD ,TIA ,PAD ,Pulmonary HTN ,R lE cellulitis S/P R LE fem-pop . Patient with elevated Bun /Cr.On IV ABX . Improved SBP continue Lasix ,Hydralazine ,ASA,Lopressor & Zocor , Follow Is Os .Follow Electrolytes .

## 2017-06-25 NOTE — PROGRESS NOTE ADULT - ASSESSMENT
89 year old male with a PMH of DM II and HTN on 6/16 a/w RLE cellulitis now s/p LE angio and bypass surgery.  CKD stage 4

## 2017-06-25 NOTE — PROGRESS NOTE ADULT - PROBLEM SELECTOR PLAN 3
creat with bump today likely due to hemodynamics, YONATHAN. Urine output fair. IVF held postop due to mild pulmonary edema noted on CXR. Low dose lasix resumed yesterday. creat with bump today likely due to hemodynamics, YONATHAN. Urine output fair. IVF held postop due to mild pulmonary edema noted on CXR. Low dose lasix resumed yesterday.  - repeat CXR today  - send UA and urine lytes

## 2017-06-26 LAB
BUN SERPL-MCNC: 42 MG/DL — HIGH (ref 7–23)
CALCIUM SERPL-MCNC: 8.6 MG/DL — SIGNIFICANT CHANGE UP (ref 8.4–10.5)
CHLORIDE SERPL-SCNC: 97 MMOL/L — LOW (ref 98–107)
CO2 SERPL-SCNC: 21 MMOL/L — LOW (ref 22–31)
CREAT SERPL-MCNC: 1.56 MG/DL — HIGH (ref 0.5–1.3)
GLUCOSE SERPL-MCNC: 241 MG/DL — HIGH (ref 70–99)
POTASSIUM SERPL-MCNC: 3.8 MMOL/L — SIGNIFICANT CHANGE UP (ref 3.5–5.3)
POTASSIUM SERPL-SCNC: 3.8 MMOL/L — SIGNIFICANT CHANGE UP (ref 3.5–5.3)
SODIUM SERPL-SCNC: 133 MMOL/L — LOW (ref 135–145)

## 2017-06-26 PROCEDURE — 71010: CPT | Mod: 26

## 2017-06-26 RX ORDER — FUROSEMIDE 40 MG
20 TABLET ORAL DAILY
Qty: 0 | Refills: 0 | Status: DISCONTINUED | OUTPATIENT
Start: 2017-06-26 | End: 2017-06-26

## 2017-06-26 RX ORDER — TAMSULOSIN HYDROCHLORIDE 0.4 MG/1
0.4 CAPSULE ORAL AT BEDTIME
Qty: 0 | Refills: 0 | Status: DISCONTINUED | OUTPATIENT
Start: 2017-06-26 | End: 2017-06-30

## 2017-06-26 RX ADMIN — BRIMONIDINE TARTRATE 1 DROP(S): 2 SOLUTION/ DROPS OPHTHALMIC at 05:39

## 2017-06-26 RX ADMIN — Medication 20 MILLIGRAM(S): at 05:38

## 2017-06-26 RX ADMIN — Medication 4: at 12:29

## 2017-06-26 RX ADMIN — HEPARIN SODIUM 5000 UNIT(S): 5000 INJECTION INTRAVENOUS; SUBCUTANEOUS at 14:12

## 2017-06-26 RX ADMIN — Medication 81 MILLIGRAM(S): at 12:29

## 2017-06-26 RX ADMIN — PIPERACILLIN AND TAZOBACTAM 25 GRAM(S): 4; .5 INJECTION, POWDER, LYOPHILIZED, FOR SOLUTION INTRAVENOUS at 21:44

## 2017-06-26 RX ADMIN — Medication 25 MILLIGRAM(S): at 17:31

## 2017-06-26 RX ADMIN — CILOSTAZOL 50 MILLIGRAM(S): 100 TABLET ORAL at 05:38

## 2017-06-26 RX ADMIN — Medication 2: at 08:58

## 2017-06-26 RX ADMIN — Medication 4: at 17:32

## 2017-06-26 RX ADMIN — PIPERACILLIN AND TAZOBACTAM 25 GRAM(S): 4; .5 INJECTION, POWDER, LYOPHILIZED, FOR SOLUTION INTRAVENOUS at 05:38

## 2017-06-26 RX ADMIN — SODIUM CHLORIDE 3 MILLILITER(S): 9 INJECTION INTRAMUSCULAR; INTRAVENOUS; SUBCUTANEOUS at 07:15

## 2017-06-26 RX ADMIN — Medication 1 DROP(S): at 05:39

## 2017-06-26 RX ADMIN — BRIMONIDINE TARTRATE 1 DROP(S): 2 SOLUTION/ DROPS OPHTHALMIC at 17:31

## 2017-06-26 RX ADMIN — Medication 25 MILLIGRAM(S): at 05:38

## 2017-06-26 RX ADMIN — HEPARIN SODIUM 5000 UNIT(S): 5000 INJECTION INTRAVENOUS; SUBCUTANEOUS at 05:38

## 2017-06-26 RX ADMIN — Medication 10 MILLIGRAM(S): at 17:30

## 2017-06-26 RX ADMIN — CILOSTAZOL 50 MILLIGRAM(S): 100 TABLET ORAL at 17:30

## 2017-06-26 RX ADMIN — HEPARIN SODIUM 5000 UNIT(S): 5000 INJECTION INTRAVENOUS; SUBCUTANEOUS at 21:45

## 2017-06-26 RX ADMIN — Medication 1 DROP(S): at 17:31

## 2017-06-26 RX ADMIN — Medication 10 MILLIGRAM(S): at 05:38

## 2017-06-26 RX ADMIN — SIMVASTATIN 20 MILLIGRAM(S): 20 TABLET, FILM COATED ORAL at 21:44

## 2017-06-26 RX ADMIN — Medication 1 APPLICATION(S): at 05:39

## 2017-06-26 RX ADMIN — TAMSULOSIN HYDROCHLORIDE 0.4 MILLIGRAM(S): 0.4 CAPSULE ORAL at 21:44

## 2017-06-26 RX ADMIN — PIPERACILLIN AND TAZOBACTAM 25 GRAM(S): 4; .5 INJECTION, POWDER, LYOPHILIZED, FOR SOLUTION INTRAVENOUS at 14:11

## 2017-06-26 NOTE — PROGRESS NOTE ADULT - ASSESSMENT
Patient is a 89 year old male RIGHT foot 1st met head ulcer to subQ.   - Patient was examined and evaluated  - Discussed procedure with patient and answered all questions to patient's satisfaction  - Patient was consented (permission granted to print patient's name) for RIGHT foot wound debridement with application of Stravix graft.   - Patient has previous cards clearance for RLE bypass. Procedure to be performed under IV sedation and local anesthesia  - Patient booked for procedure on 06/27/2017 at 7pm.   - Updated primary team     - NPO at 8am after breakfast   - Fluids per primary team   - d/w attending

## 2017-06-26 NOTE — PROGRESS NOTE ADULT - SUBJECTIVE AND OBJECTIVE BOX
Patient is a 89y old  Male who presents with a chief complaint of RLE swelling (2017 21:55)       INTERVAL HPI/OVERNIGHT EVENTS:  Patient seen and evaluated at bedside.  Pt is resting comfortable in NAD. Denies N/V/F/C.  No pain in foot.     Allergies    No Known Allergies    Intolerances        Vital Signs Last 24 Hrs  T(C): 37.3, Max: 37.3 ( @ 00:43)  T(F): 99.1, Max: 99.2 ( @ 00:43)  HR: 61 (59 - 66)  BP: 142/46 (132/41 - 145/44)  BP(mean): --  RR: 18 (18 - 18)  SpO2: 93% (93% - 100%)    LABS:                        10.2   11.65 )-----------( 247      ( 2017 05:40 )             31.0         133<L>  |  98  |  44<H>  ----------------------------<  229<H>  4.2   |  18<L>  |  1.62<H>    Ca    8.7      2017 11:48        Urinalysis Basic - ( 2017 14:33 )    Color: YELLOW / Appearance: CLEAR / S.015 / pH: 5.5  Gluc: NEGATIVE / Ketone: NEGATIVE  / Bili: NEGATIVE / Urobili: NORMAL E.U.   Blood: TRACE / Protein: 30 / Nitrite: NEGATIVE   Leuk Esterase: TRACE / RBC: 5-10 / WBC 2-5   Sq Epi: x / Non Sq Epi: x / Bacteria: x      CAPILLARY BLOOD GLUCOSE  187 (2017 08:37)  258 (2017 22:02)  217 (2017 17:50)  224 (2017 11:55)    LLE: kept dressing clean, dry and intact

## 2017-06-26 NOTE — DIETITIAN INITIAL EVALUATION ADULT. - OTHER INFO
Pt states that his appetite has been fairly good and he has been eating well. He states he follows a consistent carbohydrate, low sodium diet at home. Pt normally feeds himself but since in hospital Pt is having pain in his right elbow which is not allowing him to feed himself. PCA has been helping Pt. Pt states that his appetite has been fairly good and he has been eating well. He states he follows a consistent carbohydrate, low sodium diet at home. Pt normally feeds himself but since in hospital Pt is having pain in his right elbow which is not allowing him to feed himself. PCA has been helping Pt eat. Pt is currently on a renal restricted diet which restricts sodium, potassium and phosphorus and limits the foods available to the Pt. The Pt should be on a sodium restriction but does not need a potassium or phosphorus restriction.

## 2017-06-26 NOTE — PROGRESS NOTE ADULT - SUBJECTIVE AND OBJECTIVE BOX
No pain, no shortness of breath      MEDICATIONS  (STANDING):  piperacillin/tazobactam IVPB. 3.375Gram(s) IV Intermittent every 8 hours  heparin  Injectable 5000Unit(s) SubCutaneous every 8 hours  insulin lispro (HumaLOG) corrective regimen sliding scale  SubCutaneous at bedtime  insulin lispro (HumaLOG) corrective regimen sliding scale  SubCutaneous three times a day before meals  hydrALAZINE 10milliGRAM(s) Oral two times a day  simvastatin 20milliGRAM(s) Oral at bedtime  brimonidine 0.2% Ophthalmic Solution 1Drop(s) Right EYE two times a day  timolol 0.5% Solution 1Drop(s) Right EYE two times a day  collagenase Ointment 1Application(s) Topical daily  metoprolol 25milliGRAM(s) Oral two times a day  sodium chloride 0.9% lock flush 3milliLiter(s) IV Push every 8 hours  cilostazol 50milliGRAM(s) Oral two times a day  aspirin  chewable 81milliGRAM(s) Oral daily  furosemide    Tablet 20milliGRAM(s) Oral daily  tamsulosin 0.4milliGRAM(s) Oral at bedtime      VITAL:  T(C): , Max: 37.3 ( @ 00:43)  T(F): , Max: 99.2 ( @ 00:43)  HR: 61  BP: 142/46  BP(mean): --  RR: 18  SpO2: 93%        PHYSICAL EXAM:    Constitutional: NAD  HEENTN:  DMM, no JVD  Respiratory: CTA-b/l  Cardiac: RRR s1s2  Gastrointestinal: BS+, soft, NT/ND  Extremities: No peripheral edema      LABS:                        10.2   11.65 )-----------( 247      ( 2017 05:40 )             31.0     Na(133)/K(4.2)/Cl(98)/HCO3(18)/BUN(44)/Cr(1.62)Glu(229)/Ca(8.7)/Mg(--)/PO4(--)     @ 11:48  Na(131)/K(4.1)/Cl(96)/HCO3(19)/BUN(45)/Cr(1.59)Glu(226)/Ca(8.6)/Mg(--)/PO4(--)    - @ 05:40  Na(137)/K(4.4)/Cl(100)/HCO3(16)/BUN(34)/Cr(1.16)Glu(193)/Ca(8.6)/Mg(1.9)/PO4(3.5)     @ 05:10    Urinalysis Basic - ( 2017 14:33 )  Color: YELLOW / Appearance: CLEAR / S.015 / pH: 5.5  Gluc: NEGATIVE / Ketone: NEGATIVE  / Bili: NEGATIVE / Urobili: NORMAL E.U.   Blood: TRACE / Protein: 30 / Nitrite: NEGATIVE   Leuk Esterase: TRACE / RBC: 5-10 / WBC 2-5   Sq Epi: x / Non Sq Epi: x / Bacteria: x    Sodium, Random Urine: < 20 meq/L ( @ 14:33)  Potassium, Random Urine: 34.9 meq/L ( @ 14:33)  Chloride, Random Urine: < 20 mmol/L ( @ 14:33)      IMPRESSION:89M w/ DM2, HTN, and CKD3,  a/w RLE cellulitis now s/p RLE angio and bypass surgery.    (1)Renal - CKD3 - superimposed SCOT; more likely simple prerenal azotemia than contrast nephropathy (given urine lytes, and given the low contrast load). Labs pending for today.    (2)CV - hypovolemic?      RECOMMEND:  (1)BMP daily  (2)Discontinue lasix for now  (3)Dose new meds for GFR 30-40ml/min; present med dosing is okay for now      William García MD  [facility]  [phone] No pain, no shortness of breath      MEDICATIONS  (STANDING):  piperacillin/tazobactam IVPB. 3.375Gram(s) IV Intermittent every 8 hours  heparin  Injectable 5000Unit(s) SubCutaneous every 8 hours  insulin lispro (HumaLOG) corrective regimen sliding scale  SubCutaneous at bedtime  insulin lispro (HumaLOG) corrective regimen sliding scale  SubCutaneous three times a day before meals  hydrALAZINE 10milliGRAM(s) Oral two times a day  simvastatin 20milliGRAM(s) Oral at bedtime  brimonidine 0.2% Ophthalmic Solution 1Drop(s) Right EYE two times a day  timolol 0.5% Solution 1Drop(s) Right EYE two times a day  collagenase Ointment 1Application(s) Topical daily  metoprolol 25milliGRAM(s) Oral two times a day  sodium chloride 0.9% lock flush 3milliLiter(s) IV Push every 8 hours  cilostazol 50milliGRAM(s) Oral two times a day  aspirin  chewable 81milliGRAM(s) Oral daily  furosemide    Tablet 20milliGRAM(s) Oral daily  tamsulosin 0.4milliGRAM(s) Oral at bedtime      VITAL:  T(C): , Max: 37.3 ( @ 00:43)  T(F): , Max: 99.2 ( @ 00:43)  HR: 61  BP: 142/46  BP(mean): --  RR: 18  SpO2: 93%        PHYSICAL EXAM:    Constitutional: NAD  HEENTN:  DMM, no JVD  Respiratory: CTA-b/l  Cardiac: RRR s1s2  Gastrointestinal: BS+, soft, NT/ND  Extremities: No peripheral edema      LABS:                        10.2   11.65 )-----------( 247      ( 2017 05:40 )             31.0     Na(133)/K(4.2)/Cl(98)/HCO3(18)/BUN(44)/Cr(1.62)Glu(229)/Ca(8.7)/Mg(--)/PO4(--)     @ 11:48  Na(131)/K(4.1)/Cl(96)/HCO3(19)/BUN(45)/Cr(1.59)Glu(226)/Ca(8.6)/Mg(--)/PO4(--)     @ 05:40  Na(137)/K(4.4)/Cl(100)/HCO3(16)/BUN(34)/Cr(1.16)Glu(193)/Ca(8.6)/Mg(1.9)/PO4(3.5)     @ 05:10    Urinalysis Basic - ( 2017 14:33 )  Color: YELLOW / Appearance: CLEAR / S.015 / pH: 5.5  Gluc: NEGATIVE / Ketone: NEGATIVE  / Bili: NEGATIVE / Urobili: NORMAL E.U.   Blood: TRACE / Protein: 30 / Nitrite: NEGATIVE   Leuk Esterase: TRACE / RBC: 5-10 / WBC 2-5   Sq Epi: x / Non Sq Epi: x / Bacteria: x    Sodium, Random Urine: < 20 meq/L ( @ 14:33)  Potassium, Random Urine: 34.9 meq/L ( @ 14:33)  Chloride, Random Urine: < 20 mmol/L ( @ 14:33)    CXR 17 - small b/l effusions; no pulm vascular congestion          IMPRESSION:89M w/ DM2, HTN, and CKD3,  a/w RLE cellulitis now s/p RLE angio and bypass surgery.    (1)Renal - CKD3 - superimposed SCOT; more likely simple prerenal azotemia than contrast nephropathy (given urine lytes, and given the low contrast load). Labs pending for today.    (2)CV - hypovolemic?      RECOMMEND:  (1)BMP daily  (2)Discontinue lasix for now  (3)Dose new meds for GFR 30-40ml/min; present med dosing is okay for now      William García MD  [facility]  [phone] No pain, no shortness of breath      MEDICATIONS  (STANDING):  piperacillin/tazobactam IVPB. 3.375Gram(s) IV Intermittent every 8 hours  heparin  Injectable 5000Unit(s) SubCutaneous every 8 hours  insulin lispro (HumaLOG) corrective regimen sliding scale  SubCutaneous at bedtime  insulin lispro (HumaLOG) corrective regimen sliding scale  SubCutaneous three times a day before meals  hydrALAZINE 10milliGRAM(s) Oral two times a day  simvastatin 20milliGRAM(s) Oral at bedtime  brimonidine 0.2% Ophthalmic Solution 1Drop(s) Right EYE two times a day  timolol 0.5% Solution 1Drop(s) Right EYE two times a day  collagenase Ointment 1Application(s) Topical daily  metoprolol 25milliGRAM(s) Oral two times a day  sodium chloride 0.9% lock flush 3milliLiter(s) IV Push every 8 hours  cilostazol 50milliGRAM(s) Oral two times a day  aspirin  chewable 81milliGRAM(s) Oral daily  furosemide    Tablet 20milliGRAM(s) Oral daily  tamsulosin 0.4milliGRAM(s) Oral at bedtime      VITAL:  T(C): , Max: 37.3 ( @ 00:43)  T(F): , Max: 99.2 ( @ 00:43)  HR: 61  BP: 142/46  BP(mean): --  RR: 18  SpO2: 93%        PHYSICAL EXAM:    Constitutional: NAD, pleasantly demented  HEENTN:  DMM, no JVD  Respiratory: CTA-b/l  Cardiac: s1s2  Gastrointestinal: BS+, soft, NT/ND, (+)tee  Extremities: trace b/l LE edema; RLE dressed      LABS:                        10.2   11.65 )-----------( 247      ( 2017 05:40 )             31.0     Na(133)/K(4.2)/Cl(98)/HCO3(18)/BUN(44)/Cr(1.62)Glu(229)/Ca(8.7)/Mg(--)/PO4(--)    -25 @ 11:48  Na(131)/K(4.1)/Cl(96)/HCO3(19)/BUN(45)/Cr(1.59)Glu(226)/Ca(8.6)/Mg(--)/PO4(--)     @ 05:40  Na(137)/K(4.4)/Cl(100)/HCO3(16)/BUN(34)/Cr(1.16)Glu(193)/Ca(8.6)/Mg(1.9)/PO4(3.5)     @ 05:10    Urinalysis Basic - ( 2017 14:33 )  Color: YELLOW / Appearance: CLEAR / S.015 / pH: 5.5  Gluc: NEGATIVE / Ketone: NEGATIVE  / Bili: NEGATIVE / Urobili: NORMAL E.U.   Blood: TRACE / Protein: 30 / Nitrite: NEGATIVE   Leuk Esterase: TRACE / RBC: 5-10 / WBC 2-5   Sq Epi: x / Non Sq Epi: x / Bacteria: x    Sodium, Random Urine: < 20 meq/L ( @ 14:33)  Potassium, Random Urine: 34.9 meq/L ( @ 14:33)  Chloride, Random Urine: < 20 mmol/L ( @ 14:33)    CXR 17 - small b/l effusions; no pulm vascular congestion          IMPRESSION:89M w/ DM2, HTN, and CKD3,  a/w RLE cellulitis now s/p RLE angio and bypass surgery.    (1)Renal - CKD3 - superimposed SCOT; more likely simple prerenal azotemia than contrast nephropathy (given urine lytes, and given the low contrast load). Labs pending for today.    (2)CV - presumed to be hypovolemic      RECOMMEND:  (1)BMP daily  (2)Discontinue lasix for now  (3)Dose new meds for GFR 30-40ml/min; present med dosing is okay for now  (4)Encourage PO intake  (5)Could d/c tee today if creatinine is 1.7 or lower    William García MD  [facility]  [phone]

## 2017-06-26 NOTE — PROGRESS NOTE ADULT - SUBJECTIVE AND OBJECTIVE BOX
Surgery Post-Op Note  C Team Surgery     Subjective: Patient continuining lasix for fluid overload seen on CXR; remains IV locked; was OOB to chair; failed TOV and tee was replaced at 0200    T(C): 37.3, Max: 37.3 (06-26 @ 00:43)  HR: 61 (59 - 66)  BP: 142/46 (132/41 - 145/44)  RR: 18 (18 - 18)  SpO2: 93% (93% - 100%)  Wt(kg): --  I&O's Detail  I & Os for 24h ending 25 Jun 2017 07:00  =============================================  IN:    Oral Fluid: 600 ml    dextrose 5% + sodium chloride 0.45% with potassium chloride 20 mEq/L: 280 ml    IV PiggyBack: 100 ml    Total IN: 980 ml  ---------------------------------------------  OUT:    Indwelling Catheter - Urethral: 475 ml    Total OUT: 475 ml  ---------------------------------------------  Total NET: 505 ml    I & Os for current day (as of 26 Jun 2017 05:43)  =============================================  IN:    Oral Fluid: 290 ml    IV PiggyBack: 100 ml    Total IN: 390 ml  ---------------------------------------------  OUT:    Indwelling Catheter - Urethral: 925 ml    Voided: 50 ml    Total OUT: 975 ml  ---------------------------------------------  Total NET: -585 ml    Physical Exam:  Gen: NAD   RLE: incision c / d / i   Vasc: L DP / PT Dopp Surgery Post-Op Note  C Team Surgery     Subjective: Patient continuining lasix for fluid overload seen on CXR; remains IV locked; was OOB to chair; failed TOV and tee was replaced at 0200    T(C): 37.3, Max: 37.3 (06-26 @ 00:43)  HR: 61 (59 - 66)  BP: 142/46 (132/41 - 145/44)  RR: 18 (18 - 18)  SpO2: 93% (93% - 100%)  Wt(kg): --  I&O's Detail  I & Os for 24h ending 25 Jun 2017 07:00  =============================================  IN:    Oral Fluid: 600 ml    dextrose 5% + sodium chloride 0.45% with potassium chloride 20 mEq/L: 280 ml    IV PiggyBack: 100 ml    Total IN: 980 ml  ---------------------------------------------  OUT:    Indwelling Catheter - Urethral: 475 ml    Total OUT: 475 ml  ---------------------------------------------  Total NET: 505 ml    I & Os for current day (as of 26 Jun 2017 05:43)  =============================================  IN:    Oral Fluid: 290 ml    IV PiggyBack: 100 ml    Total IN: 390 ml  ---------------------------------------------  OUT:    Indwelling Catheter - Urethral: 925 ml    Voided: 50 ml    Total OUT: 975 ml  ---------------------------------------------  Total NET: -585 ml    Physical Exam:  Gen: NAD   RLE: incision c / d / i   Vasc: L DP / PT Dopp  foot w good perfusion                           10.2   11.65 )-----------( 247      ( 25 Jun 2017 05:40 )             31.0   06-26    133<L>  |  97<L>  |  42<H>  ----------------------------<  241<H>  3.8   |  21<L>  |  1.56<H>    Ca    8.6      26 Jun 2017 10:29

## 2017-06-26 NOTE — DIETITIAN INITIAL EVALUATION ADULT. - NUTRITION INTERVENTIONS
1. Remove renal restriction from diet order. 2. Add Na restriction.  3. Monitor weight, labs, po intake and skin integrity.

## 2017-06-26 NOTE — PROGRESS NOTE ADULT - SUBJECTIVE AND OBJECTIVE BOX
FOLLOW UP VISIT:  SYMPTOMS: patient comfortable , denies Cp,Sob      MEDICATIONS  (STANDING):  piperacillin/tazobactam IVPB. 3.375Gram(s) IV Intermittent every 8 hours  heparin  Injectable 5000Unit(s) SubCutaneous every 8 hours  insulin lispro (HumaLOG) corrective regimen sliding scale  SubCutaneous at bedtime  insulin lispro (HumaLOG) corrective regimen sliding scale  SubCutaneous three times a day before meals  hydrALAZINE 10milliGRAM(s) Oral two times a day  simvastatin 20milliGRAM(s) Oral at bedtime  brimonidine 0.2% Ophthalmic Solution 1Drop(s) Right EYE two times a day  timolol 0.5% Solution 1Drop(s) Right EYE two times a day  collagenase Ointment 1Application(s) Topical daily  metoprolol 25milliGRAM(s) Oral two times a day  cilostazol 50milliGRAM(s) Oral two times a day  aspirin  chewable 81milliGRAM(s) Oral daily  tamsulosin 0.4milliGRAM(s) Oral at bedtime    MEDICATIONS  (PRN):  oxyCODONE IR 2.5milliGRAM(s) Oral every 4 hours PRN Moderate Pain (4 - 6)  oxyCODONE IR 5milliGRAM(s) Oral every 4 hours PRN Severe Pain (7 - 10)      Vital Signs Last 24 Hrs  T(C): 37.4, Max: 37.4 ( @ 16:27)  T(F): 99.3, Max: 99.3 ( @ 16:27)  HR: 61 (59 - 65)  BP: 151/40 (132/41 - 160/54)  BP(mean): --  RR: 18 (18 - 18)  SpO2: 93% (93% - 100%)    Daily     Daily Weight in k.1 (2017 13:04)    I&O's Detail  I & Os for 24h ending 2017 07:00  =============================================  IN:    Oral Fluid: 290 ml    IV PiggyBack: 100 ml    Total IN: 390 ml  ---------------------------------------------  OUT:    Indwelling Catheter - Urethral: 925 ml    Voided: 50 ml    Total OUT: 975 ml  ---------------------------------------------  Total NET: -585 ml    I & Os for current day (as of 2017 17:51)  =============================================  IN:    Total IN: 0 ml  ---------------------------------------------  OUT:    Indwelling Catheter - Urethral: 1050 ml    Voided: 475 ml    Total OUT: 1525 ml  ---------------------------------------------  Total NET: -1525 ml      Physical Exam  Eyes anicteric    Neck  + JVD   Lungs CTA   Cor  SM2/6   Abd  soft NT +bs  Ext dressing in Place   Pulses  reduced   Neuro  axox3  Isaacs in Place   LABS    Urinalysis Basic - ( 2017 14:33 )    Color: YELLOW / Appearance: CLEAR / S.015 / pH: 5.5  Gluc: NEGATIVE / Ketone: NEGATIVE  / Bili: NEGATIVE / Urobili: NORMAL E.U.   Blood: TRACE / Protein: 30 / Nitrite: NEGATIVE   Leuk Esterase: TRACE / RBC: 5-10 / WBC 2-5   Sq Epi: x / Non Sq Epi: x / Bacteria: x          CBC Full  -  ( 2017 05:40 )  WBC Count : 11.65 K/uL  Hemoglobin : 10.2 g/dL  Hematocrit : 31.0 %  Platelet Count - Automated : 247 K/uL  Mean Cell Volume : 95.4 fL  Mean Cell Hemoglobin : 31.4 pg  Mean Cell Hemoglobin Concentration : 32.9 %  Auto Neutrophil # : x  Auto Lymphocyte # : x  Auto Monocyte # : x  Auto Eosinophil # : x  Auto Basophil # : x  Auto Neutrophil % : x  Auto Lymphocyte % : x  Auto Monocyte % : x  Auto Eosinophil % : x  Auto Basophil % : x        133<L>  |  97<L>  |  42<H>  ----------------------------<  241<H>  3.8   |  21<L>  |  1.56<H>    Ca    8.6      2017 10:29      Lipid panel  TSH:  Digoxin level    ECHO    Nuclear Stress    ECG:    Telemetry: V- pace 60 bpm    RADIOLOGY & ADDITIONAL STUDIES:    ASSESSMENT & PLAN:89 year old male with Hx of CAD ,PAD ,TIA ,DM  pulmonary HTN ,TR ,MR  S/P R fem-pop bypass. Hemodynamically stable on IV abx  . Follow I s & Os .  I < O  .off lasix  .  Continue Lopressor,ASa,Zocor ,Cilostazol  & Lipitor . For R 1st toe debridement in AM . Continue Current therapy . Follow electrolytes . If persistent HTN will adjust Hydralazine .

## 2017-06-27 ENCOUNTER — APPOINTMENT (OUTPATIENT)
Dept: VASCULAR SURGERY | Facility: CLINIC | Age: 82
End: 2017-06-27

## 2017-06-27 LAB
APTT BLD: 26.6 SEC — LOW (ref 27.5–37.4)
BLD GP AB SCN SERPL QL: NEGATIVE — SIGNIFICANT CHANGE UP
BUN SERPL-MCNC: 37 MG/DL — HIGH (ref 7–23)
CALCIUM SERPL-MCNC: 8.5 MG/DL — SIGNIFICANT CHANGE UP (ref 8.4–10.5)
CHLORIDE SERPL-SCNC: 98 MMOL/L — SIGNIFICANT CHANGE UP (ref 98–107)
CO2 SERPL-SCNC: 21 MMOL/L — LOW (ref 22–31)
CREAT SERPL-MCNC: 1.4 MG/DL — HIGH (ref 0.5–1.3)
GLUCOSE SERPL-MCNC: 158 MG/DL — HIGH (ref 70–99)
HCT VFR BLD CALC: 28.5 % — LOW (ref 39–50)
HGB BLD-MCNC: 9.6 G/DL — LOW (ref 13–17)
INR BLD: 1.22 — HIGH (ref 0.88–1.17)
MCHC RBC-ENTMCNC: 31.8 PG — SIGNIFICANT CHANGE UP (ref 27–34)
MCHC RBC-ENTMCNC: 33.7 % — SIGNIFICANT CHANGE UP (ref 32–36)
MCV RBC AUTO: 94.4 FL — SIGNIFICANT CHANGE UP (ref 80–100)
PLATELET # BLD AUTO: 225 K/UL — SIGNIFICANT CHANGE UP (ref 150–400)
PMV BLD: 10.6 FL — SIGNIFICANT CHANGE UP (ref 7–13)
POTASSIUM SERPL-MCNC: 3.3 MMOL/L — LOW (ref 3.5–5.3)
POTASSIUM SERPL-SCNC: 3.3 MMOL/L — LOW (ref 3.5–5.3)
PROTHROM AB SERPL-ACNC: 13.7 SEC — HIGH (ref 9.8–13.1)
RBC # BLD: 3.02 M/UL — LOW (ref 4.2–5.8)
RBC # FLD: 12.7 % — SIGNIFICANT CHANGE UP (ref 10.3–14.5)
RH IG SCN BLD-IMP: POSITIVE — SIGNIFICANT CHANGE UP
SODIUM SERPL-SCNC: 134 MMOL/L — LOW (ref 135–145)
WBC # BLD: 10.02 K/UL — SIGNIFICANT CHANGE UP (ref 3.8–10.5)
WBC # FLD AUTO: 10.02 K/UL — SIGNIFICANT CHANGE UP (ref 3.8–10.5)

## 2017-06-27 RX ORDER — INSULIN GLARGINE 100 [IU]/ML
4 INJECTION, SOLUTION SUBCUTANEOUS AT BEDTIME
Qty: 0 | Refills: 0 | Status: DISCONTINUED | OUTPATIENT
Start: 2017-06-27 | End: 2017-06-29

## 2017-06-27 RX ORDER — POTASSIUM CHLORIDE 20 MEQ
40 PACKET (EA) ORAL ONCE
Qty: 0 | Refills: 0 | Status: COMPLETED | OUTPATIENT
Start: 2017-06-27 | End: 2017-06-27

## 2017-06-27 RX ADMIN — Medication 1 DROP(S): at 05:07

## 2017-06-27 RX ADMIN — PIPERACILLIN AND TAZOBACTAM 25 GRAM(S): 4; .5 INJECTION, POWDER, LYOPHILIZED, FOR SOLUTION INTRAVENOUS at 05:06

## 2017-06-27 RX ADMIN — INSULIN GLARGINE 4 UNIT(S): 100 INJECTION, SOLUTION SUBCUTANEOUS at 21:28

## 2017-06-27 RX ADMIN — HEPARIN SODIUM 5000 UNIT(S): 5000 INJECTION INTRAVENOUS; SUBCUTANEOUS at 05:06

## 2017-06-27 RX ADMIN — BRIMONIDINE TARTRATE 1 DROP(S): 2 SOLUTION/ DROPS OPHTHALMIC at 17:58

## 2017-06-27 RX ADMIN — CILOSTAZOL 50 MILLIGRAM(S): 100 TABLET ORAL at 05:06

## 2017-06-27 RX ADMIN — OXYCODONE HYDROCHLORIDE 5 MILLIGRAM(S): 5 TABLET ORAL at 22:04

## 2017-06-27 RX ADMIN — HEPARIN SODIUM 5000 UNIT(S): 5000 INJECTION INTRAVENOUS; SUBCUTANEOUS at 13:01

## 2017-06-27 RX ADMIN — OXYCODONE HYDROCHLORIDE 5 MILLIGRAM(S): 5 TABLET ORAL at 13:45

## 2017-06-27 RX ADMIN — OXYCODONE HYDROCHLORIDE 5 MILLIGRAM(S): 5 TABLET ORAL at 21:28

## 2017-06-27 RX ADMIN — Medication 81 MILLIGRAM(S): at 11:18

## 2017-06-27 RX ADMIN — Medication 1 DROP(S): at 17:59

## 2017-06-27 RX ADMIN — Medication 25 MILLIGRAM(S): at 05:06

## 2017-06-27 RX ADMIN — CILOSTAZOL 50 MILLIGRAM(S): 100 TABLET ORAL at 17:58

## 2017-06-27 RX ADMIN — Medication 25 MILLIGRAM(S): at 17:58

## 2017-06-27 RX ADMIN — TAMSULOSIN HYDROCHLORIDE 0.4 MILLIGRAM(S): 0.4 CAPSULE ORAL at 21:28

## 2017-06-27 RX ADMIN — PIPERACILLIN AND TAZOBACTAM 25 GRAM(S): 4; .5 INJECTION, POWDER, LYOPHILIZED, FOR SOLUTION INTRAVENOUS at 13:01

## 2017-06-27 RX ADMIN — Medication 10 MILLIGRAM(S): at 05:06

## 2017-06-27 RX ADMIN — BRIMONIDINE TARTRATE 1 DROP(S): 2 SOLUTION/ DROPS OPHTHALMIC at 05:06

## 2017-06-27 RX ADMIN — Medication 40 MILLIEQUIVALENT(S): at 11:13

## 2017-06-27 RX ADMIN — SIMVASTATIN 20 MILLIGRAM(S): 20 TABLET, FILM COATED ORAL at 21:28

## 2017-06-27 RX ADMIN — OXYCODONE HYDROCHLORIDE 5 MILLIGRAM(S): 5 TABLET ORAL at 13:26

## 2017-06-27 RX ADMIN — Medication 1 APPLICATION(S): at 05:07

## 2017-06-27 RX ADMIN — Medication 2: at 08:48

## 2017-06-27 RX ADMIN — Medication 2: at 12:43

## 2017-06-27 RX ADMIN — Medication 2: at 17:58

## 2017-06-27 RX ADMIN — PIPERACILLIN AND TAZOBACTAM 25 GRAM(S): 4; .5 INJECTION, POWDER, LYOPHILIZED, FOR SOLUTION INTRAVENOUS at 21:27

## 2017-06-27 RX ADMIN — Medication 10 MILLIGRAM(S): at 17:58

## 2017-06-27 NOTE — PROGRESS NOTE ADULT - SUBJECTIVE AND OBJECTIVE BOX
FOLLOW UP VISIT:  SYMPTOMS:  Patient comfortable , denies cp ,sob  Awaiting R 1st toe debridement   Off Lasix with improved Bun/cr  MEDICATIONS  (STANDING):  piperacillin/tazobactam IVPB. 3.375 Gram(s) IV Intermittent every 8 hours  heparin  Injectable 5000 Unit(s) SubCutaneous every 8 hours  insulin lispro (HumaLOG) corrective regimen sliding scale   SubCutaneous at bedtime  insulin lispro (HumaLOG) corrective regimen sliding scale   SubCutaneous three times a day before meals  hydrALAZINE 10 milliGRAM(s) Oral two times a day  simvastatin 20 milliGRAM(s) Oral at bedtime  brimonidine 0.2% Ophthalmic Solution 1 Drop(s) Right EYE two times a day  timolol 0.5% Solution 1 Drop(s) Right EYE two times a day  collagenase Ointment 1 Application(s) Topical daily  metoprolol 25 milliGRAM(s) Oral two times a day  cilostazol 50 milliGRAM(s) Oral two times a day  aspirin  chewable 81 milliGRAM(s) Oral daily  tamsulosin 0.4 milliGRAM(s) Oral at bedtime  insulin glargine Injectable (LANTUS) 4 Unit(s) SubCutaneous at bedtime    MEDICATIONS  (PRN):  oxyCODONE IR 2.5 milliGRAM(s) Oral every 4 hours PRN Moderate Pain (4 - 6)  oxyCODONE IR 5 milliGRAM(s) Oral every 4 hours PRN Severe Pain (7 - 10)      Vital Signs Last 24 Hrs  T(C): 36.9 (2017 16:46), Max: 37.3 (2017 04:52)  T(F): 98.4 (2017 16:46), Max: 99.2 (2017 04:52)  HR: 64 (2017 16:46) (60 - 76)  BP: 143/44 (2017 16:46) (128/46 - 163/52)  BP(mean): --  RR: 18 (2017 16:46) (18 - 18)  SpO2: 94% (2017 16:46) (94% - 98%)    Daily Height in cm: 177.8 (2017 16:06)    Daily Weight in k.2 (2017 04:52)    I&O's Detail    2017 07:01  -  2017 07:00  --------------------------------------------------------  IN:    IV PiggyBack: 200 mL    Oral Fluid: 50 mL  Total IN: 250 mL    OUT:    Indwelling Catheter - Urethral: 1850 mL    Voided: 475 mL  Total OUT: 2325 mL    Total NET: -2075 mL      2017 07:01  -  2017 19:29  --------------------------------------------------------  IN:  Total IN: 0 mL    OUT:    Indwelling Catheter - Urethral: 850 mL  Total OUT: 850 mL    Total NET: -850 mL          Physical Exam  Eyes Anicteric  Neck  no sig JVD  Lungs  CTA   Cor  SM 2/6  Abd  soft NT + BS  Ext  Dressing in place   Pulses  reduced  Neuro  AxOx3    LABS  PT/INR - ( 2017 06:00 )   PT: 13.7 SEC;   INR: 1.22          PTT - ( 2017 06:00 )  PTT:26.6 SEC        CBC Full  -  ( 2017 06:00 )  WBC Count : 10.02 K/uL  Hemoglobin : 9.6 g/dL  Hematocrit : 28.5 %  Platelet Count - Automated : 225 K/uL  Mean Cell Volume : 94.4 fL  Mean Cell Hemoglobin : 31.8 pg  Mean Cell Hemoglobin Concentration : 33.7 %  Auto Neutrophil # : x  Auto Lymphocyte # : x  Auto Monocyte # : x  Auto Eosinophil # : x  Auto Basophil # : x  Auto Neutrophil % : x  Auto Lymphocyte % : x  Auto Monocyte % : x  Auto Eosinophil % : x  Auto Basophil % : x        134<L>  |  98  |  37<H>  ----------------------------<  158<H>  3.3<L>   |  21<L>  |  1.40<H>    Ca    8.5      2017 06:00      Lipid panel  TSH:  Digoxin level    ECHO    Nuclear Stress    ECG:    Telemetry:  V paced    RADIOLOGY & ADDITIONAL STUDIES:    ASSESSMENT & PLAN: 89 year old male with Hx of CAD ,PAD ,TIA ,DM  pulmonary HTN ,TR ,MR  S/P R fem-pop bypass. Hemodynamically stable on IV abx  . Follow I s & Os .  I < O  .off lasix & IVF   .  Continue Lopressor,ASa,Zocor ,Cilostazol  & Lipitor . For R 1st toe debridement today  . Continue Current therapy . Follow electrolytes . improved SBP .

## 2017-06-27 NOTE — PROGRESS NOTE ADULT - ASSESSMENT
89M w/ RLE cellulitis, now POD # 4 from R femoral to above knee popliteal bypass with PTFE  - Pain control  - c/w antiplatelet therapy  - physical therapy   - wound care per podiatry 89M w/ RLE cellulitis, now POD # 4 from R femoral to above knee popliteal bypass with PTFE  - c/w Pain control PRN  - c/w antiplatelet therapy  - physical therapy   - OR today with podiatry

## 2017-06-27 NOTE — PROGRESS NOTE ADULT - SUBJECTIVE AND OBJECTIVE BOX
Vascular Surgery Daily Progress Note    S: Patient doing well. No complaints or acute events overnight.     Gen: NAD   RLE: stable leg cellulitis. Dressing C/D/I rle warm w increased cap refill and perfusion  Vasc: R AT Dopp/ L DP/PT Dopp     Vital Signs Last 24 Hrs  T(C): 37.3, Max: 37.4 (06-26 @ 16:27)  T(F): 99.2, Max: 99.3 (06-26 @ 16:27)  HR: 64 (59 - 69)  BP: 163/52 (128/46 - 163/52)  BP(mean): --  RR: 18 (18 - 18)  SpO2: 94% (93% - 97%)    I&O's Detail  I & Os for 24h ending 26 Jun 2017 07:00  =============================================  IN:    Oral Fluid: 290 ml    IV PiggyBack: 100 ml    Total IN: 390 ml  ---------------------------------------------  OUT:    Indwelling Catheter - Urethral: 925 ml    Voided: 50 ml    Total OUT: 975 ml  ---------------------------------------------  Total NET: -585 ml    I & Os for current day (as of 27 Jun 2017 06:46)  =============================================  IN:    IV PiggyBack: 200 ml    Oral Fluid: 50 ml    Total IN: 250 ml  ---------------------------------------------  OUT:    Indwelling Catheter - Urethral: 1850 ml    Voided: 475 ml    Total OUT: 2325 ml  ---------------------------------------------  Total NET: -2075 ml          06-26    133<L>  |  97<L>  |  42<H>  ----------------------------<  241<H>  3.8   |  21<L>  |  1.56<H>    Ca    8.6      26 Jun 2017 10:29        CAPILLARY BLOOD GLUCOSE  207 (26 Jun 2017 20:13)  213 (26 Jun 2017 16:27)  231 (26 Jun 2017 11:40)  187 (26 Jun 2017 08:37)

## 2017-06-27 NOTE — BRIEF OPERATIVE NOTE - POST-OP DX
Limb ischemia  06/23/2017    Active  Mehul Callahan  Non-healing wound of lower extremity, right, sequela  06/27/2017    Active  Kevin Ford
Limb ischemia  06/23/2017    Active  Mehul Callahan

## 2017-06-27 NOTE — BRIEF OPERATIVE NOTE - PROCEDURE
Debridement  06/27/2017  excisional ulcer debridement down to the level of muscle  Active  [facility]
Bypass (graft) femoropopliteal (arteries)  06/23/2017    Active  [facility]

## 2017-06-27 NOTE — PROGRESS NOTE ADULT - SUBJECTIVE AND OBJECTIVE BOX
Patient is a 89y old  Male who presents with a chief complaint of RLE swelling (2017 21:55)      INTERVAL HPI/OVERNIGHT EVENTS:   Pt is scheduled for right foot 1st met ulcer debridement with stravix application with Dr. Schroeder at 6:00 pm. Patient is aware of procedure and is NPO since 5 am.    MEDICATIONS  (STANDING):  piperacillin/tazobactam IVPB. 3.375 Gram(s) IV Intermittent every 8 hours  heparin  Injectable 5000 Unit(s) SubCutaneous every 8 hours  insulin lispro (HumaLOG) corrective regimen sliding scale   SubCutaneous at bedtime  insulin lispro (HumaLOG) corrective regimen sliding scale   SubCutaneous three times a day before meals  hydrALAZINE 10 milliGRAM(s) Oral two times a day  simvastatin 20 milliGRAM(s) Oral at bedtime  brimonidine 0.2% Ophthalmic Solution 1 Drop(s) Right EYE two times a day  timolol 0.5% Solution 1 Drop(s) Right EYE two times a day  collagenase Ointment 1 Application(s) Topical daily  metoprolol 25 milliGRAM(s) Oral two times a day  cilostazol 50 milliGRAM(s) Oral two times a day  aspirin  chewable 81 milliGRAM(s) Oral daily  tamsulosin 0.4 milliGRAM(s) Oral at bedtime  potassium chloride    Tablet ER 40 milliEquivalent(s) Oral once    MEDICATIONS  (PRN):  oxyCODONE IR 2.5 milliGRAM(s) Oral every 4 hours PRN Moderate Pain (4 - 6)  oxyCODONE IR 5 milliGRAM(s) Oral every 4 hours PRN Severe Pain (7 - 10)      Allergies    No Known Allergies    Intolerances        Vital Signs Last 24 Hrs  T(C): 37.2 (2017 08:16), Max: 37.4 (2017 16:27)  T(F): 98.9 (2017 08:16), Max: 99.3 (2017 16:27)  HR: 60 (2017 08:16) (59 - 69)  BP: 140/69 (2017 08:16) (128/46 - 163/52)  BP(mean): --  RR: 18 (2017 08:16) (18 - 18)  SpO2: 94% (2017 08:16) (93% - 97%)    LABS:                        9.6    10.02 )-----------( 225      ( 2017 06:00 )             28.5     06-    134<L>  |  98  |  37<H>  ----------------------------<  158<H>  3.3<L>   |  21<L>  |  1.40<H>    Ca    8.5      2017 06:00      PT/INR - ( 2017 06:00 )   PT: 13.7 SEC;   INR: 1.22          PTT - ( 2017 06:00 )  PTT:26.6 SEC  Urinalysis Basic - ( 2017 14:33 )    Color: YELLOW / Appearance: CLEAR / S.015 / pH: 5.5  Gluc: NEGATIVE / Ketone: NEGATIVE  / Bili: NEGATIVE / Urobili: NORMAL E.U.   Blood: TRACE / Protein: 30 / Nitrite: NEGATIVE   Leuk Esterase: TRACE / RBC: 5-10 / WBC 2-5   Sq Epi: x / Non Sq Epi: x / Bacteria: x      CAPILLARY BLOOD GLUCOSE  191 (2017 08:29)  207 (2017 20:13)  213 (2017 16:27)  231 (2017 11:40)          RADIOLOGY & ADDITIONAL TESTS:    Plan:   To OR today at 6:00 pm with Dr. Schroeder for 1st metatarsal ulcer debridement with stravix.   CXR on sunrise.  EKG on sunrise.  Pt was previously cleared for bypass procedure, this procedure will be under MAC with local.   Consent signed and in chart.  Procedure was explained to patient in detail. All alternatives, risks and complications were discussed. All questions answered.

## 2017-06-27 NOTE — PROGRESS NOTE ADULT - SUBJECTIVE AND OBJECTIVE BOX
No pain, no shortness of breath      VITAL:  T(C): , Max: 37.4 (06-26-17 @ 16:27)  T(F): , Max: 99.3 (06-26-17 @ 16:27)  HR: 60 (06-27-17 @ 08:16)  BP: 140/69 (06-27-17 @ 08:16)  BP(mean): --  RR: 18 (06-27-17 @ 08:16)  SpO2: 94% (06-27-17 @ 08:16)      PHYSICAL EXAM:    Constitutional: NAD, pleasantly demented  HEENTN:  DMM, no JVD  Respiratory: CTA-b/l  Cardiac: s1s2  Gastrointestinal: BS+, soft, NT/ND, (+)ete  Extremities: trace b/l LE edema; RLE dressed      LABS:                        9.6    10.02 )-----------( 225      ( 27 Jun 2017 06:00 )             28.5     Na(134)/K(3.3)/Cl(98)/HCO3(21)/BUN(37)/Cr(1.40)Glu(158)/Ca(8.5)/Mg(--)/PO4(--)    06-27 @ 06:00  Na(133)/K(3.8)/Cl(97)/HCO3(21)/BUN(42)/Cr(1.56)Glu(241)/Ca(8.6)/Mg(--)/PO4(--)    06-26 @ 10:29  Na(133)/K(4.2)/Cl(98)/HCO3(18)/BUN(44)/Cr(1.62)Glu(229)/Ca(8.7)/Mg(--)/PO4(--)    06-25 @ 11:48  Na(131)/K(4.1)/Cl(96)/HCO3(19)/BUN(45)/Cr(1.59)Glu(226)/Ca(8.6)/Mg(--)/PO4(--)    06-25 @ 05:40      IMPRESSION:89M w/ DM2, HTN, and CKD3, 6/16 a/w RLE cellulitis now s/p RLE angio and bypass surgery.    (1)Renal - CKD3 - superimposed SCOT - prerenal +/- YONATHAN. Resolving/resolved.  (2)CV - euvolemic to mildly hypovolemic.      RECOMMEND:  (1)BMP daily  (2)No IVF/no diuretics for now  (3)Dose new meds for GFR 30-40ml/min; present med dosing is okay for now  (4)Encourage PO intake  (5)D/C planning per primary team            William García MD  [facility]  [phone]

## 2017-06-28 ENCOUNTER — TRANSCRIPTION ENCOUNTER (OUTPATIENT)
Age: 82
End: 2017-06-28

## 2017-06-28 DIAGNOSIS — E83.39 OTHER DISORDERS OF PHOSPHORUS METABOLISM: ICD-10-CM

## 2017-06-28 DIAGNOSIS — Z92.89 PERSONAL HISTORY OF OTHER MEDICAL TREATMENT: ICD-10-CM

## 2017-06-28 LAB
BUN SERPL-MCNC: 32 MG/DL — HIGH (ref 7–23)
CALCIUM SERPL-MCNC: 8.4 MG/DL — SIGNIFICANT CHANGE UP (ref 8.4–10.5)
CHLORIDE SERPL-SCNC: 104 MMOL/L — SIGNIFICANT CHANGE UP (ref 98–107)
CO2 SERPL-SCNC: 20 MMOL/L — LOW (ref 22–31)
CREAT SERPL-MCNC: 1.25 MG/DL — SIGNIFICANT CHANGE UP (ref 0.5–1.3)
GLUCOSE SERPL-MCNC: 128 MG/DL — HIGH (ref 70–99)
HCT VFR BLD CALC: 28.5 % — LOW (ref 39–50)
HGB BLD-MCNC: 9.5 G/DL — LOW (ref 13–17)
MAGNESIUM SERPL-MCNC: 1.8 MG/DL — SIGNIFICANT CHANGE UP (ref 1.6–2.6)
MCHC RBC-ENTMCNC: 31.9 PG — SIGNIFICANT CHANGE UP (ref 27–34)
MCHC RBC-ENTMCNC: 33.3 % — SIGNIFICANT CHANGE UP (ref 32–36)
MCV RBC AUTO: 95.6 FL — SIGNIFICANT CHANGE UP (ref 80–100)
PHOSPHATE SERPL-MCNC: 2.1 MG/DL — LOW (ref 2.5–4.5)
PLATELET # BLD AUTO: 236 K/UL — SIGNIFICANT CHANGE UP (ref 150–400)
PMV BLD: 10.2 FL — SIGNIFICANT CHANGE UP (ref 7–13)
POTASSIUM SERPL-MCNC: 4 MMOL/L — SIGNIFICANT CHANGE UP (ref 3.5–5.3)
POTASSIUM SERPL-SCNC: 4 MMOL/L — SIGNIFICANT CHANGE UP (ref 3.5–5.3)
RBC # BLD: 2.98 M/UL — LOW (ref 4.2–5.8)
RBC # FLD: 12.8 % — SIGNIFICANT CHANGE UP (ref 10.3–14.5)
SODIUM SERPL-SCNC: 139 MMOL/L — SIGNIFICANT CHANGE UP (ref 135–145)
WBC # BLD: 8.44 K/UL — SIGNIFICANT CHANGE UP (ref 3.8–10.5)
WBC # FLD AUTO: 8.44 K/UL — SIGNIFICANT CHANGE UP (ref 3.8–10.5)

## 2017-06-28 PROCEDURE — 99222 1ST HOSP IP/OBS MODERATE 55: CPT | Mod: GC

## 2017-06-28 PROCEDURE — 93971 EXTREMITY STUDY: CPT | Mod: 26

## 2017-06-28 RX ORDER — MAGNESIUM SULFATE 500 MG/ML
2 VIAL (ML) INJECTION ONCE
Qty: 0 | Refills: 0 | Status: COMPLETED | OUTPATIENT
Start: 2017-06-28 | End: 2017-06-28

## 2017-06-28 RX ADMIN — CILOSTAZOL 50 MILLIGRAM(S): 100 TABLET ORAL at 05:55

## 2017-06-28 RX ADMIN — Medication 81 MILLIGRAM(S): at 11:56

## 2017-06-28 RX ADMIN — Medication 2: at 12:59

## 2017-06-28 RX ADMIN — HEPARIN SODIUM 5000 UNIT(S): 5000 INJECTION INTRAVENOUS; SUBCUTANEOUS at 05:55

## 2017-06-28 RX ADMIN — Medication 25 MILLIGRAM(S): at 18:00

## 2017-06-28 RX ADMIN — OXYCODONE HYDROCHLORIDE 5 MILLIGRAM(S): 5 TABLET ORAL at 13:29

## 2017-06-28 RX ADMIN — HEPARIN SODIUM 5000 UNIT(S): 5000 INJECTION INTRAVENOUS; SUBCUTANEOUS at 22:18

## 2017-06-28 RX ADMIN — Medication 25 MILLIGRAM(S): at 05:55

## 2017-06-28 RX ADMIN — BRIMONIDINE TARTRATE 1 DROP(S): 2 SOLUTION/ DROPS OPHTHALMIC at 17:58

## 2017-06-28 RX ADMIN — Medication 1 DROP(S): at 05:54

## 2017-06-28 RX ADMIN — Medication 30 MILLIGRAM(S): at 18:00

## 2017-06-28 RX ADMIN — SIMVASTATIN 20 MILLIGRAM(S): 20 TABLET, FILM COATED ORAL at 22:18

## 2017-06-28 RX ADMIN — TAMSULOSIN HYDROCHLORIDE 0.4 MILLIGRAM(S): 0.4 CAPSULE ORAL at 22:18

## 2017-06-28 RX ADMIN — CILOSTAZOL 50 MILLIGRAM(S): 100 TABLET ORAL at 18:00

## 2017-06-28 RX ADMIN — Medication 50 GRAM(S): at 10:42

## 2017-06-28 RX ADMIN — INSULIN GLARGINE 4 UNIT(S): 100 INJECTION, SOLUTION SUBCUTANEOUS at 22:19

## 2017-06-28 RX ADMIN — OXYCODONE HYDROCHLORIDE 5 MILLIGRAM(S): 5 TABLET ORAL at 06:08

## 2017-06-28 RX ADMIN — OXYCODONE HYDROCHLORIDE 5 MILLIGRAM(S): 5 TABLET ORAL at 07:08

## 2017-06-28 RX ADMIN — PIPERACILLIN AND TAZOBACTAM 25 GRAM(S): 4; .5 INJECTION, POWDER, LYOPHILIZED, FOR SOLUTION INTRAVENOUS at 22:18

## 2017-06-28 RX ADMIN — Medication 63.75 MILLIMOLE(S): at 11:55

## 2017-06-28 RX ADMIN — Medication 1 DROP(S): at 17:59

## 2017-06-28 RX ADMIN — HEPARIN SODIUM 5000 UNIT(S): 5000 INJECTION INTRAVENOUS; SUBCUTANEOUS at 13:00

## 2017-06-28 RX ADMIN — PIPERACILLIN AND TAZOBACTAM 25 GRAM(S): 4; .5 INJECTION, POWDER, LYOPHILIZED, FOR SOLUTION INTRAVENOUS at 13:00

## 2017-06-28 RX ADMIN — OXYCODONE HYDROCHLORIDE 5 MILLIGRAM(S): 5 TABLET ORAL at 12:29

## 2017-06-28 RX ADMIN — BRIMONIDINE TARTRATE 1 DROP(S): 2 SOLUTION/ DROPS OPHTHALMIC at 05:54

## 2017-06-28 RX ADMIN — PIPERACILLIN AND TAZOBACTAM 25 GRAM(S): 4; .5 INJECTION, POWDER, LYOPHILIZED, FOR SOLUTION INTRAVENOUS at 05:53

## 2017-06-28 RX ADMIN — Medication 10 MILLIGRAM(S): at 18:00

## 2017-06-28 RX ADMIN — Medication 10 MILLIGRAM(S): at 05:55

## 2017-06-28 RX ADMIN — Medication 2: at 17:59

## 2017-06-28 NOTE — DISCHARGE NOTE ADULT - CARE PLAN
Goal:	Healing  Instructions for follow-up, activity and diet:	Weight bearing as tolerated to the right heel in post op shoe  Keep dressing clean dry and intact until follow up visit with Dr. Schroeder   Schedule an appointment with Dr. Schroeder by calling [phone] within 1 week of discharge. Goal:	Healing  Instructions for follow-up, activity and diet:	Weight bearing as tolerated to the right heel in post op shoe  Keep dressing clean dry and intact until follow up visit with Dr. Schroeder   Schedule an appointment with Dr. Schroeder by calling [phone] within 1 week of discharge. Goal:	Healing  Instructions for follow-up, activity and diet:	Weight bearing as tolerated to the right heel in post op shoe  Keep dressing clean dry and intact until follow up visit with Dr. Schroeder   Schedule an appointment with Dr. Schroeder by calling [phone] within 1 week of discharge. Principal Discharge DX:	Cellulitis of leg, right  Goal:	s/p femoral to above knee popliteal bypass, debridement of right first toe.  Instructions for follow-up, activity and diet:	Weight bearing as tolerated to the right heel in post op shoe  Keep dressing clean dry and intact until follow up visit with Podiatrist Dr. Schroeder   Schedule an appointment with Dr. Schroeder by calling [phone] within 1 week of discharge.  You will continue Augmentin for 6 additional days.  You will need the following Prednisone taper:    Until 7/2: Prednisone 30 mg daily  7/3 - 7/7: Prednisone 20 mg daily   7/8 - 7/12: Prednisone 15 mg daily  7/13 - 7/17: Prednisone 10 mg daily  7/18 - 7/22: Prednisone 5 mg daily.  For your right elbow, wrist, or hand pain, please follow up with Orthoppedist Dr Garcia in 1-2 wk @ [phone]  Your Cozaar was stopped given elevated creatinine during hospitalization and Metoprolol was started instead.  Follow up with your Cardiologist and Nephrologist in one week, or you may follow up with who saw you in the hospital.  See below for information  Secondary Diagnosis:	Arterial insufficiency with ischemic ulcer  Secondary Diagnosis:	CKD (chronic kidney disease) stage 4, GFR 15-29 ml/min  Secondary Diagnosis:	DM (diabetes mellitus)  Secondary Diagnosis:	Duodenal ulcer  Secondary Diagnosis:	Isaacs catheter status  Secondary Diagnosis:	HTN (hypertension) Principal Discharge DX:	Cellulitis of leg, right  Goal:	s/p femoral to above knee popliteal bypass, debridement of right first toe.  Instructions for follow-up, activity and diet:	Weight bearing as tolerated to the right heel in post op shoe  Keep dressing clean dry and intact until follow up visit with Podiatrist Dr. Schroeder   Schedule an appointment with Dr. Schroeder by calling [phone] within 1 week of discharge.  You will continue Augmentin for 6 additional days.  You will need the following Prednisone taper:    Until 7/2: Prednisone 30 mg daily  7/3 - 7/7: Prednisone 20 mg daily   7/8 - 7/12: Prednisone 15 mg daily  7/13 - 7/17: Prednisone 10 mg daily  7/18 - 7/22: Prednisone 5 mg daily.  For your right elbow, wrist, or hand pain, please follow up with Orthoppedist Dr Garcia in 1-2 wk @ [phone]  Your Cozaar was stopped given elevated creatinine during hospitalization and Metoprolol was started instead.  Follow up with your Cardiologist and Nephrologist in one week, or you may follow up with who saw you in the hospital.  See below for information  Secondary Diagnosis:	Arterial insufficiency with ischemic ulcer  Secondary Diagnosis:	CKD (chronic kidney disease) stage 4, GFR 15-29 ml/min  Secondary Diagnosis:	DM (diabetes mellitus)  Secondary Diagnosis:	Duodenal ulcer  Secondary Diagnosis:	Isaacs catheter status  Secondary Diagnosis:	HTN (hypertension) Principal Discharge DX:	Cellulitis of leg, right  Goal:	s/p femoral to above knee popliteal bypass, debridement of right first toe.  Instructions for follow-up, activity and diet:	Weight bearing as tolerated to the right heel in post op shoe  Keep dressing clean dry and intact until follow up visit with Podiatrist Dr. Schroeder   Schedule an appointment with Dr. Schroeder by calling [phone] within 1 week of discharge.  You will continue Augmentin for 6 additional days.  You will need the following Prednisone taper:    Until 7/2: Prednisone 30 mg daily  7/3 - 7/7: Prednisone 20 mg daily   7/8 - 7/12: Prednisone 15 mg daily  7/13 - 7/17: Prednisone 10 mg daily  7/18 - 7/22: Prednisone 5 mg daily.  For your right elbow, wrist, or hand pain, please follow up with Orthoppedist Dr Garcia in 1-2 wk @ [phone]  Your Cozaar was stopped given elevated creatinine during hospitalization and Metoprolol was started instead.  Follow up with your Cardiologist and Nephrologist in one week, or you may follow up with who saw you in the hospital.  See below for information  Secondary Diagnosis:	Arterial insufficiency with ischemic ulcer  Secondary Diagnosis:	CKD (chronic kidney disease) stage 4, GFR 15-29 ml/min  Secondary Diagnosis:	DM (diabetes mellitus)  Secondary Diagnosis:	Duodenal ulcer  Secondary Diagnosis:	Isaacs catheter status  Secondary Diagnosis:	HTN (hypertension) Principal Discharge DX:	Cellulitis of leg, right  Goal:	s/p femoral to above knee popliteal bypass, debridement of right first toe.  Instructions for follow-up, activity and diet:	Weight bearing as tolerated to the right heel in post op shoe  Keep dressing clean dry and intact until follow up visit with Podiatrist Dr. Schroeder   Schedule an appointment with Dr. Schroeder by calling [phone] within 1 week of discharge.  You will continue Augmentin for 6 additional days.  You will need the following Prednisone taper:    Until 7/2: Prednisone 30 mg daily  7/3 - 7/7: Prednisone 20 mg daily   7/8 - 7/12: Prednisone 15 mg daily  7/13 - 7/17: Prednisone 10 mg daily  7/18 - 7/22: Prednisone 5 mg daily.  For your right elbow, wrist, or hand pain, please follow up with Orthoppedist Dr Garcia in 1-2 wk @ [phone]  Your Cozaar was stopped given elevated creatinine during hospitalization and Metoprolol was started instead.  Follow up with your Cardiologist and Nephrologist in one week, or you may follow up with who saw you in the hospital.  See below for information  Secondary Diagnosis:	Arterial insufficiency with ischemic ulcer  Secondary Diagnosis:	CKD (chronic kidney disease) stage 4, GFR 15-29 ml/min  Secondary Diagnosis:	DM (diabetes mellitus)  Secondary Diagnosis:	Duodenal ulcer  Secondary Diagnosis:	Isaacs catheter status  Secondary Diagnosis:	HTN (hypertension) Principal Discharge DX:	Cellulitis of leg, right  Goal:	s/p femoral to above knee popliteal bypass, debridement of right first toe.  Instructions for follow-up, activity and diet:	Weight bearing as tolerated to the right heel in post op shoe  Keep dressing clean dry and intact until follow up visit with Podiatrist Dr. Schroeder   Schedule an appointment with Dr. Schroeder by calling [phone] within 1 week of discharge.  You will continue Augmentin for 6 additional days.  You will need the following Prednisone taper:    Until 7/2: Prednisone 30 mg daily  7/3 - 7/7: Prednisone 20 mg daily   7/8 - 7/12: Prednisone 15 mg daily  7/13 - 7/17: Prednisone 10 mg daily  7/18 - 7/22: Prednisone 5 mg daily.  For your right elbow, wrist, or hand pain, please follow up with Orthoppedist Dr Garcia in 1-2 wk @ [phone]  Your Cozaar was stopped given elevated creatinine during hospitalization and Metoprolol was started instead.  Follow up with your Cardiologist and Nephrologist in one week, or you may follow up with who saw you in the hospital.  See below for information  Secondary Diagnosis:	Arterial insufficiency with ischemic ulcer  Secondary Diagnosis:	CKD (chronic kidney disease) stage 4, GFR 15-29 ml/min  Secondary Diagnosis:	DM (diabetes mellitus)  Secondary Diagnosis:	Duodenal ulcer  Secondary Diagnosis:	Isaacs catheter status  Secondary Diagnosis:	HTN (hypertension)

## 2017-06-28 NOTE — PROGRESS NOTE ADULT - SUBJECTIVE AND OBJECTIVE BOX
NEPHROLOGY-[facility] [phone]        Patient seen and examined @ bedside, no new complaints        MEDICATIONS  (STANDING):  piperacillin/tazobactam IVPB. 3.375 Gram(s) IV Intermittent every 8 hours  heparin  Injectable 5000 Unit(s) SubCutaneous every 8 hours  insulin lispro (HumaLOG) corrective regimen sliding scale   SubCutaneous at bedtime  insulin lispro (HumaLOG) corrective regimen sliding scale   SubCutaneous three times a day before meals  hydrALAZINE 10 milliGRAM(s) Oral two times a day  simvastatin 20 milliGRAM(s) Oral at bedtime  brimonidine 0.2% Ophthalmic Solution 1 Drop(s) Right EYE two times a day  timolol 0.5% Solution 1 Drop(s) Right EYE two times a day  collagenase Ointment 1 Application(s) Topical daily  metoprolol 25 milliGRAM(s) Oral two times a day  cilostazol 50 milliGRAM(s) Oral two times a day  aspirin  chewable 81 milliGRAM(s) Oral daily  tamsulosin 0.4 milliGRAM(s) Oral at bedtime  insulin glargine Injectable (LANTUS) 4 Unit(s) SubCutaneous at bedtime  sodium phosphate IVPB 15 milliMole(s) IV Intermittent once  magnesium sulfate  IVPB 2 Gram(s) IV Intermittent once      VITAL:  T(C): , Max: 37 (06-28-17 @ 05:37)  T(F): , Max: 98.6 (06-28-17 @ 05:37)  HR: 60 (06-28-17 @ 05:37)  BP: 137/41 (06-28-17 @ 05:37)  BP(mean): --  RR: 18 (06-28-17 @ 05:37)  SpO2: 96% (06-28-17 @ 05:37)  Wt(kg): --    I and O's:    06-27 @ 07:01  -  06-28 @ 07:00  --------------------------------------------------------  IN: 0 mL / OUT: 1325 mL / NET: -1325 mL      Height (cm): 177.8 (06-27 @ 16:06)  Weight (kg): 75.1 (06-27 @ 16:06)  BMI (kg/m2): 23.8 (06-27 @ 16:06)  BSA (m2): 1.93 (06-27 @ 16:06)    PHYSICAL EXAM:    Constitutional: NAD  HEENT: PERRLA    Neck:  No JVD  Respiratory: CTAB/L  Cardiovascular: S1 and S2  Gastrointestinal: BS+, soft, NT/ND  Extremities: Trace b/l LE edema  : No Isaacs  Skin: No rashes      LABS:                        9.5    8.44  )-----------( 236      ( 28 Jun 2017 05:30 )             28.5     06-28    139  |  104  |  32<H>  ----------------------------<  128<H>  4.0   |  20   |  1.25    Ca    8.4      28 Jun 2017 06:00  Phos  2.1     06-28  Mg     1.8     06-28            Urine Studies:          RADIOLOGY & ADDITIONAL STUDIES:

## 2017-06-28 NOTE — PROGRESS NOTE ADULT - SUBJECTIVE AND OBJECTIVE BOX
FOLLOW UP VISIT:  SYMPTOMS: patient denies cp, sob ,,failed Voiding after Isaacs removal   Seen by rheumatologist for R arm maine ..on prednisone     MEDICATIONS  (STANDING):  piperacillin/tazobactam IVPB. 3.375 Gram(s) IV Intermittent every 8 hours  heparin  Injectable 5000 Unit(s) SubCutaneous every 8 hours  insulin lispro (HumaLOG) corrective regimen sliding scale   SubCutaneous at bedtime  insulin lispro (HumaLOG) corrective regimen sliding scale   SubCutaneous three times a day before meals  hydrALAZINE 10 milliGRAM(s) Oral two times a day  simvastatin 20 milliGRAM(s) Oral at bedtime  brimonidine 0.2% Ophthalmic Solution 1 Drop(s) Right EYE two times a day  timolol 0.5% Solution 1 Drop(s) Right EYE two times a day  collagenase Ointment 1 Application(s) Topical daily  metoprolol 25 milliGRAM(s) Oral two times a day  cilostazol 50 milliGRAM(s) Oral two times a day  aspirin  chewable 81 milliGRAM(s) Oral daily  tamsulosin 0.4 milliGRAM(s) Oral at bedtime  insulin glargine Injectable (LANTUS) 4 Unit(s) SubCutaneous at bedtime  predniSONE   Tablet 30 milliGRAM(s) Oral daily    MEDICATIONS  (PRN):  oxyCODONE IR 2.5 milliGRAM(s) Oral every 4 hours PRN Moderate Pain (4 - 6)  oxyCODONE IR 5 milliGRAM(s) Oral every 4 hours PRN Severe Pain (7 - 10)      Vital Signs Last 24 Hrs  T(C): 37 (2017 20:00), Max: 37 (2017 05:37)  T(F): 98.6 (2017 20:00), Max: 98.6 (2017 05:37)  HR: 63 (2017 20:00) (60 - 66)  BP: 148/58 (2017 20:00) (120/38 - 148/58)  BP(mean): --  RR: 18 (2017 20:00) (16 - 24)  SpO2: 96% (2017 20:00) (94% - 97%)    Daily     Daily Weight in k.6 (2017 05:37)    I&O's Detail    2017 07:01  -  2017 07:00  --------------------------------------------------------  IN:  Total IN: 0 mL    OUT:    Indwelling Catheter - Urethral: 1325 mL  Total OUT: 1325 mL    Total NET: -1325 mL          Physical Exam  Eyes anicteric   Neck  no sig JVD  Lungs  CTA  Cor  SM 2/6  + PPM   Abd  Soft NT +BS  Ext  LE dressing   Pulses reduced  Neuro  axox3    LABS  PT/INR - ( 2017 06:00 )   PT: 13.7 SEC;   INR: 1.22          PTT - ( 2017 06:00 )  PTT:26.6 SEC        CBC Full  -  ( 2017 05:30 )  WBC Count : 8.44 K/uL  Hemoglobin : 9.5 g/dL  Hematocrit : 28.5 %  Platelet Count - Automated : 236 K/uL  Mean Cell Volume : 95.6 fL  Mean Cell Hemoglobin : 31.9 pg  Mean Cell Hemoglobin Concentration : 33.3 %  Auto Neutrophil # : x  Auto Lymphocyte # : x  Auto Monocyte # : x  Auto Eosinophil # : x  Auto Basophil # : x  Auto Neutrophil % : x  Auto Lymphocyte % : x  Auto Monocyte % : x  Auto Eosinophil % : x  Auto Basophil % : x        139  |  104  |  32<H>  ----------------------------<  128<H>  4.0   |  20<L>  |  1.25    Ca    8.4      2017 06:00  Phos  2.1     06-  Mg     1.8           Lipid panel  TSH:  Digoxin level    ECHO  NL LV function     Nuclear Stress    ECG:    Telemetry:  V -paced    RADIOLOGY & ADDITIONAL STUDIES:    ASSESSMENT & PLAN:89 year old male with PAD, HTN, Type 2 DM, gout  who presented with cellulitis and PAD in the RLE now POD # 5 from R femoral to above knee popliteal bypass , s/p excisional ulcer debridement of the foot now complaining of R arm pain.Stable Hemodynamics  He is with improved BUn .Cr , Lasix PRN, S/P  failed Voiding trial .Isaacs placement , on prednisone for Gout .. Follow electrolytes

## 2017-06-28 NOTE — CONSULT NOTE ADULT - SUBJECTIVE AND OBJECTIVE BOX
HISTORY OF PRESENT ILLNESS: Patient is a 89 year old male with PAD, HTN, Type 2 DM, gout (no confirmed crystal arthropathy) who presented with cellulitis and PAD in the RLE now POD # 5 from R femoral to above knee popliteal bypass with PTFE, s/p excisional ulcer debridement of the foot now complaining of R arm pain. Patient states he cannot home his elbow, wrist or fingers without eliciting pain. + Rubor noted in the R elbow. Denies any swelling of his joints, f, nvd, HA, CP, SOB, rash, bleeding. Colchicine is listed as a home medication but the patient does not recall ever taking it. Patient does not have a rheumatologist or any rheumatological disease per his knowledge other than the history of gout. The gout was treated by his podiatrist, but he never had an aspirate to confirm the crystal arthropathy. The patient also endorses L calf pain when he is trying to ambulate. - Homans sign. 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 Dopplers of the R upper EXT showed no evidence of a DVT but + superficial venous thrombus in the R cephalic vein.    PAST MEDICAL & SURGICAL HISTORY:  Duodenal ulcer  DM (diabetes mellitus)  HTN (hypertension)  S/P appendectomy: 50 yrs ago  Gout      FH:  No FH of Rheumatological disease.      SH:  Lives in an apartment with a day aid. Was walking at home previously.  No EToH, no illicit drug use, no smoking.      REVIEW OF SYSTEMS    CONSTITUTIONAL:  Denies weight loss, fever, chills, nausea or vomiting.  HEENT:  Eyes:  Denies visual loss, blurred vision, double vision or scleral icterus. Ears, Nose, Throat:  Denies hearing loss, sneezing, congestion, runny nose or sore throat.  SKIN:  Denies rash or itching.  CARDIOVASCULAR:  Denies chest pain, chest pressure or chest discomfort. No palpitations or edema.  RESPIRATORY:  Denies shortness of breath, cough or sputum.  GASTROINTESTINAL:  Denies anorexia, nausea, vomiting or diarrhea. No abdominal pain or blood.  GENITOURINARY:  Denies any hematuria, dysuria  NEUROLOGICAL:  Denies headache, dizziness, syncope, paralysis, ataxia, numbness or tingling in the extremities. No change in bowel or bladder control.  MUSCULOSKELETAL:  See HPI  HEMATOLOGIC:  Denies anemia, bleeding or bruising.  LYMPHATICS:  Denies enlarged nodes. No history of splenectomy.  PSYCHIATRIC:  Denies history of depression or anxiety.  ENDOCRINOLOGIC:  Denies reports of sweating, cold or heat intolerance. No polyuria or polydipsia.  ALLERGIES:  Denies history of asthma, hives, eczema or rhinitis.    MEDICATIONS  (STANDING):  piperacillin/tazobactam IVPB. 3.375 Gram(s) IV Intermittent every 8 hours  heparin  Injectable 5000 Unit(s) SubCutaneous every 8 hours  insulin lispro (HumaLOG) corrective regimen sliding scale   SubCutaneous at bedtime  insulin lispro (HumaLOG) corrective regimen sliding scale   SubCutaneous three times a day before meals  hydrALAZINE 10 milliGRAM(s) Oral two times a day  simvastatin 20 milliGRAM(s) Oral at bedtime  brimonidine 0.2% Ophthalmic Solution 1 Drop(s) Right EYE two times a day  timolol 0.5% Solution 1 Drop(s) Right EYE two times a day  collagenase Ointment 1 Application(s) Topical daily  metoprolol 25 milliGRAM(s) Oral two times a day  cilostazol 50 milliGRAM(s) Oral two times a day  aspirin  chewable 81 milliGRAM(s) Oral daily  tamsulosin 0.4 milliGRAM(s) Oral at bedtime  insulin glargine Injectable (LANTUS) 4 Unit(s) SubCutaneous at bedtime    MEDICATIONS  (PRN):  oxyCODONE IR 2.5 milliGRAM(s) Oral every 4 hours PRN Moderate Pain (4 - 6)  oxyCODONE IR 5 milliGRAM(s) Oral every 4 hours PRN Severe Pain (7 - 10)      Allergies    No Known Allergies    Intolerances        PERTINENT MEDICATION HISTORY:    SOCIAL HISTORY:    FAMILY HISTORY:  No pertinent family history in first degree relatives      Vital Signs Last 24 Hrs  T(C): 36.4 (2017 10:40), Max: 37 (2017 05:37)  T(F): 97.5 (2017 10:40), Max: 98.6 (2017 05:37)  HR: 60 (2017 10:40) (60 - 66)  BP: 120/38 (2017 10:40) (120/38 - 143/44)  BP(mean): --  RR: 18 (2017 10:40) (16 - 24)  SpO2: 95% (2017 10:40) (94% - 97%)    Daily Height in cm: 177.8 (2017 16:06)    Daily Weight in k.6 (2017 05:37)    PHYSICAL EXAM:      General: R arm pain causing limited ROM in that ext. Lying in bed.    Skin/Breast: CDI. Evidence of cellulitis present in RLE up to the mid shin.  	  Ophthalmologic: EOMI, PERRLA  	  ENMT: No oral or nasal ulcer appreciated. MMM    Respiratory and Thorax: CTAB no wrr  	  Cardiovascular:	Nl s1 s2 no mrg    Gastrointestinal:	 +BS x4 NT ND    Genitourinary:	No sup pub tender    Musculoskeletal: Evidence of cellulitis present in RLE up to the mid shin. No ATIYA. Negative Homans sign on the L. Pain elicited in R UE on palpation of the elbow, wrist, and diffusely through fingers. Mild rubor noted at elbow. No evidence of effusion. LUE FROM. No TTP of the b/l joints of the LE.    Neurological: CN 2-12 grossly intact. Sensation intact b/l.     Psychiatric: AAox4    Hematology/Lymphatics: No LAD.      LABS:                        9.5    8.44  )-----------( 236      ( 2017 05:30 )             28.5     06-28    139  |  104  |  32<H>  ----------------------------<  128<H>  4.0   |  20<L>  |  1.25    Ca    8.4      2017 06:00  Phos  2.1     -  Mg     1.8     -28      PT/INR - ( 2017 06:00 )   PT: 13.7 SEC;   INR: 1.22          PTT - ( 2017 06:00 )  PTT:26.6 SEC      RADIOLOGY & ADDITIONAL STUDIES:  < from: Xray Elbow AP + Lateral + Oblique, Right (17 @ 23:20) >  IMPRESSION:  Mild posterior soft tissue swelling over olecranon region with underlying   faint amorphous calcific deposits and appearance of mild erosive change   along the subjacent posterior olecranon cortex. These findings could   correlate with tophaceous gout in the proper clinical and lab context   versus distal triceps enthesopathic change.     Displaced anterior elbow fat pad indicative of an underlying joint   effusion, fluid aspiration may provide additional diagnostic information   as warranted.     No tracking gas collections or gross radiographic evidence for   osteomyelitis.    No fractures or dislocations.    Preserved joint spaces.    Generalized osteopenia otherwise no discrete lytic or blastic lesions.    Scattered discontinuous vascular calcifications.    < end of copied text >      < from: VA Duplex Ext Veins Upper Comp, Right. (17 @ 09:29) >  Summary/Impressions:  1.  No evidence of deep venous thrombosis in the right  upper extremity.  2.  Superficial venous thrombosis visualized within the  right cephalic vein.    < end of copied text >

## 2017-06-28 NOTE — DISCHARGE NOTE ADULT - PATIENT PORTAL LINK FT
“You can access the Follow[facility] Patient Portal, offered by [facility], by registering with the following website: [email or web address]”

## 2017-06-28 NOTE — DISCHARGE NOTE ADULT - MEDICATION SUMMARY - MEDICATIONS TO STOP TAKING
I will STOP taking the medications listed below when I get home from the hospital:    losartan 100 mg oral tablet  -- 1 tab(s) by mouth once a day    colchicine 0.6 mg oral tablet  -- 1 tab(s) by mouth once a day x 1 more day and then PRN Knee pain

## 2017-06-28 NOTE — DISCHARGE NOTE ADULT - MEDICATION SUMMARY - MEDICATIONS TO TAKE
I will START or STAY ON the medications listed below when I get home from the hospital:    predniSONE 10 mg oral tablet  -- 3 tab(s) by mouth once a day  Until 7/2: Prednisone 30 mg daily  7/3 - 7/7: Prednisone 20 mg daily   7/8 - 7/12: Prednisone 15 mg daily  7/13 - 7/17: Prednisone 10 mg daily  7/18 - 7/22: Prednisone 5 mg daily.  -- Indication: For gout    aspirin 81 mg oral tablet, chewable  -- 1 tab(s) by mouth once a day  -- Indication: For Arterial insufficiency with ischemic ulcer    tamsulosin 0.4 mg oral capsule  -- 1 cap(s) by mouth once a day (at bedtime)  -- Indication: For Urinary obstruction    metformin extended release 750 mg oral tablet, extended release  -- 1 tab(s) by mouth once a day  -- Indication: For diabetes    GlipiZIDE XL 5 mg oral tablet, extended release  -- 1 tab(s) by mouth once a day  -- Indication: For diabetes    simvastatin 20 mg oral tablet  -- 1 tab(s) by mouth once a day (at bedtime)  -- Indication: For HLD    Plavix 75 mg oral tablet  -- 1 tab(s) by mouth once a day  -- Indication: For PAD    metoprolol tartrate 25 mg oral tablet  -- 1 tab(s) by mouth 2 times a day  -- Indication: For HTN    furosemide 20 mg oral tablet  -- 1 tab(s) by mouth 2 times a day  -- Indication: For Peripheral edema    cilostazol 50 mg oral tablet  -- 1 tab(s) by mouth 2 times a day  -- Indication: For PAD    sucralfate 1 g oral tablet  --  by mouth 2 times a day  -- Indication: For GERD    Combigan 0.2%-0.5% ophthalmic solution  -- 1 drop(s) to right eye every 12 hours  -- Indication: For glaucoma    amoxicillin-clavulanate 500 mg-125 mg oral tablet  -- 1 tab(s) by mouth 2 times a day until July 5, 2017  -- Indication: For Cellulitis of leg, right    hydrALAZINE 10 mg oral tablet  --  by mouth 2 times a day  -- Indication: For HTN I will START or STAY ON the medications listed below when I get home from the hospital:    predniSONE 10 mg oral tablet  -- 3 tab(s) by mouth once a day  Until 7/2: Prednisone 30 mg daily  7/3 - 7/7: Prednisone 20 mg daily   7/8 - 7/12: Prednisone 15 mg daily  7/13 - 7/17: Prednisone 10 mg daily  7/18 - 7/22: Prednisone 5 mg daily.  -- Indication: For gout    aspirin 81 mg oral tablet, chewable  -- 1 tab(s) by mouth once a day  -- Indication: For Arterial insufficiency with ischemic ulcer    tamsulosin 0.4 mg oral capsule  -- 1 cap(s) by mouth once a day (at bedtime)  -- Indication: For Urinary obstruction    metformin extended release 750 mg oral tablet, extended release  -- 1 tab(s) by mouth once a day  -- Indication: For diabetes    GlipiZIDE XL 5 mg oral tablet, extended release  -- 1 tab(s) by mouth once a day  -- Indication: For diabetes    simvastatin 20 mg oral tablet  -- 1 tab(s) by mouth once a day (at bedtime)  -- Indication: For HLD    Plavix 75 mg oral tablet  -- 1 tab(s) by mouth once a day  -- Indication: For PAD    metoprolol tartrate 25 mg oral tablet  -- 1 tab(s) by mouth 2 times a day  -- Indication: For HTN    cilostazol 50 mg oral tablet  -- 1 tab(s) by mouth 2 times a day  -- Indication: For PAD    sucralfate 1 g oral tablet  --  by mouth 2 times a day  -- Indication: For GERD    Combigan 0.2%-0.5% ophthalmic solution  -- 1 drop(s) to right eye every 12 hours  -- Indication: For glaucoma    amoxicillin-clavulanate 500 mg-125 mg oral tablet  -- 1 tab(s) by mouth 2 times a day until July 5, 2017  -- Indication: For Cellulitis of leg, right    hydrALAZINE 10 mg oral tablet  --  by mouth 2 times a day  -- Indication: For HTN

## 2017-06-28 NOTE — DISCHARGE NOTE ADULT - HOSPITAL COURSE
Patient is a 89 year old male with a PMH of DM II and HTN who is having RLE swelling and erythema for a couple of months.  Per patient, he tried a dose of abx as an outpatient and it did not help.  He lives in an apartment with a visiting nurse who helps.  He walks at home.  He saw Dr. Aguilar in the office as an outpatient and had NIMO/PVRs which showed a Right NIMO of .48 and a left NIMO of .6.  He was started on lasix for lower extremity swelling 2 weeks prior to admission.  Pt had recent cardiac work up with old inferior MI no clear evidence of ischemia . Normal LV function. Echo with Severe TR, Moderate MR, normal LV function & pulmonary HTN.  Pt admitted to Vascular Surgery with plan to perform angiogram, but creatinine was elevated, so angiogram was postponed.  Nephrology and Cardiology called, Diuretics and Cozaar were stopped given the elevated Cr. Patient is a 89 year old male with a PMH of DM II and HTN who is having RLE swelling and erythema for a couple of months.  Per patient, he tried a dose of abx as an outpatient and it did not help.  He lives in an apartment with a visiting nurse who helps.  He walks at home.  He saw Dr. Aguilar in the office as an outpatient and had NIMO/PVRs which showed a Right NIMO of .48 and a left NIMO of .6.  He was started on lasix for lower extremity swelling 2 weeks prior to admission.  Pt had recent cardiac work up with old inferior MI no clear evidence of ischemia . Normal LV function. Echo with Severe TR, Moderate MR, normal LV function & pulmonary HTN.  Pt admitted to Vascular Surgery with plan to perform angiogram, but creatinine was elevated, so angiogram was postponed.  Nephrology and Cardiology called, Diuretics and Cozaar were stopped given the elevated Cr.  Once Creatinine improved he was taken for an angiogram on 6/21.  He then underwent a femoral to above knee popliteal bypass with PTFE graft.  On 6/23 he complained of right elbow, wrist and hand pain, seen by Orthopedics, nothing to do at this time.  He can follow up as outpatient.  Pt also seen by Pulmonology (Dr. Friedman) for severe pulmonary hypertension, diuresis continued for optimization before surgery.  Pt was brought to the OR by Podiatry on 6/27 for debridement of the foot ulcer.  Postoperatively, pain was well controlled.  Pt continued to c/o of right arm pain so Rheumatology was consulted given h/o gout.  Prednisone started since pain >2 days and could not give colchicine. Isaacs removed, failed trial of void, flomax started.  Pt then passed the next trial of void.  Pt discharged to rehab in stable condition on Prednisone taper and 6 additional days of Augmentin.  Pt's Cozaar was stopped given elevated creatinine during hospitalization and Metoprolol was started instead. Patient is a 89 year old male with a PMH of DM II and HTN who is having RLE swelling and erythema for a couple of months.  Per patient, he tried a dose of abx as an outpatient and it did not help.  He lives in an apartment with a visiting nurse who helps.  He walks at home.  He saw Dr. Aguilar in the office as an outpatient and had NIMO/PVRs which showed a Right NIMO of .48 and a left NIMO of .6.  He was started on lasix for lower extremity swelling 2 weeks prior to admission.  Pt had recent cardiac work up with old inferior MI no clear evidence of ischemia . Normal LV function. Echo with Severe TR, Moderate MR, normal LV function & pulmonary HTN.  Pt admitted to Vascular Surgery with plan to perform angiogram, but creatinine was elevated, so angiogram was postponed.  Nephrology and Cardiology called, Diuretics and Cozaar were stopped given the elevated Cr.  Once Creatinine improved he was taken for an angiogram on 6/21.  He then underwent a femoral to above knee popliteal bypass with PTFE graft.  On 6/23 he complained of right elbow, wrist and hand pain, seen by Orthopedics, nothing to do at this time.  He can follow up as outpatient.  Pt also seen by Pulmonology (Dr. Friedman) for severe pulmonary hypertension, diuresis continued for optimization before surgery.  Pt was brought to the OR by Podiatry on 6/27 for debridement of the foot ulcer.  Postoperatively, pain was well controlled.  Pt continued to c/o of right arm pain so Rheumatology was consulted given h/o gout.  Prednisone started since pain >2 days and could not give colchicine. Isaacs removed, failed trial of void, flomax started.  Pt then passed the next trial of void.  Pt discharged to rehab in stable condition on Prednisone taper and 6 additional days of Augmentin.  Pt's Cozaar and lasix were stopped given elevated creatinine during hospitalization and Metoprolol was started instead.

## 2017-06-28 NOTE — PROGRESS NOTE ADULT - ASSESSMENT
89M w/ RLE cellulitis, now POD # 5 from R femoral to above knee popliteal bypass with PTFE, s/p excisional ulcer debridement of foot  - c/w pain control PRN  - c/w antiplatelet therapy  - physical therapy   - f/u podiatry recs  - rehab planning

## 2017-06-28 NOTE — CONSULT NOTE ADULT - CONSULT REASON
Elevated Cr, planning for angiogram
Postoperative hemodynamic monitoring
Pulmonary Hypertension
R elbow pain
Cardiac Evaluation & diuretic prior to RLE angiogram

## 2017-06-28 NOTE — DISCHARGE NOTE ADULT - CARE PROVIDER_API CALL
Miguelito Aguilar), Vascular Surgery  [address] [place] [address]  Phone: [phone]  Fax: [phone]    Emmanuel Mendez), Internal Medicine  [address] [place] [address]  Phone: [phone]  Fax: [phone]    Toni Vazquez), Internal Medicine; Nephrology  [address] [place] [address]  Phone: [phone]  Fax: [phone]    Clive Garcia), Orthopedics  [address] [place] [address]  Phone: [phone]  Fax: [phone]

## 2017-06-28 NOTE — DISCHARGE NOTE ADULT - CARE PROVIDERS DIRECT ADDRESSES
[email or web address], [email or web address], [email or web address], [email or web address]

## 2017-06-28 NOTE — CONSULT NOTE ADULT - ASSESSMENT
89 year old male with PAD, HTN, Type 2 DM, gout (no confirmed crystal arthropathy) who presented with cellulitis and PAD in the RLE now POD # 5 from R femoral to above knee popliteal bypass with PTFE, s/p excisional ulcer debridement of the foot now complaining of R arm pain.    #R arm pain:  - Likely gout flare, given presentation and patient's history.  - Given that symptoms have been present for over 48 hours, not a candidate for colchicine at this time, will initiate prednisone 30 mg now.  - Uric acid level in the AM  - Ice pack to affected joints  - PT OT.

## 2017-06-28 NOTE — PROGRESS NOTE ADULT - SUBJECTIVE AND OBJECTIVE BOX
Vascular Surgery Daily Progress Note    S: Patient doing well; s/p excisional ulcer debridement of foot wound down to the level of muscle with podiatry     Gen: NAD   RLE: stable leg cellulitis. RLE warm with increased cap refill and perfusion; dressing removed ; incision clean  Vasc: R AT Dopp/ L DP/PT Dopp     Vital Signs Last 24 Hrs  Vital Signs Last 24 Hrs  T(C): 37 (28 Jun 2017 05:37), Max: 37.2 (27 Jun 2017 08:16)  T(F): 98.6 (28 Jun 2017 05:37), Max: 98.9 (27 Jun 2017 08:16)  HR: 60 (28 Jun 2017 05:37) (60 - 76)  BP: 137/41 (28 Jun 2017 05:37) (124/47 - 147/52)  BP(mean): --  RR: 18 (28 Jun 2017 05:37) (16 - 24)  SpO2: 96% (28 Jun 2017 05:37) (94% - 98%)    I&O's Detail    27 Jun 2017 07:01  -  28 Jun 2017 07:00  --------------------------------------------------------  IN:  Total IN: 0 mL    OUT:    Indwelling Catheter - Urethral: 1325 mL  Total OUT: 1325 mL    Total NET: -1325 mL                            9.5    8.44  )-----------( 236      ( 28 Jun 2017 05:30 )             28.5       06-28    139  |  104  |  32<H>  ----------------------------<  128<H>  4.0   |  20<L>  |  1.25    Ca    8.4      28 Jun 2017 06:00  Phos  2.1     06-28  Mg     1.8     06-28        CAPILLARY BLOOD GLUCOSE  126 (27 Jun 2017 20:49)  189 (27 Jun 2017 16:46)  189 (27 Jun 2017 16:06)  190 (27 Jun 2017 12:03)  191 (27 Jun 2017 08:29)        PT/INR - ( 27 Jun 2017 06:00 )   PT: 13.7 SEC;   INR: 1.22          PTT - ( 27 Jun 2017 06:00 )  PTT:26.6 SEC

## 2017-06-28 NOTE — PROGRESS NOTE ADULT - ASSESSMENT
Patient is a 89 year old male s/p RIGHT foot sub 1st met head ulcer debridement w/ stravix application  - Patient was examined and evaluated  - Dressing left clean, dry, and intact  - No further podiatry intervention at this time  - No concern for residual infection, consider d/c abx on discharge  - Pt will f/u with Dr. Schroeder once discharged, f/u info in discharge paperwork  - d/w attending

## 2017-06-28 NOTE — DISCHARGE NOTE ADULT - SECONDARY DIAGNOSIS.
Arterial insufficiency with ischemic ulcer CKD (chronic kidney disease) stage 4, GFR 15-29 ml/min DM (diabetes mellitus) Duodenal ulcer Isaacs catheter status HTN (hypertension)

## 2017-06-28 NOTE — PROGRESS NOTE ADULT - SUBJECTIVE AND OBJECTIVE BOX
Patient is a 89y old  Male who presents with a chief complaint of RLE swelling (16 Jun 2017 21:55)       INTERVAL HPI/OVERNIGHT EVENTS:  Patient seen and evaluated at bedside.  Pt is resting comfortable in NAD. Denies N/V/F/C.    Allergies    No Known Allergies    Intolerances        Vital Signs Last 24 Hrs  T(C): 37 (28 Jun 2017 05:37), Max: 37.2 (27 Jun 2017 08:16)  T(F): 98.6 (28 Jun 2017 05:37), Max: 98.9 (27 Jun 2017 08:16)  HR: 60 (28 Jun 2017 05:37) (60 - 76)  BP: 137/41 (28 Jun 2017 05:37) (124/47 - 147/52)  BP(mean): --  RR: 18 (28 Jun 2017 05:37) (16 - 24)  SpO2: 96% (28 Jun 2017 05:37) (94% - 98%)    LABS:                        9.5    8.44  )-----------( 236      ( 28 Jun 2017 05:30 )             28.5     06-28    139  |  104  |  32<H>  ----------------------------<  128<H>  4.0   |  20<L>  |  1.25    Ca    8.4      28 Jun 2017 06:00  Phos  2.1     06-28  Mg     1.8     06-28      PT/INR - ( 27 Jun 2017 06:00 )   PT: 13.7 SEC;   INR: 1.22          PTT - ( 27 Jun 2017 06:00 )  PTT:26.6 SEC    CAPILLARY BLOOD GLUCOSE  126 (27 Jun 2017 20:49)  189 (27 Jun 2017 16:46)  189 (27 Jun 2017 16:06)  190 (27 Jun 2017 12:03)  191 (27 Jun 2017 08:29)          Lower Extremity Physical Exam:  Right foot dressing left clean, dry, intact. No strikethrough noted

## 2017-06-28 NOTE — DISCHARGE NOTE ADULT - PLAN OF CARE
Healing Weight bearing as tolerated to the right heel in post op shoe  Keep dressing clean dry and intact until follow up visit with Dr. Schroeder   Schedule an appointment with Dr. Schroeder by calling [phone] within 1 week of discharge. s/p femoral to above knee popliteal bypass, debridement of right first toe. Weight bearing as tolerated to the right heel in post op shoe  Keep dressing clean dry and intact until follow up visit with Podiatrist Dr. Schroeder   Schedule an appointment with Dr. Schroeder by calling [phone] within 1 week of discharge.  You will continue Augmentin for 6 additional days.  You will need the following Prednisone taper:    Until 7/2: Prednisone 30 mg daily  7/3 - 7/7: Prednisone 20 mg daily   7/8 - 7/12: Prednisone 15 mg daily  7/13 - 7/17: Prednisone 10 mg daily  7/18 - 7/22: Prednisone 5 mg daily.  For your right elbow, wrist, or hand pain, please follow up with Orthoppedist Dr Garcia in 1-2 wk @ [phone]  Your Cozaar was stopped given elevated creatinine during hospitalization and Metoprolol was started instead.  Follow up with your Cardiologist and Nephrologist in one week, or you may follow up with who saw you in the hospital.  See below for information

## 2017-06-29 DIAGNOSIS — E87.1 HYPO-OSMOLALITY AND HYPONATREMIA: ICD-10-CM

## 2017-06-29 LAB
BUN SERPL-MCNC: 33 MG/DL — HIGH (ref 7–23)
CALCIUM SERPL-MCNC: 8.8 MG/DL — SIGNIFICANT CHANGE UP (ref 8.4–10.5)
CHLORIDE SERPL-SCNC: 97 MMOL/L — LOW (ref 98–107)
CO2 SERPL-SCNC: 21 MMOL/L — LOW (ref 22–31)
CREAT SERPL-MCNC: 1.31 MG/DL — HIGH (ref 0.5–1.3)
GLUCOSE SERPL-MCNC: 286 MG/DL — HIGH (ref 70–99)
HCT VFR BLD CALC: 28.6 % — LOW (ref 39–50)
HGB BLD-MCNC: 9.5 G/DL — LOW (ref 13–17)
MAGNESIUM SERPL-MCNC: 2.3 MG/DL — SIGNIFICANT CHANGE UP (ref 1.6–2.6)
MCHC RBC-ENTMCNC: 31.7 PG — SIGNIFICANT CHANGE UP (ref 27–34)
MCHC RBC-ENTMCNC: 33.2 % — SIGNIFICANT CHANGE UP (ref 32–36)
MCV RBC AUTO: 95.3 FL — SIGNIFICANT CHANGE UP (ref 80–100)
NRBC # FLD: 0 — SIGNIFICANT CHANGE UP
PHOSPHATE SERPL-MCNC: 4.2 MG/DL — SIGNIFICANT CHANGE UP (ref 2.5–4.5)
PLATELET # BLD AUTO: 217 K/UL — SIGNIFICANT CHANGE UP (ref 150–400)
PMV BLD: 10.4 FL — SIGNIFICANT CHANGE UP (ref 7–13)
POTASSIUM SERPL-MCNC: 4.3 MMOL/L — SIGNIFICANT CHANGE UP (ref 3.5–5.3)
POTASSIUM SERPL-SCNC: 4.3 MMOL/L — SIGNIFICANT CHANGE UP (ref 3.5–5.3)
RBC # BLD: 3 M/UL — LOW (ref 4.2–5.8)
RBC # FLD: 12.3 % — SIGNIFICANT CHANGE UP (ref 10.3–14.5)
SODIUM SERPL-SCNC: 133 MMOL/L — LOW (ref 135–145)
URATE SERPL-MCNC: 5.5 MG/DL — SIGNIFICANT CHANGE UP (ref 3.4–8.8)
WBC # BLD: 6.62 K/UL — SIGNIFICANT CHANGE UP (ref 3.8–10.5)
WBC # FLD AUTO: 6.62 K/UL — SIGNIFICANT CHANGE UP (ref 3.8–10.5)

## 2017-06-29 PROCEDURE — 99231 SBSQ HOSP IP/OBS SF/LOW 25: CPT | Mod: GC

## 2017-06-29 RX ORDER — OXYCODONE HYDROCHLORIDE 5 MG/1
5 TABLET ORAL EVERY 4 HOURS
Qty: 0 | Refills: 0 | Status: DISCONTINUED | OUTPATIENT
Start: 2017-06-29 | End: 2017-06-30

## 2017-06-29 RX ORDER — SODIUM CHLORIDE 9 MG/ML
500 INJECTION, SOLUTION INTRAVENOUS ONCE
Qty: 0 | Refills: 0 | Status: COMPLETED | OUTPATIENT
Start: 2017-06-29 | End: 2017-06-30

## 2017-06-29 RX ORDER — INSULIN GLARGINE 100 [IU]/ML
6 INJECTION, SOLUTION SUBCUTANEOUS AT BEDTIME
Qty: 0 | Refills: 0 | Status: DISCONTINUED | OUTPATIENT
Start: 2017-06-29 | End: 2017-06-30

## 2017-06-29 RX ORDER — OXYCODONE HYDROCHLORIDE 5 MG/1
2.5 TABLET ORAL EVERY 4 HOURS
Qty: 0 | Refills: 0 | Status: DISCONTINUED | OUTPATIENT
Start: 2017-06-29 | End: 2017-06-30

## 2017-06-29 RX ADMIN — TAMSULOSIN HYDROCHLORIDE 0.4 MILLIGRAM(S): 0.4 CAPSULE ORAL at 22:02

## 2017-06-29 RX ADMIN — Medication 25 MILLIGRAM(S): at 17:31

## 2017-06-29 RX ADMIN — Medication 1 DROP(S): at 05:29

## 2017-06-29 RX ADMIN — PIPERACILLIN AND TAZOBACTAM 25 GRAM(S): 4; .5 INJECTION, POWDER, LYOPHILIZED, FOR SOLUTION INTRAVENOUS at 05:28

## 2017-06-29 RX ADMIN — HEPARIN SODIUM 5000 UNIT(S): 5000 INJECTION INTRAVENOUS; SUBCUTANEOUS at 05:28

## 2017-06-29 RX ADMIN — CILOSTAZOL 50 MILLIGRAM(S): 100 TABLET ORAL at 17:31

## 2017-06-29 RX ADMIN — Medication 81 MILLIGRAM(S): at 12:32

## 2017-06-29 RX ADMIN — Medication 10 MILLIGRAM(S): at 05:28

## 2017-06-29 RX ADMIN — HEPARIN SODIUM 5000 UNIT(S): 5000 INJECTION INTRAVENOUS; SUBCUTANEOUS at 13:20

## 2017-06-29 RX ADMIN — Medication 10 MILLIGRAM(S): at 17:31

## 2017-06-29 RX ADMIN — Medication 1 DROP(S): at 17:31

## 2017-06-29 RX ADMIN — BRIMONIDINE TARTRATE 1 DROP(S): 2 SOLUTION/ DROPS OPHTHALMIC at 17:31

## 2017-06-29 RX ADMIN — CILOSTAZOL 50 MILLIGRAM(S): 100 TABLET ORAL at 05:28

## 2017-06-29 RX ADMIN — BRIMONIDINE TARTRATE 1 DROP(S): 2 SOLUTION/ DROPS OPHTHALMIC at 05:29

## 2017-06-29 RX ADMIN — Medication 3: at 22:57

## 2017-06-29 RX ADMIN — SIMVASTATIN 20 MILLIGRAM(S): 20 TABLET, FILM COATED ORAL at 22:03

## 2017-06-29 RX ADMIN — PIPERACILLIN AND TAZOBACTAM 25 GRAM(S): 4; .5 INJECTION, POWDER, LYOPHILIZED, FOR SOLUTION INTRAVENOUS at 13:20

## 2017-06-29 RX ADMIN — Medication 6: at 08:22

## 2017-06-29 RX ADMIN — Medication 30 MILLIGRAM(S): at 06:32

## 2017-06-29 RX ADMIN — INSULIN GLARGINE 6 UNIT(S): 100 INJECTION, SOLUTION SUBCUTANEOUS at 22:03

## 2017-06-29 RX ADMIN — HEPARIN SODIUM 5000 UNIT(S): 5000 INJECTION INTRAVENOUS; SUBCUTANEOUS at 22:04

## 2017-06-29 RX ADMIN — Medication 8: at 17:31

## 2017-06-29 RX ADMIN — Medication 6: at 12:32

## 2017-06-29 RX ADMIN — Medication 25 MILLIGRAM(S): at 05:28

## 2017-06-29 NOTE — PROGRESS NOTE ADULT - SUBJECTIVE AND OBJECTIVE BOX
Patient is a 89y old  Male who presents with a chief complaint of Right foot ulcer (28 Jun 2017 08:08)       INTERVAL HPI/OVERNIGHT EVENTS:  Patient seen and evalauted at bedside.  Pt is resting comfortable in NAD. Denies N/V/F/C.  Pain rated at 0/10. s/p wound debridement and stravix graft application    MEDICATIONS  (STANDING):  piperacillin/tazobactam IVPB. 3.375 Gram(s) IV Intermittent every 8 hours  heparin  Injectable 5000 Unit(s) SubCutaneous every 8 hours  insulin lispro (HumaLOG) corrective regimen sliding scale   SubCutaneous at bedtime  insulin lispro (HumaLOG) corrective regimen sliding scale   SubCutaneous three times a day before meals  hydrALAZINE 10 milliGRAM(s) Oral two times a day  simvastatin 20 milliGRAM(s) Oral at bedtime  brimonidine 0.2% Ophthalmic Solution 1 Drop(s) Right EYE two times a day  timolol 0.5% Solution 1 Drop(s) Right EYE two times a day  collagenase Ointment 1 Application(s) Topical daily  metoprolol 25 milliGRAM(s) Oral two times a day  cilostazol 50 milliGRAM(s) Oral two times a day  aspirin  chewable 81 milliGRAM(s) Oral daily  tamsulosin 0.4 milliGRAM(s) Oral at bedtime  insulin glargine Injectable (LANTUS) 4 Unit(s) SubCutaneous at bedtime  predniSONE   Tablet 30 milliGRAM(s) Oral daily    MEDICATIONS  (PRN):  oxyCODONE IR 2.5 milliGRAM(s) Oral every 4 hours PRN Moderate Pain (4 - 6)  oxyCODONE IR 5 milliGRAM(s) Oral every 4 hours PRN Severe Pain (7 - 10)      Allergies    No Known Allergies    Intolerances        Vital Signs Last 24 Hrs  T(C): 36.3 (29 Jun 2017 07:52), Max: 37 (28 Jun 2017 20:00)  T(F): 97.4 (29 Jun 2017 07:52), Max: 98.6 (28 Jun 2017 20:00)  HR: 60 (29 Jun 2017 07:52) (60 - 63)  BP: 143/57 (29 Jun 2017 07:52) (120/38 - 150/59)  BP(mean): --  RR: 18 (29 Jun 2017 07:52) (18 - 18)  SpO2: 95% (29 Jun 2017 07:52) (94% - 96%)    LABS:                        9.5    6.62  )-----------( 217      ( 29 Jun 2017 06:00 )             28.6     06-29    133<L>  |  97<L>  |  33<H>  ----------------------------<  286<H>  4.3   |  21<L>  |  1.31<H>    Ca    8.8      29 Jun 2017 06:00  Phos  4.2     06-29  Mg     2.3     06-29          CAPILLARY BLOOD GLUCOSE  271 (29 Jun 2017 08:02)  185 (28 Jun 2017 20:46)  176 (28 Jun 2017 16:32)  179 (28 Jun 2017 12:57)          Lower Extremity Physical Exam:  Dressing kept C/D/I due to graft on the ulcer sub 1st met head. no strikethrough noted, No streaking noted to ankle          Discussed with Attending: [x] YES  [ ] NO    Imaging Personally Reviewed:  [x] YES  [ ] NO    Consultant(s) Notes Reviewed:  [x] YES  [ ] NO      Hyponatremia  Isaacs catheter status  Hypophosphatemia  CKD (chronic kidney disease) stage 4, GFR 15-29 ml/min  Stage 3 chronic kidney disease  Cellulitis of leg, right  Stasis dermatitis of both legs  Venous insufficiency of right leg  Arterial insufficiency with ischemic ulcer  Peripheral vascular disease  Ulcer of toe

## 2017-06-29 NOTE — PROGRESS NOTE ADULT - ASSESSMENT
89 year old male s/p Right sub 1st met head ulcer debridement and Stravix graft application  - Keep dressing c/d/i  - on discharge the outer dressing will only to be changed by Dr. Schroeder who will follow pt in rehab  - No concern for infection  - Will f/u outpatient with Dr. Schroeder  - No further pod sx intervention

## 2017-06-29 NOTE — PROGRESS NOTE ADULT - SUBJECTIVE AND OBJECTIVE BOX
Vascular Surgery daily Progress note    S: Failed TOV overnight; tee reinserted. Pain controlled.     T(C): 36.3 (06-29-17 @ 04:49), Max: 37 (06-28-17 @ 20:00)  HR: 60 (06-29-17 @ 04:49) (60 - 63)  BP: 150/59 (06-29-17 @ 04:49) (120/38 - 150/59)  RR: 18 (06-29-17 @ 04:49) (18 - 18)  SpO2: 95% (06-29-17 @ 04:49) (94% - 96%)  Wt(kg): --  I&O's Detail    27 Jun 2017 07:01  -  28 Jun 2017 07:00  --------------------------------------------------------  IN:  Total IN: 0 mL    OUT:    Indwelling Catheter - Urethral: 1325 mL  Total OUT: 1325 mL    Total NET: -1325 mL      28 Jun 2017 07:01  -  29 Jun 2017 06:37  --------------------------------------------------------  IN:    Oral Fluid: 540 mL  Total IN: 540 mL    OUT:    Indwelling Catheter - Urethral: 480 mL  Total OUT: 480 mL    Total NET: 60 mL          Gen: NAD   RLE: stable leg cellulitis. RLE warm with increased cap refill and perfusion; dressing removed ; incision clean  Vasc: R AT Dopp/ L DP/PT Dopp Vascular Surgery daily Progress note    S: Failed TOV overnight; tee reinserted. Pain controlled.     T(C): 36.3 (06-29-17 @ 04:49), Max: 37 (06-28-17 @ 20:00)  HR: 60 (06-29-17 @ 04:49) (60 - 63)  BP: 150/59 (06-29-17 @ 04:49) (120/38 - 150/59)  RR: 18 (06-29-17 @ 04:49) (18 - 18)  SpO2: 95% (06-29-17 @ 04:49) (94% - 96%)  Wt(kg): --  I&O's Detail    27 Jun 2017 07:01  -  28 Jun 2017 07:00  --------------------------------------------------------  IN:  Total IN: 0 mL    OUT:    Indwelling Catheter - Urethral: 1325 mL  Total OUT: 1325 mL    Total NET: -1325 mL      28 Jun 2017 07:01  -  29 Jun 2017 06:37  --------------------------------------------------------  IN:    Oral Fluid: 540 mL  Total IN: 540 mL    OUT:    Indwelling Catheter - Urethral: 480 mL  Total OUT: 480 mL    Total NET: 60 mL                          9.5    6.62  )-----------( 217      ( 29 Jun 2017 06:00 )             28.6   06-29    133<L>  |  97<L>  |  33<H>  ----------------------------<  286<H>  4.3   |  21<L>  |  1.31<H>    Ca    8.8      29 Jun 2017 06:00  Phos  4.2     06-29  Mg     2.3     06-29          Gen: NAD   RLE: stable leg cellulitis. RLE warm with increased cap refill and perfusion; dressing removed ; incision clean  Vasc: R AT Dopp/ L DP/PT Dopp

## 2017-06-29 NOTE — PROGRESS NOTE ADULT - ASSESSMENT
89 year old male with PAD, HTN, Type 2 DM, gout (no confirmed crystal arthropathy) who presented with cellulitis and PAD in the RLE now POD # 5 from R femoral to above knee popliteal bypass with PTFE, s/p excisional ulcer debridement of the foot now complaining of R arm pain.    #R arm pain:  - Likely gout flare, given presentation (tophi visible on xray) and patient's history.  - Given that symptoms have been present for over 48 hours, not a candidate for colchicine at this time. Continue prednisone 30 mg (Day 2 of 5) and then begin taper.  - Ice pack to affected joints  - PT OT. 89 year old male with PAD, HTN, Type 2 DM, gout (no confirmed crystal arthropathy) who presented with cellulitis and PAD in the RLE now POD # 6 from R femoral to above knee popliteal bypass with PTFE, s/p excisional ulcer debridement of the foot now complaining of R arm pain.    #R arm pain:  - Likely gout flare, given presentation (tophi visible on xray) and patient's history.  - Given that symptoms have been present for over 48 hours, not a candidate for colchicine at this time. Continue prednisone 30 mg (Day 2 of 5) and then begin taper.  - Ice pack to affected joints  - PT OT. 89 year old male with PAD, HTN, Type 2 DM, gout (no confirmed crystal arthropathy) who presented with cellulitis and PAD in the RLE now POD # 6 from R femoral to above knee popliteal bypass with PTFE, s/p excisional ulcer debridement of the foot now complaining of R arm pain.    #R arm pain:  - Likely gout flare, given presentation (tophi visible on xray) and patient's history.  - Given that symptoms have been present for over 48 hours, not a candidate for colchicine at this time. Continue prednisone 30 mg (Day 2 of 5) and then begin taper. Upon discharge: After 5 days of 30 mg of prednisone are complete, can go down to 20 mg of prednisone/day for 5 days, then every 5 days decrease by 5 mg, then stop.  - Ice pack to affected joints  - PT OT. 89 year old male with PAD, HTN, Type 2 DM, gout (no confirmed crystal arthropathy) who presented with cellulitis and PAD in the RLE now POD # 6 from R femoral to above knee popliteal bypass with PTFE, s/p excisional ulcer debridement of the foot now complaining of R arm pain.    #R arm pain:  - Likely gout flare, given presentation (tophi visible on xray) and patient's history.  - Given that symptoms have been present for over 48 hours, not a candidate for colchicine at this time. Continue prednisone 30 mg (Day 2 of 5) and then begin taper. Upon discharge: After 5 days of 30 mg of prednisone are complete, can go down to 20 mg of prednisone/day for 5 days, then every 5 days decrease by 5 mg, then stop.  - Ice pack to affected joints  - PT OT.    Will sign off. Please reconsult as needed.

## 2017-06-29 NOTE — PROGRESS NOTE ADULT - SUBJECTIVE AND OBJECTIVE BOX
FOLLOW UP VISIT:  SYMPTOMS:  Patient feeling well S/p one episode of diarrhea  this afternoon during PT , R arm pain improved   MEDICATIONS  (STANDING):  heparin  Injectable 5000 Unit(s) SubCutaneous every 8 hours  insulin lispro (HumaLOG) corrective regimen sliding scale   SubCutaneous at bedtime  insulin lispro (HumaLOG) corrective regimen sliding scale   SubCutaneous three times a day before meals  hydrALAZINE 10 milliGRAM(s) Oral two times a day  simvastatin 20 milliGRAM(s) Oral at bedtime  brimonidine 0.2% Ophthalmic Solution 1 Drop(s) Right EYE two times a day  timolol 0.5% Solution 1 Drop(s) Right EYE two times a day  collagenase Ointment 1 Application(s) Topical daily  metoprolol 25 milliGRAM(s) Oral two times a day  cilostazol 50 milliGRAM(s) Oral two times a day  aspirin  chewable 81 milliGRAM(s) Oral daily  tamsulosin 0.4 milliGRAM(s) Oral at bedtime  predniSONE   Tablet 30 milliGRAM(s) Oral daily  insulin glargine Injectable (LANTUS) 6 Unit(s) SubCutaneous at bedtime  amoxicillin  500 milliGRAM(s)/clavulanate 1 Tablet(s) Oral two times a day    MEDICATIONS  (PRN):  oxyCODONE IR 2.5 milliGRAM(s) Oral every 4 hours PRN Moderate Pain (4 - 6)  oxyCODONE IR 5 milliGRAM(s) Oral every 4 hours PRN Severe Pain (7 - 10)      Vital Signs Last 24 Hrs  T(C): 36.6 (2017 17:03), Max: 37 (2017 20:00)  T(F): 97.8 (2017 17:03), Max: 98.6 (2017 20:00)  HR: 60 (2017 17:03) (60 - 66)  BP: 132/47 (2017 17:03) (124/45 - 150/59)  BP(mean): --  RR: 18 (2017 17:03) (18 - 18)  SpO2: 94% (2017 17:03) (94% - 96%)    Daily     Daily Weight in k.9 (2017 04:49)    I&O's Detail    2017 07:01  -  2017 07:00  --------------------------------------------------------  IN:    Oral Fluid: 540 mL  Total IN: 540 mL    OUT:    Indwelling Catheter - Urethral: 480 mL  Total OUT: 480 mL    Total NET: 60 mL      2017 07:01  -  2017 19:11  --------------------------------------------------------  IN:    Oral Fluid: 420 mL  Total IN: 420 mL    OUT:    Indwelling Catheter - Urethral: 450 mL  Total OUT: 450 mL    Total NET: -30 mL          Physical Exam  Eyes anicteric   Neck  no sig JVd   Lungs  CTa   Cor  SM 2/6   Abd  Soft Nt +bs   Ext  Dressing in place   Pulses  reduced   Neuro  AxOx3    LABS          CBC Full  -  ( 2017 06:00 )  WBC Count : 6.62 K/uL  Hemoglobin : 9.5 g/dL  Hematocrit : 28.6 %  Platelet Count - Automated : 217 K/uL  Mean Cell Volume : 95.3 fL  Mean Cell Hemoglobin : 31.7 pg  Mean Cell Hemoglobin Concentration : 33.2 %  Auto Neutrophil # : x  Auto Lymphocyte # : x  Auto Monocyte # : x  Auto Eosinophil # : x  Auto Basophil # : x  Auto Neutrophil % : x  Auto Lymphocyte % : x  Auto Monocyte % : x  Auto Eosinophil % : x  Auto Basophil % : x        133<L>  |  97<L>  |  33<H>  ----------------------------<  286<H>  4.3   |  21<L>  |  1.31<H>    Ca    8.8      2017 06:00  Phos  4.2     06-  Mg     2.3           Lipid panel  TSH:  Digoxin level    ECHO    Nuclear Stress    ECG:    Telemetry:  V paced     RADIOLOGY & ADDITIONAL STUDIES:    ASSESSMENT & PLAN:  89 year old male with PAD, HTN, Type 2 DM, gout  who presented with cellulitis and PAD in the RLE now POD # 6 from R femoral to above knee popliteal bypass , s/p excisional ulcer debridement of the foot now  with of R arm  gout attack on predinisone with improve symptoms .Stable Hemodynamics ..    Awaiting  placement for rehab .

## 2017-06-29 NOTE — PROGRESS NOTE ADULT - SUBJECTIVE AND OBJECTIVE BOX
INTERVAL HPI/OVERNIGHT EVENTS: No events ON. R arm pain improved. Day 2 of oral prednisone 30 mg. ROM improving in RUE. No longer having tenosynovitis. DVT study negative. Failed TOV. Deneis any F, nvd, cp, sob, rash, ha, changei n vision neurological function, abd pain, bleeding, cough, ATIYA.    MEDICATIONS  (STANDING):  piperacillin/tazobactam IVPB. 3.375 Gram(s) IV Intermittent every 8 hours  heparin  Injectable 5000 Unit(s) SubCutaneous every 8 hours  insulin lispro (HumaLOG) corrective regimen sliding scale   SubCutaneous at bedtime  insulin lispro (HumaLOG) corrective regimen sliding scale   SubCutaneous three times a day before meals  hydrALAZINE 10 milliGRAM(s) Oral two times a day  simvastatin 20 milliGRAM(s) Oral at bedtime  brimonidine 0.2% Ophthalmic Solution 1 Drop(s) Right EYE two times a day  timolol 0.5% Solution 1 Drop(s) Right EYE two times a day  collagenase Ointment 1 Application(s) Topical daily  metoprolol 25 milliGRAM(s) Oral two times a day  cilostazol 50 milliGRAM(s) Oral two times a day  aspirin  chewable 81 milliGRAM(s) Oral daily  tamsulosin 0.4 milliGRAM(s) Oral at bedtime  insulin glargine Injectable (LANTUS) 4 Unit(s) SubCutaneous at bedtime  predniSONE   Tablet 30 milliGRAM(s) Oral daily    MEDICATIONS  (PRN):  oxyCODONE IR 2.5 milliGRAM(s) Oral every 4 hours PRN Moderate Pain (4 - 6)  oxyCODONE IR 5 milliGRAM(s) Oral every 4 hours PRN Severe Pain (7 - 10)      Allergies    No Known Allergies    Intolerances        REVIEW OF SYSTEMS    See HPI    Vital Signs Last 24 Hrs  T(C): 36.3 (29 Jun 2017 11:48), Max: 37 (28 Jun 2017 20:00)  T(F): 97.4 (29 Jun 2017 11:48), Max: 98.6 (28 Jun 2017 20:00)  HR: 66 (29 Jun 2017 11:48) (60 - 66)  BP: 124/45 (29 Jun 2017 11:48) (124/45 - 150/59)  BP(mean): --  RR: 18 (29 Jun 2017 11:48) (18 - 18)  SpO2: 95% (29 Jun 2017 11:48) (94% - 96%)    PHYSICAL EXAM:      General: NAD Lying in bed.    Skin/Breast: CDI. Evidence of cellulitis present in RLE up to the mid shin.  	  Ophthalmologic: EOMI, PERRLA  	  ENMT: No oral or nasal ulcer appreciated. MMM    Respiratory and Thorax: CTAB no wrr  	  Cardiovascular:	Nl s1 s2 no mrg    Gastrointestinal:	 +BS x4 NT ND    Genitourinary:	No sup pub tender    Musculoskeletal: Evidence of cellulitis present in RLE up to the mid shin. No ATIYA. Negative Homans sign on the L. Pain in RUE improved from yesterday with now increased FROM in the elbow, wrist and hands. Still some tenderness when closing hand fully. No rubor noted in R UE joints. Decreasing swelling in the wrist.    Neurological: CN 2-12 grossly intact. Sensation intact b/l.     Psychiatric: AAox4    Hematology/Lymphatics: No LAD.        LABS:                        9.5    6.62  )-----------( 217      ( 29 Jun 2017 06:00 )             28.6     06-29    133<L>  |  97<L>  |  33<H>  ----------------------------<  286<H>  4.3   |  21<L>  |  1.31<H>    Ca    8.8      29 Jun 2017 06:00  Phos  4.2     06-29  Mg     2.3     06-29      Uric acid 5.5        RADIOLOGY & ADDITIONAL TESTS:

## 2017-06-29 NOTE — PROGRESS NOTE ADULT - ASSESSMENT
89 year old male with a PMH of DM II and HTN on 6/16 a/w RLE cellulitis now s/p LE angio and bypass surgery.  CKD stage 4 89 year old male with a PMH of DM II and HTN on 6/16 a/w RLE cellulitis now s/p LE angio and bypass surgery.  CKD stage 3

## 2017-06-29 NOTE — PROGRESS NOTE ADULT - SUBJECTIVE AND OBJECTIVE BOX
Dr. William García  NEPHROLOGY-[facility] [phone]        Patient seen and examined @ bedside, no complaints, no chest pain or SOB        MEDICATIONS  (STANDING):  piperacillin/tazobactam IVPB. 3.375 Gram(s) IV Intermittent every 8 hours  heparin  Injectable 5000 Unit(s) SubCutaneous every 8 hours  insulin lispro (HumaLOG) corrective regimen sliding scale   SubCutaneous at bedtime  insulin lispro (HumaLOG) corrective regimen sliding scale   SubCutaneous three times a day before meals  hydrALAZINE 10 milliGRAM(s) Oral two times a day  simvastatin 20 milliGRAM(s) Oral at bedtime  brimonidine 0.2% Ophthalmic Solution 1 Drop(s) Right EYE two times a day  timolol 0.5% Solution 1 Drop(s) Right EYE two times a day  collagenase Ointment 1 Application(s) Topical daily  metoprolol 25 milliGRAM(s) Oral two times a day  cilostazol 50 milliGRAM(s) Oral two times a day  aspirin  chewable 81 milliGRAM(s) Oral daily  tamsulosin 0.4 milliGRAM(s) Oral at bedtime  insulin glargine Injectable (LANTUS) 4 Unit(s) SubCutaneous at bedtime  predniSONE   Tablet 30 milliGRAM(s) Oral daily      VITAL:  T(C): , Max: 37 (06-28-17 @ 20:00)  T(F): , Max: 98.6 (06-28-17 @ 20:00)  HR: 60 (06-29-17 @ 07:52)  BP: 143/57 (06-29-17 @ 07:52)  BP(mean): --  RR: 18 (06-29-17 @ 07:52)  SpO2: 95% (06-29-17 @ 07:52)  Wt(kg): --    I and O's:    06-28 @ 07:01  -  06-29 @ 07:00  --------------------------------------------------------  IN: 540 mL / OUT: 480 mL / NET: 60 mL    06-29 @ 07:01  -  06-29 @ 09:58  --------------------------------------------------------  IN: 420 mL / OUT: 250 mL / NET: 170 mL          PHYSICAL EXAM:    Constitutional: NAD  HEENT: PERRLA,   Neck: No JVD  Respiratory: CTA B/L  Cardiovascular: S1 and S2  Gastrointestinal: BS+, soft, NT/ND  Extremities: trace b/l peripheral edema  : +Isaacs  Skin: No rashes      LABS:                        9.5    6.62  )-----------( 217      ( 29 Jun 2017 06:00 )             28.6     06-29    133  |  97  |  33<H>  ----------------------------<  286<H>  4.3   |  21  |  1.31<H>    Ca    8.8      29 Jun 2017 06:00  Phos  4.2     06-29  Mg     2.3     06-29            Urine Studies:          RADIOLOGY & ADDITIONAL STUDIES: Dr. William García  NEPHROLOGY-[facility] [phone]        Patient seen and examined @ bedside, no complaints, no chest pain or SOB        MEDICATIONS  (STANDING):  piperacillin/tazobactam IVPB. 3.375 Gram(s) IV Intermittent every 8 hours  heparin  Injectable 5000 Unit(s) SubCutaneous every 8 hours  insulin lispro (HumaLOG) corrective regimen sliding scale   SubCutaneous at bedtime  insulin lispro (HumaLOG) corrective regimen sliding scale   SubCutaneous three times a day before meals  hydrALAZINE 10 milliGRAM(s) Oral two times a day  simvastatin 20 milliGRAM(s) Oral at bedtime  brimonidine 0.2% Ophthalmic Solution 1 Drop(s) Right EYE two times a day  timolol 0.5% Solution 1 Drop(s) Right EYE two times a day  collagenase Ointment 1 Application(s) Topical daily  metoprolol 25 milliGRAM(s) Oral two times a day  cilostazol 50 milliGRAM(s) Oral two times a day  aspirin  chewable 81 milliGRAM(s) Oral daily  tamsulosin 0.4 milliGRAM(s) Oral at bedtime  insulin glargine Injectable (LANTUS) 4 Unit(s) SubCutaneous at bedtime  predniSONE   Tablet 30 milliGRAM(s) Oral daily      VITAL:  T(C): , Max: 37 (06-28-17 @ 20:00)  T(F): , Max: 98.6 (06-28-17 @ 20:00)  HR: 60 (06-29-17 @ 07:52)  BP: 143/57 (06-29-17 @ 07:52)  BP(mean): --  RR: 18 (06-29-17 @ 07:52)  SpO2: 95% (06-29-17 @ 07:52)  Wt(kg): --    I and O's:    06-28 @ 07:01  -  06-29 @ 07:00  --------------------------------------------------------  IN: 540 mL / OUT: 480 mL / NET: 60 mL    06-29 @ 07:01  -  06-29 @ 09:58  --------------------------------------------------------  IN: 420 mL / OUT: 250 mL / NET: 170 mL          PHYSICAL EXAM:    Constitutional: NAD, pleasantly demented  HEENT: PERRLA,   Neck: No JVD  Respiratory: CTA B/L  Cardiovascular: S1 and S2  Gastrointestinal: BS+, soft, NT/ND  Extremities: trace b/l peripheral edema; R foot dressed  : +Isaacs  Skin: No rashes      LABS:                        9.5    6.62  )-----------( 217      ( 29 Jun 2017 06:00 )             28.6     06-29    133  |  97  |  33<H>  ----------------------------<  286<H>  4.3   |  21  |  1.31<H>    Ca    8.8      29 Jun 2017 06:00  Phos  4.2     06-29  Mg     2.3     06-29

## 2017-06-29 NOTE — PROGRESS NOTE ADULT - ASSESSMENT
89M w/ RLE cellulitis, now POD # 5 from R femoral to above knee popliteal bypass with PTFE, s/p excisional ulcer debridement of foot  - c/w pain control PRN  - c/w antiplatelet therapy  - physical therapy   - f/u podiatry recs  - rehab planning  - possible trial of void in 24hrs 89M w/ RLE cellulitis, now POD # 5 from R femoral to above knee popliteal bypass with PTFE, s/p excisional ulcer debridement of foot  - c/w pain control PRN  - c/w antiplatelet therapy and abx  - physical therapy   - f/u podiatry recs  - rehab planning  - possible trial of void in 24hrs

## 2017-06-30 VITALS
TEMPERATURE: 98 F | DIASTOLIC BLOOD PRESSURE: 63 MMHG | HEART RATE: 64 BPM | RESPIRATION RATE: 18 BRPM | OXYGEN SATURATION: 96 % | SYSTOLIC BLOOD PRESSURE: 142 MMHG

## 2017-06-30 LAB
BUN SERPL-MCNC: 42 MG/DL — HIGH (ref 7–23)
CALCIUM SERPL-MCNC: 8.6 MG/DL — SIGNIFICANT CHANGE UP (ref 8.4–10.5)
CHLORIDE SERPL-SCNC: 93 MMOL/L — LOW (ref 98–107)
CO2 SERPL-SCNC: 26 MMOL/L — SIGNIFICANT CHANGE UP (ref 22–31)
CREAT SERPL-MCNC: 1.29 MG/DL — SIGNIFICANT CHANGE UP (ref 0.5–1.3)
GLUCOSE SERPL-MCNC: 251 MG/DL — HIGH (ref 70–99)
HCT VFR BLD CALC: 27.6 % — LOW (ref 39–50)
HGB BLD-MCNC: 9.6 G/DL — LOW (ref 13–17)
MCHC RBC-ENTMCNC: 32.2 PG — SIGNIFICANT CHANGE UP (ref 27–34)
MCHC RBC-ENTMCNC: 34.8 % — SIGNIFICANT CHANGE UP (ref 32–36)
MCV RBC AUTO: 92.6 FL — SIGNIFICANT CHANGE UP (ref 80–100)
NRBC # FLD: 0 — SIGNIFICANT CHANGE UP
PLATELET # BLD AUTO: 251 K/UL — SIGNIFICANT CHANGE UP (ref 150–400)
PMV BLD: 10.4 FL — SIGNIFICANT CHANGE UP (ref 7–13)
POTASSIUM SERPL-MCNC: 4.1 MMOL/L — SIGNIFICANT CHANGE UP (ref 3.5–5.3)
POTASSIUM SERPL-SCNC: 4.1 MMOL/L — SIGNIFICANT CHANGE UP (ref 3.5–5.3)
RBC # BLD: 2.98 M/UL — LOW (ref 4.2–5.8)
RBC # FLD: 12.1 % — SIGNIFICANT CHANGE UP (ref 10.3–14.5)
SODIUM SERPL-SCNC: 130 MMOL/L — LOW (ref 135–145)
WBC # BLD: 10.81 K/UL — HIGH (ref 3.8–10.5)
WBC # FLD AUTO: 10.81 K/UL — HIGH (ref 3.8–10.5)

## 2017-06-30 RX ORDER — INSULIN LISPRO 100/ML
2 VIAL (ML) SUBCUTANEOUS ONCE
Qty: 0 | Refills: 0 | Status: COMPLETED | OUTPATIENT
Start: 2017-06-30 | End: 2017-06-30

## 2017-06-30 RX ORDER — ASPIRIN/CALCIUM CARB/MAGNESIUM 324 MG
1 TABLET ORAL
Qty: 0 | Refills: 0 | COMMUNITY
Start: 2017-06-30

## 2017-06-30 RX ORDER — SODIUM CHLORIDE 9 MG/ML
500 INJECTION, SOLUTION INTRAVENOUS ONCE
Qty: 0 | Refills: 0 | Status: COMPLETED | OUTPATIENT
Start: 2017-06-30 | End: 2017-06-30

## 2017-06-30 RX ORDER — SIMVASTATIN 20 MG/1
0 TABLET, FILM COATED ORAL
Qty: 0 | Refills: 0 | COMMUNITY

## 2017-06-30 RX ORDER — TAMSULOSIN HYDROCHLORIDE 0.4 MG/1
1 CAPSULE ORAL
Qty: 0 | Refills: 0 | COMMUNITY
Start: 2017-06-30

## 2017-06-30 RX ORDER — METOPROLOL TARTRATE 50 MG
1 TABLET ORAL
Qty: 0 | Refills: 0 | COMMUNITY
Start: 2017-06-30

## 2017-06-30 RX ORDER — CLOPIDOGREL BISULFATE 75 MG/1
1 TABLET, FILM COATED ORAL
Qty: 0 | Refills: 0 | COMMUNITY

## 2017-06-30 RX ORDER — FUROSEMIDE 40 MG
1 TABLET ORAL
Qty: 0 | Refills: 0 | COMMUNITY

## 2017-06-30 RX ORDER — SODIUM CHLORIDE 9 MG/ML
1000 INJECTION INTRAMUSCULAR; INTRAVENOUS; SUBCUTANEOUS
Qty: 0 | Refills: 0 | Status: DISCONTINUED | OUTPATIENT
Start: 2017-06-30 | End: 2017-06-30

## 2017-06-30 RX ORDER — SUCRALFATE 1 G
0 TABLET ORAL
Qty: 0 | Refills: 0 | COMMUNITY

## 2017-06-30 RX ORDER — IPRATROPIUM/ALBUTEROL SULFATE 18-103MCG
3 AEROSOL WITH ADAPTER (GRAM) INHALATION ONCE
Qty: 0 | Refills: 0 | Status: COMPLETED | OUTPATIENT
Start: 2017-06-30 | End: 2017-06-30

## 2017-06-30 RX ADMIN — CILOSTAZOL 50 MILLIGRAM(S): 100 TABLET ORAL at 05:45

## 2017-06-30 RX ADMIN — Medication 3 MILLILITER(S): at 04:21

## 2017-06-30 RX ADMIN — Medication 81 MILLIGRAM(S): at 12:16

## 2017-06-30 RX ADMIN — Medication 25 MILLIGRAM(S): at 05:45

## 2017-06-30 RX ADMIN — Medication 6: at 08:54

## 2017-06-30 RX ADMIN — Medication 10 MILLIGRAM(S): at 05:46

## 2017-06-30 RX ADMIN — Medication 1 TABLET(S): at 05:45

## 2017-06-30 RX ADMIN — Medication 2 UNIT(S): at 02:43

## 2017-06-30 RX ADMIN — SODIUM CHLORIDE 1000 MILLILITER(S): 9 INJECTION, SOLUTION INTRAVENOUS at 02:46

## 2017-06-30 RX ADMIN — Medication 30 MILLIGRAM(S): at 05:46

## 2017-06-30 RX ADMIN — HEPARIN SODIUM 5000 UNIT(S): 5000 INJECTION INTRAVENOUS; SUBCUTANEOUS at 05:45

## 2017-06-30 RX ADMIN — Medication 6: at 12:15

## 2017-06-30 RX ADMIN — SODIUM CHLORIDE 50 MILLILITER(S): 9 INJECTION INTRAMUSCULAR; INTRAVENOUS; SUBCUTANEOUS at 04:19

## 2017-06-30 RX ADMIN — SODIUM CHLORIDE 1000 MILLILITER(S): 9 INJECTION, SOLUTION INTRAVENOUS at 00:24

## 2017-06-30 RX ADMIN — Medication 1 DROP(S): at 05:47

## 2017-06-30 RX ADMIN — BRIMONIDINE TARTRATE 1 DROP(S): 2 SOLUTION/ DROPS OPHTHALMIC at 05:46

## 2017-06-30 NOTE — PROGRESS NOTE ADULT - PROVIDER SPECIALTY LIST ADULT
Nephrology
Orthopedics
Podiatry
Pulmonology
Rheumatology
Surgery
Vascular Surgery
Anticoag Management
CTU
Cardiology
Critical Care
Nephrology
Nephrology
Podiatry
Nephrology

## 2017-06-30 NOTE — PROGRESS NOTE ADULT - SUBJECTIVE AND OBJECTIVE BOX
Dr. William García  NEPHROLOGY-[facility] [id number]        Patient seen and examined @ bedside, no new complaints,         MEDICATIONS  (STANDING):  heparin  Injectable 5000 Unit(s) SubCutaneous every 8 hours  insulin lispro (HumaLOG) corrective regimen sliding scale   SubCutaneous at bedtime  insulin lispro (HumaLOG) corrective regimen sliding scale   SubCutaneous three times a day before meals  hydrALAZINE 10 milliGRAM(s) Oral two times a day  simvastatin 20 milliGRAM(s) Oral at bedtime  brimonidine 0.2% Ophthalmic Solution 1 Drop(s) Right EYE two times a day  timolol 0.5% Solution 1 Drop(s) Right EYE two times a day  collagenase Ointment 1 Application(s) Topical daily  metoprolol 25 milliGRAM(s) Oral two times a day  cilostazol 50 milliGRAM(s) Oral two times a day  aspirin  chewable 81 milliGRAM(s) Oral daily  tamsulosin 0.4 milliGRAM(s) Oral at bedtime  predniSONE   Tablet 30 milliGRAM(s) Oral daily  insulin glargine Injectable (LANTUS) 6 Unit(s) SubCutaneous at bedtime  amoxicillin  500 milliGRAM(s)/clavulanate 1 Tablet(s) Oral two times a day  sodium chloride 0.9%. 1000 milliLiter(s) (50 mL/Hr) IV Continuous <Continuous>      VITAL:  T(C): , Max: 36.8 (06-30-17 @ 07:45)  T(F): , Max: 98.2 (06-30-17 @ 07:45)  HR: 68 (06-30-17 @ 07:45)  BP: 131/60 (06-30-17 @ 07:45)  BP(mean): --  RR: 17 (06-30-17 @ 07:45)  SpO2: 96% (06-30-17 @ 07:45)  Wt(kg): --    I and O's:    06-29 @ 07:01  -  06-30 @ 07:00  --------------------------------------------------------  IN: 420 mL / OUT: 650 mL / NET: -230 mL    06-30 @ 07:01  -  06-30 @ 11:48  --------------------------------------------------------  IN: 0 mL / OUT: 350 mL / NET: -350 mL          PHYSICAL EXAM:    Constitutional: NAD  HEENT: PERRLA,   Neck: No JVD  Respiratory: CTA B/L  Cardiovascular: S1 and S2  Gastrointestinal: BS+, soft, NT/ND  Extremities: No peripheral edema  : No Isaacs  Skin: No rashes      LABS:                        9.6    10.81 )-----------( 251      ( 30 Jun 2017 05:30 )             27.6     06-30    130<L>  |  93<L>  |  42<H>  ----------------------------<  251<H>  4.1   |  26  |  1.29    Ca    8.6      30 Jun 2017 05:30  Phos  4.2     06-29  Mg     2.3     06-29 Dr. William García  NEPHROLOGY-[facility] [id number]        Patient seen and examined @ bedside, no new complaints, voiding well        MEDICATIONS  (STANDING):  heparin  Injectable 5000 Unit(s) SubCutaneous every 8 hours  insulin lispro (HumaLOG) corrective regimen sliding scale   SubCutaneous at bedtime  insulin lispro (HumaLOG) corrective regimen sliding scale   SubCutaneous three times a day before meals  hydrALAZINE 10 milliGRAM(s) Oral two times a day  simvastatin 20 milliGRAM(s) Oral at bedtime  brimonidine 0.2% Ophthalmic Solution 1 Drop(s) Right EYE two times a day  timolol 0.5% Solution 1 Drop(s) Right EYE two times a day  collagenase Ointment 1 Application(s) Topical daily  metoprolol 25 milliGRAM(s) Oral two times a day  cilostazol 50 milliGRAM(s) Oral two times a day  aspirin  chewable 81 milliGRAM(s) Oral daily  tamsulosin 0.4 milliGRAM(s) Oral at bedtime  predniSONE   Tablet 30 milliGRAM(s) Oral daily  insulin glargine Injectable (LANTUS) 6 Unit(s) SubCutaneous at bedtime  amoxicillin  500 milliGRAM(s)/clavulanate 1 Tablet(s) Oral two times a day  sodium chloride 0.9%. 1000 milliLiter(s) (50 mL/Hr) IV Continuous <Continuous>      VITAL:  T(C): , Max: 36.8 (06-30-17 @ 07:45)  T(F): , Max: 98.2 (06-30-17 @ 07:45)  HR: 68 (06-30-17 @ 07:45)  BP: 131/60 (06-30-17 @ 07:45)  BP(mean): --  RR: 17 (06-30-17 @ 07:45)  SpO2: 96% (06-30-17 @ 07:45)  Wt(kg): --    I and O's:    06-29 @ 07:01  -  06-30 @ 07:00  --------------------------------------------------------  IN: 420 mL / OUT: 650 mL / NET: -230 mL    06-30 @ 07:01  -  06-30 @ 11:48  --------------------------------------------------------  IN: 0 mL / OUT: 350 mL / NET: -350 mL          PHYSICAL EXAM:    Constitutional: NAD  HEENT: PERRLA,   Neck: No JVD  Respiratory: CTA B/L  Cardiovascular: S1 and S2  Gastrointestinal: BS+, soft, NT/ND  Extremities: No peripheral edema  : No Isaacs  Skin: No rashes      LABS:                        9.6    10.81 )-----------( 251      ( 30 Jun 2017 05:30 )             27.6     06-30    130<L>  |  93<L>  |  42<H>  ----------------------------<  251<H>  4.1   |  26  |  1.29    Ca    8.6      30 Jun 2017 05:30  Phos  4.2     06-29  Mg     2.3     06-29 Dr. William García  NEPHROLOGY-[facility] [id number]        Patient seen and examined @ bedside, no new complaints, voiding well        MEDICATIONS  (STANDING):  heparin  Injectable 5000 Unit(s) SubCutaneous every 8 hours  insulin lispro (HumaLOG) corrective regimen sliding scale   SubCutaneous at bedtime  insulin lispro (HumaLOG) corrective regimen sliding scale   SubCutaneous three times a day before meals  hydrALAZINE 10 milliGRAM(s) Oral two times a day  simvastatin 20 milliGRAM(s) Oral at bedtime  brimonidine 0.2% Ophthalmic Solution 1 Drop(s) Right EYE two times a day  timolol 0.5% Solution 1 Drop(s) Right EYE two times a day  collagenase Ointment 1 Application(s) Topical daily  metoprolol 25 milliGRAM(s) Oral two times a day  cilostazol 50 milliGRAM(s) Oral two times a day  aspirin  chewable 81 milliGRAM(s) Oral daily  tamsulosin 0.4 milliGRAM(s) Oral at bedtime  predniSONE   Tablet 30 milliGRAM(s) Oral daily  insulin glargine Injectable (LANTUS) 6 Unit(s) SubCutaneous at bedtime  amoxicillin  500 milliGRAM(s)/clavulanate 1 Tablet(s) Oral two times a day  sodium chloride 0.9%. 1000 milliLiter(s) (50 mL/Hr) IV Continuous <Continuous>      VITAL:  T(C): , Max: 36.8 (06-30-17 @ 07:45)  T(F): , Max: 98.2 (06-30-17 @ 07:45)  HR: 68 (06-30-17 @ 07:45)  BP: 131/60 (06-30-17 @ 07:45)  BP(mean): --  RR: 17 (06-30-17 @ 07:45)  SpO2: 96% (06-30-17 @ 07:45)  Wt(kg): --    I and O's:    06-29 @ 07:01  -  06-30 @ 07:00  --------------------------------------------------------  IN: 420 mL / OUT: 650 mL / NET: -230 mL    06-30 @ 07:01  -  06-30 @ 11:48  --------------------------------------------------------  IN: 0 mL / OUT: 350 mL / NET: -350 mL          PHYSICAL EXAM:    Constitutional: NAD  HEENT: PERRLA,   Neck: No JVD  Respiratory: mild b/l end expiratory wheeze  Cardiovascular: S1 and S2, RRR  Gastrointestinal: BS+, soft, NT/ND  Extremities: No peripheral edema  : No Isaacs  Skin: No rashes      LABS:                        9.6    10.81 )-----------( 251      ( 30 Jun 2017 05:30 )             27.6     06-30    130<L>  |  93<L>  |  42<H>  ----------------------------<  251<H>  4.1   |  26  |  1.29    Ca    8.6      30 Jun 2017 05:30  Phos  4.2     06-29  Mg     2.3     06-29

## 2017-06-30 NOTE — PROGRESS NOTE ADULT - ATTENDING COMMENTS
Vascular sx attending covering for Dr. Aguilar  89yo M p/w nonhealing R 1st toe ulcer.   NO acute events overnight  R 1st toe ulcer with minimal surrounding cellulitis  foot is warm and well perfused  no palpable pedal pulses  Plan: cont wound care as per podiatry  IV abx  pain control  pt. is scheduled for angiogram by Dr. Aguilar next week
as above
as above
Seen/examined with PA; agree with above. I just spoke VAL Whitmore from surgical team  - she asked whether his outpatient lasix should be reinstituted. I do not believe that he is hypervolemic at present; I would favor not restarting Lasix at present. Advised patient and family that at rehab, should he start to develop shortness of breath, that he ask the team at rehab to reinstitue Lasix.
Seen and examined with PA - agree with above plan of care as outlined.
Seen and examined with PA - agree with above; no objection to d/c  - no need to set up for outpatient renal followup.
as above

## 2017-06-30 NOTE — PROGRESS NOTE ADULT - PROBLEM SELECTOR PROBLEM 5
Cellulitis of leg, right
Cellulitis of leg, right
Hyponatremia
Hyponatremia
Cellulitis of leg, right
Cellulitis of leg, right

## 2017-06-30 NOTE — PROGRESS NOTE ADULT - SUBJECTIVE AND OBJECTIVE BOX
Patient is a 89y old  Male who presents with a chief complaint of Right foot ulcer (28 Jun 2017 08:08)       INTERVAL HPI/OVERNIGHT EVENTS:  Patient seen and evaluated at bedside.  Pt is resting comfortable in NAD. Denies N/V/F/C.  Pain rated at 0/10    Allergies    No Known Allergies    Intolerances        Vital Signs Last 24 Hrs  T(C): 36.6 (30 Jun 2017 05:30), Max: 36.6 (29 Jun 2017 17:03)  T(F): 97.9 (30 Jun 2017 05:30), Max: 97.9 (30 Jun 2017 05:30)  HR: 62 (30 Jun 2017 05:30) (60 - 66)  BP: 141/55 (30 Jun 2017 05:30) (124/45 - 154/56)  BP(mean): --  RR: 17 (30 Jun 2017 05:30) (17 - 18)  SpO2: 93% (30 Jun 2017 05:30) (93% - 95%)    LABS:                        9.6    10.81 )-----------( 251      ( 30 Jun 2017 05:30 )             27.6     06-30    130<L>  |  93<L>  |  42<H>  ----------------------------<  251<H>  4.1   |  26  |  1.29    Ca    8.6      30 Jun 2017 05:30  Phos  4.2     06-29  Mg     2.3     06-29          CAPILLARY BLOOD GLUCOSE  318 (30 Jun 2017 02:06)  376 (29 Jun 2017 22:49)  344 (29 Jun 2017 17:03)  290 (29 Jun 2017 11:48)          Lower Extremity Physical Exam:  Dressing left CDI, no strikethrough noted.  Pt in no acute pain at this time.       RADIOLOGY & ADDITIONAL TESTS:

## 2017-06-30 NOTE — PROGRESS NOTE ADULT - ASSESSMENT
89 year old male with a PMH of DM II and HTN on 6/16 a/w RLE cellulitis now s/p LE angio and bypass surgery.  CKD stage 3

## 2017-06-30 NOTE — PROGRESS NOTE ADULT - PROBLEM SELECTOR PLAN 5
corrected serum sodium in setting of hyperglycemia is 133  ok for now
continue iv abx
corrected serum sodium in setting of hyperglycemia is 136-137  ok for now

## 2017-06-30 NOTE — PROGRESS NOTE ADULT - ASSESSMENT
89M w/ RLE cellulitis, now POD # 5 from R femoral to above knee popliteal bypass with PTFE, s/p excisional ulcer debridement of foot  - c/w pain control PRN  - c/w antiplatelet therapy and abx  - physical therapy   - f/u podiatry recs  - rehab planning  - consider tee for failed trial of void   - f/u fingersticks

## 2017-06-30 NOTE — PROVIDER CONTACT NOTE (OTHER) - REASON
Pt hasn't voided since tee removal
pt IV access came out, pt refusing another IV access since he is going to rehab 06/30
BM
pt complaining of R elbow pain, pt denies any numbness or tingling but when asked to move fingers, pt unable to do so

## 2017-06-30 NOTE — PROVIDER CONTACT NOTE (OTHER) - ACTION/TREATMENT ORDERED:
MD aware. as per MD WBC okay; does not want to order studies
Team made aware; bladder scan was done and showed 87 mL. Team ordered 500 mL bolus LR.
will come and asses pt; continue to monitor   (pt seen at bedside, possible bursitis as per provider, will have hand consult ordered in AM)

## 2017-06-30 NOTE — PROGRESS NOTE ADULT - ASSESSMENT
89yo M s/p RF ulcer debridement and Stravix application 6/27  ·	Pt examined and evaluated  ·	Pt dressing left CDI  ·	Pt in no acute pain at this time  ·	Pt stable for discharge from podiatry  ·	Podiatry instructions in [place] dc ppwork

## 2017-06-30 NOTE — PROGRESS NOTE ADULT - SUBJECTIVE AND OBJECTIVE BOX
Vascular Surgery daily Progress note    S: Isaacs removed yesterday. Received 1L bolus for failure to void and low quantity on bladder scan. Has 1 episode of incontinent urine. Elevated fingersticks overnight, receiving corrective insulin doses.     T(C): 36.6 (06-30-17 @ 05:30), Max: 36.6 (06-29-17 @ 17:03)  HR: 62 (06-30-17 @ 05:30) (60 - 66)  BP: 141/55 (06-30-17 @ 05:30) (124/45 - 154/56)  RR: 17 (06-30-17 @ 05:30) (17 - 18)  SpO2: 93% (06-30-17 @ 05:30) (93% - 95%)  Wt(kg): --  I&O's Detail    29 Jun 2017 07:01  -  30 Jun 2017 07:00  --------------------------------------------------------  IN:    Oral Fluid: 420 mL  Total IN: 420 mL    OUT:    Indwelling Catheter - Urethral: 450 mL  Total OUT: 450 mL    Total NET: -30 mL      Physical:    RLE: stable leg cellulitis. RLE warm with increased cap refill and perfusion, staple incisions c/d/i  Vasc: R AT Dopp/ L DP/PT Dopp

## 2017-06-30 NOTE — PROGRESS NOTE ADULT - PROBLEM SELECTOR PLAN 4
s/p reinsertion of tee as pt failed TOV
as above
as above
s/p d/c of tee catheter this AM  f/up TOV
as above
s/p TOV, d/c planning per primary team, set up for d/c at 3pm today

## 2017-06-30 NOTE — PROGRESS NOTE ADULT - PROBLEM SELECTOR PROBLEM 4
Stasis dermatitis of both legs
Stasis dermatitis of both legs
Isaacs catheter status
Isaacs catheter status
Stasis dermatitis of both legs
Isaacs catheter status
Stasis dermatitis of both legs

## 2017-07-14 ENCOUNTER — EMERGENCY (EMERGENCY)
Facility: HOSPITAL | Age: 82
LOS: 1 days | Discharge: NURSING, REHAB OR LONG-TERM CARE | End: 2017-07-14
Attending: EMERGENCY MEDICINE
Payer: MEDICARE

## 2017-07-14 ENCOUNTER — OUTPATIENT (OUTPATIENT)
Dept: OUTPATIENT SERVICES | Facility: HOSPITAL | Age: 82
LOS: 1 days | End: 2017-07-14

## 2017-07-14 VITALS
RESPIRATION RATE: 16 BRPM | OXYGEN SATURATION: 99 % | SYSTOLIC BLOOD PRESSURE: 101 MMHG | DIASTOLIC BLOOD PRESSURE: 56 MMHG | TEMPERATURE: 98 F | HEART RATE: 60 BPM

## 2017-07-14 DIAGNOSIS — L02.611 CUTANEOUS ABSCESS OF RIGHT FOOT: ICD-10-CM

## 2017-07-14 LAB
ALBUMIN SERPL ELPH-MCNC: 3.4 G/DL — SIGNIFICANT CHANGE UP (ref 3.3–5)
ALP SERPL-CCNC: 59 U/L — SIGNIFICANT CHANGE UP (ref 40–120)
ALT FLD-CCNC: 14 U/L — SIGNIFICANT CHANGE UP (ref 4–41)
APTT BLD: 25.2 SEC — LOW (ref 27.5–37.4)
AST SERPL-CCNC: 19 U/L — SIGNIFICANT CHANGE UP (ref 4–40)
BASOPHILS # BLD AUTO: 0.01 K/UL — SIGNIFICANT CHANGE UP (ref 0–0.2)
BASOPHILS NFR BLD AUTO: 0.1 % — SIGNIFICANT CHANGE UP (ref 0–2)
BILIRUB SERPL-MCNC: 0.6 MG/DL — SIGNIFICANT CHANGE UP (ref 0.2–1.2)
BUN SERPL-MCNC: 43 MG/DL — HIGH (ref 7–23)
CALCIUM SERPL-MCNC: 9.2 MG/DL — SIGNIFICANT CHANGE UP (ref 8.4–10.5)
CHLORIDE SERPL-SCNC: 103 MMOL/L — SIGNIFICANT CHANGE UP (ref 98–107)
CO2 SERPL-SCNC: 24 MMOL/L — SIGNIFICANT CHANGE UP (ref 22–31)
CREAT SERPL-MCNC: 1.18 MG/DL — SIGNIFICANT CHANGE UP (ref 0.5–1.3)
EOSINOPHIL # BLD AUTO: 0.01 K/UL — SIGNIFICANT CHANGE UP (ref 0–0.5)
EOSINOPHIL NFR BLD AUTO: 0.1 % — SIGNIFICANT CHANGE UP (ref 0–6)
GLUCOSE SERPL-MCNC: 153 MG/DL — HIGH (ref 70–99)
HCT VFR BLD CALC: 30.4 % — LOW (ref 39–50)
HGB BLD-MCNC: 10.1 G/DL — LOW (ref 13–17)
IMM GRANULOCYTES # BLD AUTO: 0.02 # — SIGNIFICANT CHANGE UP
IMM GRANULOCYTES NFR BLD AUTO: 0.3 % — SIGNIFICANT CHANGE UP (ref 0–1.5)
INR BLD: 1.02 — SIGNIFICANT CHANGE UP (ref 0.88–1.17)
LYMPHOCYTES # BLD AUTO: 0.74 K/UL — LOW (ref 1–3.3)
LYMPHOCYTES # BLD AUTO: 9.6 % — LOW (ref 13–44)
MAGNESIUM SERPL-MCNC: 1.5 MG/DL — LOW (ref 1.6–2.6)
MCHC RBC-ENTMCNC: 32.9 PG — SIGNIFICANT CHANGE UP (ref 27–34)
MCHC RBC-ENTMCNC: 33.2 % — SIGNIFICANT CHANGE UP (ref 32–36)
MCV RBC AUTO: 99 FL — SIGNIFICANT CHANGE UP (ref 80–100)
MONOCYTES # BLD AUTO: 0.4 K/UL — SIGNIFICANT CHANGE UP (ref 0–0.9)
MONOCYTES NFR BLD AUTO: 5.2 % — SIGNIFICANT CHANGE UP (ref 2–14)
NEUTROPHILS # BLD AUTO: 6.5 K/UL — SIGNIFICANT CHANGE UP (ref 1.8–7.4)
NEUTROPHILS NFR BLD AUTO: 84.7 % — HIGH (ref 43–77)
NRBC # FLD: 0 — SIGNIFICANT CHANGE UP
PLATELET # BLD AUTO: 159 K/UL — SIGNIFICANT CHANGE UP (ref 150–400)
PMV BLD: 10.6 FL — SIGNIFICANT CHANGE UP (ref 7–13)
POTASSIUM SERPL-MCNC: 3.6 MMOL/L — SIGNIFICANT CHANGE UP (ref 3.5–5.3)
POTASSIUM SERPL-SCNC: 3.6 MMOL/L — SIGNIFICANT CHANGE UP (ref 3.5–5.3)
PROT SERPL-MCNC: 6.1 G/DL — SIGNIFICANT CHANGE UP (ref 6–8.3)
PROTHROM AB SERPL-ACNC: 11.4 SEC — SIGNIFICANT CHANGE UP (ref 9.8–13.1)
RBC # BLD: 3.07 M/UL — LOW (ref 4.2–5.8)
RBC # FLD: 14.7 % — HIGH (ref 10.3–14.5)
SODIUM SERPL-SCNC: 141 MMOL/L — SIGNIFICANT CHANGE UP (ref 135–145)
WBC # BLD: 7.68 K/UL — SIGNIFICANT CHANGE UP (ref 3.8–10.5)
WBC # FLD AUTO: 7.68 K/UL — SIGNIFICANT CHANGE UP (ref 3.8–10.5)

## 2017-07-14 PROCEDURE — 93971 EXTREMITY STUDY: CPT | Mod: 26,LT

## 2017-07-14 PROCEDURE — 99285 EMERGENCY DEPT VISIT HI MDM: CPT | Mod: GC

## 2017-07-14 PROCEDURE — 71020: CPT | Mod: 26

## 2017-07-14 RX ORDER — MAGNESIUM SULFATE 500 MG/ML
2 VIAL (ML) INJECTION ONCE
Qty: 0 | Refills: 0 | Status: COMPLETED | OUTPATIENT
Start: 2017-07-14 | End: 2017-07-14

## 2017-07-14 RX ORDER — POTASSIUM CHLORIDE 20 MEQ
10 PACKET (EA) ORAL
Qty: 0 | Refills: 0 | Status: COMPLETED | OUTPATIENT
Start: 2017-07-14 | End: 2017-07-14

## 2017-07-14 RX ORDER — POTASSIUM CHLORIDE 20 MEQ
40 PACKET (EA) ORAL ONCE
Qty: 0 | Refills: 0 | Status: COMPLETED | OUTPATIENT
Start: 2017-07-14 | End: 2017-07-14

## 2017-07-14 RX ADMIN — Medication 50 GRAM(S): at 22:16

## 2017-07-14 RX ADMIN — Medication 50 GRAM(S): at 23:55

## 2017-07-14 RX ADMIN — Medication 100 MILLIEQUIVALENT(S): at 21:42

## 2017-07-14 RX ADMIN — Medication 100 MILLIEQUIVALENT(S): at 23:17

## 2017-07-14 RX ADMIN — Medication 40 MILLIEQUIVALENT(S): at 21:35

## 2017-07-14 NOTE — ED PROVIDER NOTE - PMH
Chronic kidney disease, unspecified stage    DM (diabetes mellitus)    Duodenal ulcer    HTN (hypertension)

## 2017-07-14 NOTE — ED ADULT NURSE REASSESSMENT NOTE - NS ED NURSE REASSESS COMMENT FT1
Pt. alert and awake, patient and bed cleaned of urine; 30mm [place] catheter placed draining to bedside bag; second bag of 10mEq KCL started as ordered. Side rails up, fall precautions in place, call garcia within reach. TRUNG [place] float.

## 2017-07-14 NOTE — ED PROVIDER NOTE - MEDICAL DECISION MAKING DETAILS
89M CKD, PAD, HTN, DM, recent RLE fem-pop bypass p/w low potassium, pt does not know why he is in ED. Will contact [facility] for more information. RLE swollen, erythematous. Recent surgery, concern for DVT, will US. Labs including BMP with Mg and Phos, rectal temperature. EKG v-paced

## 2017-07-14 NOTE — ED ADULT NURSE REASSESSMENT NOTE - NS ED NURSE REASSESS COMMENT FT1
rec'd pt. back from US, awake, oriented to person and place at this time, with g20 saline lock on left ac with no ss of infiltration, hooked on cardiac monitor, with paced rhythm noted. denies any pain nor SOB at this time. repeat BMP sent. verified Potassium Chloride ordered from MD Go, be given as ordered. awaits lab results and US results. will continue to monitor

## 2017-07-14 NOTE — ED PROVIDER NOTE - ATTENDING CONTRIBUTION TO CARE
89yo male pmh DM, HTN, MI, PPM, CKD, PAD s/p RLE fem-popliteal bypass 3 weeks ago sent in for low potassium from [facility]. pt is on lasix 20.  no vomiting, no diarrhea, no cp, no abd pain, no fever.  RLE- staples at medial thigh without discharge or redness, lower tibia warm, no crepitus, no rash.  pt cap < 2sec, warm.  hypokalemia r/o mag imbalance likely due to lasix  \order-labs, ekg, kcl replacement, mag replacement.  course:  lab shows k-3.4, mag 1.5.  pt otherwise unremarkable.  pt given replacement mag 4g and KCL po and iv.    dx hypokalemia/hypomag with RLE cellulitis.  rx bactrim, continue with KCL po supplementation.  sent back to [facility] home.

## 2017-07-14 NOTE — ED PROVIDER NOTE - OBJECTIVE STATEMENT
89yo male pmh DM, HTN, MI, PPM, CKD, PAD s/p RLE fem-popliteal bypass 3 weeks ago sent in for low potassium from [facility]. Pt c/o RLE swelling, erythema x months. Denies fevers, chest pain, dyspnea, abd pain, n/v/d, constipation. Pt unable to give full history as he does not know why he is in the ED.

## 2017-07-14 NOTE — ED ADULT TRIAGE NOTE - CHIEF COMPLAINT QUOTE
pt was at pst for removal of staples right foot and debridement of ulcer  ems called due to low potassium  ems not sure if 2.3 or 2.5  pt has hx n stemi ashd PPm htn will get ekg at triage

## 2017-07-14 NOTE — ED ADULT NURSE NOTE - OBJECTIVE STATEMENT
pt received a&0x2, pt sent in for abn lab results as per ems, states pt had blood work that revealed low potassium, pt denies ha/dizziness/ligtheadedness/cp/sob/nvd/urinary symptoms, stiches noted to right leg from graft as per ems, pacemaker noted to left chest, stage 1 baseball in size noted to sacrum with dime size stage 2, dime size skin tear noted to left arm, pt appears comfortable and to be in NAD, vss as reported, 20 gauge IV placed in left ac, labs drawn and sent, pt placed on monitor, will continue to monitor. pt received a&0x2, pt sent in for abn lab results as per ems, states pt had blood work that revealed low potassium, pt denies ha/dizziness/ligtheadedness/cp/sob/nvd/urinary symptoms, stiches noted to right leg from graft as per ems, right groin stitching noted as well, pacemaker noted to left chest, stage 1 blanchable baseball in size (10x10 cm) noted to sacrum with dime size stage 2 (2x1 cm) at the center dressing applied , dime size skin tear noted to left arm (3x2 cm), stage 2 noted to right foot (2.5x1cm) pt appears comfortable and to be in NAD, vss as reported, 20 gauge IV placed in left ac, labs drawn and sent, pt placed on monitor, will continue to monitor.

## 2017-07-14 NOTE — ED PROVIDER NOTE - PROGRESS NOTE DETAILS
As per supervisor at hospitals, pt was scheduled for PST for debridement of right hallux. As per [facility] (ext [id number]) podiatrist Dr. Schroeder podiatrist [phone]. Receiving lasix 20mg daily and pletal, no anticoagulation K 3.4 after 1 run of potassium. Still giving 2 more runs of K 10mEq. Will discharge with bactrim for cellulitis back to [place] with PCP follow up

## 2017-07-15 VITALS
DIASTOLIC BLOOD PRESSURE: 64 MMHG | SYSTOLIC BLOOD PRESSURE: 170 MMHG | OXYGEN SATURATION: 99 % | RESPIRATION RATE: 20 BRPM | HEART RATE: 60 BPM

## 2017-07-15 RX ADMIN — Medication 100 MILLIEQUIVALENT(S): at 00:07

## 2017-07-15 NOTE — ED ADULT NURSE REASSESSMENT NOTE - NS ED NURSE REASSESS COMMENT FT1
pt. awake, alert, in NAD, for discharge. saline lock and condom cath removed. ambulance came to transport pt.

## 2017-07-18 ENCOUNTER — TRANSCRIPTION ENCOUNTER (OUTPATIENT)
Age: 82
End: 2017-07-18

## 2017-07-18 ENCOUNTER — INPATIENT (INPATIENT)
Facility: HOSPITAL | Age: 82
LOS: 6 days | Discharge: ANOTHER HOSPITAL | End: 2017-07-25
Attending: INTERNAL MEDICINE | Admitting: INTERNAL MEDICINE
Payer: MEDICARE

## 2017-07-18 VITALS
WEIGHT: 158.07 LBS | HEART RATE: 60 BPM | DIASTOLIC BLOOD PRESSURE: 47 MMHG | TEMPERATURE: 98 F | OXYGEN SATURATION: 100 % | RESPIRATION RATE: 24 BRPM | SYSTOLIC BLOOD PRESSURE: 180 MMHG

## 2017-07-18 DIAGNOSIS — E11.9 TYPE 2 DIABETES MELLITUS WITHOUT COMPLICATIONS: ICD-10-CM

## 2017-07-18 DIAGNOSIS — I63.9 CEREBRAL INFARCTION, UNSPECIFIED: ICD-10-CM

## 2017-07-18 DIAGNOSIS — Z95.828 PRESENCE OF OTHER VASCULAR IMPLANTS AND GRAFTS: Chronic | ICD-10-CM

## 2017-07-18 DIAGNOSIS — I10 ESSENTIAL (PRIMARY) HYPERTENSION: ICD-10-CM

## 2017-07-18 DIAGNOSIS — I73.9 PERIPHERAL VASCULAR DISEASE, UNSPECIFIED: ICD-10-CM

## 2017-07-18 DIAGNOSIS — Z29.9 ENCOUNTER FOR PROPHYLACTIC MEASURES, UNSPECIFIED: ICD-10-CM

## 2017-07-18 DIAGNOSIS — N18.9 CHRONIC KIDNEY DISEASE, UNSPECIFIED: ICD-10-CM

## 2017-07-18 LAB
ALBUMIN SERPL ELPH-MCNC: 3.5 G/DL — SIGNIFICANT CHANGE UP (ref 3.3–5)
ALP SERPL-CCNC: 74 U/L — SIGNIFICANT CHANGE UP (ref 40–120)
ALT FLD-CCNC: 19 U/L — SIGNIFICANT CHANGE UP (ref 4–41)
APTT BLD: 23.6 SEC — LOW (ref 27.5–37.4)
AST SERPL-CCNC: 17 U/L — SIGNIFICANT CHANGE UP (ref 4–40)
BASE EXCESS BLDV CALC-SCNC: 2.2 MMOL/L — SIGNIFICANT CHANGE UP
BASOPHILS # BLD AUTO: 0.01 K/UL — SIGNIFICANT CHANGE UP (ref 0–0.2)
BASOPHILS NFR BLD AUTO: 0.1 % — SIGNIFICANT CHANGE UP (ref 0–2)
BILIRUB SERPL-MCNC: 0.7 MG/DL — SIGNIFICANT CHANGE UP (ref 0.2–1.2)
BLD GP AB SCN SERPL QL: NEGATIVE — SIGNIFICANT CHANGE UP
BLOOD GAS VENOUS - CREATININE: 1.01 MG/DL — SIGNIFICANT CHANGE UP (ref 0.5–1.3)
BUN SERPL-MCNC: 29 MG/DL — HIGH (ref 7–23)
CALCIUM SERPL-MCNC: 9.2 MG/DL — SIGNIFICANT CHANGE UP (ref 8.4–10.5)
CHLORIDE BLDV-SCNC: 108 MMOL/L — SIGNIFICANT CHANGE UP (ref 96–108)
CHLORIDE SERPL-SCNC: 102 MMOL/L — SIGNIFICANT CHANGE UP (ref 98–107)
CK MB BLD-MCNC: 4.22 NG/ML — SIGNIFICANT CHANGE UP (ref 1–6.6)
CK MB BLD-MCNC: SIGNIFICANT CHANGE UP (ref 0–2.5)
CK SERPL-CCNC: 21 U/L — LOW (ref 30–200)
CK SERPL-CCNC: 33 U/L — SIGNIFICANT CHANGE UP (ref 30–200)
CO2 SERPL-SCNC: 25 MMOL/L — SIGNIFICANT CHANGE UP (ref 22–31)
CREAT SERPL-MCNC: 1.14 MG/DL — SIGNIFICANT CHANGE UP (ref 0.5–1.3)
EOSINOPHIL # BLD AUTO: 0.1 K/UL — SIGNIFICANT CHANGE UP (ref 0–0.5)
EOSINOPHIL NFR BLD AUTO: 1.2 % — SIGNIFICANT CHANGE UP (ref 0–6)
GAS PNL BLDV: 132 MMOL/L — LOW (ref 136–146)
GLUCOSE BLDV-MCNC: 247 — HIGH (ref 70–99)
GLUCOSE SERPL-MCNC: 263 MG/DL — HIGH (ref 70–99)
HCO3 BLDV-SCNC: 25 MMOL/L — SIGNIFICANT CHANGE UP (ref 20–27)
HCT VFR BLD CALC: 32.5 % — LOW (ref 39–50)
HCT VFR BLDV CALC: 33 % — LOW (ref 39–51)
HGB BLD-MCNC: 10.3 G/DL — LOW (ref 13–17)
HGB BLDV-MCNC: 10.7 G/DL — LOW (ref 13–17)
IMM GRANULOCYTES # BLD AUTO: 0.04 # — SIGNIFICANT CHANGE UP
IMM GRANULOCYTES NFR BLD AUTO: 0.5 % — SIGNIFICANT CHANGE UP (ref 0–1.5)
INR BLD: 1 — SIGNIFICANT CHANGE UP (ref 0.88–1.17)
LACTATE BLDV-MCNC: 1.9 MMOL/L — SIGNIFICANT CHANGE UP (ref 0.5–2)
LYMPHOCYTES # BLD AUTO: 0.78 K/UL — LOW (ref 1–3.3)
LYMPHOCYTES # BLD AUTO: 9.4 % — LOW (ref 13–44)
MCHC RBC-ENTMCNC: 31.2 PG — SIGNIFICANT CHANGE UP (ref 27–34)
MCHC RBC-ENTMCNC: 31.7 % — LOW (ref 32–36)
MCV RBC AUTO: 98.5 FL — SIGNIFICANT CHANGE UP (ref 80–100)
MONOCYTES # BLD AUTO: 0.61 K/UL — SIGNIFICANT CHANGE UP (ref 0–0.9)
MONOCYTES NFR BLD AUTO: 7.3 % — SIGNIFICANT CHANGE UP (ref 2–14)
NEUTROPHILS # BLD AUTO: 6.78 K/UL — SIGNIFICANT CHANGE UP (ref 1.8–7.4)
NEUTROPHILS NFR BLD AUTO: 81.5 % — HIGH (ref 43–77)
NRBC # FLD: 0 — SIGNIFICANT CHANGE UP
PCO2 BLDV: 42 MMHG — SIGNIFICANT CHANGE UP (ref 41–51)
PH BLDV: 7.41 PH — SIGNIFICANT CHANGE UP (ref 7.32–7.43)
PLATELET # BLD AUTO: 145 K/UL — LOW (ref 150–400)
PMV BLD: 10.9 FL — SIGNIFICANT CHANGE UP (ref 7–13)
PO2 BLDV: 30 MMHG — LOW (ref 35–40)
POTASSIUM BLDV-SCNC: 4.6 MMOL/L — HIGH (ref 3.4–4.5)
POTASSIUM SERPL-MCNC: 4.4 MMOL/L — SIGNIFICANT CHANGE UP (ref 3.5–5.3)
POTASSIUM SERPL-SCNC: 4.4 MMOL/L — SIGNIFICANT CHANGE UP (ref 3.5–5.3)
PROT SERPL-MCNC: 6.1 G/DL — SIGNIFICANT CHANGE UP (ref 6–8.3)
PROTHROM AB SERPL-ACNC: 11.2 SEC — SIGNIFICANT CHANGE UP (ref 9.8–13.1)
RBC # BLD: 3.3 M/UL — LOW (ref 4.2–5.8)
RBC # FLD: 14.6 % — HIGH (ref 10.3–14.5)
RH IG SCN BLD-IMP: POSITIVE — SIGNIFICANT CHANGE UP
SAO2 % BLDV: 53 % — LOW (ref 60–85)
SODIUM SERPL-SCNC: 139 MMOL/L — SIGNIFICANT CHANGE UP (ref 135–145)
TROPONIN T SERPL-MCNC: 0.07 NG/ML — HIGH (ref 0–0.06)
TROPONIN T SERPL-MCNC: < 0.06 NG/ML — SIGNIFICANT CHANGE UP (ref 0–0.06)
TSH SERPL-MCNC: 1.79 UIU/ML — SIGNIFICANT CHANGE UP (ref 0.27–4.2)
WBC # BLD: 8.32 K/UL — SIGNIFICANT CHANGE UP (ref 3.8–10.5)
WBC # FLD AUTO: 8.32 K/UL — SIGNIFICANT CHANGE UP (ref 3.8–10.5)

## 2017-07-18 PROCEDURE — 99223 1ST HOSP IP/OBS HIGH 75: CPT | Mod: GC

## 2017-07-18 PROCEDURE — 70450 CT HEAD/BRAIN W/O DYE: CPT | Mod: 26

## 2017-07-18 PROCEDURE — 71010: CPT | Mod: 26

## 2017-07-18 RX ORDER — DEXTROSE 50 % IN WATER 50 %
25 SYRINGE (ML) INTRAVENOUS ONCE
Qty: 0 | Refills: 0 | Status: DISCONTINUED | OUTPATIENT
Start: 2017-07-18 | End: 2017-07-25

## 2017-07-18 RX ORDER — BRIMONIDINE TARTRATE, TIMOLOL MALEATE 2; 5 MG/ML; MG/ML
1 SOLUTION/ DROPS OPHTHALMIC
Qty: 0 | Refills: 0 | COMMUNITY

## 2017-07-18 RX ORDER — INSULIN LISPRO 100/ML
VIAL (ML) SUBCUTANEOUS
Qty: 0 | Refills: 0 | Status: DISCONTINUED | OUTPATIENT
Start: 2017-07-18 | End: 2017-07-19

## 2017-07-18 RX ORDER — HEPARIN SODIUM 5000 [USP'U]/ML
5000 INJECTION INTRAVENOUS; SUBCUTANEOUS EVERY 8 HOURS
Qty: 0 | Refills: 0 | Status: DISCONTINUED | OUTPATIENT
Start: 2017-07-18 | End: 2017-07-25

## 2017-07-18 RX ORDER — SODIUM CHLORIDE 9 MG/ML
1000 INJECTION, SOLUTION INTRAVENOUS
Qty: 0 | Refills: 0 | Status: DISCONTINUED | OUTPATIENT
Start: 2017-07-18 | End: 2017-07-25

## 2017-07-18 RX ORDER — DEXTROSE 50 % IN WATER 50 %
12.5 SYRINGE (ML) INTRAVENOUS ONCE
Qty: 0 | Refills: 0 | Status: DISCONTINUED | OUTPATIENT
Start: 2017-07-18 | End: 2017-07-25

## 2017-07-18 RX ORDER — FUROSEMIDE 40 MG
20 TABLET ORAL DAILY
Qty: 0 | Refills: 0 | Status: DISCONTINUED | OUTPATIENT
Start: 2017-07-18 | End: 2017-07-20

## 2017-07-18 RX ORDER — HYDRALAZINE HCL 50 MG
0 TABLET ORAL
Qty: 0 | Refills: 0 | COMMUNITY

## 2017-07-18 RX ORDER — SIMVASTATIN 20 MG/1
20 TABLET, FILM COATED ORAL AT BEDTIME
Qty: 0 | Refills: 0 | Status: DISCONTINUED | OUTPATIENT
Start: 2017-07-18 | End: 2017-07-19

## 2017-07-18 RX ORDER — HYDRALAZINE HCL 50 MG
50 TABLET ORAL
Qty: 0 | Refills: 0 | Status: DISCONTINUED | OUTPATIENT
Start: 2017-07-18 | End: 2017-07-25

## 2017-07-18 RX ORDER — SUCRALFATE 1 G
1 TABLET ORAL
Qty: 0 | Refills: 0 | Status: DISCONTINUED | OUTPATIENT
Start: 2017-07-18 | End: 2017-07-25

## 2017-07-18 RX ORDER — ASPIRIN/CALCIUM CARB/MAGNESIUM 324 MG
81 TABLET ORAL DAILY
Qty: 0 | Refills: 0 | Status: DISCONTINUED | OUTPATIENT
Start: 2017-07-18 | End: 2017-07-25

## 2017-07-18 RX ORDER — ACETAMINOPHEN 500 MG
650 TABLET ORAL
Qty: 0 | Refills: 0 | Status: DISCONTINUED | OUTPATIENT
Start: 2017-07-18 | End: 2017-07-25

## 2017-07-18 RX ORDER — TIMOLOL 0.5 %
1 DROPS OPHTHALMIC (EYE)
Qty: 0 | Refills: 0 | Status: DISCONTINUED | OUTPATIENT
Start: 2017-07-18 | End: 2017-07-25

## 2017-07-18 RX ORDER — INSULIN LISPRO 100/ML
VIAL (ML) SUBCUTANEOUS AT BEDTIME
Qty: 0 | Refills: 0 | Status: DISCONTINUED | OUTPATIENT
Start: 2017-07-18 | End: 2017-07-19

## 2017-07-18 RX ORDER — BRIMONIDINE TARTRATE 2 MG/MG
1 SOLUTION/ DROPS OPHTHALMIC
Qty: 0 | Refills: 0 | Status: DISCONTINUED | OUTPATIENT
Start: 2017-07-18 | End: 2017-07-25

## 2017-07-18 RX ORDER — IPRATROPIUM/ALBUTEROL SULFATE 18-103MCG
3 AEROSOL WITH ADAPTER (GRAM) INHALATION EVERY 6 HOURS
Qty: 0 | Refills: 0 | Status: DISCONTINUED | OUTPATIENT
Start: 2017-07-18 | End: 2017-07-25

## 2017-07-18 RX ORDER — POTASSIUM CHLORIDE 20 MEQ
20 PACKET (EA) ORAL DAILY
Qty: 0 | Refills: 0 | Status: DISCONTINUED | OUTPATIENT
Start: 2017-07-18 | End: 2017-07-25

## 2017-07-18 RX ORDER — CLOPIDOGREL BISULFATE 75 MG/1
75 TABLET, FILM COATED ORAL DAILY
Qty: 0 | Refills: 0 | Status: DISCONTINUED | OUTPATIENT
Start: 2017-07-18 | End: 2017-07-25

## 2017-07-18 RX ORDER — DEXTROSE 50 % IN WATER 50 %
1 SYRINGE (ML) INTRAVENOUS ONCE
Qty: 0 | Refills: 0 | Status: DISCONTINUED | OUTPATIENT
Start: 2017-07-18 | End: 2017-07-25

## 2017-07-18 RX ORDER — METOPROLOL TARTRATE 50 MG
25 TABLET ORAL
Qty: 0 | Refills: 0 | Status: DISCONTINUED | OUTPATIENT
Start: 2017-07-18 | End: 2017-07-25

## 2017-07-18 RX ORDER — TAMSULOSIN HYDROCHLORIDE 0.4 MG/1
0.4 CAPSULE ORAL AT BEDTIME
Qty: 0 | Refills: 0 | Status: DISCONTINUED | OUTPATIENT
Start: 2017-07-18 | End: 2017-07-25

## 2017-07-18 RX ORDER — ASPIRIN/CALCIUM CARB/MAGNESIUM 324 MG
162 TABLET ORAL ONCE
Qty: 0 | Refills: 0 | Status: COMPLETED | OUTPATIENT
Start: 2017-07-18 | End: 2017-07-18

## 2017-07-18 RX ORDER — GLUCAGON INJECTION, SOLUTION 0.5 MG/.1ML
1 INJECTION, SOLUTION SUBCUTANEOUS ONCE
Qty: 0 | Refills: 0 | Status: DISCONTINUED | OUTPATIENT
Start: 2017-07-18 | End: 2017-07-25

## 2017-07-18 RX ORDER — CILOSTAZOL 100 MG/1
50 TABLET ORAL
Qty: 0 | Refills: 0 | Status: DISCONTINUED | OUTPATIENT
Start: 2017-07-18 | End: 2017-07-22

## 2017-07-18 RX ADMIN — SIMVASTATIN 20 MILLIGRAM(S): 20 TABLET, FILM COATED ORAL at 22:53

## 2017-07-18 RX ADMIN — Medication 81 MILLIGRAM(S): at 17:54

## 2017-07-18 RX ADMIN — HEPARIN SODIUM 5000 UNIT(S): 5000 INJECTION INTRAVENOUS; SUBCUTANEOUS at 22:53

## 2017-07-18 RX ADMIN — Medication 3 MILLILITER(S): at 21:01

## 2017-07-18 RX ADMIN — Medication 20 MILLIGRAM(S): at 17:55

## 2017-07-18 RX ADMIN — TAMSULOSIN HYDROCHLORIDE 0.4 MILLIGRAM(S): 0.4 CAPSULE ORAL at 22:53

## 2017-07-18 RX ADMIN — Medication 162 MILLIGRAM(S): at 13:54

## 2017-07-18 RX ADMIN — Medication 50 MILLIGRAM(S): at 17:55

## 2017-07-18 RX ADMIN — Medication 650 MILLIGRAM(S): at 17:54

## 2017-07-18 RX ADMIN — Medication 25 MILLIGRAM(S): at 17:55

## 2017-07-18 RX ADMIN — Medication 1 GRAM(S): at 17:56

## 2017-07-18 RX ADMIN — Medication 20 MILLIEQUIVALENT(S): at 17:55

## 2017-07-18 RX ADMIN — CLOPIDOGREL BISULFATE 75 MILLIGRAM(S): 75 TABLET, FILM COATED ORAL at 17:55

## 2017-07-18 RX ADMIN — Medication 5 MILLIGRAM(S): at 17:56

## 2017-07-18 NOTE — ED PROVIDER NOTE - OBJECTIVE STATEMENT
89yo male pmh DM, HTN, MI, PPM, CKD, PAD s/p RLE fem-popliteal bypass 3 weeks ago BIBEMS from [place] for left sided weakness at ~10am. Pt states he woke up with left handed numbness and clumsiness. Unknown last known normal. Pt discharged from [facility] ED 4d ago after being treated for hypokalemia on presurgical testing. 89yo male pmh DM, HTN, MI, PPM, CKD, PAD s/p RLE fem-popliteal bypass 3 weeks ago BIBEMS from [place] for left sided weakness at ~10am. Pt states he woke up with left handed numbness and clumsiness. Pt discharged from [facility] ED 4d ago after being treated for hypokalemia on presurgical testing. Denies headache, vision changes, chest pain, dyspnea, n/v/d.

## 2017-07-18 NOTE — ED ADULT NURSE NOTE - CHPI ED SYMPTOMS NEG
no loss of consciousness/no blurred vision/no dizziness/no confusion/no numbness/no fever/no nausea/no change in level of consciousness/no vomiting

## 2017-07-18 NOTE — DISCHARGE NOTE ADULT - MEDICATION SUMMARY - MEDICATIONS TO CHANGE
I will SWITCH the dose or number of times a day I take the medications listed below when I get home from the hospital:    hydrALAZINE 10 mg oral tablet  --  by mouth 2 times a day    metoprolol tartrate 25 mg oral tablet  -- 1 tab(s) by mouth 2 times a day

## 2017-07-18 NOTE — H&P ADULT - NEGATIVE CARDIOVASCULAR SYMPTOMS
no claudication/no chest pain/no paroxysmal nocturnal dyspnea/no dyspnea on exertion/no orthopnea/no peripheral edema/no palpitations

## 2017-07-18 NOTE — DISCHARGE NOTE ADULT - PATIENT PORTAL LINK FT
“You can access the [employer] Patient Portal, offered by [facility], by registering with the following website: [email or web address]”

## 2017-07-18 NOTE — H&P ADULT - NSHPSOCIALHISTORY_GEN_ALL_CORE
Pt has a 50 pack year history, denies any alcohol or illicit drug use. Prior to surgery, pt walked with assistance of a walker. Currently patient is in physical therapy for right lower extremity fem-popliteal bypass and has only had 3 sessions since surgery. Pt has a 50 pack year history, denies any alcohol or illicit drug use. Prior to surgery, pt walked with assistance of a walker. Currently patient is in Rehab s/p right lower extremity fem-popliteal bypass and has only had 3 sessions since surgery.

## 2017-07-18 NOTE — DISCHARGE NOTE ADULT - HOSPITAL COURSE
YEAR OLD MALE WITH PMH of PAD s/p fempop bypass, CKD, DM, HTN, and PUD who presents with left sided weakness. EKG performed and was V-paced. Cardiac enzyme times two were 0.07 in the setting of CKD, no NSTEMI present. CXR showed pulmonary edema. Initial head CT was negative. Neurology evaluated and recommended ASA and MRI if possible but due to PPM present MRI could not be performed.  Carotid duplex showed 70% Right and left carotid stenosis.  Confirmatory CTA head and neck performed showing 70% ZION stenosis a d 54 LICA stenosis. After evaluation it was determined he would not benefit from carotid endarterectomy.   PPM was interrogated and revealed episodes of Atrial fibrillation and he was started on coumadin.  RLE doppler performed showing a right soleal DVT related to recent bypass and should have repeat imaging in 3 months.  A skin tear was noted and initially had bleeding but improved prior to discharge. Plavix discontinued and pletal will be added as outpatient at rehab if CBC remains stable.  Patient was cleared for discharge and sent to [facility] for rehab. 89 YEAR OLD MALE WITH PMH of PAD s/p fempop bypass, CKD, DM, HTN, and PUD who presents with left sided weakness. EKG performed and was V-paced. Cardiac enzyme times two were 0.07 in the setting of CKD, no NSTEMI present. CXR showed pulmonary edema. Initial head CT was negative. Neurology evaluated and recommended ASA and MRI if possible but due to PPM present MRI could not be performed.  Carotid duplex showed 70% Right and left carotid stenosis.  Confirmatory CTA head and neck performed showing 70% ZION stenosis a d 54 LICA stenosis. After evaluation it was determined he would not benefit from carotid endarterectomy.   PPM was interrogated and revealed episodes of Atrial fibrillation and he was started on coumadin.  RLE doppler performed showing a right soleal DVT related to recent bypass and should have repeat imaging in 3 months.  A skin tear was noted and initially had bleeding but improved prior to discharge. Plavix discontinued and pletal will be added as outpatient at rehab if CBC remains stable.  Patient was cleared for discharge and sent to [facility] for rehab.

## 2017-07-18 NOTE — DISCHARGE NOTE ADULT - NS AS DC STROKE ED MATERIALS
Need for Followup After Discharge/Call 911 for Stroke/Stroke Warning Signs and Symptoms/Risk Factors for Stroke/Stroke Education Booklet/Prescribed Medications

## 2017-07-18 NOTE — ED PROVIDER NOTE - PROGRESS NOTE DETAILS
LUE weakness, as per EMS last known normal 1 hour ago. Stroke code called, pt in CT scan. However, while talking with pt, he endorses waking up with left hand clumsiness and numbness

## 2017-07-18 NOTE — ED PROVIDER NOTE - MEDICAL DECISION MAKING DETAILS
89yo male DM, HTN, MI, PPM, CKD, PAD s/p RLE fem-popliteal bypass 3 weeks ago BIBEMS from [facility] for weakness, LUE weakness, bilateral LE weakness possible hypokalemia vs stroke. CT head, labs, admit uJan: 89yo male DM, HTN, MI, PPM, CKD, PAD s/p RLE fem-popliteal bypass 3 weeks ago BIBEMS from [facility] for weakness, LUE weakness, bilateral LE weakness possible hypokalemia vs stroke. CT head, labs, admit

## 2017-07-18 NOTE — H&P ADULT - GASTROINTESTINAL DETAILS
no rebound tenderness/bowel sounds normal/soft/no guarding/nontender/normal/no rigidity/no distention/no masses palpable

## 2017-07-18 NOTE — H&P ADULT - ATTENDING COMMENTS
-pt seen and examined by me  -89 y m with extensive vascular diease, PVD, DM , PPM presenting  with new-onset LUE weakness. Recently underwent RLE fem-pop bypass as well as surgical debridement right great toe. Mark transfer summary notes pt taken off aspirin and plavix for last week in anticipation of pending procedure by Dr Sd Schroeder; will resume antiplatelets for now as well as apply rest of stroke protocol save fro MR studies 2/2 ppm.  -would call Dr Schroeder from pod for reccs while pt here  -wound care  ordered for both eval of toe ulceration as well as right arm skin tear of  unclear etiol -pt seen and examined by me  -89 y m with extensive vascular diease, PVD, DM , PPM presenting  with new-onset LUE weakness. Recently underwent RLE fem-pop bypass under Dr Miguelito Aguilar's service, as well as surgical debridement right great toe. Mark transfer summary notes pt taken off aspirin and plavix for last week in anticipation of pending procedure by Dr Sd Schroeder; will resume antiplatelets for now as well as apply rest of stroke protocol save fro MR studies 2/2 ppm.  -would call Dr Schroeder from pod for reccs while pt here  -wound care  ordered for both eval of toe ulceration as well as right arm skin tear of  unclear etiol  -rle swelling; noted as well prior to bypass procedure. pt unsure  if swelling improved. Denies pain, warm to touch; no cyanotic changes. Duplex ordered; -pt seen and examined by me  -89 y m with extensive vascular diease, PVD, DM , PPM presenting  with new-onset LUE weakness. Recently underwent RLE fem-pop bypass under Dr Miguelito Aguilar's service, as well as surgical debridement right great toe. Mark transfer summary notes pt taken off aspirin and plavix for last week in anticipation of pending procedure by Dr Sd Schroeder from podiatry; will resume antiplatelets for now as well as apply rest of stroke protocol save fro MR studies 2/2 ppm.  -would call Dr Schroeder from pod for reccs while pt here  -wound care  ordered for both eval of toe ulceration as well as right arm skin tear of  unclear etiol  -rle swelling; noted as well prior to bypass procedure. pt unsure  if swelling improved. Denies pain, warm to touch; no cyanotic changes. Duplex ordered;

## 2017-07-18 NOTE — H&P ADULT - NEGATIVE NEUROLOGICAL SYMPTOMS
no transient paralysis/no headache/no loss of sensation/no confusion/no syncope/no focal seizures/no vertigo/no tremors/no generalized seizures/no hemiparesis

## 2017-07-18 NOTE — DISCHARGE NOTE ADULT - PLAN OF CARE
Please continue coumadin as prescribed and please check INR in next 1-2 days to maintain an INR of 2-3.  Adjust the dose as needed. You were evaluated for carotid endarterectomy and it was determined that for you it would not be beneficial. Please continue medications as prescribed. Continue coumadin as above. you were noted to have a skin tear on your forearm which was bleeding. Bleeding has since decreased. Change bandages daily until wound is healed. Continue medications as currently prescribed. Follow up with your PMD for further management of disease. Dash diet. Continue diet modification. Avoid complex carbohydrates such as bread, pasta, cereal, white rice, white potatoes, etc. Avoid concentrated sugar as found in desserts, candy, soda, juice, etc. Consume a diet based on lean protein (chicken, fish) and vegetables. Prevent further events Please follow up with Neurology in 2-3 weeks of discharge, information for Dr. Libman below, please schedule an appointment. Also follow up with Dr. Aguilar, vascular surgeon, in 2-3 weeks. Continue your medications as prescribed.

## 2017-07-18 NOTE — H&P ADULT - MUSCULOSKELETAL
details… detailed exam calf tenderness/diminished strength/no joint swelling/no joint warmth/no joint erythema/decreased ROM

## 2017-07-18 NOTE — H&P ADULT - ASSESSMENT
89 year old M with PmHx of DM, HTN, PPM, CKD, PAD s/p RLE fem-popliteal bypass 3 weeks ago presents with left arm weakness/numbness that started in the morning around 10am. Currently pt is experiencing left sided weakness LUE > LLE and decreased strength and ROM in his left upper extremity as well as b/l calf tenderness and RLE edema. Admit to telemetry for r/o CVA and further workup for possible DVT.

## 2017-07-18 NOTE — H&P ADULT - SKIN COMMENTS
ulcer on the R dorsum of the foot. Surgical staples in his medial right thigh and right groin region, no erythema or swelling noted on physical exam.

## 2017-07-18 NOTE — DISCHARGE NOTE ADULT - SECONDARY DIAGNOSIS.
New onset atrial fibrillation Stenosis of right carotid artery Deep vein thrombosis (DVT) of other vein of right lower extremity, unspecified chronicity Skin tear of forearm without complication, right, initial encounter Essential hypertension DM (diabetes mellitus)

## 2017-07-18 NOTE — H&P ADULT - PROBLEM SELECTOR PLAN 1
Admit to telemetry. F/U Neurology note. Start ASA and atorvastatin. Speech and swallow evaluation. MRI brain w/o contrast, MRA head w/o contrast, MRA neck w/ contrast. PT/OT consult. Admit to telemetry, monitor for Arrhythmia, call for PPM interrogation in AM. Neurology c/s in chart. Re-start ASA and plavix (held since last week for anticpated foot surgery) change to atorvastatin. Speech and swallow evaluation. PT/OT consult. Repeat Head CT in 24 hrs, CD

## 2017-07-18 NOTE — H&P ADULT - NEGATIVE ENMT SYMPTOMS
no nasal discharge/no nasal obstruction/no hearing difficulty/no vertigo/no sinus symptoms/no ear pain/no tinnitus/no nasal congestion

## 2017-07-18 NOTE — DISCHARGE NOTE ADULT - NS AS DC FOLLOWUP STROKE INST
Stroke (includes: TIA/SAH/ICH/Ischemic Stroke) Coumadin/Warfarin/Stroke (includes: TIA/SAH/ICH/Ischemic Stroke)

## 2017-07-18 NOTE — H&P ADULT - MOTOR
4/5 RUE, 4/5 RLE  2/5 LUE, 3/5 LLE  ROM in tact in RUE and RLE. Passive ROM intact but limited ROM in LUE and LLE.  Abnormal arm drift test on left arm. 4/5 RUE, 4/5 RLE  2/5 LUE, 3/5 LLE  ROM in tact in RUE and RLE. Passive ROM intact but limited active ROM in LUE and LLE.  Abnormal arm drift test on left arm.

## 2017-07-18 NOTE — H&P ADULT - PROBLEM SELECTOR PLAN 2
US Venous Duplex of RLE due to edema and calf tenderness.   Start Heparin subq. Restart clopidogrel. s/p RLE fem-popliteal bypass. Wound care management. PT/OT consult.

## 2017-07-18 NOTE — DISCHARGE NOTE ADULT - MEDICATION SUMMARY - MEDICATIONS TO TAKE
I will START or STAY ON the medications listed below when I get home from the hospital:    Tylenol 325 mg oral tablet  -- 2 tab(s) by mouth 2 times a day  -- Indication: For Pain control    aspirin 81 mg oral tablet, chewable  -- 1 tab(s) by mouth once a day  -- Indication: For CVA (cerebral vascular accident)    tamsulosin 0.4 mg oral capsule  -- 1 cap(s) by mouth once a day (at bedtime)  -- Indication: For BPH    Coumadin 4 mg oral tablet  -- 1 tab(s) by mouth once a day  -- Indication: For New onset atrial fibrillation    metformin extended release 750 mg oral tablet, extended release  -- 1 tab(s) by mouth once a day  -- Indication: For DM (diabetes mellitus)    atorvastatin 80 mg oral tablet  -- 1 tab(s) by mouth once a day (at bedtime)  -- Indication: For HLD    metoprolol tartrate 25 mg oral tablet  -- 1 tab(s) by mouth 2 times a day  -- Indication: For HTN (hypertension)    albuterol 2.5 mg/3 mL (0.083%) inhalation solution  -- 3 milliliter(s) inhaled every 6 hours  -- Indication: For Shortness of breath    potassium chloride 10 mEq oral tablet, extended release  -- 2 tab(s) by mouth once a day  -- Indication: For Supplement    Carafate 1 g/10 mL oral suspension  -- 10 milliliter(s) by mouth 2 times a day  -- Indication: For Duodenal ulcer    Timoptic Ocudose 0.5% ophthalmic solution  -- 1 drop(s) to Right eye 2 times a day  -- Indication: For Eye drop    brimonidine 0.2% ophthalmic solution  -- 1 drop(s) to Right eye 2 times a day  -- Indication: For Eye drop    pantoprazole 40 mg oral delayed release tablet  -- 1 tab(s) by mouth once a day (before a meal)  -- Indication: For GERD    hydrALAZINE 50 mg oral tablet  -- 1 tab(s) by mouth 2 times a day  -- Indication: For HTN (hypertension)

## 2017-07-18 NOTE — DISCHARGE NOTE ADULT - CARE PROVIDERS DIRECT ADDRESSES
[email or web address]

## 2017-07-18 NOTE — DISCHARGE NOTE ADULT - MEDICATION SUMMARY - MEDICATIONS TO STOP TAKING
I will STOP taking the medications listed below when I get home from the hospital:    GlipiZIDE XL 5 mg oral tablet, extended release  -- 1 tab(s) by mouth once a day    cilostazol 50 mg oral tablet  -- 1 tab(s) by mouth 2 times a day    predniSONE 10 mg oral tablet  -- 3 tab(s) by mouth once a day  Until 7/2: Prednisone 30 mg daily  7/3 - 7/7: Prednisone 20 mg daily   7/8 - 7/12: Prednisone 15 mg daily  7/13 - 7/17: Prednisone 10 mg daily  7/18 - 7/22: Prednisone 5 mg daily.    amoxicillin-clavulanate 500 mg-125 mg oral tablet  -- 1 tab(s) by mouth 2 times a day until July 5, 2017    predniSONE 5 mg oral delayed release tablet  -- 1 tab(s) by mouth once a day x 5 doses    Plavix 75 mg oral tablet  -- 1 tab(s) by mouth once a day    Lasix 20 mg oral tablet  -- 1 tab(s) by mouth once a day

## 2017-07-18 NOTE — ED ADULT NURSE NOTE - OBJECTIVE STATEMENT
Facilitator note: Pt received to room 18 as a code stroke pt is alert and oriented x 2, He was sent by [facility] for c/o of left arm weakness, Pt is noted with left arm weakness and leg weakness. Pt was placed on a cardiac monitor paced rhythm noted and iv was accessed labs sent. Pt currently is c/o of right arm pain when the pt was placed in the hospital gown he was noted with a large skin tear to the forearm wound care was provided by DR. Martinez. Pt also has right leg staples and staples to the right groin area left leg appears swollen and noted with erythema no drainage present.  See systems for assessment.

## 2017-07-18 NOTE — H&P ADULT - PMH
Chronic kidney disease, unspecified stage    DM (diabetes mellitus)    Duodenal ulcer    HTN (hypertension)    Pacemaker    Peripheral arterial disease

## 2017-07-18 NOTE — H&P ADULT - NEGATIVE OPHTHALMOLOGIC SYMPTOMS
no blurred vision R/no lacrimation R/no photophobia/no diplopia/no lacrimation L/no discharge L/no blurred vision L/no discharge R

## 2017-07-18 NOTE — H&P ADULT - SENSORY
Intact two point discrimination in upper extremities. Poor two point discrimination in lower extremities. Pt had no sensation to sharp nor dull in the RLE below the knee.

## 2017-07-18 NOTE — H&P ADULT - RS GEN PE MLT RESP DETAILS PC
breath sounds equal/no chest wall tenderness/airway patent/clear to auscultation bilaterally/normal/respirations non-labored/good air movement

## 2017-07-18 NOTE — CONSULT NOTE ADULT - PROBLEM SELECTOR RECOMMENDATION 9
-unclear etiology, could be new CVA given patient w/ multiple risk factors, however, pt also has significant weakness throughout and unclear whether left arm weakness is acute   - would start ASA 81 mg, Lipitor 80 if no contraindications   - dysphagia screen   - MRI brain w/out contrast, MRA head w/out contrast, MRA neck w/ contrast   - HgA1c, lipid panel

## 2017-07-18 NOTE — DISCHARGE NOTE ADULT - CARE PLAN
Principal Discharge DX:	Cerebrovascular accident (CVA), unspecified mechanism  Goal:	Prevent further events  Instructions for follow-up, activity and diet:	Please follow up with Neurology in 2-3 weeks of discharge, information for Dr. Libman below, please schedule an appointment. Also follow up with Dr. Aguilar, vascular surgeon, in 2-3 weeks. Continue your medications as prescribed.  Secondary Diagnosis:	New onset atrial fibrillation  Instructions for follow-up, activity and diet:	Please continue coumadin as prescribed and please check INR in next 1-2 days to maintain an INR of 2-3.  Adjust the dose as needed.  Secondary Diagnosis:	Stenosis of right carotid artery  Instructions for follow-up, activity and diet:	You were evaluated for carotid endarterectomy and it was determined that for you it would not be beneficial. Please continue medications as prescribed.  Secondary Diagnosis:	Deep vein thrombosis (DVT) of other vein of right lower extremity, unspecified chronicity  Instructions for follow-up, activity and diet:	Continue coumadin as above.  Secondary Diagnosis:	Skin tear of forearm without complication, right, initial encounter  Instructions for follow-up, activity and diet:	you were noted to have a skin tear on your forearm which was bleeding. Bleeding has since decreased. Change bandages daily until wound is healed.  Secondary Diagnosis:	Essential hypertension  Instructions for follow-up, activity and diet:	Continue medications as currently prescribed. Follow up with your PMD for further management of disease. Dash diet.  Secondary Diagnosis:	DM (diabetes mellitus)  Instructions for follow-up, activity and diet:	Continue diet modification. Avoid complex carbohydrates such as bread, pasta, cereal, white rice, white potatoes, etc. Avoid concentrated sugar as found in desserts, candy, soda, juice, etc. Consume a diet based on lean protein (chicken, fish) and vegetables.

## 2017-07-18 NOTE — ED ADULT NURSE NOTE - CHIEF COMPLAINT QUOTE
Pt from [facility] with c/o left arm and hand onset 10:00am.code stroke called by Juan.

## 2017-07-18 NOTE — CONSULT NOTE ADULT - SUBJECTIVE AND OBJECTIVE BOX
NEUROLOGY CONSULT     Patient is a 89y old  Male who presents with a chief complaint of left arm weakness.     HPI: 89 yo  male pmh DM, HTN, MI, PPM, CKD, PAD s/p RLE fem-popliteal bypass 3 weeks ago brought in from [facility] for left arm weakness. As per transfer note, weakness started 1 hr prior to arrival, however, on further questioning, patient states he woke up with left arm and hand numbness and inability to move it. Dr Monroe ([facility]) attempted to be called, with no success. Unknown baseline, however, patient states he currently cannot walk and he is getting stronger at rehab. Endorses weakness in b/l legs from before as well as RUE elbow pain w/ associated weakness. Denies slurred speech, worsening left leg weakness, difficulty swallowing. Of note, patient was previously on ASA and Plavix as per [place] note, however, taken off these medications for possible surgery planning (unknown location)       PAST MEDICAL & SURGICAL HISTORY:  Chronic kidney disease, unspecified stage  Duodenal ulcer  DM (diabetes mellitus)  HTN (hypertension)  S/P appendectomy: 50 yrs ago      Allergies    No Known Allergies    Intolerances        MEDICATIONS  (STANDING):    MEDICATIONS  (PRN):      SOCIAL HISTORY: Denies tobacco/EtOH/drug use     FAMILY HISTORY:  No pertinent family history in first degree relatives      REVIEW OF SYSTEMS:  CONSTITUTIONAL:  No weight loss, fever, chills, weakness or fatigue.  HEENT:  Eyes:  No visual loss, blurred vision, double vision or yellow sclerae. Ears, Nose, Throat:  No hearing loss, sneezing, congestion, runny nose or sore throat.  SKIN:  No rash or itching.  CARDIOVASCULAR:  No chest pain, chest pressure or chest discomfort. No palpitations or edema.  RESPIRATORY:  No shortness of breath, cough or sputum.  GASTROINTESTINAL:  No anorexia, nausea, vomiting or diarrhea. No abdominal pain or blood.  GENITOURINARY:  denies incontinence/retention   NEUROLOGICAL:  No headache, dizziness, syncope, paralysis, ataxia, numbness or tingling in the extremities. No change in bowel or bladder control.  MUSCULOSKELETAL:  No muscle, back pain, joint pain or stiffness.  HEMATOLOGIC:  No anemia, bleeding or bruising.  LYMPHATICS:  No enlarged nodes. No history of splenectomy.  PSYCHIATRIC:  No history of depression or anxiety.  ENDOCRINOLOGIC:  No reports of sweating, cold or heat intolerance. No polyuria or polydipsia.      Vital Signs Last 24 Hrs  T(C): --  T(F): --  HR: --  BP: --  BP(mean): --  RR: --  SpO2: --    PHYSICAL EXAM:   General appearance: No acute distress                 Mental Status: AAOx3 to person, place and month only, fluent speech, able to follow simple and two step commands, however, unable to perform midline commands 2/2 to weakness   Cranial Nerves: EOMI, PERRL, VFF, V1-V3 intact, facial symmetric,  tongue midline  Motor: LUE able to move in horizontal plane distally, handgrip strength 0/5, RUE able to lift to antigravity limited by pain, b/l LEs able to move in horizontal plane only   Sensation: Intact to LT throughout, although patient endorses numbness on history taking, on exam, denies sensory changes   Coordination: unable to test 2/2 to weakness   NIHSS 10     LABS:              IMAGING:

## 2017-07-18 NOTE — DISCHARGE NOTE ADULT - CARE PROVIDER_API CALL
Miguelito Aguilar), Vascular Surgery  [address] [place] [address]  Phone: [phone]  Fax: [phone]    Libman, Richard B (MD), Neurology; Vascular Neurology  [address] [place] [id number]  Phone: [phone]  Fax: [phone]

## 2017-07-18 NOTE — H&P ADULT - HISTORY OF PRESENT ILLNESS
89 year old M presents with left arm weakness/numbness that started in the morning around 10am. As per pt, he woke up and felt like his hand was numb, unable to open his hand, as well as unable to raise or move his arm. Pt states that normally he has no limitation if his ROM throughout and that this is new to him. Admits to mouth feeling numb as well, but no slurring of speech, blurry visions, facial drooping, lightheadedness, dizziness. Denies any CP, SOB, palpitations, frequent or unusual headaches, trauma, or recent falls, fevers, or recent infections. Pt had RLE fem-popliteal bypass three weeks ago and states that since the surgery he has only had 3 sessions of physical therapy and otherwise does not ambulate. As per pt, he denies any previous hx of MI, CVA, DVT/PE. Currently pt is laying in bed in no acute discomfort and found to have left sided weakness LUE>LLE and is still experiencing numbness. 89 year old M sent from [facility] with left arm weakness/numbness that started in the morning around 10am. As per pt, he woke up and felt like his hand was numb, unable to open his hand, as well as unable to raise or move his arm. Pt states that normally he has no limitation of his ROM throughout and that this is new to him. Admits to mouth feeling numb as well, but no slurring of speech, blurry visions, facial drooping, lightheadedness, dizziness. Denies any CP, SOB, palpitations, frequent or unusual headaches, trauma, or recent falls, fevers, or recent infections. Pt had RLE fem-popliteal bypass three weeks ago and states that since the surgery he has only had 3 sessions of physical therapy and otherwise does not ambulate. Currently pt is laying in bed in no acute discomfort.

## 2017-07-18 NOTE — H&P ADULT - PSH
S/P appendectomy  50 yrs ago  S/P femoral-popliteal bypass surgery  Right Lower Extremity, 3 weeks ago

## 2017-07-19 DIAGNOSIS — I63.9 CEREBRAL INFARCTION, UNSPECIFIED: ICD-10-CM

## 2017-07-19 DIAGNOSIS — I82.4Z1 ACUTE EMBOLISM AND THROMBOSIS OF UNSPECIFIED DEEP VEINS OF RIGHT DISTAL LOWER EXTREMITY: ICD-10-CM

## 2017-07-19 DIAGNOSIS — E11.8 TYPE 2 DIABETES MELLITUS WITH UNSPECIFIED COMPLICATIONS: ICD-10-CM

## 2017-07-19 LAB
ALBUMIN SERPL ELPH-MCNC: 3.2 G/DL — LOW (ref 3.3–5)
ALP SERPL-CCNC: 66 U/L — SIGNIFICANT CHANGE UP (ref 40–120)
ALT FLD-CCNC: 17 U/L — SIGNIFICANT CHANGE UP (ref 4–41)
APPEARANCE UR: CLEAR — SIGNIFICANT CHANGE UP
AST SERPL-CCNC: 16 U/L — SIGNIFICANT CHANGE UP (ref 4–40)
BASOPHILS # BLD AUTO: 0.01 K/UL — SIGNIFICANT CHANGE UP (ref 0–0.2)
BASOPHILS NFR BLD AUTO: 0.1 % — SIGNIFICANT CHANGE UP (ref 0–2)
BILIRUB SERPL-MCNC: 0.8 MG/DL — SIGNIFICANT CHANGE UP (ref 0.2–1.2)
BILIRUB UR-MCNC: NEGATIVE — SIGNIFICANT CHANGE UP
BLOOD UR QL VISUAL: NEGATIVE — SIGNIFICANT CHANGE UP
BUN SERPL-MCNC: 24 MG/DL — HIGH (ref 7–23)
CALCIUM SERPL-MCNC: 8.8 MG/DL — SIGNIFICANT CHANGE UP (ref 8.4–10.5)
CHLORIDE SERPL-SCNC: 100 MMOL/L — SIGNIFICANT CHANGE UP (ref 98–107)
CO2 SERPL-SCNC: 25 MMOL/L — SIGNIFICANT CHANGE UP (ref 22–31)
COLOR SPEC: YELLOW — SIGNIFICANT CHANGE UP
CREAT SERPL-MCNC: 0.94 MG/DL — SIGNIFICANT CHANGE UP (ref 0.5–1.3)
EOSINOPHIL # BLD AUTO: 0.09 K/UL — SIGNIFICANT CHANGE UP (ref 0–0.5)
EOSINOPHIL NFR BLD AUTO: 1.1 % — SIGNIFICANT CHANGE UP (ref 0–6)
EPI CELLS # UR: SIGNIFICANT CHANGE UP
GLUCOSE SERPL-MCNC: 152 MG/DL — HIGH (ref 70–99)
GLUCOSE UR-MCNC: NEGATIVE — SIGNIFICANT CHANGE UP
HCT VFR BLD CALC: 31.1 % — LOW (ref 39–50)
HGB BLD-MCNC: 10.2 G/DL — LOW (ref 13–17)
IMM GRANULOCYTES # BLD AUTO: 0.04 # — SIGNIFICANT CHANGE UP
IMM GRANULOCYTES NFR BLD AUTO: 0.5 % — SIGNIFICANT CHANGE UP (ref 0–1.5)
KETONES UR-MCNC: SIGNIFICANT CHANGE UP
LEUKOCYTE ESTERASE UR-ACNC: NEGATIVE — SIGNIFICANT CHANGE UP
LYMPHOCYTES # BLD AUTO: 0.58 K/UL — LOW (ref 1–3.3)
LYMPHOCYTES # BLD AUTO: 6.9 % — LOW (ref 13–44)
MAGNESIUM SERPL-MCNC: 1.7 MG/DL — SIGNIFICANT CHANGE UP (ref 1.6–2.6)
MCHC RBC-ENTMCNC: 31.8 PG — SIGNIFICANT CHANGE UP (ref 27–34)
MCHC RBC-ENTMCNC: 32.8 % — SIGNIFICANT CHANGE UP (ref 32–36)
MCV RBC AUTO: 96.9 FL — SIGNIFICANT CHANGE UP (ref 80–100)
MONOCYTES # BLD AUTO: 0.56 K/UL — SIGNIFICANT CHANGE UP (ref 0–0.9)
MONOCYTES NFR BLD AUTO: 6.6 % — SIGNIFICANT CHANGE UP (ref 2–14)
NEUTROPHILS # BLD AUTO: 7.15 K/UL — SIGNIFICANT CHANGE UP (ref 1.8–7.4)
NEUTROPHILS NFR BLD AUTO: 84.8 % — HIGH (ref 43–77)
NITRITE UR-MCNC: NEGATIVE — SIGNIFICANT CHANGE UP
NRBC # FLD: 0 — SIGNIFICANT CHANGE UP
PH UR: 7 — SIGNIFICANT CHANGE UP (ref 5–8)
PHOSPHATE SERPL-MCNC: 2.8 MG/DL — SIGNIFICANT CHANGE UP (ref 2.5–4.5)
PLATELET # BLD AUTO: 159 K/UL — SIGNIFICANT CHANGE UP (ref 150–400)
PMV BLD: 11.1 FL — SIGNIFICANT CHANGE UP (ref 7–13)
POTASSIUM SERPL-MCNC: 4.1 MMOL/L — SIGNIFICANT CHANGE UP (ref 3.5–5.3)
POTASSIUM SERPL-SCNC: 4.1 MMOL/L — SIGNIFICANT CHANGE UP (ref 3.5–5.3)
PROT SERPL-MCNC: 5.6 G/DL — LOW (ref 6–8.3)
PROT UR-MCNC: 30 — SIGNIFICANT CHANGE UP
RBC # BLD: 3.21 M/UL — LOW (ref 4.2–5.8)
RBC # FLD: 14.6 % — HIGH (ref 10.3–14.5)
RBC CASTS # UR COMP ASSIST: SIGNIFICANT CHANGE UP (ref 0–?)
SODIUM SERPL-SCNC: 139 MMOL/L — SIGNIFICANT CHANGE UP (ref 135–145)
SP GR SPEC: 1.01 — SIGNIFICANT CHANGE UP (ref 1–1.03)
UROBILINOGEN FLD QL: NORMAL E.U. — SIGNIFICANT CHANGE UP (ref 0.2–1)
WBC # BLD: 8.43 K/UL — SIGNIFICANT CHANGE UP (ref 3.8–10.5)
WBC # FLD AUTO: 8.43 K/UL — SIGNIFICANT CHANGE UP (ref 3.8–10.5)
WBC UR QL: SIGNIFICANT CHANGE UP (ref 0–?)

## 2017-07-19 PROCEDURE — 93971 EXTREMITY STUDY: CPT | Mod: 26,LT

## 2017-07-19 PROCEDURE — 93880 EXTRACRANIAL BILAT STUDY: CPT | Mod: 26

## 2017-07-19 PROCEDURE — 99233 SBSQ HOSP IP/OBS HIGH 50: CPT

## 2017-07-19 PROCEDURE — 99223 1ST HOSP IP/OBS HIGH 75: CPT

## 2017-07-19 RX ORDER — FUROSEMIDE 40 MG
40 TABLET ORAL
Qty: 0 | Refills: 0 | Status: DISCONTINUED | OUTPATIENT
Start: 2017-07-19 | End: 2017-07-21

## 2017-07-19 RX ORDER — DEXTROSE 50 % IN WATER 50 %
50 SYRINGE (ML) INTRAVENOUS ONCE
Qty: 0 | Refills: 0 | Status: COMPLETED | OUTPATIENT
Start: 2017-07-19 | End: 2017-07-19

## 2017-07-19 RX ORDER — DEXTROSE 50 % IN WATER 50 %
25 SYRINGE (ML) INTRAVENOUS ONCE
Qty: 0 | Refills: 0 | Status: DISCONTINUED | OUTPATIENT
Start: 2017-07-19 | End: 2017-07-19

## 2017-07-19 RX ORDER — DEXTROSE 10 % IN WATER 10 %
1000 INTRAVENOUS SOLUTION INTRAVENOUS
Qty: 0 | Refills: 0 | Status: DISCONTINUED | OUTPATIENT
Start: 2017-07-19 | End: 2017-07-20

## 2017-07-19 RX ORDER — ATORVASTATIN CALCIUM 80 MG/1
80 TABLET, FILM COATED ORAL AT BEDTIME
Qty: 0 | Refills: 0 | Status: DISCONTINUED | OUTPATIENT
Start: 2017-07-19 | End: 2017-07-25

## 2017-07-19 RX ORDER — DEXTROSE 10 % IN WATER 10 %
1000 INTRAVENOUS SOLUTION INTRAVENOUS
Qty: 0 | Refills: 0 | Status: DISCONTINUED | OUTPATIENT
Start: 2017-07-19 | End: 2017-07-19

## 2017-07-19 RX ADMIN — Medication 30 MILLILITER(S): at 19:47

## 2017-07-19 RX ADMIN — TAMSULOSIN HYDROCHLORIDE 0.4 MILLIGRAM(S): 0.4 CAPSULE ORAL at 22:59

## 2017-07-19 RX ADMIN — Medication 650 MILLIGRAM(S): at 05:42

## 2017-07-19 RX ADMIN — Medication 81 MILLIGRAM(S): at 12:19

## 2017-07-19 RX ADMIN — HEPARIN SODIUM 5000 UNIT(S): 5000 INJECTION INTRAVENOUS; SUBCUTANEOUS at 13:18

## 2017-07-19 RX ADMIN — HEPARIN SODIUM 5000 UNIT(S): 5000 INJECTION INTRAVENOUS; SUBCUTANEOUS at 22:59

## 2017-07-19 RX ADMIN — CLOPIDOGREL BISULFATE 75 MILLIGRAM(S): 75 TABLET, FILM COATED ORAL at 12:19

## 2017-07-19 RX ADMIN — Medication 3 MILLILITER(S): at 11:00

## 2017-07-19 RX ADMIN — Medication 40 MILLIGRAM(S): at 18:47

## 2017-07-19 RX ADMIN — Medication 3 MILLILITER(S): at 21:47

## 2017-07-19 RX ADMIN — CILOSTAZOL 50 MILLIGRAM(S): 100 TABLET ORAL at 05:42

## 2017-07-19 RX ADMIN — Medication 1 GRAM(S): at 05:42

## 2017-07-19 RX ADMIN — Medication 60 MILLILITER(S): at 18:58

## 2017-07-19 RX ADMIN — Medication 5 MILLIGRAM(S): at 05:42

## 2017-07-19 RX ADMIN — ATORVASTATIN CALCIUM 80 MILLIGRAM(S): 80 TABLET, FILM COATED ORAL at 23:01

## 2017-07-19 RX ADMIN — Medication 20 MILLIGRAM(S): at 05:42

## 2017-07-19 RX ADMIN — Medication 50 MILLILITER(S): at 18:30

## 2017-07-19 RX ADMIN — HEPARIN SODIUM 5000 UNIT(S): 5000 INJECTION INTRAVENOUS; SUBCUTANEOUS at 05:42

## 2017-07-19 RX ADMIN — Medication 50 MILLIGRAM(S): at 05:42

## 2017-07-19 RX ADMIN — Medication 2: at 13:17

## 2017-07-19 RX ADMIN — Medication 20 MILLIEQUIVALENT(S): at 12:19

## 2017-07-19 RX ADMIN — Medication 1 DROP(S): at 05:43

## 2017-07-19 RX ADMIN — Medication 3 MILLILITER(S): at 04:08

## 2017-07-19 RX ADMIN — Medication 25 MILLIGRAM(S): at 05:42

## 2017-07-19 RX ADMIN — BRIMONIDINE TARTRATE 1 DROP(S): 2 SOLUTION/ DROPS OPHTHALMIC at 05:33

## 2017-07-19 RX ADMIN — Medication 50 MILLILITER(S): at 19:30

## 2017-07-19 NOTE — PHYSICAL THERAPY INITIAL EVALUATION ADULT - ADDITIONAL COMMENTS
Pt lives alone in an apartment. Pt was using a wide based quad cane for transfers and ambulation. However, patient has been at I for the last couple of weeks and ambulating short distances with assistance "a couple times"

## 2017-07-19 NOTE — OCCUPATIONAL THERAPY INITIAL EVALUATION ADULT - LUE MMT, REHAB EVAL
Shoulder flexion 2/5, elbow flexion 3-/5, wrist extension 2-/5, hand flexion/extension 3/5 Shoulder flexion 2/5, elbow flexion 3-/5, wrist flexion/extension 2/5, MP finger flexion/extension 3/5

## 2017-07-19 NOTE — CHART NOTE - NSCHARTNOTEFT_GEN_A_CORE
RRT was called for hypoglycemia with FSG 16. Pt was unresponsive, but had a good pulse and was completely hemodynamically stable. RRT was called for hypoglycemia with FSG 16 after receiving 2 U sliding scale insulin. Pt did not take any basal insulin, and according to nurse he did eat his lunch. No renal dysfunction. Pt was unresponsive, but had a good pulse and was completely hemodynamically stable. Pt was given 2 amps of D50 with FSG improvement to 300s, pt started to mentate as well, was unable to verbalize but followed commands. Pt has bilateral pleural effusions so no maintenance D5/fluids were started. Pt will be closely monitored with FSG q 30 min x2 then FSG q4 hours after that. Pt's ISS was discontinued at this time. PA and nurse present and in agreement of plan.

## 2017-07-19 NOTE — PATIENT PROFILE ADULT. - NS TRANSFER PATIENT BELONGINGS
1 navy blue pants, 1 white t-shirt, 1 red button up shirt, 1 light blue sweater, 1 pair of black velcrow shoes/Clothing

## 2017-07-19 NOTE — OCCUPATIONAL THERAPY INITIAL EVALUATION ADULT - RANGE OF MOTION EXAMINATION, UPPER EXTREMITY
Left AROM: shoulder flexion 0-20, elbow flexion 0-90, wrist extension 0-10, hand flexion/extension WFL/Right UE Active ROM was WFL (within functional limits)/Left UE Passive ROM was WFL  (within functional limits) Right UE Active ROM was WFL (within functional limits)/Left UE Passive ROM was WFL  (within functional limits)/Left UE AROM: Shoulder flexion 0-20 degrees, Elbow flexion 0-90 degrees, MP Finger flexion/extension WFL

## 2017-07-19 NOTE — OCCUPATIONAL THERAPY INITIAL EVALUATION ADULT - IMPAIRED TRANSFERS: SIT/STAND, REHAB EVAL
impaired balance/decreased strength/impaired postural control impaired postural control/decreased strength/decreased ROM/impaired balance

## 2017-07-19 NOTE — OCCUPATIONAL THERAPY INITIAL EVALUATION ADULT - MD ORDER
Occupational Therapy to evaluate and treat. Occupational Therapy to evaluate and treat. Out of Bed with Assistance. As per RNPrabhu, patient is okay to participate in OT evaluation and perform out of bed activity. Occupational Therapy to evaluate and treat. Out of Bed with Assistance. As per Prabhu NINO, patient is okay to participate in OT evaluation and perform out of bed activity as tolerated.

## 2017-07-19 NOTE — PROGRESS NOTE ADULT - SUBJECTIVE AND OBJECTIVE BOX
CC: Follow up for acute CVA    SUBJECTIVE / OVERNIGHT EVENTS:  Still with LUE weakness but improving.  Skin tear on RUE.  No F/C, N/V, CP, SOB, Cough, lightheadedness, dizziness, abdominal pain, diarrhea, dysuria.    MEDICATIONS  (STANDING):  cilostazol 50 milliGRAM(s) Oral two times a day  tamsulosin 0.4 milliGRAM(s) Oral at bedtime  timolol 0.5% Solution 1 Drop(s) Right EYE two times a day  brimonidine 0.2% Ophthalmic Solution 1 Drop(s) Right EYE two times a day  predniSONE   Tablet 5 milliGRAM(s) Oral daily  acetaminophen   Tablet 650 milliGRAM(s) Oral two times a day  sucralfate suspension 1 Gram(s) Oral two times a day  hydrALAZINE 50 milliGRAM(s) Oral two times a day  ALBUTerol/ipratropium for Nebulization 3 milliLiter(s) Nebulizer every 6 hours  metoprolol 25 milliGRAM(s) Oral two times a day  potassium chloride    Tablet ER 20 milliEquivalent(s) Oral daily  aspirin  chewable 81 milliGRAM(s) Oral daily  clopidogrel Tablet 75 milliGRAM(s) Oral daily  heparin  Injectable 5000 Unit(s) SubCutaneous every 8 hours  dextrose 5%. 1000 milliLiter(s) (50 mL/Hr) IV Continuous <Continuous>  dextrose 50% Injectable 12.5 Gram(s) IV Push once  dextrose 50% Injectable 25 Gram(s) IV Push once  dextrose 50% Injectable 25 Gram(s) IV Push once  furosemide    Tablet 20 milliGRAM(s) Oral daily  insulin lispro (HumaLOG) corrective regimen sliding scale   SubCutaneous three times a day before meals  insulin lispro (HumaLOG) corrective regimen sliding scale   SubCutaneous at bedtime  atorvastatin 80 milliGRAM(s) Oral at bedtime    MEDICATIONS  (PRN):  dextrose Gel 1 Dose(s) Oral once PRN Blood Glucose LESS THAN 70 milliGRAM(s)/deciliter  glucagon  Injectable 1 milliGRAM(s) IntraMuscular once PRN Glucose LESS THAN 70 milligrams/deciliter      Vital Signs Last 24 Hrs  T(C): 36.8 (17 @ 05:28), Max: 36.8 (17 @ 19:25)  HR: 77 (17 @ 11:00) (60 - 78)  BP: 175/84 (17 @ 05:28) (172/80 - 189/58)  RR: 18 (17 @ 05:28) (18 - 18)  SpO2: 97% (17 @ 11:00) (96% - 98%)  CAPILLARY BLOOD GLUCOSE  214 (2017 12:40)  149 (2017 08:57)  138 (2017 21:24)        I&O's Summary      PHYSICAL EXAM:  GENERAL: NAD, well-developed  HEAD:  Atraumatic, Normocephalic  EYES: EOMI, PERRLA, conjunctiva and sclera clear  NECK: Supple, No JVD  CHEST/LUNG: Clear to auscultation bilaterally; No wheeze  HEART: Regular rate and rhythm; No murmurs, rubs, or gallops  ABDOMEN: Soft, Nontender, Nondistended; Bowel sounds present  EXTREMITIES:  2+ Peripheral Pulses, No clubbing, cyanosis, or edema  PSYCH: Calm  NEUROLOGY: A/Ox3,  SKIN: No rashes or lesions    LABS:                        10.2   8.43  )-----------( 159      ( 2017 06:21 )             31.1     -    139  |  100  |  24<H>  ----------------------------<  152<H>  4.1   |  25  |  0.94    Ca    8.8      2017 06:21  Phos  2.8       Mg     1.7         TPro  5.6<L>  /  Alb  3.2<L>  /  TBili  0.8  /  DBili  x   /  AST  16  /  ALT  17  /  AlkPhos  66  07-19    PT/INR - ( 2017 11:44 )   PT: 11.2 SEC;   INR: 1.00          PTT - ( 2017 11:44 )  PTT:23.6 SEC  CARDIAC MARKERS ( 2017 18:28 )  x     / < 0.06 ng/mL / 21 u/L / x     / x      CARDIAC MARKERS ( 2017 11:44 )  x     / 0.07 ng/mL / 33 u/L / 4.22 ng/mL / x          Urinalysis Basic - ( 2017 07:51 )    Color: YELLOW / Appearance: CLEAR / S.010 / pH: 7.0  Gluc: NEGATIVE / Ketone: TRACE  / Bili: NEGATIVE / Urobili: NORMAL E.U.   Blood: NEGATIVE / Protein: 30 / Nitrite: NEGATIVE   Leuk Esterase: NEGATIVE / RBC: 0-2 / WBC 3-5   Sq Epi: x / Non Sq Epi: FEW / Bacteria: x        RADIOLOGY & ADDITIONAL TESTS:    Imaging Personally Reviewed:    Consultant(s) Notes Reviewed:      Care Discussed with Consultants/Other Providers: CC: Follow up for acute CVA    SUBJECTIVE / OVERNIGHT EVENTS:  Still with LUE weakness but improving.  Skin tear on RUE.  No F/C, N/V, CP, SOB, Cough, lightheadedness, dizziness, abdominal pain, diarrhea, dysuria.    MEDICATIONS  (STANDING):  cilostazol 50 milliGRAM(s) Oral two times a day  tamsulosin 0.4 milliGRAM(s) Oral at bedtime  timolol 0.5% Solution 1 Drop(s) Right EYE two times a day  brimonidine 0.2% Ophthalmic Solution 1 Drop(s) Right EYE two times a day  predniSONE   Tablet 5 milliGRAM(s) Oral daily  acetaminophen   Tablet 650 milliGRAM(s) Oral two times a day  sucralfate suspension 1 Gram(s) Oral two times a day  hydrALAZINE 50 milliGRAM(s) Oral two times a day  ALBUTerol/ipratropium for Nebulization 3 milliLiter(s) Nebulizer every 6 hours  metoprolol 25 milliGRAM(s) Oral two times a day  potassium chloride    Tablet ER 20 milliEquivalent(s) Oral daily  aspirin  chewable 81 milliGRAM(s) Oral daily  clopidogrel Tablet 75 milliGRAM(s) Oral daily  heparin  Injectable 5000 Unit(s) SubCutaneous every 8 hours  dextrose 5%. 1000 milliLiter(s) (50 mL/Hr) IV Continuous <Continuous>  dextrose 50% Injectable 12.5 Gram(s) IV Push once  dextrose 50% Injectable 25 Gram(s) IV Push once  dextrose 50% Injectable 25 Gram(s) IV Push once  furosemide    Tablet 20 milliGRAM(s) Oral daily  insulin lispro (HumaLOG) corrective regimen sliding scale   SubCutaneous three times a day before meals  insulin lispro (HumaLOG) corrective regimen sliding scale   SubCutaneous at bedtime  atorvastatin 80 milliGRAM(s) Oral at bedtime    MEDICATIONS  (PRN):  dextrose Gel 1 Dose(s) Oral once PRN Blood Glucose LESS THAN 70 milliGRAM(s)/deciliter  glucagon  Injectable 1 milliGRAM(s) IntraMuscular once PRN Glucose LESS THAN 70 milligrams/deciliter      Vital Signs Last 24 Hrs  T(C): 36.8 (17 @ 05:28), Max: 36.8 (17 @ 19:25)  HR: 77 (17 @ 11:00) (60 - 78)  BP: 175/84 (17 @ 05:28) (172/80 - 189/58)  RR: 18 (17 @ 05:28) (18 - 18)  SpO2: 97% (17 @ 11:00) (96% - 98%)  CAPILLARY BLOOD GLUCOSE  214 (2017 12:40)  149 (2017 08:57)  138 (2017 21:24)        I&O's Summary      PHYSICAL EXAM:  GENERAL: NAD, fragile appearing  HEAD:  Atraumatic, Normocephalic  EYES: EOMI, PERRLA, conjunctiva and sclera clear  NECK: Supple, No JVD  CHEST/LUNG: Clear to auscultation bilaterally; No wheeze  HEART: Regular rate and rhythm; No murmurs, rubs, or gallops  ABDOMEN: Soft, Nontender, Nondistended; Bowel sounds present  EXTREMITIES:  2+ Peripheral Pulses, No clubbing, cyanosis, or edema  PSYCH: Calm  NEUROLOGY: A/Ox3, LUE weakness.  SKIN: RUE skin tear - covered with dressing. RLE surgical site C/D/I    LABS:                        10.2   8.43  )-----------( 159      ( 2017 06:21 )             31.1     -19    139  |  100  |  24<H>  ----------------------------<  152<H>  4.1   |  25  |  0.94    Ca    8.8      2017 06:21  Phos  2.8       Mg     1.7     -    TPro  5.6<L>  /  Alb  3.2<L>  /  TBili  0.8  /  DBili  x   /  AST  16  /  ALT  17  /  AlkPhos  66  07-19    PT/INR - ( 2017 11:44 )   PT: 11.2 SEC;   INR: 1.00          PTT - ( 2017 11:44 )  PTT:23.6 SEC  CARDIAC MARKERS ( 2017 18:28 )  x     / < 0.06 ng/mL / 21 u/L / x     / x      CARDIAC MARKERS ( 2017 11:44 )  x     / 0.07 ng/mL / 33 u/L / 4.22 ng/mL / x          Urinalysis Basic - ( 2017 07:51 )    Color: YELLOW / Appearance: CLEAR / S.010 / pH: 7.0  Gluc: NEGATIVE / Ketone: TRACE  / Bili: NEGATIVE / Urobili: NORMAL E.U.   Blood: NEGATIVE / Protein: 30 / Nitrite: NEGATIVE   Leuk Esterase: NEGATIVE / RBC: 0-2 / WBC 3-5   Sq Epi: x / Non Sq Epi: FEW / Bacteria: x        RADIOLOGY & ADDITIONAL TESTS:  < from: US Duplex Venous Lower Ext Ltd, Right (17 @ 14:14) >  EXAM:  US DPLX LWR EXT VEINS LTD RT        PROCEDURE DATE:  2017         INTERPRETATION:  CLINICAL INFORMATION: Right lower extremity swelling    COMPARISON: None available.    TECHNIQUE: Duplex sonography of the RIGHT LOWER extremity with color and   spectral Doppler, with and without compression.      FINDINGS:    There is normal compressibility of the right common femoral, femoral,   popliteal, peroneal and posterior tibial veins. Occlusive thrombus is   seen in a short segment of an expanded soleal vein.    The contralateral common femoral vein is patent.    Doppler examination shows normal spontaneous and phasic flow.    IMPRESSION:     Right soleal deep vein thrombosis.    Findings discussed with VAL Quezada on 2017, 1420hours    GEORGINA LEYVA M.D., ATTENDING RADIOLOGIST  This document has been electronically signed. 2017  2:20PM    < end of copied text >  < from: VA Duplex Carotid Arteries, Bilateral. (17 @ 08:01) >  Patient name: PHILOMENA ANGULO  Date of test: 2017  MR#: [id number]  [facility] #: [id number]    Location: [facility] Physician(s): , Sandra Pro MD  Interpreted by: Olu Agosto MD, FAC, OSIRIS, RPKELLEN  Tech: Gena Wagner RVT  Type of Test: Carotid Doppler Evaluation  ------------------------------------------------------------------------  Procedure: Duplex Real-time grayscale/color Doppler  ultrasonography used to interrogate extracranial arteries  bilaterally.  Indications: Occlusion and stenosis of bilateral carotid  arteries (I65.23)  ------------------------------------------------------------------------  VELOCITY:  ------------------------------------------------------------------------  RIGHT:    Subclavian: 49 / 7    Prox CCA: 79/    Mid CCA: 75/10    Dist CCA: 86/7    Prox ICA: 216/19    Mid ICA: 121 / 23    Distal ICA: 77 / 14    ECA: 146 / 14    Vertebral: 94 / 18    ICA/CCA: 2.5  ------------------------------------------------------------------------    Subclavian: Normal    CCA Plaque: Moderate    ICA Plaque: Moderate-severe    Vertebral: Antegrade flow  ------------------------------------------------------------------------  LEFT:    Subclavian: 89 / 8    Prox CCA: 84/13    Mid CCA: 98/14    Dist CCA: 98/11    Prox ICA: 170/33    Mid ICA: 142 / 30    Distal ICA: 63 / 14    ECA: 109 /    Vertebral: 77 / 11    ICA/CCA: 1.7  ------------------------------------------------------------------------    Subclavian: Normal    CCA Plaque: Moderate    ICA Plaque: Moderate-severe    Vertebral: Antegrade flow  ------------------------------------------------------------------------  Right Findings: Right Common Carotid Artery: There is  moderate heterogenous plaque noted throughout the vessel.  Right Internal Carotid Artery:  B-mode and spectral  analyses consistent with a diameter reduction of 50-79%.  There is moderate heterogenous plaque within the proximal  vessel.  Moderate-severe atherosclerosis in the external carotid  artery (>50% stenosis).  Right Vertebral Artery:  Antegrade flow with normal  velocities.  Left Findings: Left Common Carotid Artery: There is  moderate heterogenous plaque noted throughout the vessel.  Left Internal Carotid Artery:  B-mode and spectral  analyses consistent with diameter reduction of 50-79%.  There is moderate heterogenous plaque within the proximal  vessel.  Moderate-severe atherosclerosis in the external carotid  artery (>50% stenosis).  Left Vertebral Artery:  Antegrade flow with normal  velocities.  ------------------------------------------------------------------------  Summary/Impressions:  Moderate heterogenous plaque noted within the proximal  right and left internal carotid arteries, consistent with  50-79% in both proximal vessels.  ------------------------------------------------------------------------  Confirmed on  2017 - 9:22 AM by Olu Agosto MD, FACC,  ZULEYKAE, RPVI  By signing this report, the attending physician certifies  that he or she has personally supervised and interpreted  the vascular study and has reviewed and or edited and  agrees with the written comments contained within the  report.    < end of copied text >    Imaging Personally Reviewed: yes    Consultant(s) Notes Reviewed:      Care Discussed with Consultants/Other Providers: vasc med - dr. agosto, neuro - dr. libman

## 2017-07-19 NOTE — PHYSICAL THERAPY INITIAL EVALUATION ADULT - PERTINENT HX OF CURRENT PROBLEM, REHAB EVAL
89 year old M sent from [facility] with left arm weakness/numbness that started in the morning around 10am. As per pt, he woke up and felt like his hand was numb, unable to open his hand, as well as unable to raise or move his arm.

## 2017-07-19 NOTE — OCCUPATIONAL THERAPY INITIAL EVALUATION ADULT - IMPAIRMENTS CONTRIBUTING IMPAIRED BED MOBILITY, REHAB EVAL
impaired balance/impaired postural control/decreased strength decreased ROM/impaired balance/decreased strength/impaired postural control

## 2017-07-19 NOTE — OCCUPATIONAL THERAPY INITIAL EVALUATION ADULT - LIVES WITH, PROFILE
Patient reports he lives alone in a house which has a ramp with railing on both side at the back entrance and at the end of the ramp there are 2 steps to enter.  Patient explains once inside, both bathroom and bedroom are on the main floor. As per patient, he has a bathtub in his bathroom with a shower chair and two grab bars. Accuracy of Social History questionable 2/2 pt. w/ noted confusion & difficulty following commands at times during OT evaluation. Per chart and pt., Pt presents to [facility] from [facility] where he was receiving short-term rehab services. Pt explains prior to initial hospitalization, that he was living alone in a house where there is a ramp and then 2 steps to enter. Pt explains once inside, both bathroom and bedroom are on the main floor. As per patient, he has a bathtub in his bathroom with a shower chair and two grab bars available.

## 2017-07-19 NOTE — OCCUPATIONAL THERAPY INITIAL EVALUATION ADULT - DIAGNOSIS, OT EVAL
r/o CVA. Decreased L UE strength/AROM; Decreased functional mobility; Decreased ADL management. r/o CVA; AAO x2; Decreased ability to follow commands; Decreased L UE AROM/strength; Decreased sitting/standing balance; Decreased functional mobility; Decreased ADL management.

## 2017-07-19 NOTE — PROGRESS NOTE ADULT - PROBLEM SELECTOR PLAN 1
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  EP to interrogate PPM. ASA, plavix, lipitor. Awaiting CT angio of head/neck to assess intracranial vasculature.  PT/OT/PMR eval. Lipid profile in AM. Appreciate neuro and vasc med input.

## 2017-07-19 NOTE — OCCUPATIONAL THERAPY INITIAL EVALUATION ADULT - PERTINENT HX OF CURRENT PROBLEM, REHAB EVAL
Pt is a 89 year old M who presents to [facility] from [facility] rehab on 7/18/17 with left arm weakness/numbness that started in the morning around 10am. Pt had R LE fem-popliteal bypass three weeks ago. CT Head No Contrast on 7/18/17 displays no CT evidence of acute intracranial hemorrhage, brain edema, or mass effect. Multiple chronic infarctions within the right frontal and parietal lobes and basal ganglia in the right MCA distribution.

## 2017-07-19 NOTE — OCCUPATIONAL THERAPY INITIAL EVALUATION ADULT - GENERAL OBSERVATIONS, REHAB EVAL
Pt. received semisupine in bed. +Heplock, +B/L heel protectors, Tele. PT present bedside to perform PT evaluation in conjunction w/OT evaluation. Pt. received semisupine in bed. +Heplock, +Tele, +B/L heel protectors. PT present bedside to perform PT evaluation in conjunction w/ OT evaluation.

## 2017-07-19 NOTE — OCCUPATIONAL THERAPY INITIAL EVALUATION ADULT - ADDITIONAL COMMENTS
Patient reports he has a HHA who comes in from 9am to 2pm everyday for 7 days. Accuracy of Prior Functioning Status questionable 2/2 pt. w/ noted confusion & difficulty following commands at times during OT evaluation. Per chart, pt. benefited from assistance with functional mobility using RW and benefited from assistance with ADL's while at [facility] for Rehab. However, prior to initial hospitalization, pt. explains he was independent with ADL's; however, pt. explains he used a quad cane for functional mobility and shower chair/grab bars for bathing tasks. Patient also reports he had a HHA 7 days/week x 5 hours/day to assist him with IADL's.

## 2017-07-19 NOTE — OCCUPATIONAL THERAPY INITIAL EVALUATION ADULT - PLANNED THERAPY INTERVENTIONS, OT EVAL
strengthening/balance training/fine motor coordination training/neuromuscular re-education/ADL retraining/bed mobility training/transfer training ROM/transfer training/fine motor coordination training/neuromuscular re-education/ADL retraining/balance training/bed mobility training/strengthening

## 2017-07-19 NOTE — PHYSICAL THERAPY INITIAL EVALUATION ADULT - RANGE OF MOTION EXAMINATION, REHAB EVAL
no ROM deficits were identified/bilateral upper extremity ROM was WFL (within functional limits)/bilateral lower extremity ROM was WFL (within functional limits)

## 2017-07-20 DIAGNOSIS — I82.491 ACUTE EMBOLISM AND THROMBOSIS OF OTHER SPECIFIED DEEP VEIN OF RIGHT LOWER EXTREMITY: ICD-10-CM

## 2017-07-20 DIAGNOSIS — Z51.81 ENCOUNTER FOR THERAPEUTIC DRUG LEVEL MONITORING: ICD-10-CM

## 2017-07-20 DIAGNOSIS — G93.40 ENCEPHALOPATHY, UNSPECIFIED: ICD-10-CM

## 2017-07-20 DIAGNOSIS — I10 ESSENTIAL (PRIMARY) HYPERTENSION: ICD-10-CM

## 2017-07-20 DIAGNOSIS — I63.239 CEREBRAL INFARCTION DUE TO UNSPECIFIED OCCLUSION OR STENOSIS OF UNSPECIFIED CAROTID ARTERY: ICD-10-CM

## 2017-07-20 LAB
BUN SERPL-MCNC: 28 MG/DL — HIGH (ref 7–23)
CALCIUM SERPL-MCNC: 8.9 MG/DL — SIGNIFICANT CHANGE UP (ref 8.4–10.5)
CHLORIDE SERPL-SCNC: 101 MMOL/L — SIGNIFICANT CHANGE UP (ref 98–107)
CHOLEST SERPL-MCNC: 101 MG/DL — LOW (ref 120–199)
CO2 SERPL-SCNC: 25 MMOL/L — SIGNIFICANT CHANGE UP (ref 22–31)
CREAT SERPL-MCNC: 1.05 MG/DL — SIGNIFICANT CHANGE UP (ref 0.5–1.3)
GLUCOSE SERPL-MCNC: 140 MG/DL — HIGH (ref 70–99)
HBA1C BLD-MCNC: 6.4 % — HIGH (ref 4–5.6)
HCT VFR BLD CALC: 28.8 % — LOW (ref 39–50)
HDLC SERPL-MCNC: 51 MG/DL — SIGNIFICANT CHANGE UP (ref 35–55)
HGB BLD-MCNC: 9.6 G/DL — LOW (ref 13–17)
LIPID PNL WITH DIRECT LDL SERPL: 38 MG/DL — SIGNIFICANT CHANGE UP
MCHC RBC-ENTMCNC: 31.6 PG — SIGNIFICANT CHANGE UP (ref 27–34)
MCHC RBC-ENTMCNC: 33.3 % — SIGNIFICANT CHANGE UP (ref 32–36)
MCV RBC AUTO: 94.7 FL — SIGNIFICANT CHANGE UP (ref 80–100)
NRBC # FLD: 0 — SIGNIFICANT CHANGE UP
PLATELET # BLD AUTO: 153 K/UL — SIGNIFICANT CHANGE UP (ref 150–400)
PMV BLD: 11.3 FL — SIGNIFICANT CHANGE UP (ref 7–13)
POTASSIUM SERPL-MCNC: 3.7 MMOL/L — SIGNIFICANT CHANGE UP (ref 3.5–5.3)
POTASSIUM SERPL-SCNC: 3.7 MMOL/L — SIGNIFICANT CHANGE UP (ref 3.5–5.3)
RBC # BLD: 3.04 M/UL — LOW (ref 4.2–5.8)
RBC # FLD: 14.5 % — SIGNIFICANT CHANGE UP (ref 10.3–14.5)
SODIUM SERPL-SCNC: 140 MMOL/L — SIGNIFICANT CHANGE UP (ref 135–145)
TRIGL SERPL-MCNC: 61 MG/DL — SIGNIFICANT CHANGE UP (ref 10–149)
WBC # BLD: 8.53 K/UL — SIGNIFICANT CHANGE UP (ref 3.8–10.5)
WBC # FLD AUTO: 8.53 K/UL — SIGNIFICANT CHANGE UP (ref 3.8–10.5)

## 2017-07-20 PROCEDURE — 70498 CT ANGIOGRAPHY NECK: CPT | Mod: 26,52

## 2017-07-20 PROCEDURE — 70496 CT ANGIOGRAPHY HEAD: CPT | Mod: 26

## 2017-07-20 PROCEDURE — 99223 1ST HOSP IP/OBS HIGH 75: CPT

## 2017-07-20 PROCEDURE — 93279 PRGRMG DEV EVAL PM/LDLS PM: CPT | Mod: 26

## 2017-07-20 PROCEDURE — 99221 1ST HOSP IP/OBS SF/LOW 40: CPT | Mod: 24

## 2017-07-20 PROCEDURE — 99233 SBSQ HOSP IP/OBS HIGH 50: CPT

## 2017-07-20 RX ADMIN — Medication 1 DROP(S): at 18:27

## 2017-07-20 RX ADMIN — BRIMONIDINE TARTRATE 1 DROP(S): 2 SOLUTION/ DROPS OPHTHALMIC at 06:13

## 2017-07-20 RX ADMIN — HEPARIN SODIUM 5000 UNIT(S): 5000 INJECTION INTRAVENOUS; SUBCUTANEOUS at 06:15

## 2017-07-20 RX ADMIN — Medication 3 MILLILITER(S): at 21:31

## 2017-07-20 RX ADMIN — Medication 5 MILLIGRAM(S): at 06:12

## 2017-07-20 RX ADMIN — Medication 40 MILLIGRAM(S): at 08:48

## 2017-07-20 RX ADMIN — Medication 650 MILLIGRAM(S): at 17:19

## 2017-07-20 RX ADMIN — CILOSTAZOL 50 MILLIGRAM(S): 100 TABLET ORAL at 06:13

## 2017-07-20 RX ADMIN — Medication 1 GRAM(S): at 06:15

## 2017-07-20 RX ADMIN — Medication 650 MILLIGRAM(S): at 06:14

## 2017-07-20 RX ADMIN — Medication 3 MILLILITER(S): at 10:32

## 2017-07-20 RX ADMIN — Medication 3 MILLILITER(S): at 04:01

## 2017-07-20 RX ADMIN — TAMSULOSIN HYDROCHLORIDE 0.4 MILLIGRAM(S): 0.4 CAPSULE ORAL at 21:42

## 2017-07-20 RX ADMIN — Medication 25 MILLIGRAM(S): at 18:27

## 2017-07-20 RX ADMIN — Medication 25 MILLIGRAM(S): at 06:13

## 2017-07-20 RX ADMIN — Medication 20 MILLIEQUIVALENT(S): at 13:46

## 2017-07-20 RX ADMIN — CLOPIDOGREL BISULFATE 75 MILLIGRAM(S): 75 TABLET, FILM COATED ORAL at 13:47

## 2017-07-20 RX ADMIN — HEPARIN SODIUM 5000 UNIT(S): 5000 INJECTION INTRAVENOUS; SUBCUTANEOUS at 13:46

## 2017-07-20 RX ADMIN — Medication 50 MILLIGRAM(S): at 06:13

## 2017-07-20 RX ADMIN — Medication 40 MILLIGRAM(S): at 18:25

## 2017-07-20 RX ADMIN — Medication 81 MILLIGRAM(S): at 13:48

## 2017-07-20 RX ADMIN — Medication 1 GRAM(S): at 18:27

## 2017-07-20 RX ADMIN — BRIMONIDINE TARTRATE 1 DROP(S): 2 SOLUTION/ DROPS OPHTHALMIC at 18:25

## 2017-07-20 RX ADMIN — Medication 50 MILLIGRAM(S): at 18:25

## 2017-07-20 RX ADMIN — Medication 1 DROP(S): at 06:13

## 2017-07-20 RX ADMIN — ATORVASTATIN CALCIUM 80 MILLIGRAM(S): 80 TABLET, FILM COATED ORAL at 21:42

## 2017-07-20 RX ADMIN — HEPARIN SODIUM 5000 UNIT(S): 5000 INJECTION INTRAVENOUS; SUBCUTANEOUS at 21:42

## 2017-07-20 RX ADMIN — CILOSTAZOL 50 MILLIGRAM(S): 100 TABLET ORAL at 18:25

## 2017-07-20 NOTE — PROGRESS NOTE ADULT - SUBJECTIVE AND OBJECTIVE BOX
Patient seen and examined. Had event of hypoglycemia last night precipitated episode of unresponsiveness. Is currently back to baseline and states his hand is still a little weak/numb but feels better overall.        Patient is a 89y old  Male who presents with a chief complaint of left arm weakness.     HPI: 89 yo  male pmh DM, HTN, MI, PPM, CKD, PAD s/p RLE fem-popliteal bypass 3 weeks ago brought in from [facility] for left arm weakness. As per transfer note, weakness started 1 hr prior to arrival, however, on further questioning, patient states he woke up with left arm and hand numbness and inability to move it. Dr Monroe ([facility]) attempted to be called, with no success. Unknown baseline, however, patient states he currently cannot walk and he is getting stronger at rehab. Endorses weakness in b/l legs from before as well as RUE elbow pain w/ associated weakness. Denies slurred speech, worsening left leg weakness, difficulty swallowing. Of note, patient was previously on ASA and Plavix as per [place] note, however, taken off these medications for possible surgery planning (unknown location)       PAST MEDICAL & SURGICAL HISTORY:  Chronic kidney disease, unspecified stage  Duodenal ulcer  DM (diabetes mellitus)  HTN (hypertension)  S/P appendectomy: 50 yrs ago      Allergies    No Known Allergies    Intolerances        MEDICATIONS  (STANDING):    MEDICATIONS  (PRN):      SOCIAL HISTORY: Denies tobacco/EtOH/drug use     FAMILY HISTORY:  No pertinent family history in first degree relatives      REVIEW OF SYSTEMS:  CONSTITUTIONAL:  No weight loss, fever, chills, weakness or fatigue.  HEENT:  Eyes:  No visual loss, blurred vision, double vision or yellow sclerae. Ears, Nose, Throat:  No hearing loss, sneezing, congestion, runny nose or sore throat.  SKIN:  No rash or itching.  CARDIOVASCULAR:  No chest pain, chest pressure or chest discomfort. No palpitations or edema.  RESPIRATORY:  No shortness of breath, cough or sputum.  GASTROINTESTINAL:  No anorexia, nausea, vomiting or diarrhea. No abdominal pain or blood.  GENITOURINARY:  denies incontinence/retention   NEUROLOGICAL:  No headache, dizziness, syncope, paralysis, ataxia, numbness or tingling in the extremities. No change in bowel or bladder control.  MUSCULOSKELETAL:  No muscle, back pain, joint pain or stiffness.  HEMATOLOGIC:  No anemia, bleeding or bruising.  LYMPHATICS:  No enlarged nodes. No history of splenectomy.  PSYCHIATRIC:  No history of depression or anxiety.  ENDOCRINOLOGIC:  No reports of sweating, cold or heat intolerance. No polyuria or polydipsia.      Vital Signs Last 24 Hrs  T(C): --  T(F): --  HR: --  BP: --  BP(mean): --  RR: --  SpO2: --    PHYSICAL EXAM:   General appearance: No acute distress                 Mental Status: AAOx3 to person, place and month only, fluent speech, able to follow simple and two step commands, however, unable to perform midline commands 2/2 to weakness   Cranial Nerves: EOMI, PERRL, VFF, V1-V3 intact, facial symmetric,  tongue midline  Motor: left UE drift in 10 seconds, 2/5 left hand , right LE 3-/5, left LE 3/5  Sensation: Intact to LT throughout, although patient endorses numbness on history taking, on exam, denies sensory changes   Coordination: unable to test 2/2 to weakness   NIHSS 10     LABS:              IMAGING:    CTA pending Patient seen and examined. Had event of hypoglycemia last night precipitated episode of unresponsiveness. Is currently back to baseline and states his hand is still a little weak/numb but feels better overall.        Patient is a 89y old  Male who presents with a chief complaint of left arm weakness.     HPIas documented on admission: 89 yo  male pmh DM, HTN, MI, PPM, CKD, PAD s/p RLE fem-popliteal bypass 3 weeks ago brought in from [facility] for left arm weakness. As per transfer note, weakness started 1 hr prior to arrival, however, on further questioning, patient states he woke up with left arm and hand numbness and inability to move it. Dr Monroe ([facility]) attempted to be called, with no success. Unknown baseline, however, patient states he currently cannot walk and he is getting stronger at rehab. Endorses weakness in b/l legs from before as well as RUE elbow pain w/ associated weakness. Denies slurred speech, worsening left leg weakness, difficulty swallowing. Of note, patient was previously on ASA and Plavix as per [place] note, however, taken off these medications for possible surgery planning (unknown location)       PAST MEDICAL & SURGICAL HISTORY:  Chronic kidney disease, unspecified stage  Duodenal ulcer  DM (diabetes mellitus)  HTN (hypertension)  S/P appendectomy: 50 yrs ago      Allergies    No Known Allergies    Intolerances        MEDICATIONS  (STANDING):    MEDICATIONS  (PRN):      SOCIAL HISTORY: Denies tobacco/EtOH/drug use     FAMILY HISTORY:  No pertinent family history in first degree relatives      REVIEW OF SYSTEMS:  CONSTITUTIONAL:  No weight loss, fever, chills, weakness or fatigue.  HEENT:  Eyes:  No visual loss, blurred vision, double vision or yellow sclerae. Ears, Nose, Throat:  No hearing loss, sneezing, congestion, runny nose or sore throat.  SKIN:  No rash or itching.  CARDIOVASCULAR:  No chest pain, chest pressure or chest discomfort. No palpitations or edema.  RESPIRATORY:  No shortness of breath, cough or sputum.  GASTROINTESTINAL:  No anorexia, nausea, vomiting or diarrhea. No abdominal pain or blood.  GENITOURINARY:  denies incontinence/retention   NEUROLOGICAL:  No headache, dizziness, syncope, paralysis, ataxia, numbness or tingling in the extremities. No change in bowel or bladder control.  MUSCULOSKELETAL:  No muscle, back pain, joint pain or stiffness.  HEMATOLOGIC:  No anemia, bleeding or bruising.  LYMPHATICS:  No enlarged nodes. No history of splenectomy.  PSYCHIATRIC:  No history of depression or anxiety.  ENDOCRINOLOGIC:  No reports of sweating, cold or heat intolerance. No polyuria or polydipsia.      Vital Signs Last 24 Hrs  T(C): --  T(F): --  HR: --  BP: --  BP(mean): --  RR: --  SpO2: --    PHYSICAL EXAM:   General appearance: No acute distress                 Mental Status: AAOx3 to person, place and month , not year, fluent speech, able to follow simple and two step commands, however, unable to perform midline commands 2/2 to weakness   Cranial Nerves: EOMI, PERRL, VFF, V1-V3 intact, facial symmetric,  tongue midline  Motor: left UE drift in 10 seconds, right arm-no drift; right LE 3-/5, left LE 3/5  Sensation: Intact to LT throughout, although patient endorses numbness on history taking, on exam, denies sensory changes   Coordination: not tested  NIHSS 10     LABS:              IMAGING:    CTA pending

## 2017-07-20 NOTE — CONSULT NOTE ADULT - ASSESSMENT
89 yo man with MMPs:    1.  ?TIA/stroke:  History of chronic strokes, possible new TIA/stroke.  No acute findings on recent CT head.  CTA neck/head pending  2.  New finding of right soleal vein DVT: should be anticoagulated, but patient is on ASA/Plavix.  Ideally can change patient to aspirin and an oral anticoagulant for 3 months.    3.  If unable to tolerate addition of anticoagulant, or must remain on DAPT for now, can consider serial venous duplex imaging of RLE to ensure that right soleal vein DVT does not propagate.  If propagates, then patient will need anticoagulation.  Would not recommend IVC filter.

## 2017-07-20 NOTE — CONSULT NOTE ADULT - SUBJECTIVE AND OBJECTIVE BOX
Patient is a 89y old  Male who presents with a chief complaint of code stroke (19 Jul 2017 01:43)      HPI:  89 year old M sent from [facility] with left arm weakness/numbness that started in the morning around 10am. As per pt, he woke up and felt like his hand was numb, unable to open his hand, as well as unable to raise or move his arm. Pt states that normally he has no limitation of his ROM throughout and that this is new to him. Admits to mouth feeling numb as well, but no slurring of speech, blurry visions, facial drooping, lightheadedness, dizziness. Denies any CP, SOB, palpitations, frequent or unusual headaches, trauma, or recent falls, fevers, or recent infections. Pt had RLE fem-popliteal bypass three weeks ago and states that since the surgery he has only had 3 sessions of physical therapy and otherwise does not ambulate. Currently pt is laying in bed in no acute discomfort. (18 Jul 2017 16:24)          PAST MEDICAL & SURGICAL HISTORY:  Peripheral arterial disease  Pacemaker  Chronic kidney disease, unspecified stage  Duodenal ulcer  DM (diabetes mellitus)  HTN (hypertension)  S/P femoral-popliteal bypass surgery: Right Lower Extremity, 3 weeks ago  S/P appendectomy: 50 yrs ago      MEDICATIONS  (STANDING):  cilostazol 50 milliGRAM(s) Oral two times a day  tamsulosin 0.4 milliGRAM(s) Oral at bedtime  timolol 0.5% Solution 1 Drop(s) Right EYE two times a day  brimonidine 0.2% Ophthalmic Solution 1 Drop(s) Right EYE two times a day  predniSONE   Tablet 5 milliGRAM(s) Oral daily  acetaminophen   Tablet 650 milliGRAM(s) Oral two times a day  sucralfate suspension 1 Gram(s) Oral two times a day  hydrALAZINE 50 milliGRAM(s) Oral two times a day  ALBUTerol/ipratropium for Nebulization 3 milliLiter(s) Nebulizer every 6 hours  metoprolol 25 milliGRAM(s) Oral two times a day  potassium chloride    Tablet ER 20 milliEquivalent(s) Oral daily  aspirin  chewable 81 milliGRAM(s) Oral daily  clopidogrel Tablet 75 milliGRAM(s) Oral daily  heparin  Injectable 5000 Unit(s) SubCutaneous every 8 hours  dextrose 5%. 1000 milliLiter(s) (50 mL/Hr) IV Continuous <Continuous>  dextrose 50% Injectable 12.5 Gram(s) IV Push once  dextrose 50% Injectable 25 Gram(s) IV Push once  dextrose 50% Injectable 25 Gram(s) IV Push once  atorvastatin 80 milliGRAM(s) Oral at bedtime  furosemide   Injectable 40 milliGRAM(s) IV Push two times a day    MEDICATIONS  (PRN):  dextrose Gel 1 Dose(s) Oral once PRN Blood Glucose LESS THAN 70 milliGRAM(s)/deciliter  glucagon  Injectable 1 milliGRAM(s) IntraMuscular once PRN Glucose LESS THAN 70 milligrams/deciliter      Allergies    No Known Allergies    Intolerances        VITALS:    Vital Signs Last 24 Hrs  T(C): 36.8 (20 Jul 2017 05:50), Max: 36.8 (20 Jul 2017 05:50)  T(F): 98.3 (20 Jul 2017 05:50), Max: 98.3 (20 Jul 2017 05:50)  HR: 66 (20 Jul 2017 10:32) (60 - 76)  BP: 110/44 (20 Jul 2017 08:41) (110/44 - 147/61)  BP(mean): --  RR: 17 (20 Jul 2017 08:41) (17 - 18)  SpO2: 97% (20 Jul 2017 10:32) (96% - 100%)    LABS:                          9.6    8.53  )-----------( 153      ( 20 Jul 2017 06:00 )             28.8       07-20    140  |  101  |  28<H>  ----------------------------<  140<H>  3.7   |  25  |  1.05    Ca    8.9      20 Jul 2017 06:15  Phos  2.8     07-19  Mg     1.7     07-19    TPro  5.6<L>  /  Alb  3.2<L>  /  TBili  0.8  /  DBili  x   /  AST  16  /  ALT  17  /  AlkPhos  66  07-19      CAPILLARY BLOOD GLUCOSE  175 (20 Jul 2017 09:56)  142 (20 Jul 2017 05:50)  130 (20 Jul 2017 02:00)  111 (19 Jul 2017 22:30)  101 (19 Jul 2017 22:15)  97 (19 Jul 2017 22:00)  89 (19 Jul 2017 21:40)  83 (19 Jul 2017 21:25)  74 (19 Jul 2017 21:10)  74 (19 Jul 2017 20:55)  80 (19 Jul 2017 20:40)  93 (19 Jul 2017 20:26)  109 (19 Jul 2017 20:10)  94 (19 Jul 2017 19:13)  114 (19 Jul 2017 18:57)  170 (19 Jul 2017 18:41)  101 (19 Jul 2017 18:22)  123 (19 Jul 2017 18:11)  149 (19 Jul 2017 18:00)  339 (19 Jul 2017 17:36)  16 (19 Jul 2017 17:34)  16 (19 Jul 2017 17:33)  214 (19 Jul 2017 12:40)              LOWER EXTREMITY PHYSICAL EXAM:    Patient is a 89y old  Male who presents with a chief complaint of code stroke (19 Jul 2017 01:43)      HPI:  89 year old M sent from [facility] with left arm weakness/numbness that started in the morning around 10am. As per pt, he woke up and felt like his hand was numb, unable to open his hand, as well as unable to raise or move his arm. Pt states that normally he has no limitation of his ROM throughout and that this is new to him. Admits to mouth feeling numb as well, but no slurring of speech, blurry visions, facial drooping, lightheadedness, dizziness. Denies any CP, SOB, palpitations, frequent or unusual headaches, trauma, or recent falls, fevers, or recent infections. Pt had RLE fem-popliteal bypass three weeks ago and states that since the surgery he has only had 3 sessions of physical therapy and otherwise does not ambulate. Currently pt is laying in bed in no acute discomfort. (18 Jul 2017 16:24)      PAST MEDICAL & SURGICAL HISTORY:  Peripheral arterial disease  Pacemaker  Chronic kidney disease, unspecified stage  Duodenal ulcer  DM (diabetes mellitus)  HTN (hypertension)  S/P femoral-popliteal bypass surgery: Right Lower Extremity, 3 weeks ago  S/P appendectomy: 50 yrs ago      MEDICATIONS  (STANDING):  cilostazol 50 milliGRAM(s) Oral two times a day  tamsulosin 0.4 milliGRAM(s) Oral at bedtime  timolol 0.5% Solution 1 Drop(s) Right EYE two times a day  brimonidine 0.2% Ophthalmic Solution 1 Drop(s) Right EYE two times a day  predniSONE   Tablet 5 milliGRAM(s) Oral daily  acetaminophen   Tablet 650 milliGRAM(s) Oral two times a day  sucralfate suspension 1 Gram(s) Oral two times a day  hydrALAZINE 50 milliGRAM(s) Oral two times a day  ALBUTerol/ipratropium for Nebulization 3 milliLiter(s) Nebulizer every 6 hours  metoprolol 25 milliGRAM(s) Oral two times a day  potassium chloride    Tablet ER 20 milliEquivalent(s) Oral daily  aspirin  chewable 81 milliGRAM(s) Oral daily  clopidogrel Tablet 75 milliGRAM(s) Oral daily  heparin  Injectable 5000 Unit(s) SubCutaneous every 8 hours  dextrose 5%. 1000 milliLiter(s) (50 mL/Hr) IV Continuous <Continuous>  dextrose 50% Injectable 12.5 Gram(s) IV Push once  dextrose 50% Injectable 25 Gram(s) IV Push once  dextrose 50% Injectable 25 Gram(s) IV Push once  atorvastatin 80 milliGRAM(s) Oral at bedtime  furosemide   Injectable 40 milliGRAM(s) IV Push two times a day    MEDICATIONS  (PRN):  dextrose Gel 1 Dose(s) Oral once PRN Blood Glucose LESS THAN 70 milliGRAM(s)/deciliter  glucagon  Injectable 1 milliGRAM(s) IntraMuscular once PRN Glucose LESS THAN 70 milligrams/deciliter      Allergies    No Known Allergies    Intolerances        VITALS:    Vital Signs Last 24 Hrs  T(C): 36.8 (20 Jul 2017 05:50), Max: 36.8 (20 Jul 2017 05:50)  T(F): 98.3 (20 Jul 2017 05:50), Max: 98.3 (20 Jul 2017 05:50)  HR: 66 (20 Jul 2017 10:32) (60 - 76)  BP: 110/44 (20 Jul 2017 08:41) (110/44 - 147/61)  BP(mean): --  RR: 17 (20 Jul 2017 08:41) (17 - 18)  SpO2: 97% (20 Jul 2017 10:32) (96% - 100%)    LABS:                          9.6    8.53  )-----------( 153      ( 20 Jul 2017 06:00 )             28.8       07-20    140  |  101  |  28<H>  ----------------------------<  140<H>  3.7   |  25  |  1.05    Ca    8.9      20 Jul 2017 06:15  Phos  2.8     07-19  Mg     1.7     07-19    TPro  5.6<L>  /  Alb  3.2<L>  /  TBili  0.8  /  DBili  x   /  AST  16  /  ALT  17  /  AlkPhos  66  07-19      CAPILLARY BLOOD GLUCOSE  175 (20 Jul 2017 09:56)  142 (20 Jul 2017 05:50)  130 (20 Jul 2017 02:00)  111 (19 Jul 2017 22:30)  101 (19 Jul 2017 22:15)  97 (19 Jul 2017 22:00)  89 (19 Jul 2017 21:40)  83 (19 Jul 2017 21:25)  74 (19 Jul 2017 21:10)  74 (19 Jul 2017 20:55)  80 (19 Jul 2017 20:40)  93 (19 Jul 2017 20:26)  109 (19 Jul 2017 20:10)  94 (19 Jul 2017 19:13)  114 (19 Jul 2017 18:57)  170 (19 Jul 2017 18:41)  101 (19 Jul 2017 18:22)  123 (19 Jul 2017 18:11)  149 (19 Jul 2017 18:00)  339 (19 Jul 2017 17:36)  16 (19 Jul 2017 17:34)  16 (19 Jul 2017 17:33)  214 (19 Jul 2017 12:40)              LOWER EXTREMITY PHYSICAL EXAM:        RADIOLOGY & ADDITIONAL STUDIES:    Vasular: Nonpalpable pedal pulses   Neuro: Epicritic sensation diminished.  Musculoskeletal/Ortho:  Skin: No erythema, no edema, no purulence, no SOI, no malodor.  Wound #1: Submet 1 RF  Location: submet 1  Size: .5cm x .5cm  Depth: subcutaneous  Wound bed: fibrogranular  Drainage: none  Odor: none      RADIOLOGY & ADDITIONAL STUDIES:

## 2017-07-20 NOTE — CONSULT NOTE ADULT - ASSESSMENT
89 male with right symptomatic carotid stenosis, right soleal DVT s/p recent right CFA to popliteal PTFE bypass.

## 2017-07-20 NOTE — CONSULT NOTE ADULT - PROBLEM SELECTOR RECOMMENDATION 2
- Would repeat duplex of right lower extremity in one week to evaluate for proximal extension of clot  - Agree with no anticoagulation at this time because low risk of pulmonary embolism in distal lower extremity vein - Repeat duplex in one week to evaluate for proximal extension of clot  - Agree with no anticoagulation at this time as risk of pulmonary embolism from distal lower extremity vein is low

## 2017-07-20 NOTE — CONSULT NOTE ADULT - ATTENDING COMMENTS
History and exam as above. CT head (7/18/17) to my eye showed a chronic small right MCA infarct involving the centrum semiovale and periventricular region. Carotid Doppler (7/19/17) reportedly showed bilateral carotid stenosis a 50-79%. Impression. 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 On 7/19/17 he awoke with left arm monoparesis, consistent with a lesion perhaps involving the right pre-rolandic region but this is uncertain. The mechanism of his presumed cerebral infarction is uncertain, but could possibly be due to symptomatic right carotid stenosis with asymptomatic left carotid stenosis. Cardioembolism is another possibility. Given his age, I doubt that he will benefit from carotid endarterectomy. Suggest. TTE; interrogate pacemaker; CTA neck and head; continue aspirin, Plavix and Pletal if no contraindication; rehabilitation.
as above

## 2017-07-20 NOTE — CONSULT NOTE ADULT - PROBLEM SELECTOR RECOMMENDATION 2
?new TIA/stroke  pending imaging, CTA neck  Has 50-79% bilateral carotid stenoses.  Probably not the etiology for his recent symptoms but will await further imaging.  On appropriate medications for prior history of stroke.

## 2017-07-20 NOTE — CONSULT NOTE ADULT - PROBLEM SELECTOR RECOMMENDATION 9
- Agree with CTA of the head/neck to evaluate carotid stenosis  - Will follow up re: need for CEA  - Discussed with Dr. Aguilar

## 2017-07-20 NOTE — CHART NOTE - NSCHARTNOTEFT_GEN_A_CORE
ELECTROPHYSIOLOGY    Device Interrogation Performed                                  Date/Time:    07/20/2017    2:15pm  :      Foundation[employer]                                  Model:        Almanor single chamber Pacemaker                            Mode:                 VVIR                                      Rate:     60   Sensor rate 120 bpm     Atrial Lead:  P wave amplitude:                          mv            Impedance                                       Ohms  Threshold                                          V @                ms      Ventricular Lead: RV  R wave amplitude                   >25        mv  Impedance                            530           Ohms  Threshold                             0.5            V @       0.5          ms                                      Battery Status:                     Good                   VPaced 98%    Underlying Rhythm:      Atrial fibrillation with slow ventricular response (50's).  +PVC's/NSVT    Events/Observation:   Two episodes of NSVT. Last  and fastest one on 6/21/2017 ; 7 beats at 200 bpm. Asymptomatic.                                  NSVT on 6/20/17 x 4 beats.   \   Impression/Plan:  Normal PPM  function.   Normal sensing and pacing via iterative testing. Good battery status. Excellent threshold capture.  No reprogramming.  Patient with NSVT on interrogation and telemetry in the setting of normal LV systolic function. LVEF 65% on echo done 6/22/2017. He is on a beta blocker.

## 2017-07-20 NOTE — SWALLOW BEDSIDE ASSESSMENT ADULT - COMMENTS
Pt was alert and cooperative for an initial assessment of swallow function this am. Per charting, pt is a 89 y/o male with PMHx significant for DM, HTN, PPM, CKD, PAD s/p RLE fem-popliteal bypass 3 weeks ago presents with left arm weakness/numbness with subsequent admission for CVA. CXR completed on 7/18/2017 revealed "Worsening  pulmonary  edema  with  small  bilateral  pleural  effusions."    At the time of today's assessment, the pt presents with supplemental oxygen in place via nasal canula. A mild hoarseness with reduced phonation time was further observed during low-level, conversational discourse.

## 2017-07-20 NOTE — CONSULT NOTE ADULT - SUBJECTIVE AND OBJECTIVE BOX
Cardiology/Vascular Medicine Inpatient Consultation Note    HISTORY OF PRESENT ILLNESS:  Patient is a 89 year old man sent from NH with left arm weakness/numbness that started in the morning around 10am. As per pt, he woke up and felt like his hand was numb, unable to open his hand, as well as unable to raise or move his arm.   Symptoms persisted only momentarily.  Pt states that normally he has no limitation of his ROM throughout and that this is new to him. Admits to mouth feeling numb as well, but no slurring of speech, blurry visions, facial drooping, lightheadedness, dizziness. Denies any CP, SOB, palpitations, frequent or unusual headaches, trauma, or recent falls, fevers, or recent infections. Pt had RLE fem-popliteal bypass three weeks ago and states that since the surgery he has only had 3 sessions of physical therapy and otherwise does not ambulate.     RLE LE venous duplex us identified right soleal vein DVT (likely provoked in setting of recent surgery):    < from: US Duplex Venous Lower Ext Ltd, Right (07.19.17 @ 14:14) >  EXAM:  US DPLX LWR EXT VEINS LTD RT          PROCEDURE DATE:  Jul 19 2017         INTERPRETATION:  CLINICAL INFORMATION: Right lower extremity swelling    COMPARISON: None available.    TECHNIQUE: Duplex sonography of the RIGHT LOWER extremity with color and   spectral Doppler, with and without compression.      FINDINGS:    There is normal compressibility of the right common femoral, femoral,   popliteal, peroneal and posterior tibial veins. Occlusive thrombus is   seen in a short segment of an expanded soleal vein.    The contralateral common femoral vein is patent.    Doppler examination shows normal spontaneous and phasic flow.    IMPRESSION:     Right soleal deep vein thrombosis.    Findings discussed with VAL Quezada on July 19, 2017, 1420hours      GEORGINA LEYVA M.D., ATTENDING RADIOLOGIST  This document has been electronically signed. Jul 19 2017  2:20PM        < end of copied text >        Allergies  No Known Allergies  	    MEDICATIONS:  cilostazol 50 milliGRAM(s) Oral two times a day  tamsulosin 0.4 milliGRAM(s) Oral at bedtime  hydrALAZINE 50 milliGRAM(s) Oral two times a day  metoprolol 25 milliGRAM(s) Oral two times a day  aspirin  chewable 81 milliGRAM(s) Oral daily  clopidogrel Tablet 75 milliGRAM(s) Oral daily  heparin  Injectable 5000 Unit(s) SubCutaneous every 8 hours  furosemide    Tablet 20 milliGRAM(s) Oral daily  furosemide   Injectable 40 milliGRAM(s) IV Push two times a day      ALBUTerol/ipratropium for Nebulization 3 milliLiter(s) Nebulizer every 6 hours    acetaminophen   Tablet 650 milliGRAM(s) Oral two times a day    sucralfate suspension 1 Gram(s) Oral two times a day    predniSONE   Tablet 5 milliGRAM(s) Oral daily  dextrose Gel 1 Dose(s) Oral once PRN  dextrose 50% Injectable 12.5 Gram(s) IV Push once  dextrose 50% Injectable 25 Gram(s) IV Push once  dextrose 50% Injectable 25 Gram(s) IV Push once  glucagon  Injectable 1 milliGRAM(s) IntraMuscular once PRN  atorvastatin 80 milliGRAM(s) Oral at bedtime    timolol 0.5% Solution 1 Drop(s) Right EYE two times a day  brimonidine 0.2% Ophthalmic Solution 1 Drop(s) Right EYE two times a day  potassium chloride    Tablet ER 20 milliEquivalent(s) Oral daily  dextrose 5%. 1000 milliLiter(s) IV Continuous <Continuous>  dextrose 10%. 1000 milliLiter(s) IV Continuous <Continuous>      PAST MEDICAL & SURGICAL HISTORY:  Peripheral arterial disease  Pacemaker  Chronic kidney disease, unspecified stage  Duodenal ulcer  DM (diabetes mellitus)  HTN (hypertension)  S/P femoral-popliteal bypass surgery: Right Lower Extremity, 3 weeks ago  S/P appendectomy: 50 yrs ago      FAMILY HISTORY:  No pertinent family history in first degree relatives    SOCIAL HISTORY:    [ ] Non-smoker      REVIEW OF SYSTEMS:  CONSTITUTIONAL: No fever, weight loss, or fatigue  EYES: No eye pain, visual disturbances, or discharge  ENMT:  No difficulty hearing, tinnitus, vertigo; No sinus or throat pain  NECK: No pain or stiffness  RESPIRATORY: No cough, wheezing, chills or hemoptysis; No Shortness of Breath  CARDIOVASCULAR: As above  GASTROINTESTINAL: No abdominal or epigastric pain. No nausea, vomiting, or hematemesis; No diarrhea or constipation. No melena or hematochezia.  GENITOURINARY: No dysuria, frequency, hematuria, or incontinence  NEUROLOGICAL: No headaches, memory loss, loss of strength, numbness, or tremors  SKIN: As above  LYMPH Nodes: No enlarged glands  ENDOCRINE: No heat or cold intolerance; No hair loss  MUSCULOSKELETAL: As above  PSYCHIATRIC: No depression, anxiety, mood swings, or difficulty sleeping  HEME/LYMPH: No easy bruising, or bleeding gums  ALLERGY AND IMMUNOLOGIC: No hives or eczema	      PHYSICAL EXAM:  T(C): 36.8 (07-20-17 @ 05:50), Max: 36.8 (07-20-17 @ 05:50)  HR: 60 (07-20-17 @ 05:50) (60 - 78)  BP: 131/51 (07-20-17 @ 05:50) (123/59 - 147/61)  RR: 18 (07-20-17 @ 05:50) (18 - 18)  SpO2: 99% (07-20-17 @ 05:50) (96% - 100%)  Wt(kg): --  I&O's Summary    19 Jul 2017 07:01  -  20 Jul 2017 07:00  --------------------------------------------------------  IN: 647.5 mL / OUT: 400 mL / NET: 247.5 mL        Appearance: Elderly  HEENT:   Normal oral mucosa, PERRL, EOMI, +bruit  Lymphatic: No lymphadenopathy  Cardiovascular: Normal S1 S2, No JVD, No murmurs,   Respiratory: Decreased BS b/l abses  Psychiatry: Awake, alert  Gastrointestinal:  Soft, Non-tender, + BS	  Skin: No rashes, No ecchymoses, No cyanosis	  Neurologic: Non-focal  Extremities: as above  Vascular: as above      LABS:	 	                          10.2   8.43  )-----------( 159      ( 19 Jul 2017 06:21 )             31.1       07-19    139  |  100  |  24<H>  ----------------------------<  152<H>  4.1   |  25  |  0.94    Ca    8.8      19 Jul 2017 06:21  Phos  2.8     07-19  Mg     1.7     07-19    TPro  5.6<L>  /  Alb  3.2<L>  /  TBili  0.8  /  DBili  x   /  AST  16  /  ALT  17  /  AlkPhos  66  07-19      proBNP:   Lipid Profile: Cholesterol, [id number] mg/dL<L> [120 - 199]  Direct LDL38 mg/dL  HDL Cholesterol, serum51 mg/dL [35 - 55]  Triglycerides, serum61 mg/dL [10 - 149]    HgA1c: Hemoglobin A1C, Whole Blood: 6.4 % (07-20 @ 06:15)      PREVIOUS DIAGNOSTIC CARDIOVASCULAR TESTING:      [ ]  Echocardiogram:  < from: Transthoracic Echocardiogram (06.22.17 @ 11:21) >  Patient name: PHILOMENA ANGULO  YOB: 1921   Age: 89 (M)   MR#: [id number]  Study Date: 6/22/2017  Location: [id number]: Alanna King [facility]  Study quality: Technically good  Referring Physician: Miguelito Aguilar MD  Blood Pressure: 140/54 mmHg  Height: 175 cm  Weight: 64 kg  BSA: 1.8 m2  ------------------------------------------------------------------------  PROCEDURE: Transthoracic echocardiogram with 2-D, M-Mode  and complete spectral and color flow Doppler.  INDICATION: Abnormal electrocardiogram (ECG) (EKG)  (R94.31), Cardiac murmur, unspecified (R01.1)  ------------------------------------------------------------------------  DIMENSIONS:  Dimensions:     Normal Values:  LA:     4.1 cm    2.0 - 4.0 cm  Ao:    3.6 cm    2.0 - 3.8 cm  SEPTUM: 1.0 cm    0.6 - 1.2 cm  PWT:    1.0 cm    0.6 - 1.1 cm  LVIDd:  4.9 cm    3.0 - 5.6 cm  LVIDs:  2.7 cm    1.8 - 4.0 cm  Derived Variables:  LVMI: 99 g/m2  RWT: 0.40  Fractional short: 45 %  Ejection Fraction (Teicholtz): 65 %  ------------------------------------------------------------------------  OBSERVATIONS:  Mitral Valve: Mitral annular calcification and calcified  mitral leaflets with normal diastolic opening. Minimal  mitral regurgitation.  Aortic Root:Normal aortic root.  Aortic Valve: Calcified trileaflet aortic valve with normal  opening. Mild aortic regurgitation.  Left Atrium: Dilated left atrium.  Left Ventricle: Endocardium not well visualized; grossly  normal left ventricular systolic function. Normal left  ventricular internal dimensions and wall thicknesses.  Right Heart: Normal right atrium. Normal right ventricular  size and function. A device wire is noted in the right  heart. Normal tricuspid valve.  Mild tricuspid  regurgitation.Normal pulmonic valve. Minimal pulmonic  regurgitation.  Pericardium/PleuraNormal pericardium with no pericardial  effusion.  Hemodynamic: Estimated right ventricular systolic pressure  equals 47 mm Hg, assuming right atrial pressure equals 10  mm Hg, consistent with mild pulmonary hypertension.  ------------------------------------------------------------------------  CONCLUSIONS:  1. Mitral annular calcification and calcified mitral  leaflets with normal diastolic opening. Minimal mitral  regurgitation.  2. Calcified trileaflet aortic valve with normal opening.  Mild aortic regurgitation.  3. Dilated left atrium.  4. Normal left ventricular internal dimensions and wall  thicknesses.  5. Normal right ventricular size and function. A device  wire is noted in the right heart.  6. Estimated pulmonary artery systolic pressure equals 47  mm Hg, assuming right atrial pressure equals 10  mm Hg,  consistent with mild pulmonary hypertension.  *** No previous Echo exam.  ------------------------------------------------------------------------  Confirmed on  6/22/2017 - 14:11:44 by Olu Lawrence MD, DURAN,  OSIRIS, DRAKE  ------------------------------------------------------------------------    < end of copied text >      [ ]  Catheterization:    < from: [facility] Duplex Carotid Arteries, Bilateral. (07.19.17 @ 08:01) >    Patient name: PHILOMENA ANGULO  Date of test: 7/19/2017  MR#: [id number]  [id number]    Location: [facility] Physician(s): , Sandra Pro MD  Interpreted by: Olu Lawrence MD, OSIRIS GARZON, DRAKE  Tech: Gena Wagner, [facility]  Type of Test: Carotid Doppler Evaluation  ------------------------------------------------------------------------  Procedure: Duplex Real-time grayscale/color Doppler  ultrasonography used to interrogate extracranial arteries  bilaterally.  Indications: Occlusion and stenosis of bilateral carotid  arteries (I65.23)  ------------------------------------------------------------------------  VELOCITY:  ------------------------------------------------------------------------  RIGHT:    Subclavian: 49 / 7    Prox CCA: 79/    Mid CCA: 75/10    Dist CCA: 86/7    Prox ICA: 216/19    Mid ICA: 121 / 23    Distal ICA: 77 / 14    ECA: 146 / 14    Vertebral: 94 / 18    ICA/CCA: 2.5  ------------------------------------------------------------------------    Subclavian: Normal    CCA Plaque: Moderate    ICA Plaque: Moderate-severe    Vertebral: Antegrade flow  ------------------------------------------------------------------------  LEFT:    Subclavian: 89 / 8    Prox CCA: 84/13    Mid CCA: 98/14    Dist CCA: 98/11    Prox ICA: 170/33    Mid ICA: 142 / 30    Distal ICA: 63 / 14    ECA: 109 /    Vertebral: 77 / 11    ICA/CCA: 1.7  ------------------------------------------------------------------------    Subclavian: Normal    CCA Plaque: Moderate    ICA Plaque: Moderate-severe    Vertebral: Antegrade flow  ------------------------------------------------------------------------  Right Findings: Right Common Carotid Artery: There is  moderate heterogenous plaque noted throughout the vessel.  Right Internal Carotid Artery:  B-mode and spectral  analyses consistent with a diameter reduction of 50-79%.  There is moderate heterogenous plaque within the proximal  vessel.  Moderate-severe atherosclerosis in the external carotid  artery (>50% stenosis).  Right Vertebral Artery:  Antegrade flow with normal  velocities.  Left Findings: Left Common Carotid Artery: There is  moderate heterogenous plaque noted throughout the vessel.  Left Internal Carotid Artery:  B-mode and spectral  analyses consistent with diameter reduction of 50-79%.  There is moderate heterogenous plaque within the proximal  vessel.  Moderate-severe atherosclerosis in the external carotid  artery (>50% stenosis).  Left Vertebral Artery:  Antegrade flow with normal  velocities.  ------------------------------------------------------------------------  Summary/Impressions:  Moderate heterogenous plaque noted within the proximal  right and left internal carotid arteries, consistent with  50-79% in both proximal vessels.  ------------------------------------------------------------------------  Confirmed on  7/19/2017 - 9:22 AM by Olu Lawrence MD, [facility],  [facility], [facility]  By signing this report, the attending physician certifies  that he or she has personally supervised and interpreted  the vascular study and has reviewed and or edited and  agrees with the written comments contained within the  report.    < end of copied text >      [ ]  Vascular studies:    < from: US Duplex Venous Lower Ext Ltd, Right (07.19.17 @ 14:14) >  EXAM:  US DPLX LWR EXT VEINS LTD RT        PROCEDURE DATE:  Jul 19 2017         INTERPRETATION:  CLINICAL INFORMATION: Right lower extremity swelling    COMPARISON: None available.    TECHNIQUE: Duplex sonography of the RIGHT LOWER extremity with color and   spectral Doppler, with and without compression.      FINDINGS:    There is normal compressibility of the right common femoral, femoral,   popliteal, peroneal and posterior tibial veins. Occlusive thrombus is   seen in a short segment of an expanded soleal vein.    The contralateral common femoral vein is patent.    Doppler examination shows normal spontaneous and phasic flow.    IMPRESSION:     Right soleal deep vein thrombosis.    Findings discussed with VAL Quezada on July 19, 2017, 1420hours    GEORGINA LEYVA M.D., ATTENDING RADIOLOGIST  This document has been electronically signed. Jul 19 2017  2:20PM        < end of copied text >      < from: CT Head No Cont (07.18.17 @ 11:17) >  EXAM:  CT BRAIN        PROCEDURE DATE:  Jul 18 2017         INTERPRETATION:  CLINICAL INFORMATION: Left-sided weakness times one hour    TECHNIQUE: Noncontrast axial CT images were acquired through the head.   Two-dimensional sagittal and coronal reformats were generated.    COMPARISON STUDY: CT head 1120 1/15 and 2/9/2011    FINDINGS:   Redemonstration of encephalomalacia/gliosis involving the right frontal   and parietal lobes as well as the right basal ganglia and head of the   caudate nucleus in the right MCA distribution.    There is no CT evidence of acute intracranial hemorrhage, extra-axial   collection, vasogenic edema, mass effect, midline shift, central   herniation, or hydrocephalus.     There is moderate cerebral volume loss. There is moderate patchy white   matter hypoattenuation which is nonspecific in etiology but likely   related to chronic microvascular ischemic disease.    The visualized paranasal sinuses are clear. The mastoid air cells and   middle ear cavities are clear.    The soft tissues of the scalp are unremarkable. The calvarium is intact.    IMPRESSION:   No CT evidence of acute intracranial hemorrhage, brain edema, or mass   effect. 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          JAIME RAINEY M.D., RADIOLOGY RESIDENT  This document has been electronically signed.  OSMANI GUTIERREZ M.D., ATTENDING RADIOLOGIST  This document has been electronically signed. Jul 18 201711:57AM                  < end of copied text >

## 2017-07-20 NOTE — PROGRESS NOTE ADULT - SUBJECTIVE AND OBJECTIVE BOX
CC: Follow up for encephalopathy    SUBJECTIVE / OVERNIGHT EVENTS:  Events from previous 24 hours noted.  Mental status back to baseline.  Unclear for the explanation into his hypoglycemia.  Patient unable to recall the events. Still with LUE weakness.  No F/C, N/V, CP, SOB, Cough, lightheadedness, dizziness, abdominal pain, diarrhea, dysuria.    MEDICATIONS  (STANDING):  cilostazol 50 milliGRAM(s) Oral two times a day  tamsulosin 0.4 milliGRAM(s) Oral at bedtime  timolol 0.5% Solution 1 Drop(s) Right EYE two times a day  brimonidine 0.2% Ophthalmic Solution 1 Drop(s) Right EYE two times a day  predniSONE   Tablet 5 milliGRAM(s) Oral daily  acetaminophen   Tablet 650 milliGRAM(s) Oral two times a day  sucralfate suspension 1 Gram(s) Oral two times a day  hydrALAZINE 50 milliGRAM(s) Oral two times a day  ALBUTerol/ipratropium for Nebulization 3 milliLiter(s) Nebulizer every 6 hours  metoprolol 25 milliGRAM(s) Oral two times a day  potassium chloride    Tablet ER 20 milliEquivalent(s) Oral daily  aspirin  chewable 81 milliGRAM(s) Oral daily  clopidogrel Tablet 75 milliGRAM(s) Oral daily  heparin  Injectable 5000 Unit(s) SubCutaneous every 8 hours  dextrose 5%. 1000 milliLiter(s) (50 mL/Hr) IV Continuous <Continuous>  dextrose 50% Injectable 12.5 Gram(s) IV Push once  dextrose 50% Injectable 25 Gram(s) IV Push once  dextrose 50% Injectable 25 Gram(s) IV Push once  atorvastatin 80 milliGRAM(s) Oral at bedtime  furosemide   Injectable 40 milliGRAM(s) IV Push two times a day    MEDICATIONS  (PRN):  dextrose Gel 1 Dose(s) Oral once PRN Blood Glucose LESS THAN 70 milliGRAM(s)/deciliter  glucagon  Injectable 1 milliGRAM(s) IntraMuscular once PRN Glucose LESS THAN 70 milligrams/deciliter      Vital Signs Last 24 Hrs  T(C): 36.8 (17 @ 05:50), Max: 36.8 (17 @ 05:50)  HR: 66 (17 @ 10:32) (60 - 76)  BP: 110/44 (17 @ 08:41) (110/44 - 131/51)  RR: 17 (17 @ 08:41) (17 - 18)  SpO2: 97% (17 @ 10:32) (96% - 100%)  CAPILLARY BLOOD GLUCOSE  349 (2017 13:00)  175 (2017 09:56)  142 (2017 05:50)  130 (2017 02:00)  111 (2017 22:30)  101 (2017 22:15)  97 (2017 22:00)  89 (2017 21:40)  83 (2017 21:25)  74 (2017 21:10)  74 (2017 20:55)  80 (2017 20:40)  93 (2017 20:26)  109 (2017 20:10)  94 (2017 19:13)  114 (2017 18:57)  170 (2017 18:41)  101 (2017 18:22)  123 (2017 18:11)  149 (2017 18:00)  339 (2017 17:36)  16 (2017 17:34)  16 (2017 17:33)        I&O's Summary    2017 07:  -  2017 07:00  --------------------------------------------------------  IN: 647.5 mL / OUT: 400 mL / NET: 247.5 mL    2017 07:01  -  2017 15:09  --------------------------------------------------------  IN: 0 mL / OUT: 300 mL / NET: -300 mL        PHYSICAL EXAM:  GENERAL: NAD, fragile appearing  HEAD:  Atraumatic, Normocephalic  EYES: EOMI, PERRLA, conjunctiva and sclera clear  NECK: Supple, No JVD  CHEST/LUNG: Clear to auscultation bilaterally; No wheeze  HEART: Regular rate and rhythm; No murmurs, rubs, or gallops  ABDOMEN: Soft, Nontender, Nondistended; Bowel sounds present  EXTREMITIES:  2+ Peripheral Pulses, No clubbing, cyanosis, or edema  PSYCH: Calm  NEUROLOGY: A/Ox3, LUE weakness.  SKIN: RUE skin tear - covered with dressing. RLE surgical site C/D/I    LABS:                        9.6    8.53  )-----------( 153      ( 2017 06:00 )             28.8     07-20    140  |  101  |  28<H>  ----------------------------<  140<H>  3.7   |  25  |  1.05    Ca    8.9      2017 06:15  Phos  2.8     -19  Mg     1.7     -    TPro  5.6<L>  /  Alb  3.2<L>  /  TBili  0.8  /  DBili  x   /  AST  16  /  ALT  17  /  AlkPhos  66  07-19      CARDIAC MARKERS ( 2017 18:28 )  x     / < 0.06 ng/mL / 21 u/L / x     / x          Urinalysis Basic - ( 2017 07:51 )    Color: YELLOW / Appearance: CLEAR / S.010 / pH: 7.0  Gluc: NEGATIVE / Ketone: TRACE  / Bili: NEGATIVE / Urobili: NORMAL E.U.   Blood: NEGATIVE / Protein: 30 / Nitrite: NEGATIVE   Leuk Esterase: NEGATIVE / RBC: 0-2 / WBC 3-5   Sq Epi: x / Non Sq Epi: FEW / Bacteria: x        RADIOLOGY & ADDITIONAL TESTS:    Imaging Personally Reviewed:    Consultant(s) Notes Reviewed:  vascular surg, podiatry    Care Discussed with Consultants/Other Providers: neuro - dr. libman, [facility] med - dr. agosto

## 2017-07-20 NOTE — PROGRESS NOTE ADULT - ASSESSMENT
89M with acute CVA with carotid artery stenosis complicated by provoked soleal DVT from recent fem-pop bypass for PAD, CAD/MI with PPM, DM type 2.

## 2017-07-20 NOTE — SWALLOW BEDSIDE ASSESSMENT ADULT - PHARYNGEAL PHASE
Decreased laryngeal elevation/Delayed pharyngeal swallow Delayed pharyngeal swallow/increased work of breathing noted/Wet vocal quality post oral intake/Decreased laryngeal elevation/Multiple swallows Delayed pharyngeal swallow/Decreased laryngeal elevation

## 2017-07-20 NOTE — PROGRESS NOTE ADULT - PROBLEM SELECTOR PLAN 1
Likely due to acute episode of hypoglycemia.  Improved with resolving of hypoglycemia.  Continue to monitor.

## 2017-07-20 NOTE — CONSULT NOTE ADULT - PROBLEM SELECTOR RECOMMENDATION 9
Recommend anticoagulation with oral agent.  Duration of therapy should be three months if he can tolerate.  Ideally can stop either aspirin or clopidogrel, but would ask vascular surgeon who performed RLE bypass.  Prefer either one antiplatelet with anticoagulant.  Alternatively, if need to continue DAPT, can perform serial venous duplex imaging as described above.

## 2017-07-20 NOTE — SWALLOW BEDSIDE ASSESSMENT ADULT - ASR SWALLOW ASPIRATION MONITOR
fever/throat clearing/gurgly voice/pneumonia/cough/change of breathing pattern/position upright (89Y)/oral hygiene/upper respiratory infection

## 2017-07-20 NOTE — CONSULT NOTE ADULT - PROBLEM SELECTOR PROBLEM 2
Deep vein thrombosis (DVT) of other vein of right lower extremity, unspecified chronicity Acute deep vein thrombosis (DVT) of other specified vein of right lower extremity

## 2017-07-20 NOTE — CONSULT NOTE ADULT - ASSESSMENT
89yo M w/ right foot submet 1 ulcer to subcutaneous tissue  ·	Pt examined and evaluated  ·	No acute SOI, wound stable  ·	Pt dressed with DSD  ·	Likely no podiatric surgical intervention at this time to further heal wound with graft applications given patient's medical status  ·	Discussed with attending 89yo M w/ right foot submet 1 ulcer to subcutaneous tissue  ·	Pt examined and evaluated  ·	No acute SOI, wound stable  ·	Pt dressed with DSD  ·	Seen with attending Dr. Schroeder - no pod intervention at this time, placing wound care orders and signing off on patient  ·	Please reconsult if any new podiatric problems develop

## 2017-07-20 NOTE — CONSULT NOTE ADULT - PROBLEM SELECTOR RECOMMENDATION 3
S/p RLE bypass surgery on appropriate medical therapy for PAD.  ?can we change to antiplatelet monotherapy at this time

## 2017-07-20 NOTE — PROGRESS NOTE ADULT - ASSESSMENT
89 y/o M w/ multiple vascular risk factors presenting w/ left arm numbness and weakness, PE remarkable for weakness in left arm, most notably in hand. CTH w/ no evidence of acute infarct, however, with chronic right frontal MCA territory infarct, which would correlate with findings. Patient is out of the window for tPA, not an endovascular candidate.   Carotid dopplers b/l with 50-79% stenosis (technically symptomatic given current CVA). Medical management warranted.  Patient with slight improvement despite hypoglycemic event the previous night. 89 y/o M w/ multiple vascular risk factors presenting w/ left arm numbness and weakness, PE remarkable for weakness in left arm, most notably in hand. CTH w/ no evidence of acute infarct, however, with chronic right frontal MCA territory infarct, which would correlate with findings. Patient is out of the window for tPA, not an endovascular candidate.   Carotid dopplers b/l with 50-79% stenosis (technically right carotid artery stenosis is symptomatic given current stroke). I doubt that he will benefit from endarterectomy.  Medical management warranted.  Patient with slight improvement despite hypoglycemic event the previous night.

## 2017-07-20 NOTE — CONSULT NOTE ADULT - SUBJECTIVE AND OBJECTIVE BOX
89 year old male admitted with left upper extremity weakness, numbness and thought to have CVA.  CT of the head shows multiple chronic infarctions within the right frontal and   parietal lobes and basal ganglia in the right MCA distribution.  MRI not performed because of patient's pacemaker.  During workup while in the hospital, the patient underwent duplex of the head/neck showing moderate heterogenous plaque noted within the proximal right and left internal carotid arteries, consistent with 50-79% in both proximal vessels.   He was also noted to have right lower extremity swelling and was found to have a right soleal DVT on duplex.     The patient recently underwent a right common femoral artery to above-knee popliteal artery bypass with a PTFE graft () for tissue loss and a subsequent debridement of the right foot ulcer.      Staples were removed from right groin and thigh wounds at bedside.  Patient tolerated the procedure well.     Med/Surg Hx:  Peripheral arterial disease  Pacemaker  Chronic kidney disease, unspecified stage  Duodenal ulcer  DM (diabetes mellitus)  HTN (hypertension)  S/P femoral-popliteal bypass surgery: Right Lower Extremity  S/P appendectomy: 50 yrs ago    Soc Hx: former smoker, 50 pack year history; no drug or alcohol abuse    FamHx: none    Allergies: NKDA    T(C): 36.8 (17 @ 05:50), Max: 36.8 (17 @ 05:50)  HR: 66 (17 @ 10:32) (60 - 76)  BP: 110/44 (17 @ 08:41) (110/44 - 147/61)  RR: 17 (17 @ 08:41) (17 - 18)  SpO2: 97% (17 @ 10:32) (96% - 100%)    Gen: alert, responding appropriates  Neuro: cranial nerves grossly intact, weakness of right upper extremity (3) compared to left (5)  Pulm: lungs CTA b/l  CV: RRR, systolic murmur  Abd: soft, nontender, nondistended  Ext: no edema, Right upper and lower extremities 3 strength, LUE 5 strength; right groin and thigh incisions healing well with staple erythema, no purulence or cellulitis; right foot ulcer small, no signs of infection    CBC ( @ 06:00)                          9.6<L>                   8.53    )--------------(  153        --    % Neuts, --    % Lymphs, ANC: --                              28.8<L>  CBC ( @ 06:21)                          10.2<L>                   8.43    )--------------(  159        84.8<H>% Neuts, 6.9<L>% Lymphs, ANC: 7.15                            31.1<L>    BMP ( @ 06:15)       140     |  101     |  28<H> 			Ca++ --      Ca 8.9          ---------------------------------( 140<H>		Mg --           3.7     |  25      |  1.05  			Ph --      BMP ( @ 06:21)       139     |  100     |  24<H> 			Ca++ --      Ca 8.8          ---------------------------------( 152<H>		Mg 1.7          4.1     |  25      |  0.94  			Ph 2.8       LFTs ( @ 06:21)      TPro 5.6<L> / Alb 3.2<L> / TBili 0.8 / DBili -- / AST 16 / ALT 17 / AlkPhos 66      Urinalysis ( @ 07:51):     Color: YELLOW / Appearance: CLEAR / S.010 / pH: 7.0 / Gluc: NEGATIVE / Ketones: TRACE / Bili: NEGATIVE / Urobili: NORMAL / Protein :30 / Nitrites: NEGATIVE / Leuk.Est: NEGATIVE / RBC: 0-2 / WBC: 3-5 / Sq Epi:  / Non Sq Epi: FEW / Bacteria 89 year old male admitted with left upper extremity weakness, numbness and thought to have CVA.  CT of the head shows multiple chronic infarctions within the right frontal and   parietal lobes and basal ganglia in the right MCA distribution.  MRI not performed because of patient's pacemaker.  During workup while in the hospital, the patient underwent duplex of the head/neck showing moderate heterogenous plaque within the proximal right and left internal carotid arteries, consistent with 50-79% in both proximal vessels.   He was also noted to have right lower extremity swelling and was found to have a right soleal DVT on duplex.     The patient recently underwent a right common femoral artery to above-knee popliteal artery bypass with a PTFE graft () for tissue loss and a subsequent debridement of the right foot ulcer.      Staples were removed from right groin and thigh wounds at bedside.  Patient tolerated the procedure well.     Med/Surg Hx:  Peripheral arterial disease  Pacemaker  Chronic kidney disease, unspecified stage  Duodenal ulcer  DM (diabetes mellitus)  HTN (hypertension)  S/P femoral-popliteal bypass surgery: Right Lower Extremity  S/P appendectomy: 50 yrs ago    Soc Hx: former smoker, 50 pack year history; no drug or alcohol abuse    FamHx: none    Allergies: NKDA    T(C): 36.8 (17 @ 05:50), Max: 36.8 (17 @ 05:50)  HR: 66 (17 @ 10:32) (60 - 76)  BP: 110/44 (17 @ 08:41) (110/44 - 147/61)  RR: 17 (17 @ 08:41) (17 - 18)  SpO2: 97% (17 @ 10:32) (96% - 100%)    Gen: alert, responding appropriates  Neuro: cranial nerves grossly intact, weakness of right upper extremity (3) compared to left (5)  Pulm: lungs CTA b/l  CV: RRR, systolic murmur  Abd: soft, nontender, nondistended  Ext: no edema, Right upper and lower extremities 3 strength, LUE 5 strength; right groin and thigh incisions healing well with staple erythema, no purulence or cellulitis; right foot ulcer small, no signs of infection    CBC ( @ 06:00)                          9.6<L>                   8.53    )--------------(  153        --    % Neuts, --    % Lymphs, ANC: --                              28.8<L>  CBC ( @ 06:21)                          10.2<L>                   8.43    )--------------(  159        84.8<H>% Neuts, 6.9<L>% Lymphs, ANC: 7.15                            31.1<L>    BMP ( @ 06:15)       140     |  101     |  28<H> 			Ca++ --      Ca 8.9          ---------------------------------( 140<H>		Mg --           3.7     |  25      |  1.05  			Ph --      BMP ( @ 06:21)       139     |  100     |  24<H> 			Ca++ --      Ca 8.8          ---------------------------------( 152<H>		Mg 1.7          4.1     |  25      |  0.94  			Ph 2.8       LFTs ( @ 06:21)      TPro 5.6<L> / Alb 3.2<L> / TBili 0.8 / DBili -- / AST 16 / ALT 17 / AlkPhos 66      Urinalysis ( @ 07:51):     Color: YELLOW / Appearance: CLEAR / S.010 / pH: 7.0 / Gluc: NEGATIVE / Ketones: TRACE / Bili: NEGATIVE / Urobili: NORMAL / Protein :30 / Nitrites: NEGATIVE / Leuk.Est: NEGATIVE / RBC: 0-2 / WBC: 3-5 / Sq Epi:  / Non Sq Epi: FEW / Bacteria 89 year old male admitted with left upper extremity weakness, numbness and thought to have CVA.  CT of the head shows multiple chronic infarctions within the right frontal and   parietal lobes and basal ganglia in the right MCA distribution.  MRI not performed because of patient's pacemaker.  During workup while in the hospital, the patient underwent duplex of the head/neck showing moderate heterogenous plaque within the proximal right and left internal carotid arteries, consistent with 50-79% in both proximal vessels.   He was also noted to have right lower extremity swelling and was found to have a right soleal DVT on duplex.     The patient recently underwent a right common femoral artery to above-knee popliteal artery bypass with a PTFE graft () for tissue loss and a subsequent debridement of the right foot ulcer.      Staples were removed from right groin and thigh wounds at bedside.  Patient tolerated the procedure well.     Med/Surg Hx:  Peripheral arterial disease  Pacemaker  Chronic kidney disease, unspecified stage  Duodenal ulcer  DM (diabetes mellitus)  HTN (hypertension)  S/P femoral-popliteal bypass surgery: Right Lower Extremity  S/P appendectomy: 50 yrs ago    Soc Hx: former smoker, 50 pack year history; no drug or alcohol abuse    FamHx: none    Allergies: NKDA    T(C): 36.8 (17 @ 05:50), Max: 36.8 (17 @ 05:50)  HR: 66 (17 @ 10:32) (60 - 76)  BP: 110/44 (17 @ 08:41) (110/44 - 147/61)  RR: 17 (17 @ 08:41) (17 - 18)  SpO2: 97% (17 @ 10:32) (96% - 100%)    Gen: alert, responding appropriately  Neuro: cranial nerves grossly intact, weakness of right upper extremity (3) compared to left (5)  Pulm: lungs CTA b/l  CV: RRR, systolic murmur  Abd: soft, nontender, nondistended  Ext: no edema, Right upper and lower extremities 3 strength, LUE 5 strength; right groin and thigh incisions healing well with staple erythema, no purulence or cellulitis; right foot ulcer small, no signs of infection    CBC ( @ 06:00)                          9.6<L>                   8.53    )--------------(  153        --    % Neuts, --    % Lymphs, ANC: --                              28.8<L>  CBC ( @ 06:21)                          10.2<L>                   8.43    )--------------(  159        84.8<H>% Neuts, 6.9<L>% Lymphs, ANC: 7.15                            31.1<L>    BMP ( @ 06:15)       140     |  101     |  28<H> 			Ca++ --      Ca 8.9          ---------------------------------( 140<H>		Mg --           3.7     |  25      |  1.05  			Ph --      BMP ( @ 06:21)       139     |  100     |  24<H> 			Ca++ --      Ca 8.8          ---------------------------------( 152<H>		Mg 1.7          4.1     |  25      |  0.94  			Ph 2.8       LFTs ( @ 06:21)      TPro 5.6<L> / Alb 3.2<L> / TBili 0.8 / DBili -- / AST 16 / ALT 17 / AlkPhos 66      Urinalysis ( @ 07:51):     Color: YELLOW / Appearance: CLEAR / S.010 / pH: 7.0 / Gluc: NEGATIVE / Ketones: TRACE / Bili: NEGATIVE / Urobili: NORMAL / Protein :30 / Nitrites: NEGATIVE / Leuk.Est: NEGATIVE / RBC: 0-2 / WBC: 3-5 / Sq Epi:  / Non Sq Epi: FEW / Bacteria

## 2017-07-20 NOTE — PROGRESS NOTE ADULT - PROBLEM SELECTOR PLAN 2
Reviewed medication.  Not on any long duration insulin or erroneous dosing of NISS.  Not given any PO hypoglycemics from home.  Very unlikely to be new insulin-secreting mass given isolated event.  D/C D10 gtt.  Conservative DM control.

## 2017-07-20 NOTE — SWALLOW BEDSIDE ASSESSMENT ADULT - SWALLOW EVAL: RECOMMENDED FEEDING/EATING TECHNIQUES
allow for swallow between intakes/hard swallow w/ each bite or sip/no straws/position upright (90 degrees)/small sips/bites/alternate food with liquid/tuck chin/check mouth frequently for oral residue/pocketing/maintain upright posture during/after eating for 30 mins/oral hygiene

## 2017-07-21 DIAGNOSIS — N17.9 ACUTE KIDNEY FAILURE, UNSPECIFIED: ICD-10-CM

## 2017-07-21 LAB
BUN SERPL-MCNC: 28 MG/DL — HIGH (ref 7–23)
CALCIUM SERPL-MCNC: 8.8 MG/DL — SIGNIFICANT CHANGE UP (ref 8.4–10.5)
CHLORIDE SERPL-SCNC: 99 MMOL/L — SIGNIFICANT CHANGE UP (ref 98–107)
CO2 SERPL-SCNC: 26 MMOL/L — SIGNIFICANT CHANGE UP (ref 22–31)
CREAT SERPL-MCNC: 1.33 MG/DL — HIGH (ref 0.5–1.3)
GLUCOSE SERPL-MCNC: 133 MG/DL — HIGH (ref 70–99)
HCT VFR BLD CALC: 28.5 % — LOW (ref 39–50)
HGB BLD-MCNC: 9.5 G/DL — LOW (ref 13–17)
INR BLD: 0.98 — SIGNIFICANT CHANGE UP (ref 0.88–1.17)
MAGNESIUM SERPL-MCNC: 1.6 MG/DL — SIGNIFICANT CHANGE UP (ref 1.6–2.6)
MCHC RBC-ENTMCNC: 32.4 PG — SIGNIFICANT CHANGE UP (ref 27–34)
MCHC RBC-ENTMCNC: 33.3 % — SIGNIFICANT CHANGE UP (ref 32–36)
MCV RBC AUTO: 97.3 FL — SIGNIFICANT CHANGE UP (ref 80–100)
NRBC # FLD: 0 — SIGNIFICANT CHANGE UP
PHOSPHATE SERPL-MCNC: 2.8 MG/DL — SIGNIFICANT CHANGE UP (ref 2.5–4.5)
PLATELET # BLD AUTO: 160 K/UL — SIGNIFICANT CHANGE UP (ref 150–400)
PMV BLD: 11 FL — SIGNIFICANT CHANGE UP (ref 7–13)
POTASSIUM SERPL-MCNC: 3.8 MMOL/L — SIGNIFICANT CHANGE UP (ref 3.5–5.3)
POTASSIUM SERPL-SCNC: 3.8 MMOL/L — SIGNIFICANT CHANGE UP (ref 3.5–5.3)
PROTHROM AB SERPL-ACNC: 11 SEC — SIGNIFICANT CHANGE UP (ref 9.8–13.1)
RBC # BLD: 2.93 M/UL — LOW (ref 4.2–5.8)
RBC # FLD: 14.4 % — SIGNIFICANT CHANGE UP (ref 10.3–14.5)
SODIUM SERPL-SCNC: 137 MMOL/L — SIGNIFICANT CHANGE UP (ref 135–145)
WBC # BLD: 7.64 K/UL — SIGNIFICANT CHANGE UP (ref 3.8–10.5)
WBC # FLD AUTO: 7.64 K/UL — SIGNIFICANT CHANGE UP (ref 3.8–10.5)

## 2017-07-21 PROCEDURE — 99232 SBSQ HOSP IP/OBS MODERATE 35: CPT | Mod: 24

## 2017-07-21 PROCEDURE — 99232 SBSQ HOSP IP/OBS MODERATE 35: CPT

## 2017-07-21 PROCEDURE — 99233 SBSQ HOSP IP/OBS HIGH 50: CPT

## 2017-07-21 RX ORDER — INSULIN LISPRO 100/ML
VIAL (ML) SUBCUTANEOUS
Qty: 0 | Refills: 0 | Status: DISCONTINUED | OUTPATIENT
Start: 2017-07-21 | End: 2017-07-25

## 2017-07-21 RX ORDER — WARFARIN SODIUM 2.5 MG/1
5 TABLET ORAL ONCE
Qty: 0 | Refills: 0 | Status: COMPLETED | OUTPATIENT
Start: 2017-07-21 | End: 2017-07-21

## 2017-07-21 RX ORDER — WARFARIN SODIUM 2.5 MG/1
5 TABLET ORAL ONCE
Qty: 0 | Refills: 0 | Status: DISCONTINUED | OUTPATIENT
Start: 2017-07-21 | End: 2017-07-21

## 2017-07-21 RX ORDER — INSULIN LISPRO 100/ML
VIAL (ML) SUBCUTANEOUS AT BEDTIME
Qty: 0 | Refills: 0 | Status: DISCONTINUED | OUTPATIENT
Start: 2017-07-21 | End: 2017-07-25

## 2017-07-21 RX ORDER — SODIUM CHLORIDE 0.65 %
2 AEROSOL, SPRAY (ML) NASAL
Qty: 0 | Refills: 0 | Status: DISCONTINUED | OUTPATIENT
Start: 2017-07-21 | End: 2017-07-25

## 2017-07-21 RX ADMIN — Medication 1 GRAM(S): at 18:09

## 2017-07-21 RX ADMIN — Medication 40 MILLIGRAM(S): at 05:51

## 2017-07-21 RX ADMIN — ATORVASTATIN CALCIUM 80 MILLIGRAM(S): 80 TABLET, FILM COATED ORAL at 22:35

## 2017-07-21 RX ADMIN — Medication: at 18:10

## 2017-07-21 RX ADMIN — TAMSULOSIN HYDROCHLORIDE 0.4 MILLIGRAM(S): 0.4 CAPSULE ORAL at 22:35

## 2017-07-21 RX ADMIN — CILOSTAZOL 50 MILLIGRAM(S): 100 TABLET ORAL at 18:09

## 2017-07-21 RX ADMIN — Medication 50 MILLIGRAM(S): at 05:59

## 2017-07-21 RX ADMIN — HEPARIN SODIUM 5000 UNIT(S): 5000 INJECTION INTRAVENOUS; SUBCUTANEOUS at 05:56

## 2017-07-21 RX ADMIN — Medication 3 MILLILITER(S): at 22:10

## 2017-07-21 RX ADMIN — Medication 5 MILLIGRAM(S): at 05:57

## 2017-07-21 RX ADMIN — BRIMONIDINE TARTRATE 1 DROP(S): 2 SOLUTION/ DROPS OPHTHALMIC at 05:50

## 2017-07-21 RX ADMIN — CILOSTAZOL 50 MILLIGRAM(S): 100 TABLET ORAL at 05:57

## 2017-07-21 RX ADMIN — Medication 81 MILLIGRAM(S): at 13:35

## 2017-07-21 RX ADMIN — Medication 3 MILLILITER(S): at 03:43

## 2017-07-21 RX ADMIN — Medication 1 DROP(S): at 05:55

## 2017-07-21 RX ADMIN — BRIMONIDINE TARTRATE 1 DROP(S): 2 SOLUTION/ DROPS OPHTHALMIC at 18:10

## 2017-07-21 RX ADMIN — CLOPIDOGREL BISULFATE 75 MILLIGRAM(S): 75 TABLET, FILM COATED ORAL at 13:35

## 2017-07-21 RX ADMIN — Medication 20 MILLIEQUIVALENT(S): at 13:35

## 2017-07-21 RX ADMIN — Medication 50 MILLIGRAM(S): at 18:09

## 2017-07-21 RX ADMIN — Medication 1 DROP(S): at 18:10

## 2017-07-21 RX ADMIN — HEPARIN SODIUM 5000 UNIT(S): 5000 INJECTION INTRAVENOUS; SUBCUTANEOUS at 22:08

## 2017-07-21 RX ADMIN — Medication 3 MILLILITER(S): at 16:13

## 2017-07-21 RX ADMIN — Medication 1 GRAM(S): at 05:57

## 2017-07-21 RX ADMIN — Medication 25 MILLIGRAM(S): at 05:57

## 2017-07-21 RX ADMIN — Medication 25 MILLIGRAM(S): at 18:09

## 2017-07-21 RX ADMIN — WARFARIN SODIUM 5 MILLIGRAM(S): 2.5 TABLET ORAL at 20:48

## 2017-07-21 RX ADMIN — HEPARIN SODIUM 5000 UNIT(S): 5000 INJECTION INTRAVENOUS; SUBCUTANEOUS at 13:35

## 2017-07-21 NOTE — PROGRESS NOTE ADULT - SUBJECTIVE AND OBJECTIVE BOX
VASCULAR SURGERY  Pager: [phone]    INTERVAL EVENTS/SUBJECTIVE:   No complaints this AM.   ______________________________________________  OBJECTIVE:   T(C): 36.6 (07-21-17 @ 05:48), Max: 36.6 (07-20-17 @ 15:37)  HR: 63 (07-21-17 @ 05:48) (60 - 68)  BP: 133/51 (07-21-17 @ 05:48) (115/47 - 152/57)  RR: 18 (07-21-17 @ 05:48) (17 - 18)  SpO2: 100% (07-21-17 @ 05:48) (97% - 100%)  Wt(kg): --  CAPILLARY BLOOD GLUCOSE  165 (21 Jul 2017 08:47)  284 (20 Jul 2017 21:26)  234 (20 Jul 2017 17:46)  349 (20 Jul 2017 13:00)  175 (20 Jul 2017 09:56)    I&O's Detail    20 Jul 2017 07:01  -  21 Jul 2017 07:00  --------------------------------------------------------  IN:    IV PiggyBack: 150 mL    Oral Fluid: 440 mL  Total IN: 590 mL    OUT:    Voided: 300 mL  Total OUT: 300 mL    Total NET: 290 mL      Physical exam:  General: NAD  LUE weaker than RUE  No slurring of speech or facial droop  ______________________________________________  LABS:  CBC Full  -  ( 21 Jul 2017 06:05 )  WBC Count : 7.64 K/uL  Hemoglobin : 9.5 g/dL  Hematocrit : 28.5 %  Platelet Count - Automated : 160 K/uL  Mean Cell Volume : 97.3 fL  Mean Cell Hemoglobin : 32.4 pg  Mean Cell Hemoglobin Concentration : 33.3 %    07-21    137  |  99  |  28<H>  ----------------------------<  133<H>  3.8   |  26  |  1.33<H>    Ca    8.8      21 Jul 2017 06:05  Phos  2.8     07-21  Mg     1.6     07-21  ______________________________________________  RADIOLOGY:  CT Angio Neck w/ IV Cont (07.20.17 @ 17:42)   Impression: 70% stenosis involving proximal right internal carotid artery  and 54% stenosis involving proximal left internal carotid artery. VASCULAR SURGERY  Pager: [phone]    INTERVAL EVENTS/SUBJECTIVE:   No complaints this AM.   ______________________________________________  OBJECTIVE:   T(C): 36.6 (07-21-17 @ 05:48), Max: 36.6 (07-20-17 @ 15:37)  HR: 63 (07-21-17 @ 05:48) (60 - 68)  BP: 133/51 (07-21-17 @ 05:48) (115/47 - 152/57)  RR: 18 (07-21-17 @ 05:48) (17 - 18)  SpO2: 100% (07-21-17 @ 05:48) (97% - 100%)  Wt(kg): --  CAPILLARY BLOOD GLUCOSE  165 (21 Jul 2017 08:47)  284 (20 Jul 2017 21:26)  234 (20 Jul 2017 17:46)  349 (20 Jul 2017 13:00)  175 (20 Jul 2017 09:56)    I&O's Detail    20 Jul 2017 07:01  -  21 Jul 2017 07:00  --------------------------------------------------------  IN:    IV PiggyBack: 150 mL    Oral Fluid: 440 mL  Total IN: 590 mL    OUT:    Voided: 300 mL  Total OUT: 300 mL    Total NET: 290 mL      Physical exam: VSS a and o x3  General: NAD  LUE weaker than RUE  No slurring of speech or facial droop  ______________________________________________  LABS:  CBC Full  -  ( 21 Jul 2017 06:05 )  WBC Count : 7.64 K/uL  Hemoglobin : 9.5 g/dL  Hematocrit : 28.5 %  Platelet Count - Automated : 160 K/uL  Mean Cell Volume : 97.3 fL  Mean Cell Hemoglobin : 32.4 pg  Mean Cell Hemoglobin Concentration : 33.3 %    07-21    137  |  99  |  28<H>  ----------------------------<  133<H>  3.8   |  26  |  1.33<H>    Ca    8.8      21 Jul 2017 06:05  Phos  2.8     07-21  Mg     1.6     07-21  ______________________________________________  RADIOLOGY:  CT Angio Neck w/ IV Cont (07.20.17 @ 17:42)   Impression: 70% stenosis involving proximal right internal carotid artery  and 54% stenosis involving proximal left internal carotid artery.  CTA reviewed

## 2017-07-21 NOTE — PROGRESS NOTE ADULT - PROBLEM SELECTOR PLAN 3
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  PPM functioning correctly - V-paced. ASA, plavix, lipitor. CT Angio showed 70% ZION stenosis.  PT/OT/PMR eval. Appreciate neuro and vasc med input. Neuro feels no benefit from CEA.  Awaiting vascular surgery input. 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  PPM functioning correctly - Afib. Currently on ASA, plavix, lipitor. CT Angio showed 70% ZION stenosis.  PT/OT/PMR eval. Appreciate neuro and vasc med input. Neuro feels no benefit from CEA.  Discussed with patient and niece by bedside about benefit of A/C in a setting of Afib, distal DVT, and CVA.  They are very hesitant and wanted to think it over some more before making the decision.  Answered all the questions for 30 minutes.

## 2017-07-21 NOTE — PROGRESS NOTE ADULT - ASSESSMENT
89 yo man with MMPs:    1.  ?TIA/stroke:  History of chronic strokes, possible new TIA/stroke.  No acute findings on recent CT head.  CTA neck/head pending.  On DAPT, statin.  2.  New finding of right soleal vein DVT: Plan for serial venous duplex us imaging.

## 2017-07-21 NOTE — PROGRESS NOTE ADULT - ASSESSMENT
Assessment/Plan: 89y Male presents with recent stroke found to have 70% stenosis of proximal R internal carotid artery, 54% L internal. Also found to have soleal DVT. Assessment/Plan: 89y Male presents with recent stroke and right  soleal DVT.

## 2017-07-21 NOTE — PROGRESS NOTE ADULT - SUBJECTIVE AND OBJECTIVE BOX
Cardiology/Vascular Medicine Inpatient Consultation Note      No overnight events.  Telemetry: Vpaced 60s bpm, PVCs.    Vital Signs Last 24 Hrs  T(C): 36.6 (21 Jul 2017 05:48), Max: 36.6 (20 Jul 2017 15:37)  T(F): 97.9 (21 Jul 2017 05:48), Max: 97.9 (20 Jul 2017 21:40)  HR: 63 (21 Jul 2017 05:48) (60 - 68)  BP: 133/51 (21 Jul 2017 05:48) (115/47 - 152/57)  BP(mean): --  RR: 18 (21 Jul 2017 05:48) (17 - 18)  SpO2: 100% (21 Jul 2017 05:48) (99% - 100%)      Appearance: Elderly  HEENT:   Normal oral mucosa, PERRL, EOMI, +bruit  Lymphatic: No lymphadenopathy  Cardiovascular: Normal S1 S2, No JVD, No murmurs,   Respiratory: Decreased BS b/l abses  Psychiatry: Awake, alert  Gastrointestinal:  Soft, Non-tender, + BS	  Skin: No rashes, No ecchymoses, No cyanosis	  Neurologic: Non-focal  Extremities: as above  Vascular: as above    MEDICATIONS  (STANDING):  cilostazol 50 milliGRAM(s) Oral two times a day  tamsulosin 0.4 milliGRAM(s) Oral at bedtime  timolol 0.5% Solution 1 Drop(s) Right EYE two times a day  brimonidine 0.2% Ophthalmic Solution 1 Drop(s) Right EYE two times a day  predniSONE   Tablet 5 milliGRAM(s) Oral daily  acetaminophen   Tablet 650 milliGRAM(s) Oral two times a day  sucralfate suspension 1 Gram(s) Oral two times a day  hydrALAZINE 50 milliGRAM(s) Oral two times a day  ALBUTerol/ipratropium for Nebulization 3 milliLiter(s) Nebulizer every 6 hours  metoprolol 25 milliGRAM(s) Oral two times a day  potassium chloride    Tablet ER 20 milliEquivalent(s) Oral daily  aspirin  chewable 81 milliGRAM(s) Oral daily  clopidogrel Tablet 75 milliGRAM(s) Oral daily  heparin  Injectable 5000 Unit(s) SubCutaneous every 8 hours  dextrose 5%. 1000 milliLiter(s) (50 mL/Hr) IV Continuous <Continuous>  dextrose 50% Injectable 12.5 Gram(s) IV Push once  dextrose 50% Injectable 25 Gram(s) IV Push once  dextrose 50% Injectable 25 Gram(s) IV Push once  atorvastatin 80 milliGRAM(s) Oral at bedtime    MEDICATIONS  (PRN):  dextrose Gel 1 Dose(s) Oral once PRN Blood Glucose LESS THAN 70 milliGRAM(s)/deciliter  glucagon  Injectable 1 milliGRAM(s) IntraMuscular once PRN Glucose LESS THAN 70 milligrams/deciliter  sodium chloride 0.65% Nasal 2 Spray(s) Both Nostrils every 2 hours PRN Nasal Congestion      LABS:	 	                          9.5    7.64  )-----------( 160      ( 21 Jul 2017 06:05 )             28.5       07-21    137  |  99  |  28<H>  ----------------------------<  133<H>  3.8   |  26  |  1.33<H>    Ca    8.8      21 Jul 2017 06:05  Phos  2.8     07-21  Mg     1.6     07-21               proBNP:   Lipid Profile: Cholesterol, [id number] mg/dL<L> [120 - 199]  Direct LDL38 mg/dL  HDL Cholesterol, serum51 mg/dL [35 - 55]  Triglycerides, serum61 mg/dL [10 - 149]    HgA1c: Hemoglobin A1C, Whole Blood: 6.4 % (07-20 @ 06:15)      PREVIOUS DIAGNOSTIC CARDIOVASCULAR TESTING:      [ ]  Echocardiogram:  < from: Transthoracic Echocardiogram (06.22.17 @ 11:21) >  Patient name: PHILOMENA ANGULO  YOB: 1921   Age: 89 (M)   MR#: [id number]  Study Date: 6/22/2017  Location: [id number]: Alanna King RDCS  Study quality: Technically good  Referring Physician: Miguelito Aguilar MD  Blood Pressure: 140/54 mmHg  Height: 175 cm  Weight: 64 kg  BSA: 1.8 m2  ------------------------------------------------------------------------  PROCEDURE: Transthoracic echocardiogram with 2-D, M-Mode  and complete spectral and color flow Doppler.  INDICATION: Abnormal electrocardiogram (ECG) (EKG)  (R94.31), Cardiac murmur, unspecified (R01.1)  ------------------------------------------------------------------------  DIMENSIONS:  Dimensions:     Normal Values:  LA:     4.1 cm    2.0 - 4.0 cm  Ao:    3.6 cm    2.0 - 3.8 cm  SEPTUM: 1.0 cm    0.6 - 1.2 cm  PWT:    1.0 cm    0.6 - 1.1 cm  LVIDd:  4.9 cm    3.0 - 5.6 cm  LVIDs:  2.7 cm    1.8 - 4.0 cm  Derived Variables:  LVMI: 99 g/m2  RWT: 0.40  Fractional short: 45 %  Ejection Fraction (Teicholtz): 65 %  ------------------------------------------------------------------------  OBSERVATIONS:  Mitral Valve: Mitral annular calcification and calcified  mitral leaflets with normal diastolic opening. Minimal  mitral regurgitation.  Aortic Root:Normal aortic root.  Aortic Valve: Calcified trileaflet aortic valve with normal  opening. Mild aortic regurgitation.  Left Atrium: Dilated left atrium.  Left Ventricle: Endocardium not well visualized; grossly  normal left ventricular systolic function. Normal left  ventricular internal dimensions and wall thicknesses.  Right Heart: Normal right atrium. Normal right ventricular  size and function. A device wire is noted in the right  heart. Normal tricuspid valve.  Mild tricuspid  regurgitation.Normal pulmonic valve. Minimal pulmonic  regurgitation.  Pericardium/PleuraNormal pericardium with no pericardial  effusion.  Hemodynamic: Estimated right ventricular systolic pressure  equals 47 mm Hg, assuming right atrial pressure equals 10  mm Hg, consistent with mild pulmonary hypertension.  ------------------------------------------------------------------------  CONCLUSIONS:  1. Mitral annular calcification and calcified mitral  leaflets with normal diastolic opening. Minimal mitral  regurgitation.  2. Calcified trileaflet aortic valve with normal opening.  Mild aortic regurgitation.  3. Dilated left atrium.  4. Normal left ventricular internal dimensions and wall  thicknesses.  5. Normal right ventricular size and function. A device  wire is noted in the right heart.  6. Estimated pulmonary artery systolic pressure equals 47  mm Hg, assuming right atrial pressure equals 10  mm Hg,  consistent with mild pulmonary hypertension.  *** No previous Echo exam.  ------------------------------------------------------------------------  Confirmed on  6/22/2017 - 14:11:44 by Olu Lawrence MD, DURAN,  DRAKE NEWELL  ------------------------------------------------------------------------    < end of copied text >      [ ]  Catheterization:    < from: VA Duplex Carotid Arteries, Bilateral. (07.19.17 @ 08:01) >    Patient name: PHILOMENA ANGULO  Date of test: 7/19/2017  MR#: [id number]  [facility] #: [id number]    Location: [facility] Physician(s): , Sandra Pro MD  Interpreted by: Olu Lawrence MD, OSIRIS GARZON, DRAKE  Tech: Gena Wagner [facility]  Type of Test: Carotid Doppler Evaluation  ------------------------------------------------------------------------  Procedure: Duplex Real-time grayscale/color Doppler  ultrasonography used to interrogate extracranial arteries  bilaterally.  Indications: Occlusion and stenosis of bilateral carotid  arteries (I65.23)  ------------------------------------------------------------------------  VELOCITY:  ------------------------------------------------------------------------  RIGHT:    Subclavian: 49 / 7    Prox CCA: 79/    Mid CCA: 75/10    Dist CCA: 86/7    Prox ICA: 216/19    Mid ICA: 121 / 23    Distal ICA: 77 / 14    ECA: 146 / 14    Vertebral: 94 / 18    ICA/CCA: 2.5  ------------------------------------------------------------------------    Subclavian: Normal    CCA Plaque: Moderate    ICA Plaque: Moderate-severe    Vertebral: Antegrade flow  ------------------------------------------------------------------------  LEFT:    Subclavian: 89 / 8    Prox CCA: 84/13    Mid CCA: 98/14    Dist CCA: 98/11    Prox ICA: 170/33    Mid ICA: 142 / 30    Distal ICA: 63 / 14    ECA: 109 /    Vertebral: 77 / 11    ICA/CCA: 1.7  ------------------------------------------------------------------------    Subclavian: Normal    CCA Plaque: Moderate    ICA Plaque: Moderate-severe    Vertebral: Antegrade flow  ------------------------------------------------------------------------  Right Findings: Right Common Carotid Artery: There is  moderate heterogenous plaque noted throughout the vessel.  Right Internal Carotid Artery:  B-mode and spectral  analyses consistent with a diameter reduction of 50-79%.  There is moderate heterogenous plaque within the proximal  vessel.  Moderate-severe atherosclerosis in the external carotid  artery (>50% stenosis).  Right Vertebral Artery:  Antegrade flow with normal  velocities.  Left Findings: Left Common Carotid Artery: There is  moderate heterogenous plaque noted throughout the vessel.  Left Internal Carotid Artery:  B-mode and spectral  analyses consistent with diameter reduction of 50-79%.  There is moderate heterogenous plaque within the proximal  vessel.  Moderate-severe atherosclerosis in the external carotid  artery (>50% stenosis).  Left Vertebral Artery:  Antegrade flow with normal  velocities.  ------------------------------------------------------------------------  Summary/Impressions:  Moderate heterogenous plaque noted within the proximal  right and left internal carotid arteries, consistent with  50-79% in both proximal vessels.  ------------------------------------------------------------------------  Confirmed on  7/19/2017 - 9:22 AM by Olu Lawrence MD, [facility],  [facility], [place]  By signing this report, the attending physician certifies  that he or she has personally supervised and interpreted  the vascular study and has reviewed and or edited and  agrees with the written comments contained within the  report.    < end of copied text >      [ ]  Vascular studies:    < from: US Duplex Venous Lower Ext Ltd, Right (07.19.17 @ 14:14) >  EXAM:  US DPLX LWR EXT VEINS LTD RT        PROCEDURE DATE:  Jul 19 2017         INTERPRETATION:  CLINICAL INFORMATION: Right lower extremity swelling    COMPARISON: None available.    TECHNIQUE: Duplex sonography of the RIGHT LOWER extremity with color and   spectral Doppler, with and without compression.      FINDINGS:    There is normal compressibility of the right common femoral, femoral,   popliteal, peroneal and posterior tibial veins. Occlusive thrombus is   seen in a short segment of an expanded soleal vein.    The contralateral common femoral vein is patent.    Doppler examination shows normal spontaneous and phasic flow.    IMPRESSION:     Right soleal deep vein thrombosis.    Findings discussed with VAL Quezada on July 19, 2017, 1420hours    GEORGINA LEYVA M.D., ATTENDING RADIOLOGIST  This document has been electronically signed. Jul 19 2017  2:20PM        < end of copied text >      < from: CT Head No Cont (07.18.17 @ 11:17) >  EXAM:  CT BRAIN        PROCEDURE DATE:  Jul 18 2017         INTERPRETATION:  CLINICAL INFORMATION: Left-sided weakness times one hour    TECHNIQUE: Noncontrast axial CT images were acquired through the head.   Two-dimensional sagittal and coronal reformats were generated.    COMPARISON STUDY: CT head 1120 1/15 and 2/9/2011    FINDINGS:   Redemonstration of encephalomalacia/gliosis involving the right frontal   and parietal lobes as well as the right basal ganglia and head of the   caudate nucleus in the right MCA distribution.    There is no CT evidence of acute intracranial hemorrhage, extra-axial   collection, vasogenic edema, mass effect, midline shift, central   herniation, or hydrocephalus.     There is moderate cerebral volume loss. There is moderate patchy white   matter hypoattenuation which is nonspecific in etiology but likely   related to chronic microvascular ischemic disease.    The visualized paranasal sinuses are clear. The mastoid air cells and   middle ear cavities are clear.    The soft tissues of the scalp are unremarkable. The calvarium is intact.    IMPRESSION:   No CT evidence of acute intracranial hemorrhage, brain edema, or mass   effect. 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          JIAME RAINEY M.D., RADIOLOGY RESIDENT  This document has been electronically signed.  OSMANI GUTIERREZ M.D., ATTENDING RADIOLOGIST  This document has been electronically signed. Jul 18 201711:57AM                  < end of copied text >

## 2017-07-21 NOTE — DIETITIAN INITIAL EVALUATION ADULT. - DIET TYPE
mechanical soft/consistent carbohydrate (no snacks)/DASH/TLC (sodium and cholesterol restricted diet)

## 2017-07-21 NOTE — DIETITIAN INITIAL EVALUATION ADULT. - OTHER INFO
Nutrition consult received for pressure ulcer, Pt noted with Stage II pressure ulcer of the coccyx & ball of foot. He reports good PO intake at this time, 100% meal intake at breakfast.  Pt denies any difficulties chewing and swallowing, he is currently ordered for a Mechanical Soft diet.  PO intake and protein intake encouraged. Pt accepting of PO supplement.

## 2017-07-21 NOTE — PROGRESS NOTE ADULT - PROBLEM SELECTOR PLAN 2
Reviewed medication.  Not on any long duration insulin or erroneous dosing of NISS.  Not given any PO hypoglycemics from home.  Very unlikely to be new insulin-secreting mass given isolated event.  D/C D10 gtt.  Conservative DM control with NISS.

## 2017-07-21 NOTE — PROGRESS NOTE ADULT - PROBLEM SELECTOR PLAN 1
- CTA reviewed  - Neurology believes that patient unlikely to benefit from endarterectomy  - Will discuss with attending and patient if intervention is warranted - CTA reviewed  - Neurology imput noted  continue med /conserv managemnt and pt/ot

## 2017-07-21 NOTE — PROGRESS NOTE ADULT - SUBJECTIVE AND OBJECTIVE BOX
CC: Follow up for CVA    SUBJECTIVE / OVERNIGHT EVENTS:  No new events overnight.  No episodes of hypoglycemia. Tolerating pO intake.  No F/C, N/V, CP, SOB, Cough, lightheadedness, dizziness, abdominal pain, diarrhea, dysuria.    MEDICATIONS  (STANDING):  cilostazol 50 milliGRAM(s) Oral two times a day  tamsulosin 0.4 milliGRAM(s) Oral at bedtime  timolol 0.5% Solution 1 Drop(s) Right EYE two times a day  brimonidine 0.2% Ophthalmic Solution 1 Drop(s) Right EYE two times a day  predniSONE   Tablet 5 milliGRAM(s) Oral daily  acetaminophen   Tablet 650 milliGRAM(s) Oral two times a day  sucralfate suspension 1 Gram(s) Oral two times a day  hydrALAZINE 50 milliGRAM(s) Oral two times a day  ALBUTerol/ipratropium for Nebulization 3 milliLiter(s) Nebulizer every 6 hours  metoprolol 25 milliGRAM(s) Oral two times a day  potassium chloride    Tablet ER 20 milliEquivalent(s) Oral daily  aspirin  chewable 81 milliGRAM(s) Oral daily  clopidogrel Tablet 75 milliGRAM(s) Oral daily  heparin  Injectable 5000 Unit(s) SubCutaneous every 8 hours  dextrose 5%. 1000 milliLiter(s) (50 mL/Hr) IV Continuous <Continuous>  dextrose 50% Injectable 12.5 Gram(s) IV Push once  dextrose 50% Injectable 25 Gram(s) IV Push once  dextrose 50% Injectable 25 Gram(s) IV Push once  atorvastatin 80 milliGRAM(s) Oral at bedtime    MEDICATIONS  (PRN):  dextrose Gel 1 Dose(s) Oral once PRN Blood Glucose LESS THAN 70 milliGRAM(s)/deciliter  glucagon  Injectable 1 milliGRAM(s) IntraMuscular once PRN Glucose LESS THAN 70 milligrams/deciliter  sodium chloride 0.65% Nasal 2 Spray(s) Both Nostrils every 2 hours PRN Nasal Congestion      Vital Signs Last 24 Hrs  T(C): 36.6 (21 Jul 2017 05:48), Max: 36.6 (20 Jul 2017 15:37)  T(F): 97.9 (21 Jul 2017 05:48), Max: 97.9 (20 Jul 2017 21:40)  HR: 63 (21 Jul 2017 05:48) (60 - 68)  BP: 133/51 (21 Jul 2017 05:48) (115/47 - 152/57)  BP(mean): --  RR: 18 (21 Jul 2017 05:48) (17 - 18)  SpO2: 100% (21 Jul 2017 05:48) (99% - 100%)  CAPILLARY BLOOD GLUCOSE  165 (21 Jul 2017 08:47)  284 (20 Jul 2017 21:26)  234 (20 Jul 2017 17:46)  349 (20 Jul 2017 13:00)        I&O's Summary    20 Jul 2017 07:01  -  21 Jul 2017 07:00  --------------------------------------------------------  IN: 590 mL / OUT: 300 mL / NET: 290 mL        PHYSICAL EXAM:  GENERAL: NAD, fragile appearing  HEAD:  Atraumatic, Normocephalic  EYES: EOMI, PERRLA, conjunctiva and sclera clear  NECK: Supple, No JVD  CHEST/LUNG: Clear to auscultation bilaterally; No wheeze  HEART: Regular rate and rhythm; No murmurs, rubs, or gallops  ABDOMEN: Soft, Nontender, Nondistended; Bowel sounds present  EXTREMITIES:  2+ Peripheral Pulses, No clubbing, cyanosis, or edema  PSYCH: Calm  NEUROLOGY: A/Ox3, LUE weakness.  SKIN: RUE skin tear - covered with dressing. RLE surgical site C/D/I    LABS:                        9.5    7.64  )-----------( 160      ( 21 Jul 2017 06:05 )             28.5     07-21    137  |  99  |  28<H>  ----------------------------<  133<H>  3.8   |  26  |  1.33<H>    Ca    8.8      21 Jul 2017 06:05  Phos  2.8     07-21  Mg     1.6     07-21    Lipid Profile in AM (07.20.17 @ 06:15)    Cholesterol, Serum: 101 mg/dL    Triglycerides, Serum: 61 mg/dL    HDL Cholesterol, Serum: 51 mg/dL    Direct LDL: 38:   RESULT (mg/dL)   (For adults 18 years and older)  ----------------------------------------------------------  Below 70         Ideal for people at very high risk of  heart disease  Below 100        Ideal for people at risk of heart disease  100 - 1238: 9        [place]  130 - 159        Borderline high  160 - 189        High  190 and above    Very high mg/dL                RADIOLOGY & ADDITIONAL TESTS:  < from: CT Angio Head w/ IV Cont (07.20.17 @ 17:39) >  EXAM:  CT ANGIO BRAIN (W)AW IC      EXAM:  CT ANGIO NECK (W)AW IC        PROCEDURE DATE:  Jul 20 2017         INTERPRETATION:  History: Left-sided weakness.    Multiple axial sections were performed from base of skull to vertex   without contrast enhancement. Coronal and sagittal reconstructions were   performed as well.    The patient was then injected with approximately 90 cc of Omnipaque 350   IV and multiple axial sections were performed through the neck and [place]   of Glass for purposes of a CT angiogram. Approximately 10 cc of contrast   was discarded.    Parenchymal volume loss and chronic microvascular changes are again seen.    Abnormal low-attenuation is again identified in the high right parietal   subcortical region this is compatible an area of gliosis secondary to an   old infarct.    Old lacunar infarcts in the right corona radiata region are again seen   and unchanged.    Extra-axial area of low-attenuation is again seen involving the high left   frontal parietal and right frontal region which could be compatible with   area of increased atrophy versus chronic subdural hematomas or hygromas.   This finding appears unchanged when compared with the prior exam.    There is no evidence acute hemorrhage mass or mass effect in the   posterior fossa or supratentorial region    Evaluation of the osseous structures with the appropriate window appears   unremarkable.    The visualized paranasal sinuses mastoid and middle ear regions appear   clear.    Pacemaker is identified overlying the left chest.    Scattered areas of calcification is seen throughout the aortic arch and   bilateral common carotid artery region. Calcified plaques are also seen   in the carotid artery bifurcation region. Mild narrowing of the distal   right common carotid artery is seen.     Using the NASCET criteria there is evidence of a 70% stenosis involving   the proximal right internal carotid artery. Mild narrowing of proximal   right external carotid artery is seen.    Using the NASCET criteria there is a 54% stenosis involving the proximal   left internal carotid artery. Evaluation of proximal left external   carotid artery appears normal.     Both vertebral arteries appear unremarkable    Mild narrowing of the cavernous and supraclinoid segments of the distal   right internal carotid is seen. Hypoplastic right A1 segment is seen.   Evaluation of the anterior cerebral, middle cerebral, basilar and   posterior cerebral arteries appear normal without evidence of an aneurysm   significant stenosis seen.    The dural venous sinuses and trait normal enhancement with no evidence of   venous sinus thrombosis.    Bilateral pleural effusions are identified.    Impression: 70% stenosis involving proximal right internal carotid artery  and 54% stenosis involving proximal left internal carotid artery.    CT venogram of the [place] demonstrates no significant stenosis or   aneurysm.    TANVIR FUENTES M.D., ATTENDING RADIOLOGIST  This document has been electronically signed. Jul 21 2017  9:06AM    < end of copied text >    Imaging Personally Reviewed: yes    Consultant(s) Notes Reviewed:  vascular surg    Care Discussed with Consultants/Other Providers: neuro - dr.libman, [facility] med - dr. agosto

## 2017-07-21 NOTE — DIETITIAN INITIAL EVALUATION ADULT. - PERTINENT LABORATORY DATA
07-21 Na137 mmol/L Glu 133 mg/dL<H> K+ 3.8 mmol/L Cr  1.33 mg/dL<H> BUN 28 mg/dL<H> Phos 2.8 mg/dL Alb n/a   PAB n/a

## 2017-07-21 NOTE — DIETITIAN INITIAL EVALUATION ADULT. - NS AS NUTRI INTERV MEDICAL AND FOOD SUPPLEMENTS
Glucerna Therapeutic Nutrition Shake 240mls 2x daily (440kcals, 20g protein) & No Carb Prosource (15 grams protein/ 30 mL packet.)

## 2017-07-22 DIAGNOSIS — Z71.89 OTHER SPECIFIED COUNSELING: ICD-10-CM

## 2017-07-22 DIAGNOSIS — I48.91 UNSPECIFIED ATRIAL FIBRILLATION: ICD-10-CM

## 2017-07-22 DIAGNOSIS — S51.811A LACERATION WITHOUT FOREIGN BODY OF RIGHT FOREARM, INITIAL ENCOUNTER: ICD-10-CM

## 2017-07-22 LAB
BUN SERPL-MCNC: 33 MG/DL — HIGH (ref 7–23)
CALCIUM SERPL-MCNC: 8.5 MG/DL — SIGNIFICANT CHANGE UP (ref 8.4–10.5)
CHLORIDE SERPL-SCNC: 99 MMOL/L — SIGNIFICANT CHANGE UP (ref 98–107)
CO2 SERPL-SCNC: 25 MMOL/L — SIGNIFICANT CHANGE UP (ref 22–31)
CREAT SERPL-MCNC: 1.49 MG/DL — HIGH (ref 0.5–1.3)
GLUCOSE SERPL-MCNC: 134 MG/DL — HIGH (ref 70–99)
HCT VFR BLD CALC: 26.1 % — LOW (ref 39–50)
HGB BLD-MCNC: 8.4 G/DL — LOW (ref 13–17)
INR BLD: 1 — SIGNIFICANT CHANGE UP (ref 0.88–1.17)
MAGNESIUM SERPL-MCNC: 1.6 MG/DL — SIGNIFICANT CHANGE UP (ref 1.6–2.6)
MCHC RBC-ENTMCNC: 31.9 PG — SIGNIFICANT CHANGE UP (ref 27–34)
MCHC RBC-ENTMCNC: 32.2 % — SIGNIFICANT CHANGE UP (ref 32–36)
MCV RBC AUTO: 99.2 FL — SIGNIFICANT CHANGE UP (ref 80–100)
NRBC # FLD: 0 — SIGNIFICANT CHANGE UP
PHOSPHATE SERPL-MCNC: 2.6 MG/DL — SIGNIFICANT CHANGE UP (ref 2.5–4.5)
PLATELET # BLD AUTO: 154 K/UL — SIGNIFICANT CHANGE UP (ref 150–400)
PMV BLD: 11.3 FL — SIGNIFICANT CHANGE UP (ref 7–13)
POTASSIUM SERPL-MCNC: 4.6 MMOL/L — SIGNIFICANT CHANGE UP (ref 3.5–5.3)
POTASSIUM SERPL-SCNC: 4.6 MMOL/L — SIGNIFICANT CHANGE UP (ref 3.5–5.3)
PROTHROM AB SERPL-ACNC: 11.2 SEC — SIGNIFICANT CHANGE UP (ref 9.8–13.1)
RBC # BLD: 2.63 M/UL — LOW (ref 4.2–5.8)
RBC # FLD: 14.5 % — SIGNIFICANT CHANGE UP (ref 10.3–14.5)
SODIUM SERPL-SCNC: 137 MMOL/L — SIGNIFICANT CHANGE UP (ref 135–145)
WBC # BLD: 5.91 K/UL — SIGNIFICANT CHANGE UP (ref 3.8–10.5)
WBC # FLD AUTO: 5.91 K/UL — SIGNIFICANT CHANGE UP (ref 3.8–10.5)

## 2017-07-22 PROCEDURE — 99497 ADVNCD CARE PLAN 30 MIN: CPT

## 2017-07-22 PROCEDURE — 99233 SBSQ HOSP IP/OBS HIGH 50: CPT

## 2017-07-22 RX ORDER — PANTOPRAZOLE SODIUM 20 MG/1
40 TABLET, DELAYED RELEASE ORAL
Qty: 0 | Refills: 0 | Status: DISCONTINUED | OUTPATIENT
Start: 2017-07-22 | End: 2017-07-25

## 2017-07-22 RX ORDER — WARFARIN SODIUM 2.5 MG/1
5 TABLET ORAL ONCE
Qty: 0 | Refills: 0 | Status: COMPLETED | OUTPATIENT
Start: 2017-07-22 | End: 2017-07-22

## 2017-07-22 RX ORDER — INSULIN GLARGINE 100 [IU]/ML
5 INJECTION, SOLUTION SUBCUTANEOUS AT BEDTIME
Qty: 0 | Refills: 0 | Status: DISCONTINUED | OUTPATIENT
Start: 2017-07-22 | End: 2017-07-24

## 2017-07-22 RX ORDER — SODIUM CHLORIDE 9 MG/ML
1000 INJECTION INTRAMUSCULAR; INTRAVENOUS; SUBCUTANEOUS
Qty: 0 | Refills: 0 | Status: DISCONTINUED | OUTPATIENT
Start: 2017-07-22 | End: 2017-07-24

## 2017-07-22 RX ADMIN — HEPARIN SODIUM 5000 UNIT(S): 5000 INJECTION INTRAVENOUS; SUBCUTANEOUS at 06:14

## 2017-07-22 RX ADMIN — Medication 5 MILLIGRAM(S): at 06:11

## 2017-07-22 RX ADMIN — TAMSULOSIN HYDROCHLORIDE 0.4 MILLIGRAM(S): 0.4 CAPSULE ORAL at 21:12

## 2017-07-22 RX ADMIN — Medication 1 DROP(S): at 17:17

## 2017-07-22 RX ADMIN — WARFARIN SODIUM 5 MILLIGRAM(S): 2.5 TABLET ORAL at 17:17

## 2017-07-22 RX ADMIN — Medication 3 MILLILITER(S): at 11:40

## 2017-07-22 RX ADMIN — CILOSTAZOL 50 MILLIGRAM(S): 100 TABLET ORAL at 06:11

## 2017-07-22 RX ADMIN — Medication 81 MILLIGRAM(S): at 12:59

## 2017-07-22 RX ADMIN — Medication 3 MILLILITER(S): at 17:34

## 2017-07-22 RX ADMIN — Medication 1 GRAM(S): at 17:17

## 2017-07-22 RX ADMIN — HEPARIN SODIUM 5000 UNIT(S): 5000 INJECTION INTRAVENOUS; SUBCUTANEOUS at 21:11

## 2017-07-22 RX ADMIN — Medication: at 18:12

## 2017-07-22 RX ADMIN — ATORVASTATIN CALCIUM 80 MILLIGRAM(S): 80 TABLET, FILM COATED ORAL at 21:12

## 2017-07-22 RX ADMIN — INSULIN GLARGINE 5 UNIT(S): 100 INJECTION, SOLUTION SUBCUTANEOUS at 21:12

## 2017-07-22 RX ADMIN — Medication 50 MILLIGRAM(S): at 17:16

## 2017-07-22 RX ADMIN — CLOPIDOGREL BISULFATE 75 MILLIGRAM(S): 75 TABLET, FILM COATED ORAL at 13:00

## 2017-07-22 RX ADMIN — Medication 25 MILLIGRAM(S): at 17:17

## 2017-07-22 RX ADMIN — BRIMONIDINE TARTRATE 1 DROP(S): 2 SOLUTION/ DROPS OPHTHALMIC at 17:16

## 2017-07-22 RX ADMIN — Medication 3 MILLILITER(S): at 21:42

## 2017-07-22 RX ADMIN — Medication 3 MILLILITER(S): at 03:18

## 2017-07-22 RX ADMIN — Medication 1 GRAM(S): at 06:12

## 2017-07-22 RX ADMIN — Medication 50 MILLIGRAM(S): at 06:13

## 2017-07-22 RX ADMIN — SODIUM CHLORIDE 50 MILLILITER(S): 9 INJECTION INTRAMUSCULAR; INTRAVENOUS; SUBCUTANEOUS at 17:58

## 2017-07-22 RX ADMIN — Medication: at 13:05

## 2017-07-22 RX ADMIN — Medication 25 MILLIGRAM(S): at 06:14

## 2017-07-22 RX ADMIN — HEPARIN SODIUM 5000 UNIT(S): 5000 INJECTION INTRAVENOUS; SUBCUTANEOUS at 13:03

## 2017-07-22 RX ADMIN — Medication 1 DROP(S): at 06:11

## 2017-07-22 RX ADMIN — BRIMONIDINE TARTRATE 1 DROP(S): 2 SOLUTION/ DROPS OPHTHALMIC at 06:14

## 2017-07-22 RX ADMIN — Medication: at 09:45

## 2017-07-22 RX ADMIN — Medication 20 MILLIEQUIVALENT(S): at 13:00

## 2017-07-22 NOTE — PROGRESS NOTE ADULT - SUBJECTIVE AND OBJECTIVE BOX
Patient is a 89y old  Male who presents with a chief complaint of code stroke (19 Jul 2017 01:43)      SUBJECTIVE / OVERNIGHT EVENTS: Bleeding from a skin tear    MEDICATIONS  (STANDING):  cilostazol 50 milliGRAM(s) Oral two times a day  tamsulosin 0.4 milliGRAM(s) Oral at bedtime  timolol 0.5% Solution 1 Drop(s) Right EYE two times a day  brimonidine 0.2% Ophthalmic Solution 1 Drop(s) Right EYE two times a day  predniSONE   Tablet 5 milliGRAM(s) Oral daily  acetaminophen   Tablet 650 milliGRAM(s) Oral two times a day  sucralfate suspension 1 Gram(s) Oral two times a day  hydrALAZINE 50 milliGRAM(s) Oral two times a day  ALBUTerol/ipratropium for Nebulization 3 milliLiter(s) Nebulizer every 6 hours  metoprolol 25 milliGRAM(s) Oral two times a day  potassium chloride    Tablet ER 20 milliEquivalent(s) Oral daily  aspirin  chewable 81 milliGRAM(s) Oral daily  clopidogrel Tablet 75 milliGRAM(s) Oral daily  heparin  Injectable 5000 Unit(s) SubCutaneous every 8 hours  dextrose 5%. 1000 milliLiter(s) (50 mL/Hr) IV Continuous <Continuous>  dextrose 50% Injectable 12.5 Gram(s) IV Push once  dextrose 50% Injectable 25 Gram(s) IV Push once  dextrose 50% Injectable 25 Gram(s) IV Push once  atorvastatin 80 milliGRAM(s) Oral at bedtime  insulin lispro (HumaLOG) corrective regimen sliding scale   SubCutaneous three times a day before meals  insulin lispro (HumaLOG) corrective regimen sliding scale   SubCutaneous at bedtime  warfarin 5 milliGRAM(s) Oral once    MEDICATIONS  (PRN):  dextrose Gel 1 Dose(s) Oral once PRN Blood Glucose LESS THAN 70 milliGRAM(s)/deciliter  glucagon  Injectable 1 milliGRAM(s) IntraMuscular once PRN Glucose LESS THAN 70 milligrams/deciliter  sodium chloride 0.65% Nasal 2 Spray(s) Both Nostrils every 2 hours PRN Nasal Congestion      T(C): 36.7 (07-22-17 @ 14:14), Max: 36.7 (07-22-17 @ 14:14)  HR: 59 (07-22-17 @ 14:14) (59 - 99)  BP: 113/43 (07-22-17 @ 14:14) (113/43 - 153/54)  RR: 18 (07-22-17 @ 14:14) (18 - 18)  SpO2: 100% (07-22-17 @ 14:14) (96% - 100%)  CAPILLARY BLOOD GLUCOSE  269 (22 Jul 2017 12:50)  175 (22 Jul 2017 09:09)  174 (21 Jul 2017 22:04)  264 (21 Jul 2017 17:33)        I&O's Summary    22 Jul 2017 07:01  -  22 Jul 2017 15:51  --------------------------------------------------------  IN: 0 mL / OUT: 300 mL / NET: -300 mL        PHYSICAL EXAM:  GENERAL: NAD,   HEAD:  Normocephalic  EYES: EOMI,  conjunctiva and sclera clear  NECK: Supple,   CHEST/LUNG: Clear to auscultation bilaterally; No wheeze  HEART:  s1 s2 + Regular rate and rhythm;   ABDOMEN: Soft, Nontender, Nondistended; Bowel sounds present  EXTREMITIES:   No clubbing, cyanosis  NEURO: AAOx3      LABS:                        8.4    5.91  )-----------( 154      ( 22 Jul 2017 06:00 )             26.1     07-22    137  |  99  |  33<H>  ----------------------------<  134<H>  4.6   |  25  |  1.49<H>    Ca    8.5      22 Jul 2017 06:00  Phos  2.6     07-22  Mg     1.6     07-22      PT/INR - ( 22 Jul 2017 06:00 )   PT: 11.2 SEC;   INR: 1.00

## 2017-07-22 NOTE — PROGRESS NOTE ADULT - PROBLEM SELECTOR PLAN 1
will c/w coumadin. Discussed with HCP Saw Tong, he understands the risk of bleeding  Trend INR, Will not bridge given bleeding skin tear

## 2017-07-22 NOTE — PROGRESS NOTE ADULT - PROBLEM SELECTOR PLAN 2
Distal provoked DVT.  Likely related to recent RLE fem-pop bypass.  Will monitor for now, repeat LE doppler in 1 week.

## 2017-07-23 LAB
BUN SERPL-MCNC: 33 MG/DL — HIGH (ref 7–23)
CALCIUM SERPL-MCNC: 8.1 MG/DL — LOW (ref 8.4–10.5)
CHLORIDE SERPL-SCNC: 104 MMOL/L — SIGNIFICANT CHANGE UP (ref 98–107)
CO2 SERPL-SCNC: 23 MMOL/L — SIGNIFICANT CHANGE UP (ref 22–31)
CREAT SERPL-MCNC: 1.29 MG/DL — SIGNIFICANT CHANGE UP (ref 0.5–1.3)
GLUCOSE SERPL-MCNC: 114 MG/DL — HIGH (ref 70–99)
HCT VFR BLD CALC: 23.9 % — LOW (ref 39–50)
HGB BLD-MCNC: 8 G/DL — LOW (ref 13–17)
INR BLD: 1.31 — HIGH (ref 0.88–1.17)
MAGNESIUM SERPL-MCNC: 1.6 MG/DL — SIGNIFICANT CHANGE UP (ref 1.6–2.6)
MCHC RBC-ENTMCNC: 32.5 PG — SIGNIFICANT CHANGE UP (ref 27–34)
MCHC RBC-ENTMCNC: 33.5 % — SIGNIFICANT CHANGE UP (ref 32–36)
MCV RBC AUTO: 97.2 FL — SIGNIFICANT CHANGE UP (ref 80–100)
NRBC # FLD: 0 — SIGNIFICANT CHANGE UP
PHOSPHATE SERPL-MCNC: 2.5 MG/DL — SIGNIFICANT CHANGE UP (ref 2.5–4.5)
PLATELET # BLD AUTO: 151 K/UL — SIGNIFICANT CHANGE UP (ref 150–400)
PMV BLD: 11 FL — SIGNIFICANT CHANGE UP (ref 7–13)
POTASSIUM SERPL-MCNC: 4.3 MMOL/L — SIGNIFICANT CHANGE UP (ref 3.5–5.3)
POTASSIUM SERPL-SCNC: 4.3 MMOL/L — SIGNIFICANT CHANGE UP (ref 3.5–5.3)
PROTHROM AB SERPL-ACNC: 14.7 SEC — HIGH (ref 9.8–13.1)
RBC # BLD: 2.46 M/UL — LOW (ref 4.2–5.8)
RBC # FLD: 14.3 % — SIGNIFICANT CHANGE UP (ref 10.3–14.5)
SODIUM SERPL-SCNC: 138 MMOL/L — SIGNIFICANT CHANGE UP (ref 135–145)
WBC # BLD: 5.57 K/UL — SIGNIFICANT CHANGE UP (ref 3.8–10.5)
WBC # FLD AUTO: 5.57 K/UL — SIGNIFICANT CHANGE UP (ref 3.8–10.5)

## 2017-07-23 PROCEDURE — 99233 SBSQ HOSP IP/OBS HIGH 50: CPT

## 2017-07-23 RX ORDER — WARFARIN SODIUM 2.5 MG/1
5 TABLET ORAL ONCE
Qty: 0 | Refills: 0 | Status: COMPLETED | OUTPATIENT
Start: 2017-07-23 | End: 2017-07-23

## 2017-07-23 RX ADMIN — Medication 50 MILLIGRAM(S): at 17:49

## 2017-07-23 RX ADMIN — Medication 25 MILLIGRAM(S): at 05:41

## 2017-07-23 RX ADMIN — TAMSULOSIN HYDROCHLORIDE 0.4 MILLIGRAM(S): 0.4 CAPSULE ORAL at 21:51

## 2017-07-23 RX ADMIN — HEPARIN SODIUM 5000 UNIT(S): 5000 INJECTION INTRAVENOUS; SUBCUTANEOUS at 05:41

## 2017-07-23 RX ADMIN — Medication 3 MILLILITER(S): at 03:50

## 2017-07-23 RX ADMIN — BRIMONIDINE TARTRATE 1 DROP(S): 2 SOLUTION/ DROPS OPHTHALMIC at 17:48

## 2017-07-23 RX ADMIN — Medication 3 MILLILITER(S): at 12:09

## 2017-07-23 RX ADMIN — SODIUM CHLORIDE 50 MILLILITER(S): 9 INJECTION INTRAMUSCULAR; INTRAVENOUS; SUBCUTANEOUS at 09:06

## 2017-07-23 RX ADMIN — ATORVASTATIN CALCIUM 80 MILLIGRAM(S): 80 TABLET, FILM COATED ORAL at 21:51

## 2017-07-23 RX ADMIN — INSULIN GLARGINE 5 UNIT(S): 100 INJECTION, SOLUTION SUBCUTANEOUS at 21:51

## 2017-07-23 RX ADMIN — WARFARIN SODIUM 5 MILLIGRAM(S): 2.5 TABLET ORAL at 17:49

## 2017-07-23 RX ADMIN — BRIMONIDINE TARTRATE 1 DROP(S): 2 SOLUTION/ DROPS OPHTHALMIC at 05:40

## 2017-07-23 RX ADMIN — Medication 20 MILLIEQUIVALENT(S): at 12:41

## 2017-07-23 RX ADMIN — Medication 3: at 12:41

## 2017-07-23 RX ADMIN — Medication 3 MILLILITER(S): at 16:57

## 2017-07-23 RX ADMIN — Medication 1 DROP(S): at 05:42

## 2017-07-23 RX ADMIN — CLOPIDOGREL BISULFATE 75 MILLIGRAM(S): 75 TABLET, FILM COATED ORAL at 12:41

## 2017-07-23 RX ADMIN — Medication 650 MILLIGRAM(S): at 17:49

## 2017-07-23 RX ADMIN — Medication 50 MILLIGRAM(S): at 05:41

## 2017-07-23 RX ADMIN — Medication 1 GRAM(S): at 17:48

## 2017-07-23 RX ADMIN — Medication 81 MILLIGRAM(S): at 12:41

## 2017-07-23 RX ADMIN — Medication 1 GRAM(S): at 05:41

## 2017-07-23 RX ADMIN — PANTOPRAZOLE SODIUM 40 MILLIGRAM(S): 20 TABLET, DELAYED RELEASE ORAL at 05:41

## 2017-07-23 RX ADMIN — Medication 5 MILLIGRAM(S): at 05:41

## 2017-07-23 RX ADMIN — Medication 3 MILLILITER(S): at 22:04

## 2017-07-23 RX ADMIN — Medication 25 MILLIGRAM(S): at 17:49

## 2017-07-23 RX ADMIN — Medication 1 DROP(S): at 17:49

## 2017-07-23 RX ADMIN — Medication 650 MILLIGRAM(S): at 05:41

## 2017-07-23 RX ADMIN — HEPARIN SODIUM 5000 UNIT(S): 5000 INJECTION INTRAVENOUS; SUBCUTANEOUS at 12:41

## 2017-07-23 RX ADMIN — Medication 2: at 17:48

## 2017-07-23 RX ADMIN — HEPARIN SODIUM 5000 UNIT(S): 5000 INJECTION INTRAVENOUS; SUBCUTANEOUS at 21:51

## 2017-07-23 NOTE — PROGRESS NOTE ADULT - PROBLEM SELECTOR PLAN 9
Discussed with pt and HCP Saw Tong at bedside. MOLST form was filled out. Pt is DNR/DNI
Discussed with pt and HCP Saw Tong at bedside. MOLST form was filled out. Pt is DNR/DNI SPent 30 mins

## 2017-07-23 NOTE — PROGRESS NOTE ADULT - SUBJECTIVE AND OBJECTIVE BOX
Patient is a 89y old  Male who presents with a chief complaint of code stroke (19 Jul 2017 01:43)      SUBJECTIVE / OVERNIGHT EVENTS: No acute event overnight    MEDICATIONS  (STANDING):  tamsulosin 0.4 milliGRAM(s) Oral at bedtime  timolol 0.5% Solution 1 Drop(s) Right EYE two times a day  brimonidine 0.2% Ophthalmic Solution 1 Drop(s) Right EYE two times a day  acetaminophen   Tablet 650 milliGRAM(s) Oral two times a day  sucralfate suspension 1 Gram(s) Oral two times a day  hydrALAZINE 50 milliGRAM(s) Oral two times a day  ALBUTerol/ipratropium for Nebulization 3 milliLiter(s) Nebulizer every 6 hours  metoprolol 25 milliGRAM(s) Oral two times a day  potassium chloride    Tablet ER 20 milliEquivalent(s) Oral daily  aspirin  chewable 81 milliGRAM(s) Oral daily  clopidogrel Tablet 75 milliGRAM(s) Oral daily  heparin  Injectable 5000 Unit(s) SubCutaneous every 8 hours  dextrose 5%. 1000 milliLiter(s) (50 mL/Hr) IV Continuous <Continuous>  dextrose 50% Injectable 12.5 Gram(s) IV Push once  dextrose 50% Injectable 25 Gram(s) IV Push once  dextrose 50% Injectable 25 Gram(s) IV Push once  atorvastatin 80 milliGRAM(s) Oral at bedtime  insulin lispro (HumaLOG) corrective regimen sliding scale   SubCutaneous three times a day before meals  insulin lispro (HumaLOG) corrective regimen sliding scale   SubCutaneous at bedtime  insulin glargine Injectable (LANTUS) 5 Unit(s) SubCutaneous at bedtime  sodium chloride 0.9%. 1000 milliLiter(s) (50 mL/Hr) IV Continuous <Continuous>  pantoprazole    Tablet 40 milliGRAM(s) Oral before breakfast  warfarin 5 milliGRAM(s) Oral once    MEDICATIONS  (PRN):  dextrose Gel 1 Dose(s) Oral once PRN Blood Glucose LESS THAN 70 milliGRAM(s)/deciliter  glucagon  Injectable 1 milliGRAM(s) IntraMuscular once PRN Glucose LESS THAN 70 milligrams/deciliter  sodium chloride 0.65% Nasal 2 Spray(s) Both Nostrils every 2 hours PRN Nasal Congestion      T(C): 36.9 (07-23-17 @ 05:37), Max: 36.9 (07-23-17 @ 05:37)  HR: 60 (07-23-17 @ 05:37) (59 - 96)  BP: 145/54 (07-23-17 @ 05:37) (113/43 - 145/54)  RR: 18 (07-23-17 @ 05:37) (18 - 18)  SpO2: 98% (07-23-17 @ 05:37) (95% - 100%)  CAPILLARY BLOOD GLUCOSE  148 (23 Jul 2017 08:57)  141 (23 Jul 2017 05:15)  133 (23 Jul 2017 01:17)  185 (22 Jul 2017 21:08)  270 (22 Jul 2017 17:50)  269 (22 Jul 2017 12:50)        I&O's Summary    22 Jul 2017 07:01  -  23 Jul 2017 07:00  --------------------------------------------------------  IN: 0 mL / OUT: 300 mL / NET: -300 mL        PHYSICAL EXAM:  GENERAL: NAD,   HEAD:  Normocephalic  EYES: EOMI,  conjunctiva and sclera clear  NECK: Supple,   CHEST/LUNG: Clear to auscultation bilaterally; No wheeze  HEART:  s1 s2 + Regular rate and rhythm;   ABDOMEN: Soft, Nontender, Nondistended; Bowel sounds present  EXTREMITIES:   No clubbing, cyanosis  NEURO: AAOx2-3  SKIN: Right arm dressing      LABS:                        8.0    5.57  )-----------( 151      ( 23 Jul 2017 06:30 )             23.9     07-23    138  |  104  |  33<H>  ----------------------------<  114<H>  4.3   |  23  |  1.29    Ca    8.1<L>      23 Jul 2017 06:30  Phos  2.5     07-23  Mg     1.6     07-23      PT/INR - ( 23 Jul 2017 06:30 )   PT: 14.7 SEC;   INR: 1.31

## 2017-07-24 LAB
BUN SERPL-MCNC: 30 MG/DL — HIGH (ref 7–23)
CALCIUM SERPL-MCNC: 8.1 MG/DL — LOW (ref 8.4–10.5)
CHLORIDE SERPL-SCNC: 105 MMOL/L — SIGNIFICANT CHANGE UP (ref 98–107)
CO2 SERPL-SCNC: 24 MMOL/L — SIGNIFICANT CHANGE UP (ref 22–31)
CREAT SERPL-MCNC: 1.1 MG/DL — SIGNIFICANT CHANGE UP (ref 0.5–1.3)
GLUCOSE SERPL-MCNC: 78 MG/DL — SIGNIFICANT CHANGE UP (ref 70–99)
HCT VFR BLD CALC: 25.5 % — LOW (ref 39–50)
HCT VFR BLD CALC: 27.5 % — LOW (ref 39–50)
HGB BLD-MCNC: 8.3 G/DL — LOW (ref 13–17)
HGB BLD-MCNC: 9 G/DL — LOW (ref 13–17)
INR BLD: 2.04 — HIGH (ref 0.88–1.17)
MCHC RBC-ENTMCNC: 31.9 PG — SIGNIFICANT CHANGE UP (ref 27–34)
MCHC RBC-ENTMCNC: 31.9 PG — SIGNIFICANT CHANGE UP (ref 27–34)
MCHC RBC-ENTMCNC: 32.5 % — SIGNIFICANT CHANGE UP (ref 32–36)
MCHC RBC-ENTMCNC: 32.7 % — SIGNIFICANT CHANGE UP (ref 32–36)
MCV RBC AUTO: 97.5 FL — SIGNIFICANT CHANGE UP (ref 80–100)
MCV RBC AUTO: 98.1 FL — SIGNIFICANT CHANGE UP (ref 80–100)
NRBC # FLD: 0 — SIGNIFICANT CHANGE UP
NRBC # FLD: 0 — SIGNIFICANT CHANGE UP
PLATELET # BLD AUTO: 163 K/UL — SIGNIFICANT CHANGE UP (ref 150–400)
PLATELET # BLD AUTO: 188 K/UL — SIGNIFICANT CHANGE UP (ref 150–400)
PMV BLD: 10.9 FL — SIGNIFICANT CHANGE UP (ref 7–13)
PMV BLD: 11.1 FL — SIGNIFICANT CHANGE UP (ref 7–13)
POTASSIUM SERPL-MCNC: 4.1 MMOL/L — SIGNIFICANT CHANGE UP (ref 3.5–5.3)
POTASSIUM SERPL-SCNC: 4.1 MMOL/L — SIGNIFICANT CHANGE UP (ref 3.5–5.3)
PROTHROM AB SERPL-ACNC: 23.2 SEC — HIGH (ref 9.8–13.1)
RBC # BLD: 2.6 M/UL — LOW (ref 4.2–5.8)
RBC # BLD: 2.82 M/UL — LOW (ref 4.2–5.8)
RBC # FLD: 14.2 % — SIGNIFICANT CHANGE UP (ref 10.3–14.5)
RBC # FLD: 14.2 % — SIGNIFICANT CHANGE UP (ref 10.3–14.5)
SODIUM SERPL-SCNC: 140 MMOL/L — SIGNIFICANT CHANGE UP (ref 135–145)
WBC # BLD: 5.19 K/UL — SIGNIFICANT CHANGE UP (ref 3.8–10.5)
WBC # BLD: 6.08 K/UL — SIGNIFICANT CHANGE UP (ref 3.8–10.5)
WBC # FLD AUTO: 5.19 K/UL — SIGNIFICANT CHANGE UP (ref 3.8–10.5)
WBC # FLD AUTO: 6.08 K/UL — SIGNIFICANT CHANGE UP (ref 3.8–10.5)

## 2017-07-24 PROCEDURE — 99233 SBSQ HOSP IP/OBS HIGH 50: CPT

## 2017-07-24 PROCEDURE — 99232 SBSQ HOSP IP/OBS MODERATE 35: CPT | Mod: 24

## 2017-07-24 RX ORDER — WARFARIN SODIUM 2.5 MG/1
3 TABLET ORAL ONCE
Qty: 0 | Refills: 0 | Status: COMPLETED | OUTPATIENT
Start: 2017-07-24 | End: 2017-07-24

## 2017-07-24 RX ADMIN — Medication 1 DROP(S): at 05:18

## 2017-07-24 RX ADMIN — Medication 81 MILLIGRAM(S): at 12:44

## 2017-07-24 RX ADMIN — SODIUM CHLORIDE 50 MILLILITER(S): 9 INJECTION INTRAMUSCULAR; INTRAVENOUS; SUBCUTANEOUS at 09:25

## 2017-07-24 RX ADMIN — Medication 25 MILLIGRAM(S): at 17:57

## 2017-07-24 RX ADMIN — HEPARIN SODIUM 5000 UNIT(S): 5000 INJECTION INTRAVENOUS; SUBCUTANEOUS at 05:18

## 2017-07-24 RX ADMIN — HEPARIN SODIUM 5000 UNIT(S): 5000 INJECTION INTRAVENOUS; SUBCUTANEOUS at 21:51

## 2017-07-24 RX ADMIN — PANTOPRAZOLE SODIUM 40 MILLIGRAM(S): 20 TABLET, DELAYED RELEASE ORAL at 05:19

## 2017-07-24 RX ADMIN — Medication 3 MILLILITER(S): at 10:57

## 2017-07-24 RX ADMIN — ATORVASTATIN CALCIUM 80 MILLIGRAM(S): 80 TABLET, FILM COATED ORAL at 21:51

## 2017-07-24 RX ADMIN — Medication 3 MILLILITER(S): at 16:24

## 2017-07-24 RX ADMIN — Medication 1 GRAM(S): at 05:19

## 2017-07-24 RX ADMIN — Medication 50 MILLIGRAM(S): at 05:19

## 2017-07-24 RX ADMIN — Medication 1 DROP(S): at 17:57

## 2017-07-24 RX ADMIN — BRIMONIDINE TARTRATE 1 DROP(S): 2 SOLUTION/ DROPS OPHTHALMIC at 05:19

## 2017-07-24 RX ADMIN — Medication 25 MILLIGRAM(S): at 05:19

## 2017-07-24 RX ADMIN — Medication 3 MILLILITER(S): at 23:03

## 2017-07-24 RX ADMIN — Medication 50 MILLIGRAM(S): at 17:57

## 2017-07-24 RX ADMIN — TAMSULOSIN HYDROCHLORIDE 0.4 MILLIGRAM(S): 0.4 CAPSULE ORAL at 21:51

## 2017-07-24 RX ADMIN — CLOPIDOGREL BISULFATE 75 MILLIGRAM(S): 75 TABLET, FILM COATED ORAL at 12:44

## 2017-07-24 RX ADMIN — HEPARIN SODIUM 5000 UNIT(S): 5000 INJECTION INTRAVENOUS; SUBCUTANEOUS at 12:44

## 2017-07-24 RX ADMIN — BRIMONIDINE TARTRATE 1 DROP(S): 2 SOLUTION/ DROPS OPHTHALMIC at 17:57

## 2017-07-24 RX ADMIN — WARFARIN SODIUM 3 MILLIGRAM(S): 2.5 TABLET ORAL at 17:57

## 2017-07-24 RX ADMIN — Medication 3 MILLILITER(S): at 04:24

## 2017-07-24 RX ADMIN — Medication 20 MILLIEQUIVALENT(S): at 12:44

## 2017-07-24 RX ADMIN — Medication 1 GRAM(S): at 17:57

## 2017-07-24 NOTE — ADVANCED PRACTICE NURSE CONSULT - ASSESSMENT
A&Ox1, confused to time and situation, bedbound, incontinent of urine and stool. Skin warm, dry with increased moisture in intertriginous folds, adequate skin turgor, scattered areas of hyperpigmentation and hypopigmentation, scattered areas of ecchymosis on bilateral upper extremities, thin/fragile skin. Blanchable erythema on bilateral heels. RLE (+) hemosiderin staining, LLE with scattered areas of scabs. Bilateral lower leg dry/flaky skin. Callus noted on right plantar foot, 4th metatarsal head.    Healed Stage 2 Pressure Injury on Coccyx: 100% reepithelialization.    Right plantar foot, 1st metatarsal head with chronic wound extending to subcutaneous tissue, measures [id number]. 100% granulation tissue present, scant serous drainage. Periwound skin with callus ring. Topical recommendations from podiatry in chart.    Right medial thigh and right groin with healing linear incisions and areas of scabs from staples.    Skin tears:  -Left Forearm, Category 3 (complete loss of epidermis) measures 0.5cmx0.3cmx0.2cm. 100% exposed red, moist dermis. Small amount of serosanguinous drainage. Dried exudate in periwound skin.  -Right forearm, Category 3 (complete loss of epidermis) measures [id number] (irregular borders). 100% exposed red, moist dermis. Moderate amount of serosanguinous drainage.   Goal of care: manage exudate, maintain moist environment for wound healing.      Incontinence Associated Dermatitis: sacrum and bilateral buttock with blanchable erythema.

## 2017-07-24 NOTE — PROGRESS NOTE ADULT - SUBJECTIVE AND OBJECTIVE BOX
Patient is a 89y old  Male who presents with a chief complaint of code stroke (19 Jul 2017 01:43)      SUBJECTIVE / OVERNIGHT EVENTS: Pt in NAD paced 50's. no new compliants    MEDICATIONS  (STANDING):  tamsulosin 0.4 milliGRAM(s) Oral at bedtime  timolol 0.5% Solution 1 Drop(s) Right EYE two times a day  brimonidine 0.2% Ophthalmic Solution 1 Drop(s) Right EYE two times a day  acetaminophen   Tablet 650 milliGRAM(s) Oral two times a day  sucralfate suspension 1 Gram(s) Oral two times a day  hydrALAZINE 50 milliGRAM(s) Oral two times a day  ALBUTerol/ipratropium for Nebulization 3 milliLiter(s) Nebulizer every 6 hours  metoprolol 25 milliGRAM(s) Oral two times a day  potassium chloride    Tablet ER 20 milliEquivalent(s) Oral daily  aspirin  chewable 81 milliGRAM(s) Oral daily  clopidogrel Tablet 75 milliGRAM(s) Oral daily  heparin  Injectable 5000 Unit(s) SubCutaneous every 8 hours  dextrose 5%. 1000 milliLiter(s) (50 mL/Hr) IV Continuous <Continuous>  dextrose 50% Injectable 12.5 Gram(s) IV Push once  dextrose 50% Injectable 25 Gram(s) IV Push once  dextrose 50% Injectable 25 Gram(s) IV Push once  atorvastatin 80 milliGRAM(s) Oral at bedtime  insulin lispro (HumaLOG) corrective regimen sliding scale   SubCutaneous three times a day before meals  insulin lispro (HumaLOG) corrective regimen sliding scale   SubCutaneous at bedtime  insulin glargine Injectable (LANTUS) 5 Unit(s) SubCutaneous at bedtime  sodium chloride 0.9%. 1000 milliLiter(s) (50 mL/Hr) IV Continuous <Continuous>  pantoprazole    Tablet 40 milliGRAM(s) Oral before breakfast  warfarin 3 milliGRAM(s) Oral once    MEDICATIONS  (PRN):  dextrose Gel 1 Dose(s) Oral once PRN Blood Glucose LESS THAN 70 milliGRAM(s)/deciliter  glucagon  Injectable 1 milliGRAM(s) IntraMuscular once PRN Glucose LESS THAN 70 milligrams/deciliter  sodium chloride 0.65% Nasal 2 Spray(s) Both Nostrils every 2 hours PRN Nasal Congestion        CAPILLARY BLOOD GLUCOSE  100 (24 Jul 2017 12:12)  95 (24 Jul 2017 09:16)  86 (24 Jul 2017 06:03)  116 (24 Jul 2017 01:24)  184 (23 Jul 2017 21:23)  229 (23 Jul 2017 17:00)        I&O's Summary      PHYSICAL EXAM:  GENERAL: NAD, well-developed  HEAD:  Atraumatic, Normocephalic  EYES: EOMI, PERRLA, conjunctiva and sclera clear  NECK: Supple, No JVD  CHEST/LUNG: Clear to auscultation bilaterally; No wheeze  HEART: Regular rate and rhythm; No murmurs, rubs, or gallops  ABDOMEN: Soft, Nontender, Nondistended; Bowel sounds present  EXTREMITIES:  2+ Peripheral Pulses, No clubbing, cyanosis, or edema  PSYCH: AAOx3  NEUROLOGY: LT UE/LE 3/5 +LT UE bandage  SKIN: No rashes or lesions    LABS:                        8.3    5.19  )-----------( 163      ( 24 Jul 2017 06:05 )             25.5     07-24    140  |  105  |  30<H>  ----------------------------<  78  4.1   |  24  |  1.10    Ca    8.1<L>      24 Jul 2017 06:05  Phos  2.5     07-23  Mg     1.6     07-23      PT/INR - ( 24 Jul 2017 06:05 )   PT: 23.2 SEC;   INR: 2.04                    RADIOLOGY & ADDITIONAL TESTS:    Imaging Personally Reviewed:    Consultant(s) Notes Reviewed:      Care Discussed with Consultants/Other Providers:

## 2017-07-24 NOTE — PROGRESS NOTE ADULT - PROBLEM SELECTOR PLAN 2
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  PPM functioning correctly - Afib. Currently on ASA, plavix, lipitor. CT Angio showed 70% ZION stenosis.  PT/OT/PMR eval. Appreciate neuro and vasc med input. Neuro feels no benefit from CEA.

## 2017-07-24 NOTE — ADVANCED PRACTICE NURSE CONSULT - RECOMMEDATIONS
Recommend nutrition consult.    Bilateral Forearm Skin Tears: Cleanse with NS, pat dry. Apply Liquid barrier film to periwound skin. Apply Foam with border, change every 72 hours and PRN.    Right medial thigh and right groin: Cleanse with NS, pat dry. Apply liquid barrier film daily.    Sacrum and bilateral buttock: Cleanse with skin cleanser, pat dry. Apply Lisa moisture barrier cream twice a day and PRN.    Apply Sween 24 moisturizing lotion daily to bilateral lower and upper extremities.    Continue low air loss bed therapy, continue heel elevation with z-flex boots, continue use of fluidized positioning device for pressure redistribution and to assist to turn & reposition q2h, continue moisture management with barrier creams & single breathable pad, continue measures to decrease friction/shear/pressure.    Please call wound care service line is further assistance is needed ([phone]). Recommend nutrition consult.    Left Forearm Skin Tear: Cleanse with NS, pat dry. Apply Liquid barrier film to periwound skin. Apply Foam with border, change every 72 hours and PRN.    Right Forearm Skin Tear: Cleanse with NS, pat dry. Apply Liquid barrier film to periwound skin. Apply Foam with border, change every 72 hours and PRN. If there is a  large amount of sanguinous drainage apply Kaltostat to wound base, cover with gauze, ABD pad and secure with paper tape.    Right medial thigh and right groin: Cleanse with NS, pat dry. Apply liquid barrier film daily.    Sacrum and bilateral buttock: Cleanse with skin cleanser, pat dry. Apply Lisa moisture barrier cream twice a day and PRN.    Apply Sween 24 moisturizing lotion daily to bilateral lower and upper extremities.    Continue low air loss bed therapy, continue heel elevation with z-flex boots, continue use of fluidized positioning device for pressure redistribution and to assist to turn & reposition q2h, continue moisture management with barrier creams & single breathable pad, continue measures to decrease friction/shear/pressure.    Please call wound care service line is further assistance is needed ([phone]).

## 2017-07-24 NOTE — CHART NOTE - NSCHARTNOTEFT_GEN_A_CORE
Pt bleeding through dressings from R forearm skin tear. Pressure applied for 10-15 minutes over brachial artery. Redressed and taped. CBC ordered. Will monitor for hemodynamic stability. D/w vascular sx given recent fem-pop bypass would recommend continuing antiplatelets unless patient is hemodynamically unstable. Will continue to monitor.

## 2017-07-24 NOTE — PROGRESS NOTE ADULT - PROBLEM SELECTOR PLAN 1
will c/w coumadin. Discussed with HCP Saw Tong, he understands the risk of bleeding  INR therapuetic and H/H stable. understand risk and benefits of AC

## 2017-07-24 NOTE — PROGRESS NOTE ADULT - SUBJECTIVE AND OBJECTIVE BOX
Patient is a 89y old  Male who presents with a chief complaint of code stroke (19 Jul 2017 01:43)      Vascular Surgery Attending Progress Note    Interval HPI: pt w/o c.o    Medications:  tamsulosin 0.4 milliGRAM(s) Oral at bedtime  timolol 0.5% Solution 1 Drop(s) Right EYE two times a day  brimonidine 0.2% Ophthalmic Solution 1 Drop(s) Right EYE two times a day  acetaminophen   Tablet 650 milliGRAM(s) Oral two times a day  sucralfate suspension 1 Gram(s) Oral two times a day  hydrALAZINE 50 milliGRAM(s) Oral two times a day  ALBUTerol/ipratropium for Nebulization 3 milliLiter(s) Nebulizer every 6 hours  metoprolol 25 milliGRAM(s) Oral two times a day  potassium chloride    Tablet ER 20 milliEquivalent(s) Oral daily  aspirin  chewable 81 milliGRAM(s) Oral daily  clopidogrel Tablet 75 milliGRAM(s) Oral daily  heparin  Injectable 5000 Unit(s) SubCutaneous every 8 hours  dextrose 5%. 1000 milliLiter(s) IV Continuous <Continuous>  dextrose Gel 1 Dose(s) Oral once PRN  dextrose 50% Injectable 12.5 Gram(s) IV Push once  dextrose 50% Injectable 25 Gram(s) IV Push once  dextrose 50% Injectable 25 Gram(s) IV Push once  glucagon  Injectable 1 milliGRAM(s) IntraMuscular once PRN  atorvastatin 80 milliGRAM(s) Oral at bedtime  sodium chloride 0.65% Nasal 2 Spray(s) Both Nostrils every 2 hours PRN  insulin lispro (HumaLOG) corrective regimen sliding scale   SubCutaneous three times a day before meals  insulin lispro (HumaLOG) corrective regimen sliding scale   SubCutaneous at bedtime  pantoprazole    Tablet 40 milliGRAM(s) Oral before breakfast      Vital Signs Last 24 Hrs  T(C): 36.6 (24 Jul 2017 16:00), Max: 36.8 (23 Jul 2017 21:48)  T(F): 97.8 (24 Jul 2017 16:00), Max: 98.3 (23 Jul 2017 21:48)  HR: 61 (24 Jul 2017 17:53) (59 - 63)  BP: 162/57 (24 Jul 2017 17:53) (143/60 - 162/57)  BP(mean): --  RR: 18 (24 Jul 2017 16:00) (18 - 18)  SpO2: 98% (24 Jul 2017 16:00) (98% - 100%)  I&O's Summary      Physical Exam:  Neuro  A&Ox3 VSS  Vascular:  rle warm well perf  Extremity: lue and lle motor 2.5/5  w slight improvment   Musculoskeletal:    LABS:                        9.0    6.08  )-----------( 188      ( 24 Jul 2017 16:25 )             27.5     07-24    140  |  105  |  30<H>  ----------------------------<  78  4.1   |  24  |  1.10    Ca    8.1<L>      24 Jul 2017 06:05  Phos  2.5     07-23  Mg     1.6     07-23      PT/INR - ( 24 Jul 2017 06:05 )   PT: 23.2 SEC;   INR: 2.04              HINA SOTOMAYOR MD  [phone]

## 2017-07-24 NOTE — PROGRESS NOTE ADULT - PROBLEM SELECTOR PLAN 3
Wound care. Hold pletal given increased risk of Bleed, c/w ASA and Plavix   given bleeding skin tear will d/w vascular in regards to ASA and plavix in addition to coumadin

## 2017-07-24 NOTE — ADVANCED PRACTICE NURSE CONSULT - REASON FOR CONSULT
Patient seen on skin care rounds after wound care referral received for assessment of skin impairment and recommendations of topical management. Chart reviewed: Serum albumin 3.2g/dL, Obdulio 13, HgbA1C 6.4%, BMI 23.3kg/m2.Patient H/O PAD, PPM, CKD, DM, HTN, recent femoral bypass, atrial fibrillation on coumadin. Admitted from [facility] for left sided weakness, (+) Cerebral infarct.

## 2017-07-25 VITALS
HEART RATE: 59 BPM | TEMPERATURE: 98 F | DIASTOLIC BLOOD PRESSURE: 50 MMHG | SYSTOLIC BLOOD PRESSURE: 143 MMHG | RESPIRATION RATE: 18 BRPM | OXYGEN SATURATION: 100 %

## 2017-07-25 LAB
APTT BLD: 61 SEC — HIGH (ref 27.5–37.4)
BUN SERPL-MCNC: 26 MG/DL — HIGH (ref 7–23)
CALCIUM SERPL-MCNC: 8.7 MG/DL — SIGNIFICANT CHANGE UP (ref 8.4–10.5)
CHLORIDE SERPL-SCNC: 105 MMOL/L — SIGNIFICANT CHANGE UP (ref 98–107)
CO2 SERPL-SCNC: 20 MMOL/L — LOW (ref 22–31)
CREAT SERPL-MCNC: 0.96 MG/DL — SIGNIFICANT CHANGE UP (ref 0.5–1.3)
GLUCOSE SERPL-MCNC: 134 MG/DL — HIGH (ref 70–99)
HCT VFR BLD CALC: 25.2 % — LOW (ref 39–50)
HGB BLD-MCNC: 8.2 G/DL — LOW (ref 13–17)
INR BLD: 2.62 — HIGH (ref 0.88–1.17)
MCHC RBC-ENTMCNC: 31.7 PG — SIGNIFICANT CHANGE UP (ref 27–34)
MCHC RBC-ENTMCNC: 32.5 % — SIGNIFICANT CHANGE UP (ref 32–36)
MCV RBC AUTO: 97.3 FL — SIGNIFICANT CHANGE UP (ref 80–100)
NRBC # FLD: 0 — SIGNIFICANT CHANGE UP
PLATELET # BLD AUTO: 171 K/UL — SIGNIFICANT CHANGE UP (ref 150–400)
PMV BLD: 10.8 FL — SIGNIFICANT CHANGE UP (ref 7–13)
POTASSIUM SERPL-MCNC: 4.3 MMOL/L — SIGNIFICANT CHANGE UP (ref 3.5–5.3)
POTASSIUM SERPL-SCNC: 4.3 MMOL/L — SIGNIFICANT CHANGE UP (ref 3.5–5.3)
PROTHROM AB SERPL-ACNC: 29.9 SEC — HIGH (ref 9.8–13.1)
RBC # BLD: 2.59 M/UL — LOW (ref 4.2–5.8)
RBC # FLD: 14 % — SIGNIFICANT CHANGE UP (ref 10.3–14.5)
SODIUM SERPL-SCNC: 138 MMOL/L — SIGNIFICANT CHANGE UP (ref 135–145)
WBC # BLD: 5.49 K/UL — SIGNIFICANT CHANGE UP (ref 3.8–10.5)
WBC # FLD AUTO: 5.49 K/UL — SIGNIFICANT CHANGE UP (ref 3.8–10.5)

## 2017-07-25 PROCEDURE — 99232 SBSQ HOSP IP/OBS MODERATE 35: CPT | Mod: 24

## 2017-07-25 PROCEDURE — 99239 HOSP IP/OBS DSCHRG MGMT >30: CPT

## 2017-07-25 RX ORDER — CILOSTAZOL 100 MG/1
1 TABLET ORAL
Qty: 0 | Refills: 0 | COMMUNITY

## 2017-07-25 RX ORDER — ATORVASTATIN CALCIUM 80 MG/1
1 TABLET, FILM COATED ORAL
Qty: 0 | Refills: 0 | COMMUNITY
Start: 2017-07-25

## 2017-07-25 RX ORDER — PANTOPRAZOLE SODIUM 20 MG/1
1 TABLET, DELAYED RELEASE ORAL
Qty: 0 | Refills: 0 | COMMUNITY
Start: 2017-07-25

## 2017-07-25 RX ORDER — CLOPIDOGREL BISULFATE 75 MG/1
1 TABLET, FILM COATED ORAL
Qty: 0 | Refills: 0 | COMMUNITY

## 2017-07-25 RX ORDER — FUROSEMIDE 40 MG
1 TABLET ORAL
Qty: 0 | Refills: 0 | COMMUNITY

## 2017-07-25 RX ADMIN — Medication 20 MILLIEQUIVALENT(S): at 13:45

## 2017-07-25 RX ADMIN — Medication 50 MILLIGRAM(S): at 05:36

## 2017-07-25 RX ADMIN — Medication 25 MILLIGRAM(S): at 05:37

## 2017-07-25 RX ADMIN — Medication 3 MILLILITER(S): at 04:00

## 2017-07-25 RX ADMIN — Medication 1 GRAM(S): at 05:36

## 2017-07-25 RX ADMIN — HEPARIN SODIUM 5000 UNIT(S): 5000 INJECTION INTRAVENOUS; SUBCUTANEOUS at 13:45

## 2017-07-25 RX ADMIN — Medication 81 MILLIGRAM(S): at 13:44

## 2017-07-25 RX ADMIN — BRIMONIDINE TARTRATE 1 DROP(S): 2 SOLUTION/ DROPS OPHTHALMIC at 05:37

## 2017-07-25 RX ADMIN — Medication 1 DROP(S): at 05:37

## 2017-07-25 RX ADMIN — PANTOPRAZOLE SODIUM 40 MILLIGRAM(S): 20 TABLET, DELAYED RELEASE ORAL at 05:37

## 2017-07-25 RX ADMIN — CLOPIDOGREL BISULFATE 75 MILLIGRAM(S): 75 TABLET, FILM COATED ORAL at 13:45

## 2017-07-25 RX ADMIN — HEPARIN SODIUM 5000 UNIT(S): 5000 INJECTION INTRAVENOUS; SUBCUTANEOUS at 05:38

## 2017-07-25 NOTE — PROGRESS NOTE ADULT - PROBLEM SELECTOR PLAN 1
continue med /conserv managemnt and pt/ot  due to rt forearm bleeding d/w med att may d/c pletal  and plavix and maintain on coumadin and asa if needed from a cardiology standpoint  rt foot woundcare  f/u ov as outpt

## 2017-07-25 NOTE — PROGRESS NOTE ADULT - SUBJECTIVE AND OBJECTIVE BOX
Patient is a 89y old  Male who presents with a chief complaint of code stroke (19 Jul 2017 01:43)      Vascular Surgery Attending Progress Note    Interval HPI: pt states rue forearm wound is not bleeding  any more pt was examined by me at 9 am today     Medications:  tamsulosin 0.4 milliGRAM(s) Oral at bedtime  timolol 0.5% Solution 1 Drop(s) Right EYE two times a day  brimonidine 0.2% Ophthalmic Solution 1 Drop(s) Right EYE two times a day  acetaminophen   Tablet 650 milliGRAM(s) Oral two times a day  sucralfate suspension 1 Gram(s) Oral two times a day  hydrALAZINE 50 milliGRAM(s) Oral two times a day  ALBUTerol/ipratropium for Nebulization 3 milliLiter(s) Nebulizer every 6 hours  metoprolol 25 milliGRAM(s) Oral two times a day  potassium chloride    Tablet ER 20 milliEquivalent(s) Oral daily  aspirin  chewable 81 milliGRAM(s) Oral daily  heparin  Injectable 5000 Unit(s) SubCutaneous every 8 hours  dextrose 5%. 1000 milliLiter(s) IV Continuous <Continuous>  dextrose Gel 1 Dose(s) Oral once PRN  dextrose 50% Injectable 12.5 Gram(s) IV Push once  dextrose 50% Injectable 25 Gram(s) IV Push once  dextrose 50% Injectable 25 Gram(s) IV Push once  glucagon  Injectable 1 milliGRAM(s) IntraMuscular once PRN  atorvastatin 80 milliGRAM(s) Oral at bedtime  sodium chloride 0.65% Nasal 2 Spray(s) Both Nostrils every 2 hours PRN  insulin lispro (HumaLOG) corrective regimen sliding scale   SubCutaneous three times a day before meals  insulin lispro (HumaLOG) corrective regimen sliding scale   SubCutaneous at bedtime  pantoprazole    Tablet 40 milliGRAM(s) Oral before breakfast      Vital Signs Last 24 Hrs  T(C): 36.5 (25 Jul 2017 14:22), Max: 37.1 (24 Jul 2017 21:48)  T(F): 97.7 (25 Jul 2017 14:22), Max: 98.8 (24 Jul 2017 21:48)  HR: 59 (25 Jul 2017 14:22) (59 - 63)  BP: 143/50 (25 Jul 2017 14:22) (143/50 - 165/57)  BP(mean): --  RR: 18 (25 Jul 2017 14:22) (18 - 18)  SpO2: 100% (25 Jul 2017 14:22) (97% - 100%)  I&O's Summary      Physical Exam:  Neuro  A&Ox3 VSS  Vascular: rt forearm dressing c/d/i   Extremity: rle warm well perfused  Musculoskeletal:lue and lle w  dec motor fx  stable from prev exam     LABS:                        8.2    5.49  )-----------( 171      ( 25 Jul 2017 06:00 )             25.2     07-25    138  |  105  |  26<H>  ----------------------------<  134<H>  4.3   |  20<L>  |  0.96    Ca    8.7      25 Jul 2017 06:00      PT/INR - ( 25 Jul 2017 06:00 )   PT: 29.9 SEC;   INR: 2.62          PTT - ( 25 Jul 2017 06:00 )  PTT:61.0 SEC    HINA SOTOMAYOR MD  [phone]

## 2017-07-25 NOTE — PROGRESS NOTE ADULT - PROBLEM SELECTOR PLAN 4
Distal provoked DVT.  Likely related to recent RLE fem-pop bypass.  Will monitor for now, repeat LE doppler in 1 week.
Distal provoked DVT.  Likely related to recent RLE fem-pop bypass.  Will monitor for now, repeat LE doppler in 1 week.
FS elevated, will start Lantus at 5 ( given episodes of Hypoglycemia in the past)  Trend FS and c/w NISS.
FS improvin, c/w Lantus at 5 ( given episodes of Hypoglycemia in the past)  Trend FS and c/w NISS.
c/w dressing  -monitor H/H
c/w dressing  -monitor H/H
Wound care. Continue pletal.

## 2017-07-25 NOTE — PROGRESS NOTE ADULT - PROBLEM SELECTOR PLAN 7
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  PPM functioning correctly - Afib. Currently on ASA, plavix, lipitor. CT Angio showed 70% ZION stenosis.  PT/OT/PMR eval. Appreciate neuro and vasc med input. Neuro feels no benefit from CEA.
likely 2/2 diuresis  -d/c IVF   c/w Cr trending
likely 2/2 diuresis  -d/c IVF   c/w Cr trending
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  PPM functioning correctly - Afib. Currently on ASA, plavix, lipitor. CT Angio showed 70% ZION stenosis.  PT/OT/PMR eval. Appreciate neuro and vasc med input. Neuro feels no benefit from CEA.

## 2017-07-25 NOTE — PROGRESS NOTE ADULT - PROBLEM SELECTOR PLAN 6
FS hypoglycemia in AM [id number] discontinue lantus cont on ISS  -d/c on metformin
FS hypoglycemia in AM [id number] discontinue lantus cont on ISS  -d/c on metformin
Likely due to acute episode of hypoglycemia.  Improved with resolving of hypoglycemia.  Continue to monitor.
Likely due to acute episode of hypoglycemia.  Improved with resolving of hypoglycemia.  Continue to monitor.
Monitor BMP.  Could be related to recent lasix diuresis and IVC injection for CT Angio.  Give IVF hydration if continues to worsen.

## 2017-07-25 NOTE — PROGRESS NOTE ADULT - PROBLEM SELECTOR PROBLEM 1
Acute deep vein thrombosis (DVT) of distal vein of right lower extremity
Carotid stenosis, symptomatic, with infarction
Carotid stenosis, symptomatic, with infarction
Cerebrovascular accident (CVA), unspecified mechanism
Encephalopathy
Encephalopathy
New onset atrial fibrillation
Carotid stenosis, symptomatic, with infarction
Cerebrovascular accident (CVA), unspecified mechanism

## 2017-07-25 NOTE — PROGRESS NOTE ADULT - PROBLEM SELECTOR PROBLEM 5
Anticoagulation management encounter
Acute deep vein thrombosis (DVT) of distal vein of right lower extremity
Acute deep vein thrombosis (DVT) of distal vein of right lower extremity
Peripheral arterial disease
Peripheral arterial disease
SCOT (acute kidney injury)
SCOT (acute kidney injury)

## 2017-07-25 NOTE — PROGRESS NOTE ADULT - PROBLEM SELECTOR PLAN 5
Distal provoked DVT.  Likely related to recent RLE fem-pop bypass. repeat US as outpt
Distal provoked DVT.  Likely related to recent RLE fem-pop bypass. repeat US as outpt
Wound care. Continue pletal. Appreciate vasc surg input.
likely 2/2 diuresis  Improved with IVF, c/w for now and d/c in the AM  c/w Cr trending
likely 2/2 diuresis  will start IVF and trend Cr
Wound care. Continue pletal. Appreciate vasc surg input.

## 2017-07-25 NOTE — PROGRESS NOTE ADULT - PROBLEM SELECTOR PROBLEM 2
Cerebrovascular accident (CVA), unspecified mechanism
Peripheral arterial disease
Acute deep vein thrombosis (DVT) of distal vein of right lower extremity
Acute deep vein thrombosis (DVT) of distal vein of right lower extremity
Cerebrovascular accident (CVA), unspecified mechanism
Cerebrovascular accident (CVA), unspecified mechanism
Deep vein thrombosis (DVT) of other vein of right lower extremity, unspecified chronicity
Deep vein thrombosis (DVT) of other vein of right lower extremity, unspecified chronicity
Type 2 diabetes mellitus with complication, without long-term current use of insulin
Type 2 diabetes mellitus with complication, without long-term current use of insulin
Acute deep vein thrombosis (DVT) of distal vein of right lower extremity
Deep vein thrombosis (DVT) of other vein of right lower extremity, unspecified chronicity

## 2017-07-25 NOTE — PROGRESS NOTE ADULT - PROBLEM SELECTOR PROBLEM 3
Carotid stenosis, symptomatic, with infarction
Peripheral arterial disease
Cerebrovascular accident (CVA), unspecified mechanism
Cerebrovascular accident (CVA), unspecified mechanism
Peripheral arterial disease
Peripheral arterial disease
Skin tear of forearm without complication, right, initial encounter
Skin tear of forearm without complication, right, initial encounter
Type 2 diabetes mellitus with complication, without long-term current use of insulin

## 2017-07-25 NOTE — PROGRESS NOTE ADULT - PROBLEM SELECTOR PROBLEM 8
Peripheral arterial disease
Goals of care, counseling/discussion
Goals of care, counseling/discussion
Peripheral arterial disease

## 2017-07-25 NOTE — PROGRESS NOTE ADULT - SUBJECTIVE AND OBJECTIVE BOX
Patient is a 89y old  Male who presents with a chief complaint of code stroke (19 Jul 2017 01:43)      SUBJECTIVE / OVERNIGHT EVENTS:    MEDICATIONS  (STANDING):  tamsulosin 0.4 milliGRAM(s) Oral at bedtime  timolol 0.5% Solution 1 Drop(s) Right EYE two times a day  brimonidine 0.2% Ophthalmic Solution 1 Drop(s) Right EYE two times a day  acetaminophen   Tablet 650 milliGRAM(s) Oral two times a day  sucralfate suspension 1 Gram(s) Oral two times a day  hydrALAZINE 50 milliGRAM(s) Oral two times a day  ALBUTerol/ipratropium for Nebulization 3 milliLiter(s) Nebulizer every 6 hours  metoprolol 25 milliGRAM(s) Oral two times a day  potassium chloride    Tablet ER 20 milliEquivalent(s) Oral daily  aspirin  chewable 81 milliGRAM(s) Oral daily  clopidogrel Tablet 75 milliGRAM(s) Oral daily  heparin  Injectable 5000 Unit(s) SubCutaneous every 8 hours  dextrose 5%. 1000 milliLiter(s) (50 mL/Hr) IV Continuous <Continuous>  dextrose 50% Injectable 12.5 Gram(s) IV Push once  dextrose 50% Injectable 25 Gram(s) IV Push once  dextrose 50% Injectable 25 Gram(s) IV Push once  atorvastatin 80 milliGRAM(s) Oral at bedtime  insulin lispro (HumaLOG) corrective regimen sliding scale   SubCutaneous three times a day before meals  insulin lispro (HumaLOG) corrective regimen sliding scale   SubCutaneous at bedtime  pantoprazole    Tablet 40 milliGRAM(s) Oral before breakfast    MEDICATIONS  (PRN):  dextrose Gel 1 Dose(s) Oral once PRN Blood Glucose LESS THAN 70 milliGRAM(s)/deciliter  glucagon  Injectable 1 milliGRAM(s) IntraMuscular once PRN Glucose LESS THAN 70 milligrams/deciliter  sodium chloride 0.65% Nasal 2 Spray(s) Both Nostrils every 2 hours PRN Nasal Congestion        CAPILLARY BLOOD GLUCOSE  135 (25 Jul 2017 09:18)  141 (24 Jul 2017 22:39)  150 (24 Jul 2017 16:37)  100 (24 Jul 2017 12:12)        I&O's Summary  ICU Vital Signs Last 24 Hrs  T(C): 36.6 (25 Jul 2017 05:32), Max: 37.1 (24 Jul 2017 21:48)  T(F): 97.9 (25 Jul 2017 05:32), Max: 98.8 (24 Jul 2017 21:48)  HR: 60 (25 Jul 2017 05:32) (60 - 63)  BP: 165/57 (25 Jul 2017 05:32) (152/51 - 165/57)  BP(mean): --  ABP: --  ABP(mean): --  RR: 18 (25 Jul 2017 05:32) (18 - 18)  SpO2: 99% (25 Jul 2017 05:32) (97% - 99%)      PHYSICAL EXAM:  GENERAL: NAD, well-developed  HEAD:  Atraumatic, Normocephalic  EYES: EOMI, PERRLA, conjunctiva and sclera clear  NECK: Supple, No JVD  CHEST/LUNG: Clear to auscultation bilaterally; No wheeze  HEART: Regular rate and rhythm; No murmurs, rubs, or gallops  ABDOMEN: Soft, Nontender, Nondistended; Bowel sounds present  EXTREMITIES:  2+ Peripheral Pulses, No clubbing, cyanosis, or edema  PSYCH: AAOx3  NEUROLOGY: non-focal  SKIN: No rashes or lesions    LABS:                        8.2    5.49  )-----------( 171      ( 25 Jul 2017 06:00 )             25.2     07-25    138  |  105  |  26<H>  ----------------------------<  134<H>  4.3   |  20<L>  |  0.96    Ca    8.7      25 Jul 2017 06:00      PT/INR - ( 25 Jul 2017 06:00 )   PT: 29.9 SEC;   INR: 2.62          PTT - ( 25 Jul 2017 06:00 )  PTT:61.0 SEC          RADIOLOGY & ADDITIONAL TESTS:    Imaging Personally Reviewed:    Consultant(s) Notes Reviewed:      Care Discussed with Consultants/Other Providers: Patient is a 89y old  Male who presents with a chief complaint of code stroke (19 Jul 2017 01:43)      SUBJECTIVE / OVERNIGHT EVENTS: Pt in NAD pt noted to be bleeding from skin tag seen by wound care and vascular now no more bleeding. Pt denies any compliants all other review of systems neg    MEDICATIONS  (STANDING):  tamsulosin 0.4 milliGRAM(s) Oral at bedtime  timolol 0.5% Solution 1 Drop(s) Right EYE two times a day  brimonidine 0.2% Ophthalmic Solution 1 Drop(s) Right EYE two times a day  acetaminophen   Tablet 650 milliGRAM(s) Oral two times a day  sucralfate suspension 1 Gram(s) Oral two times a day  hydrALAZINE 50 milliGRAM(s) Oral two times a day  ALBUTerol/ipratropium for Nebulization 3 milliLiter(s) Nebulizer every 6 hours  metoprolol 25 milliGRAM(s) Oral two times a day  potassium chloride    Tablet ER 20 milliEquivalent(s) Oral daily  aspirin  chewable 81 milliGRAM(s) Oral daily  clopidogrel Tablet 75 milliGRAM(s) Oral daily  heparin  Injectable 5000 Unit(s) SubCutaneous every 8 hours  dextrose 5%. 1000 milliLiter(s) (50 mL/Hr) IV Continuous <Continuous>  dextrose 50% Injectable 12.5 Gram(s) IV Push once  dextrose 50% Injectable 25 Gram(s) IV Push once  dextrose 50% Injectable 25 Gram(s) IV Push once  atorvastatin 80 milliGRAM(s) Oral at bedtime  insulin lispro (HumaLOG) corrective regimen sliding scale   SubCutaneous three times a day before meals  insulin lispro (HumaLOG) corrective regimen sliding scale   SubCutaneous at bedtime  pantoprazole    Tablet 40 milliGRAM(s) Oral before breakfast    MEDICATIONS  (PRN):  dextrose Gel 1 Dose(s) Oral once PRN Blood Glucose LESS THAN 70 milliGRAM(s)/deciliter  glucagon  Injectable 1 milliGRAM(s) IntraMuscular once PRN Glucose LESS THAN 70 milligrams/deciliter  sodium chloride 0.65% Nasal 2 Spray(s) Both Nostrils every 2 hours PRN Nasal Congestion        CAPILLARY BLOOD GLUCOSE  135 (25 Jul 2017 09:18)  141 (24 Jul 2017 22:39)  150 (24 Jul 2017 16:37)  100 (24 Jul 2017 12:12)        I&O's Summary  ICU Vital Signs Last 24 Hrs  T(C): 36.6 (25 Jul 2017 05:32), Max: 37.1 (24 Jul 2017 21:48)  T(F): 97.9 (25 Jul 2017 05:32), Max: 98.8 (24 Jul 2017 21:48)  HR: 60 (25 Jul 2017 05:32) (60 - 63)  BP: 165/57 (25 Jul 2017 05:32) (152/51 - 165/57)  BP(mean): --  ABP: --  ABP(mean): --  RR: 18 (25 Jul 2017 05:32) (18 - 18)  SpO2: 99% (25 Jul 2017 05:32) (97% - 99%)      PHYSICAL EXAM:  GENERAL: NAD, well-developed  HEAD:  Atraumatic, Normocephalic  EYES: EOMI, PERRLA, conjunctiva and sclera clear  NECK: Supple, No JVD  CHEST/LUNG: Clear to auscultation bilaterally; No wheeze  HEART: Regular rate and rhythm; No murmurs, rubs, or gallops  ABDOMEN: Soft, Nontender, Nondistended; Bowel sounds present  EXTREMITIES:  2+ Peripheral Pulses, No clubbing, cyanosis, or edema  PSYCH: AAOx3  NEUROLOGY: non-focal  SKIN: No rashes or lesions    LABS:                        8.2    5.49  )-----------( 171      ( 25 Jul 2017 06:00 )             25.2     07-25    138  |  105  |  26<H>  ----------------------------<  134<H>  4.3   |  20<L>  |  0.96    Ca    8.7      25 Jul 2017 06:00      PT/INR - ( 25 Jul 2017 06:00 )   PT: 29.9 SEC;   INR: 2.62          PTT - ( 25 Jul 2017 06:00 )  PTT:61.0 SEC          RADIOLOGY & ADDITIONAL TESTS:    Imaging Personally Reviewed:    Consultant(s) Notes Reviewed:      Care Discussed with Consultants/Other Providers:

## 2017-07-25 NOTE — PROGRESS NOTE ADULT - PROBLEM SELECTOR PROBLEM 6
Encephalopathy
Encephalopathy
Type 2 diabetes mellitus with complication, without long-term current use of insulin
Type 2 diabetes mellitus with complication, without long-term current use of insulin
SCOT (acute kidney injury)

## 2017-07-25 NOTE — PROGRESS NOTE ADULT - PROBLEM SELECTOR PLAN 8
Discussed with pt and HCP Saw Tong at bedside. MOLST form was filled out. Pt is DNR/DNI
Discussed with pt and HCP Saw oTng at bedside. MOLST form was filled out. Pt is DNR/DNI
Wound care. Hold pletal given increased risk of Bleed, c/w ASA and Plavix   Appreciate vasc surg input.
Wound care. Hold pletal given increased risk of Bleed, c/w ASA and Plavix   Appreciate vasc surg input.

## 2017-07-25 NOTE — PROGRESS NOTE ADULT - PROBLEM SELECTOR PLAN 3
Wound care. Hold pletal given increased risk of Bleed, c/w ASA and Plavix   given bleeding skin tear will d/w vascular in regards to ASA and plavix in addition to coumadin Wound care. Hold pletal given increased risk of Bleed. Case d/w Dr. Horan Ok with discontinue plavix as on coumadin and multiple risk factors for stroke afib/PAD would be ok with ASA alone. Case d/w with Dr. Aguilar would like pt to be on pletal but not an absolute at this point. Consider add pletal as outpt in 1 week if H/H stable. Wound care. Case d/w Dr. Horan Ok with discontinue plavix as on coumadin and multiple risk factors for stroke afib/PAD would be ok with ASA alone with coumadin. Case d/w with Dr. Aguilar would like pt to be on pletal but not an absolute at this point. Consider add pletal as outpt in 1 week if H/H stable.

## 2017-07-25 NOTE — PROGRESS NOTE ADULT - PROBLEM SELECTOR PROBLEM 4
Essential hypertension
Acute deep vein thrombosis (DVT) of distal vein of right lower extremity
Acute deep vein thrombosis (DVT) of distal vein of right lower extremity
Skin tear of forearm without complication, right, initial encounter
Skin tear of forearm without complication, right, initial encounter
Type 2 diabetes mellitus with complication, without long-term current use of insulin
Type 2 diabetes mellitus with complication, without long-term current use of insulin
Peripheral arterial disease

## 2017-09-25 ENCOUNTER — INPATIENT (INPATIENT)
Facility: HOSPITAL | Age: 82
LOS: 4 days | Discharge: HOME HEALTH | End: 2017-09-30
Attending: HOSPITALIST | Admitting: HOSPITALIST
Payer: MEDICARE

## 2017-09-25 VITALS
DIASTOLIC BLOOD PRESSURE: 51 MMHG | RESPIRATION RATE: 30 BRPM | TEMPERATURE: 98 F | SYSTOLIC BLOOD PRESSURE: 130 MMHG | HEART RATE: 63 BPM

## 2017-09-25 DIAGNOSIS — E11.9 TYPE 2 DIABETES MELLITUS WITHOUT COMPLICATIONS: ICD-10-CM

## 2017-09-25 DIAGNOSIS — I50.9 HEART FAILURE, UNSPECIFIED: ICD-10-CM

## 2017-09-25 DIAGNOSIS — E46 UNSPECIFIED PROTEIN-CALORIE MALNUTRITION: ICD-10-CM

## 2017-09-25 DIAGNOSIS — Z95.828 PRESENCE OF OTHER VASCULAR IMPLANTS AND GRAFTS: Chronic | ICD-10-CM

## 2017-09-25 DIAGNOSIS — I10 ESSENTIAL (PRIMARY) HYPERTENSION: ICD-10-CM

## 2017-09-25 DIAGNOSIS — Z29.9 ENCOUNTER FOR PROPHYLACTIC MEASURES, UNSPECIFIED: ICD-10-CM

## 2017-09-25 LAB
ALBUMIN SERPL ELPH-MCNC: 3 G/DL — LOW (ref 3.3–5)
ALP SERPL-CCNC: 99 U/L — SIGNIFICANT CHANGE UP (ref 40–120)
ALT FLD-CCNC: 22 U/L — SIGNIFICANT CHANGE UP (ref 4–41)
APTT BLD: 26.6 SEC — LOW (ref 27.5–37.4)
AST SERPL-CCNC: 26 U/L — SIGNIFICANT CHANGE UP (ref 4–40)
BASE EXCESS BLDV CALC-SCNC: -2.9 MMOL/L — SIGNIFICANT CHANGE UP
BASOPHILS # BLD AUTO: 0 K/UL — SIGNIFICANT CHANGE UP (ref 0–0.2)
BASOPHILS NFR BLD AUTO: 0 % — SIGNIFICANT CHANGE UP (ref 0–2)
BILIRUB SERPL-MCNC: 0.6 MG/DL — SIGNIFICANT CHANGE UP (ref 0.2–1.2)
BLOOD GAS VENOUS - CREATININE: 1.66 MG/DL — HIGH (ref 0.5–1.3)
BUN SERPL-MCNC: 57 MG/DL — HIGH (ref 7–23)
CALCIUM SERPL-MCNC: 8.9 MG/DL — SIGNIFICANT CHANGE UP (ref 8.4–10.5)
CHLORIDE BLDV-SCNC: 107 MMOL/L — SIGNIFICANT CHANGE UP (ref 96–108)
CHLORIDE SERPL-SCNC: 104 MMOL/L — SIGNIFICANT CHANGE UP (ref 98–107)
CK MB BLD-MCNC: 3.94 NG/ML — SIGNIFICANT CHANGE UP (ref 1–6.6)
CK MB BLD-MCNC: 4.46 NG/ML — SIGNIFICANT CHANGE UP (ref 1–6.6)
CK MB BLD-MCNC: SIGNIFICANT CHANGE UP (ref 0–2.5)
CK MB BLD-MCNC: SIGNIFICANT CHANGE UP (ref 0–2.5)
CK SERPL-CCNC: 100 U/L — SIGNIFICANT CHANGE UP (ref 30–200)
CK SERPL-CCNC: 101 U/L — SIGNIFICANT CHANGE UP (ref 30–200)
CO2 SERPL-SCNC: 19 MMOL/L — LOW (ref 22–31)
CREAT SERPL-MCNC: 1.67 MG/DL — HIGH (ref 0.5–1.3)
EOSINOPHIL # BLD AUTO: 0 K/UL — SIGNIFICANT CHANGE UP (ref 0–0.5)
EOSINOPHIL NFR BLD AUTO: 0 % — SIGNIFICANT CHANGE UP (ref 0–6)
GAS PNL BLDV: 138 MMOL/L — SIGNIFICANT CHANGE UP (ref 136–146)
GLUCOSE BLDV-MCNC: 208 — HIGH (ref 70–99)
GLUCOSE SERPL-MCNC: 208 MG/DL — HIGH (ref 70–99)
HCO3 BLDV-SCNC: 22 MMOL/L — SIGNIFICANT CHANGE UP (ref 20–27)
HCT VFR BLD CALC: 28.2 % — LOW (ref 39–50)
HCT VFR BLDV CALC: 30.8 % — LOW (ref 39–51)
HGB BLD-MCNC: 9.3 G/DL — LOW (ref 13–17)
HGB BLDV-MCNC: 10 G/DL — LOW (ref 13–17)
IMM GRANULOCYTES # BLD AUTO: 0.06 # — SIGNIFICANT CHANGE UP
IMM GRANULOCYTES NFR BLD AUTO: 0.6 % — SIGNIFICANT CHANGE UP (ref 0–1.5)
INR BLD: 1.17 — SIGNIFICANT CHANGE UP (ref 0.88–1.17)
LACTATE BLDV-MCNC: 2 MMOL/L — SIGNIFICANT CHANGE UP (ref 0.5–2)
LYMPHOCYTES # BLD AUTO: 0.49 K/UL — LOW (ref 1–3.3)
LYMPHOCYTES # BLD AUTO: 4.8 % — LOW (ref 13–44)
MCHC RBC-ENTMCNC: 31.3 PG — SIGNIFICANT CHANGE UP (ref 27–34)
MCHC RBC-ENTMCNC: 33 % — SIGNIFICANT CHANGE UP (ref 32–36)
MCV RBC AUTO: 94.9 FL — SIGNIFICANT CHANGE UP (ref 80–100)
MONOCYTES # BLD AUTO: 0.9 K/UL — SIGNIFICANT CHANGE UP (ref 0–0.9)
MONOCYTES NFR BLD AUTO: 8.9 % — SIGNIFICANT CHANGE UP (ref 2–14)
NEUTROPHILS # BLD AUTO: 8.69 K/UL — HIGH (ref 1.8–7.4)
NEUTROPHILS NFR BLD AUTO: 85.7 % — HIGH (ref 43–77)
NRBC # FLD: 0 — SIGNIFICANT CHANGE UP
NT-PROBNP SERPL-SCNC: SIGNIFICANT CHANGE UP PG/ML
PCO2 BLDV: 33 MMHG — LOW (ref 41–51)
PH BLDV: 7.42 PH — SIGNIFICANT CHANGE UP (ref 7.32–7.43)
PLATELET # BLD AUTO: 250 K/UL — SIGNIFICANT CHANGE UP (ref 150–400)
PMV BLD: 11 FL — SIGNIFICANT CHANGE UP (ref 7–13)
PO2 BLDV: 32 MMHG — LOW (ref 35–40)
POTASSIUM BLDV-SCNC: 4.6 MMOL/L — HIGH (ref 3.4–4.5)
POTASSIUM SERPL-MCNC: 4.8 MMOL/L — SIGNIFICANT CHANGE UP (ref 3.5–5.3)
POTASSIUM SERPL-SCNC: 4.8 MMOL/L — SIGNIFICANT CHANGE UP (ref 3.5–5.3)
PROT SERPL-MCNC: 5.6 G/DL — LOW (ref 6–8.3)
PROTHROM AB SERPL-ACNC: 13.2 SEC — HIGH (ref 9.8–13.1)
RBC # BLD: 2.97 M/UL — LOW (ref 4.2–5.8)
RBC # FLD: 14 % — SIGNIFICANT CHANGE UP (ref 10.3–14.5)
SAO2 % BLDV: 51.7 % — LOW (ref 60–85)
SODIUM SERPL-SCNC: 138 MMOL/L — SIGNIFICANT CHANGE UP (ref 135–145)
TROPONIN T SERPL-MCNC: 0.16 NG/ML — HIGH (ref 0–0.06)
TROPONIN T SERPL-MCNC: 0.18 NG/ML — HIGH (ref 0–0.06)
WBC # BLD: 10.14 K/UL — SIGNIFICANT CHANGE UP (ref 3.8–10.5)
WBC # FLD AUTO: 10.14 K/UL — SIGNIFICANT CHANGE UP (ref 3.8–10.5)

## 2017-09-25 PROCEDURE — 99223 1ST HOSP IP/OBS HIGH 75: CPT

## 2017-09-25 PROCEDURE — 71010: CPT | Mod: 26

## 2017-09-25 RX ORDER — FUROSEMIDE 40 MG
40 TABLET ORAL ONCE
Qty: 0 | Refills: 0 | Status: COMPLETED | OUTPATIENT
Start: 2017-09-25 | End: 2017-09-25

## 2017-09-25 RX ORDER — DEXTROSE 50 % IN WATER 50 %
25 SYRINGE (ML) INTRAVENOUS ONCE
Qty: 0 | Refills: 0 | Status: DISCONTINUED | OUTPATIENT
Start: 2017-09-25 | End: 2017-09-30

## 2017-09-25 RX ORDER — INSULIN LISPRO 100/ML
VIAL (ML) SUBCUTANEOUS
Qty: 0 | Refills: 0 | Status: DISCONTINUED | OUTPATIENT
Start: 2017-09-25 | End: 2017-09-30

## 2017-09-25 RX ORDER — METOPROLOL TARTRATE 50 MG
25 TABLET ORAL
Qty: 0 | Refills: 0 | Status: DISCONTINUED | OUTPATIENT
Start: 2017-09-25 | End: 2017-09-30

## 2017-09-25 RX ORDER — ASPIRIN/CALCIUM CARB/MAGNESIUM 324 MG
81 TABLET ORAL DAILY
Qty: 0 | Refills: 0 | Status: DISCONTINUED | OUTPATIENT
Start: 2017-09-26 | End: 2017-09-30

## 2017-09-25 RX ORDER — PANTOPRAZOLE SODIUM 20 MG/1
40 TABLET, DELAYED RELEASE ORAL
Qty: 0 | Refills: 0 | Status: DISCONTINUED | OUTPATIENT
Start: 2017-09-25 | End: 2017-09-30

## 2017-09-25 RX ORDER — VANCOMYCIN HCL 1 G
1000 VIAL (EA) INTRAVENOUS ONCE
Qty: 0 | Refills: 0 | Status: COMPLETED | OUTPATIENT
Start: 2017-09-25 | End: 2017-09-25

## 2017-09-25 RX ORDER — SUCRALFATE 1 G
10 TABLET ORAL
Qty: 0 | Refills: 0 | COMMUNITY

## 2017-09-25 RX ORDER — GLUCAGON INJECTION, SOLUTION 0.5 MG/.1ML
1 INJECTION, SOLUTION SUBCUTANEOUS ONCE
Qty: 0 | Refills: 0 | Status: DISCONTINUED | OUTPATIENT
Start: 2017-09-25 | End: 2017-09-30

## 2017-09-25 RX ORDER — POTASSIUM CHLORIDE 20 MEQ
2 PACKET (EA) ORAL
Qty: 0 | Refills: 0 | COMMUNITY

## 2017-09-25 RX ORDER — BRIMONIDINE TARTRATE 2 MG/MG
1 SOLUTION/ DROPS OPHTHALMIC
Qty: 0 | Refills: 0 | COMMUNITY

## 2017-09-25 RX ORDER — BRIMONIDINE TARTRATE, TIMOLOL MALEATE 2; 5 MG/ML; MG/ML
1 SOLUTION/ DROPS OPHTHALMIC
Qty: 0 | Refills: 0 | COMMUNITY

## 2017-09-25 RX ORDER — DEXTROSE 50 % IN WATER 50 %
1 SYRINGE (ML) INTRAVENOUS ONCE
Qty: 0 | Refills: 0 | Status: DISCONTINUED | OUTPATIENT
Start: 2017-09-25 | End: 2017-09-30

## 2017-09-25 RX ORDER — WARFARIN SODIUM 2.5 MG/1
1 TABLET ORAL
Qty: 0 | Refills: 0 | COMMUNITY

## 2017-09-25 RX ORDER — CLOPIDOGREL BISULFATE 75 MG/1
1 TABLET, FILM COATED ORAL
Qty: 0 | Refills: 0 | COMMUNITY

## 2017-09-25 RX ORDER — FUROSEMIDE 40 MG
1 TABLET ORAL
Qty: 0 | Refills: 0 | COMMUNITY

## 2017-09-25 RX ORDER — ACETAMINOPHEN 500 MG
2 TABLET ORAL
Qty: 0 | Refills: 0 | COMMUNITY

## 2017-09-25 RX ORDER — SODIUM CHLORIDE 9 MG/ML
1000 INJECTION, SOLUTION INTRAVENOUS
Qty: 0 | Refills: 0 | Status: DISCONTINUED | OUTPATIENT
Start: 2017-09-25 | End: 2017-09-30

## 2017-09-25 RX ORDER — ATORVASTATIN CALCIUM 80 MG/1
40 TABLET, FILM COATED ORAL AT BEDTIME
Qty: 0 | Refills: 0 | Status: DISCONTINUED | OUTPATIENT
Start: 2017-09-25 | End: 2017-09-30

## 2017-09-25 RX ORDER — HYDRALAZINE HCL 50 MG
1 TABLET ORAL
Qty: 0 | Refills: 0 | COMMUNITY

## 2017-09-25 RX ORDER — PIPERACILLIN AND TAZOBACTAM 4; .5 G/20ML; G/20ML
3.38 INJECTION, POWDER, LYOPHILIZED, FOR SOLUTION INTRAVENOUS ONCE
Qty: 0 | Refills: 0 | Status: COMPLETED | OUTPATIENT
Start: 2017-09-25 | End: 2017-09-25

## 2017-09-25 RX ORDER — INSULIN LISPRO 100/ML
VIAL (ML) SUBCUTANEOUS AT BEDTIME
Qty: 0 | Refills: 0 | Status: DISCONTINUED | OUTPATIENT
Start: 2017-09-25 | End: 2017-09-30

## 2017-09-25 RX ORDER — CIPROFLOXACIN LACTATE 400MG/40ML
400 VIAL (ML) INTRAVENOUS ONCE
Qty: 0 | Refills: 0 | Status: DISCONTINUED | OUTPATIENT
Start: 2017-09-25 | End: 2017-09-25

## 2017-09-25 RX ORDER — TIMOLOL 0.5 %
1 DROPS OPHTHALMIC (EYE)
Qty: 0 | Refills: 0 | COMMUNITY

## 2017-09-25 RX ORDER — TIMOLOL 0.5 %
1 DROPS OPHTHALMIC (EYE)
Qty: 0 | Refills: 0 | Status: DISCONTINUED | OUTPATIENT
Start: 2017-09-25 | End: 2017-09-30

## 2017-09-25 RX ORDER — FUROSEMIDE 40 MG
40 TABLET ORAL DAILY
Qty: 0 | Refills: 0 | Status: DISCONTINUED | OUTPATIENT
Start: 2017-09-25 | End: 2017-09-26

## 2017-09-25 RX ORDER — SUCRALFATE 1 G
1 TABLET ORAL
Qty: 0 | Refills: 0 | Status: DISCONTINUED | OUTPATIENT
Start: 2017-09-25 | End: 2017-09-30

## 2017-09-25 RX ORDER — ASPIRIN/CALCIUM CARB/MAGNESIUM 324 MG
162 TABLET ORAL ONCE
Qty: 0 | Refills: 0 | Status: COMPLETED | OUTPATIENT
Start: 2017-09-25 | End: 2017-09-25

## 2017-09-25 RX ORDER — SUCRALFATE 1 G
1 TABLET ORAL
Qty: 0 | Refills: 0 | COMMUNITY

## 2017-09-25 RX ORDER — TAMSULOSIN HYDROCHLORIDE 0.4 MG/1
0.4 CAPSULE ORAL AT BEDTIME
Qty: 0 | Refills: 0 | Status: DISCONTINUED | OUTPATIENT
Start: 2017-09-25 | End: 2017-09-30

## 2017-09-25 RX ORDER — CILOSTAZOL 100 MG/1
1 TABLET ORAL
Qty: 0 | Refills: 0 | COMMUNITY

## 2017-09-25 RX ORDER — BRIMONIDINE TARTRATE 2 MG/MG
1 SOLUTION/ DROPS OPHTHALMIC THREE TIMES A DAY
Qty: 0 | Refills: 0 | Status: DISCONTINUED | OUTPATIENT
Start: 2017-09-25 | End: 2017-09-30

## 2017-09-25 RX ORDER — SIMVASTATIN 20 MG/1
1 TABLET, FILM COATED ORAL
Qty: 0 | Refills: 0 | COMMUNITY

## 2017-09-25 RX ORDER — HYDRALAZINE HCL 50 MG
50 TABLET ORAL
Qty: 0 | Refills: 0 | Status: DISCONTINUED | OUTPATIENT
Start: 2017-09-25 | End: 2017-09-30

## 2017-09-25 RX ORDER — ALBUTEROL 90 UG/1
3 AEROSOL, METERED ORAL
Qty: 0 | Refills: 0 | COMMUNITY

## 2017-09-25 RX ORDER — DEXTROSE 50 % IN WATER 50 %
12.5 SYRINGE (ML) INTRAVENOUS ONCE
Qty: 0 | Refills: 0 | Status: DISCONTINUED | OUTPATIENT
Start: 2017-09-25 | End: 2017-09-30

## 2017-09-25 RX ADMIN — PIPERACILLIN AND TAZOBACTAM 200 GRAM(S): 4; .5 INJECTION, POWDER, LYOPHILIZED, FOR SOLUTION INTRAVENOUS at 13:12

## 2017-09-25 RX ADMIN — Medication 162 MILLIGRAM(S): at 13:12

## 2017-09-25 RX ADMIN — Medication 40 MILLIGRAM(S): at 13:12

## 2017-09-25 RX ADMIN — Medication 40 MILLIGRAM(S): at 18:16

## 2017-09-25 RX ADMIN — Medication 1 DROP(S): at 22:59

## 2017-09-25 RX ADMIN — Medication 25 MILLIGRAM(S): at 18:14

## 2017-09-25 RX ADMIN — Medication 50 MILLIGRAM(S): at 18:16

## 2017-09-25 RX ADMIN — ATORVASTATIN CALCIUM 40 MILLIGRAM(S): 80 TABLET, FILM COATED ORAL at 21:42

## 2017-09-25 RX ADMIN — Medication 1 GRAM(S): at 18:15

## 2017-09-25 RX ADMIN — Medication 250 MILLIGRAM(S): at 13:30

## 2017-09-25 RX ADMIN — TAMSULOSIN HYDROCHLORIDE 0.4 MILLIGRAM(S): 0.4 CAPSULE ORAL at 21:42

## 2017-09-25 NOTE — ED PROVIDER NOTE - CONSTITUTIONAL, MLM
normal... bitemporal wasting, slurred speech (baseline per neighbors) awake, alert, oriented to person, place, time/situation and in no apparent distress.

## 2017-09-25 NOTE — ED PROVIDER NOTE - OBJECTIVE STATEMENT
89yo male pmh DM, HTN, MI, PPM, CKD, PAD s/p RLE fem-popliteal bypass ago, ?CVA upon recent admission BIBEMS for SOB. Patient was found by neighbors and was described to have difficulty breathing. Patient describes being sob for 3 weeks. Denies cp, ARANA, fevers, chills, abd pain, trauma/falls. Lives at home alone. 89yo male pmh DM, HTN, MI, PPM, CKD, PAD s/p RLE fem-popliteal bypass ago, ?CVA upon recent admission BIBEMS for SOB. Patient was found by neighbors and was described to have difficulty breathing. Patient describes being sob for 3 weeks. Denies cp, ARANA, fevers, chills, abd pain, trauma/falls, weakness. Lives at home with 24/7 aid. Neighbor reports that he has had poor PO intake for a few weeks and thinks he may have had a stroke a few days ago. ?Mechanical fall out of bed on saturday. no head strike no LOC. Able to ambulate slightly with walker assistance.

## 2017-09-25 NOTE — ED ADULT TRIAGE NOTE - CHIEF COMPLAINT QUOTE
patient CICIEMS pt recently DC'd from hospital on thursday for "wound" was found by neighbor today c/o SOB. pt having difficulty speaking between breaths. resp rate = 30 LS diminished in all bases.

## 2017-09-25 NOTE — ED PROVIDER NOTE - MEDICAL DECISION MAKING DETAILS
89yo male pmh DM, HTN, MI, CKD, PAD s/p RLE fem-popliteal bypass BIBEMS with sob. labs, cardiac enzymes, probnp, DVT study LE, cxr. Patient is DNR/DNI, satting well VSS. social admit

## 2017-09-25 NOTE — H&P ADULT - NSHPLABSRESULTS_GEN_ALL_CORE
9.3    10.14 )-----------( 250      ( 25 Sep 2017 10:55 )             28.2     09-25    138  |  104  |  57<H>  ----------------------------<  208<H>  4.8   |  19<L>  |  1.67<H>    Ca    8.9      25 Sep 2017 10:55    TPro  5.6<L>  /  Alb  3.0<L>  /  TBili  0.6  /  DBili  x   /  AST  26  /  ALT  22  /  AlkPhos  99  09-25    CARDIAC MARKERS ( 25 Sep 2017 10:55 )  x     / 0.18 ng/mL / 101 u/L / 3.94 ng/mL / x          EKG AV paced 9.3    10.14 )-----------( 250      ( 25 Sep 2017 10:55 )             28.2     09-25    138  |  104  |  57<H>  ----------------------------<  208<H>  4.8   |  19<L>  |  1.67<H>    Ca    8.9      25 Sep 2017 10:55    TPro  5.6<L>  /  Alb  3.0<L>  /  TBili  0.6  /  DBili  x   /  AST  26  /  ALT  22  /  AlkPhos  99  09-25    CARDIAC MARKERS ( 25 Sep 2017 10:55 )  x     / 0.18 ng/mL / 101 u/L / 3.94 ng/mL / x          EKG AV paced    Imaging Reviewed:  CXR - bilateral pleural effusions, unable to rule out underlying consolidation or PNA. Pulmonary edema present.

## 2017-09-25 NOTE — ED PROVIDER NOTE - ATTENDING CONTRIBUTION TO CARE
89yo male pmh DM, HTN, MI, PPM, CKD, PAD s/p RLE fem-popliteal bypass ago, ?CVA upon recent admission BIBEMS for SOB. Patient was found by neighbors and was described to have difficulty breathing. Patient describes being sob for 3 weeks.   Dc from [facility] 4 days ago.  Satte no appetite Lives at home with 24/7 aid. Neighbor reports that he has had poor PO intake for a few weeks and thinks he may have had a stroke a few days ago. Pr ncat  mm dry lungs globally decreased bs  abd soft  le bl pitting edema  , rle girth larger than left, resp 30/min  imp sob, chf exacerbation  plan labs  xray  lasix  admit 89yo male pmh DM, HTN, MI, PPM, CKD, PAD s/p RLE fem-popliteal bypass ago, ?CVA upon recent admission BIBEMS for SOB. Patient was found by neighbors and was described to have difficulty breathing. Patient describes being sob for 3 weeks.   Dc from [facility] 4 days ago.  Satte no appetite Lives at home with 24/7 aid. Neighbor reports that he has had poor PO intake for a few weeks and thinks he may have had a stroke a few days ago. Pr ncat  mm dry lungs globally decreased bs  abd soft  le bl pitting edema  , rle girth larger than left, resp 30/min  imp sob, chf exacerbation  plan labs  xray  lasix  admit---pt with elevated pro BNP, SCOT, and elevated troponin.  Will admit tele

## 2017-09-25 NOTE — ED PROCEDURE NOTE - PROCEDURE ADDITIONAL DETAILS
[address]70 Ultrasound, Limited, lower extremity:    Focused ED ultrasound of the (r ) lower extremity.    (r ) leg scanned in B mode from the common femoral vein  past bifurcation into superficial and deep femoral, with and withou compression.The popliteal vein was then visualized  then down to popliteal vein past the trifurcation, with and without compression.  Indication: RLE swelling  Findings: full compression at all sites, with no visualization of clot.  Impression: No proximal DVT visualized    Procedure note and images placed in patient's chart

## 2017-09-25 NOTE — H&P ADULT - PROBLEM SELECTOR PLAN 1
Admit to tele  serial Ce and ekg  IV Lasix 40 qD  I+O  O > I  Consider cardio evaluation Admit to tele  serial Ce and ekg  IV Lasix 40 qD  I+O  O > I  Consider cardio evaluation  CXR possible PNA given Zosyn/Vanco in ED. Will await hospitalist recommendation to continue Presentation is most consistent with acute decompensation of chronic diastolic heart failure. Unclear precipitant. TPN is elevated but patient is comfortable and without chest pain; more likely elevated in setting of demand ischemia than acute MI. Lower suspicion for pneumonia as cause of decompensation without fever or cough, will refrain from further antibiotics. If patient does become febrile, can treat with ceftriaxone and azithromycin (no indication for vancomycin, pip-tazo unless he becomes septic).  -admit to telemetry unit  -serial CE and ekg  -IV Lasix 40 qD, monitor potassium  -monitor I+O  -repeat TTE

## 2017-09-25 NOTE — ED PROVIDER NOTE - NS ED ROS FT
CONST: no fevers, no chills  EYES: no pain  ENT: no sore throat   CV: no chest pain  RESP: +shortness of breath  ABD: no abdominal pain   : no dysuria  MSK: no back pain  NEURO: no headache or additional neurologic complaints  HEME: no easy bleeding  SKIN:  no rash

## 2017-09-25 NOTE — ED ADULT NURSE NOTE - OBJECTIVE STATEMENT
pt received a&ox3, c/o sob x 3 weeks worsening with time, pt denies chest pain, breathing noted to be intermittently labored, md at bedside, pt placed on 4L nc as per md order, hx of COPD, stage 1 pressure ulcer 10x10 noted to sacrum, no breakdown noted, covered with dressing, pt has cellulitis to right leg, slight swelling and weeping noted, pts vs as reported, pt placed on monitor, will continue to monitor.

## 2017-09-25 NOTE — H&P ADULT - ASSESSMENT
89 year old male pmh of PAD s/p R Fem-pop bypass, HTN, DM, CKD, s/p PPM p/w shortness of breath r/o CHF exacerbation.

## 2017-09-25 NOTE — H&P ADULT - HISTORY OF PRESENT ILLNESS
89 year old male pmh of DM, HTN, MI, PPM, CKD, PAD s/p  RLE fem pop bypass, p/w SOB.  Patient a poor historian and attempted to HCP Nishant Hong and Saw Zaragoza but neither were available. History obtained via ed provider note:    Pt lives with 24/7 aid and has been weak lately,  neighbors feel that has not been eating well.  He fell out of bed few days ago,  had no head trauma of loss of consciousness. The patient felt short of breath earlier but currently feels a little better. Does not think he had any fevers. 89 year old male pmh of DM, HTN, MI, PPM, CKD, PAD s/p  RLE fem pop bypass, p/w SOB.  Patient a poor historian and attempted to HCP Nishant Hong and Saw Zaragoza but neither were available. History obtained via ED provider note:    Pt lives with 24/7 aid and has been weak lately,  neighbors feel that has not been eating well.  He fell out of bed few days ago,  had no head trauma of loss of consciousness. The patient felt short of breath earlier but currently feels a little better. Does not think he had any fevers.

## 2017-09-25 NOTE — ED PROVIDER NOTE - SKIN, MLM
abrasions on shin RLE. 3+ pitting edema b/L. RLE swelling > LLE swelling. Mild RLE erythema anterior shin

## 2017-09-26 DIAGNOSIS — N18.3 CHRONIC KIDNEY DISEASE, STAGE 3 (MODERATE): ICD-10-CM

## 2017-09-26 DIAGNOSIS — I82.409 ACUTE EMBOLISM AND THROMBOSIS OF UNSPECIFIED DEEP VEINS OF UNSPECIFIED LOWER EXTREMITY: ICD-10-CM

## 2017-09-26 LAB
BUN SERPL-MCNC: 51 MG/DL — HIGH (ref 7–23)
CALCIUM SERPL-MCNC: 8.6 MG/DL — SIGNIFICANT CHANGE UP (ref 8.4–10.5)
CHLORIDE SERPL-SCNC: 103 MMOL/L — SIGNIFICANT CHANGE UP (ref 98–107)
CO2 SERPL-SCNC: 23 MMOL/L — SIGNIFICANT CHANGE UP (ref 22–31)
CREAT SERPL-MCNC: 1.37 MG/DL — HIGH (ref 0.5–1.3)
GLUCOSE SERPL-MCNC: 167 MG/DL — HIGH (ref 70–99)
HBA1C BLD-MCNC: 6.5 % — HIGH (ref 4–5.6)
HCT VFR BLD CALC: 29 % — LOW (ref 39–50)
HGB BLD-MCNC: 9.4 G/DL — LOW (ref 13–17)
MCHC RBC-ENTMCNC: 31.2 PG — SIGNIFICANT CHANGE UP (ref 27–34)
MCHC RBC-ENTMCNC: 32.4 % — SIGNIFICANT CHANGE UP (ref 32–36)
MCV RBC AUTO: 96.3 FL — SIGNIFICANT CHANGE UP (ref 80–100)
NRBC # FLD: 0 — SIGNIFICANT CHANGE UP
PLATELET # BLD AUTO: 211 K/UL — SIGNIFICANT CHANGE UP (ref 150–400)
PMV BLD: 10.9 FL — SIGNIFICANT CHANGE UP (ref 7–13)
POTASSIUM SERPL-MCNC: 3.7 MMOL/L — SIGNIFICANT CHANGE UP (ref 3.5–5.3)
POTASSIUM SERPL-SCNC: 3.7 MMOL/L — SIGNIFICANT CHANGE UP (ref 3.5–5.3)
RBC # BLD: 3.01 M/UL — LOW (ref 4.2–5.8)
RBC # FLD: 13.9 % — SIGNIFICANT CHANGE UP (ref 10.3–14.5)
SODIUM SERPL-SCNC: 139 MMOL/L — SIGNIFICANT CHANGE UP (ref 135–145)
WBC # BLD: 9.67 K/UL — SIGNIFICANT CHANGE UP (ref 3.8–10.5)
WBC # FLD AUTO: 9.67 K/UL — SIGNIFICANT CHANGE UP (ref 3.8–10.5)

## 2017-09-26 PROCEDURE — 99233 SBSQ HOSP IP/OBS HIGH 50: CPT

## 2017-09-26 RX ORDER — ACETAMINOPHEN 500 MG
650 TABLET ORAL EVERY 6 HOURS
Qty: 0 | Refills: 0 | Status: DISCONTINUED | OUTPATIENT
Start: 2017-09-26 | End: 2017-09-30

## 2017-09-26 RX ORDER — FUROSEMIDE 40 MG
20 TABLET ORAL DAILY
Qty: 0 | Refills: 0 | Status: DISCONTINUED | OUTPATIENT
Start: 2017-09-27 | End: 2017-09-30

## 2017-09-26 RX ORDER — HEPARIN SODIUM 5000 [USP'U]/ML
5000 INJECTION INTRAVENOUS; SUBCUTANEOUS EVERY 12 HOURS
Qty: 0 | Refills: 0 | Status: DISCONTINUED | OUTPATIENT
Start: 2017-09-26 | End: 2017-09-30

## 2017-09-26 RX ORDER — INFLUENZA VIRUS VACCINE 15; 15; 15; 15 UG/.5ML; UG/.5ML; UG/.5ML; UG/.5ML
0.5 SUSPENSION INTRAMUSCULAR ONCE
Qty: 0 | Refills: 0 | Status: COMPLETED | OUTPATIENT
Start: 2017-09-26 | End: 2017-09-26

## 2017-09-26 RX ADMIN — Medication 1 DROP(S): at 17:37

## 2017-09-26 RX ADMIN — Medication 25 MILLIGRAM(S): at 07:14

## 2017-09-26 RX ADMIN — BRIMONIDINE TARTRATE 1 DROP(S): 2 SOLUTION/ DROPS OPHTHALMIC at 05:57

## 2017-09-26 RX ADMIN — BRIMONIDINE TARTRATE 1 DROP(S): 2 SOLUTION/ DROPS OPHTHALMIC at 22:19

## 2017-09-26 RX ADMIN — Medication 40 MILLIGRAM(S): at 06:00

## 2017-09-26 RX ADMIN — Medication 50 MILLIGRAM(S): at 05:57

## 2017-09-26 RX ADMIN — TAMSULOSIN HYDROCHLORIDE 0.4 MILLIGRAM(S): 0.4 CAPSULE ORAL at 22:20

## 2017-09-26 RX ADMIN — Medication 1 GRAM(S): at 17:36

## 2017-09-26 RX ADMIN — HEPARIN SODIUM 5000 UNIT(S): 5000 INJECTION INTRAVENOUS; SUBCUTANEOUS at 17:36

## 2017-09-26 RX ADMIN — ATORVASTATIN CALCIUM 40 MILLIGRAM(S): 80 TABLET, FILM COATED ORAL at 22:20

## 2017-09-26 RX ADMIN — Medication 50 MILLIGRAM(S): at 17:36

## 2017-09-26 RX ADMIN — Medication 1 DROP(S): at 06:01

## 2017-09-26 RX ADMIN — PANTOPRAZOLE SODIUM 40 MILLIGRAM(S): 20 TABLET, DELAYED RELEASE ORAL at 07:14

## 2017-09-26 RX ADMIN — Medication 650 MILLIGRAM(S): at 12:57

## 2017-09-26 RX ADMIN — Medication 650 MILLIGRAM(S): at 11:28

## 2017-09-26 RX ADMIN — Medication 81 MILLIGRAM(S): at 13:15

## 2017-09-26 RX ADMIN — Medication 25 MILLIGRAM(S): at 17:36

## 2017-09-26 RX ADMIN — Medication 1 GRAM(S): at 07:13

## 2017-09-26 RX ADMIN — BRIMONIDINE TARTRATE 1 DROP(S): 2 SOLUTION/ DROPS OPHTHALMIC at 01:27

## 2017-09-26 RX ADMIN — BRIMONIDINE TARTRATE 1 DROP(S): 2 SOLUTION/ DROPS OPHTHALMIC at 13:15

## 2017-09-26 NOTE — PROGRESS NOTE ADULT - PROBLEM SELECTOR PLAN 1
-trend cardiac enzymes  -check TTE  -appears more euvolemic so change lasix to oral  -monitor I/Os and daily weights  -cardiology consult

## 2017-09-26 NOTE — PATIENT PROFILE ADULT. - FUNCTIONAL SCREEN CURRENT LEVEL: EATING, MLM
Pharmacy faxed in request for medication refill. Medication(s) set up as pending orders from medication list.    Preferred pharmacy has been set up and verified           (2) assistive person

## 2017-09-26 NOTE — PATIENT PROFILE ADULT. - AS SC [facility] [employer]
Immediate Brief Procedure Note    Patient: Yancy Adan    Pre-op Dx: Symptomatic Cholecystitis ,Cholelithiasis    Post-op Dx: Same    Procedure:  Laparoscopic Cholecytectomy    Surgeon:  Ha Benjamin MD     Assistants: Pilo Carolina PA-C     Anesthesia Staff: Anesthesiologist: Jose Raul Dugan MD  Anesthesia Assistant: Jose Regan  Anesthesia Staff: Laquita Thbiodeaux    Anesthesia Type: General with infiltration with 0.5% Marcaine 1% Xylocaine Local     Findings: Please Refer to Dictated Operative Note     Estimated Blood Loss: minimal    Complications: none    Specimens Removed: Gallbladder   (2) very limited

## 2017-09-26 NOTE — PHYSICAL THERAPY INITIAL EVALUATION ADULT - ADDITIONAL COMMENTS
As per patient's friend patient only ambulates from bed to the  and remains in the w/c at home with 1 person assist with transfers.

## 2017-09-27 PROCEDURE — 99233 SBSQ HOSP IP/OBS HIGH 50: CPT

## 2017-09-27 PROCEDURE — 93971 EXTREMITY STUDY: CPT | Mod: 26,LT

## 2017-09-27 RX ADMIN — Medication 1 DROP(S): at 06:06

## 2017-09-27 RX ADMIN — TAMSULOSIN HYDROCHLORIDE 0.4 MILLIGRAM(S): 0.4 CAPSULE ORAL at 22:16

## 2017-09-27 RX ADMIN — Medication 1 GRAM(S): at 17:03

## 2017-09-27 RX ADMIN — BRIMONIDINE TARTRATE 1 DROP(S): 2 SOLUTION/ DROPS OPHTHALMIC at 06:06

## 2017-09-27 RX ADMIN — BRIMONIDINE TARTRATE 1 DROP(S): 2 SOLUTION/ DROPS OPHTHALMIC at 13:04

## 2017-09-27 RX ADMIN — Medication 25 MILLIGRAM(S): at 17:03

## 2017-09-27 RX ADMIN — ATORVASTATIN CALCIUM 40 MILLIGRAM(S): 80 TABLET, FILM COATED ORAL at 22:16

## 2017-09-27 RX ADMIN — HEPARIN SODIUM 5000 UNIT(S): 5000 INJECTION INTRAVENOUS; SUBCUTANEOUS at 17:03

## 2017-09-27 RX ADMIN — Medication 20 MILLIGRAM(S): at 06:07

## 2017-09-27 RX ADMIN — Medication 81 MILLIGRAM(S): at 11:13

## 2017-09-27 RX ADMIN — Medication 50 MILLIGRAM(S): at 17:03

## 2017-09-27 RX ADMIN — Medication 50 MILLIGRAM(S): at 06:07

## 2017-09-27 RX ADMIN — Medication 1 DROP(S): at 17:02

## 2017-09-27 RX ADMIN — PANTOPRAZOLE SODIUM 40 MILLIGRAM(S): 20 TABLET, DELAYED RELEASE ORAL at 06:07

## 2017-09-27 RX ADMIN — HEPARIN SODIUM 5000 UNIT(S): 5000 INJECTION INTRAVENOUS; SUBCUTANEOUS at 06:07

## 2017-09-27 RX ADMIN — Medication 1 GRAM(S): at 06:28

## 2017-09-27 RX ADMIN — Medication 1: at 17:14

## 2017-09-27 RX ADMIN — BRIMONIDINE TARTRATE 1 DROP(S): 2 SOLUTION/ DROPS OPHTHALMIC at 22:16

## 2017-09-27 RX ADMIN — Medication 25 MILLIGRAM(S): at 06:29

## 2017-09-27 NOTE — PROGRESS NOTE ADULT - PROBLEM SELECTOR PLAN 1
-trend cardiac enzymes  -check TTE  -appears more euvolemic so change lasix to oral  -monitor I/Os and daily weights  -cardiology consult called

## 2017-09-27 NOTE — DIETITIAN INITIAL EVALUATION ADULT. - PERTINENT LABORATORY DATA
09-26 Na139 mmol/L Glu 167 mg/dL<H> K+ 3.7 mmol/L Cr  1.37 mg/dL<H> BUN 51 mg/dL<H> Phos n/a   Alb n/a   PAB n/a

## 2017-09-27 NOTE — DIETITIAN INITIAL EVALUATION ADULT. - OTHER INFO
Nutrition consult received. Pt reports fair PO intake at this time. Stated PO intake was "fine" prior to admission.  However, Pt does endorse significant Wt. loss.  Pt. noted with loss of subcutaneous fat and muscle loss. Wt. noted from prior admission is 71.7 kgs (7-21). Current Wt is 65.2 kgs ( obtained via bedscale as no Wt's available on current admission) & Pt 69" tall.  No GI distress (nausea/vomiting/diarrhea/constipation.) No reported difficulties chewing and swallowing foods and fluids.  PO intake encouraged, Pt. declined supplemental drinks 2/2 fear of nausea/vomiting with intake. PO intake and frequent snacks encouraged.

## 2017-09-27 NOTE — DIETITIAN INITIAL EVALUATION ADULT. - PHYSICAL APPEARANCE
Nutrition focused physical exam conducted - found signs of malnutrition [ ]absent [ ]present Subcutaneous fat loss: [ Severe] Orbital fat pads region, [Severe ]Buccal fat region, [ Moderate]Triceps region,  [Severe ]Ribs region  Muscle wasting: [ Severe]Temples region, [ Severe]Clavicle region, [n/a ]Shoulder region, [n/a] Scapula region, [ n/a]Interosseous region, [Severe ]thigh region, [Severe ]Calf region

## 2017-09-27 NOTE — DIETITIAN INITIAL EVALUATION ADULT. - PROBLEM SELECTOR PLAN 1
Presentation is most consistent with acute decompensation of chronic diastolic heart failure. Unclear precipitant. TPN is elevated but patient is comfortable and without chest pain; more likely elevated in setting of demand ischemia than acute MI. Lower suspicion for pneumonia as cause of decompensation without fever or cough, will refrain from further antibiotics. If patient does become febrile, can treat with ceftriaxone and azithromycin (no indication for vancomycin, pip-tazo unless he becomes septic).  -admit to telemetry unit  -serial CE and ekg  -IV Lasix 40 qD, monitor potassium  -monitor I+O  -repeat TTE

## 2017-09-27 NOTE — DIETITIAN INITIAL EVALUATION ADULT. - NS AS NUTRI INTERV ED CONTENT
Purpose of the nutrition education/Nutrition relationship to health/disease/Priority modifications/Survival information

## 2017-09-28 LAB
BUN SERPL-MCNC: 42 MG/DL — HIGH (ref 7–23)
CALCIUM SERPL-MCNC: 8.3 MG/DL — LOW (ref 8.4–10.5)
CHLORIDE SERPL-SCNC: 104 MMOL/L — SIGNIFICANT CHANGE UP (ref 98–107)
CK SERPL-CCNC: 37 U/L — SIGNIFICANT CHANGE UP (ref 30–200)
CO2 SERPL-SCNC: 25 MMOL/L — SIGNIFICANT CHANGE UP (ref 22–31)
CREAT SERPL-MCNC: 1.13 MG/DL — SIGNIFICANT CHANGE UP (ref 0.5–1.3)
GLUCOSE SERPL-MCNC: 134 MG/DL — HIGH (ref 70–99)
HCT VFR BLD CALC: 27.8 % — LOW (ref 39–50)
HGB BLD-MCNC: 9 G/DL — LOW (ref 13–17)
MAGNESIUM SERPL-MCNC: 1.5 MG/DL — LOW (ref 1.6–2.6)
MCHC RBC-ENTMCNC: 31 PG — SIGNIFICANT CHANGE UP (ref 27–34)
MCHC RBC-ENTMCNC: 32.4 % — SIGNIFICANT CHANGE UP (ref 32–36)
MCV RBC AUTO: 95.9 FL — SIGNIFICANT CHANGE UP (ref 80–100)
NRBC # FLD: 0 — SIGNIFICANT CHANGE UP
PLATELET # BLD AUTO: 203 K/UL — SIGNIFICANT CHANGE UP (ref 150–400)
PMV BLD: 11.1 FL — SIGNIFICANT CHANGE UP (ref 7–13)
POTASSIUM SERPL-MCNC: 3.4 MMOL/L — LOW (ref 3.5–5.3)
POTASSIUM SERPL-SCNC: 3.4 MMOL/L — LOW (ref 3.5–5.3)
RBC # BLD: 2.9 M/UL — LOW (ref 4.2–5.8)
RBC # FLD: 13.7 % — SIGNIFICANT CHANGE UP (ref 10.3–14.5)
SODIUM SERPL-SCNC: 141 MMOL/L — SIGNIFICANT CHANGE UP (ref 135–145)
TROPONIN T SERPL-MCNC: 0.15 NG/ML — HIGH (ref 0–0.06)
WBC # BLD: 7.45 K/UL — SIGNIFICANT CHANGE UP (ref 3.8–10.5)
WBC # FLD AUTO: 7.45 K/UL — SIGNIFICANT CHANGE UP (ref 3.8–10.5)

## 2017-09-28 PROCEDURE — 99233 SBSQ HOSP IP/OBS HIGH 50: CPT

## 2017-09-28 PROCEDURE — 99223 1ST HOSP IP/OBS HIGH 75: CPT

## 2017-09-28 RX ORDER — MAGNESIUM SULFATE 500 MG/ML
2 VIAL (ML) INJECTION ONCE
Qty: 0 | Refills: 0 | Status: COMPLETED | OUTPATIENT
Start: 2017-09-28 | End: 2017-09-28

## 2017-09-28 RX ORDER — POTASSIUM CHLORIDE 20 MEQ
40 PACKET (EA) ORAL ONCE
Qty: 0 | Refills: 0 | Status: COMPLETED | OUTPATIENT
Start: 2017-09-28 | End: 2017-09-28

## 2017-09-28 RX ADMIN — BRIMONIDINE TARTRATE 1 DROP(S): 2 SOLUTION/ DROPS OPHTHALMIC at 21:58

## 2017-09-28 RX ADMIN — Medication 50 MILLIGRAM(S): at 06:21

## 2017-09-28 RX ADMIN — HEPARIN SODIUM 5000 UNIT(S): 5000 INJECTION INTRAVENOUS; SUBCUTANEOUS at 18:45

## 2017-09-28 RX ADMIN — Medication 1 GRAM(S): at 18:46

## 2017-09-28 RX ADMIN — Medication 650 MILLIGRAM(S): at 22:28

## 2017-09-28 RX ADMIN — Medication 1 DROP(S): at 06:22

## 2017-09-28 RX ADMIN — PANTOPRAZOLE SODIUM 40 MILLIGRAM(S): 20 TABLET, DELAYED RELEASE ORAL at 06:21

## 2017-09-28 RX ADMIN — Medication 1 DROP(S): at 18:52

## 2017-09-28 RX ADMIN — Medication 20 MILLIGRAM(S): at 06:22

## 2017-09-28 RX ADMIN — ATORVASTATIN CALCIUM 40 MILLIGRAM(S): 80 TABLET, FILM COATED ORAL at 21:58

## 2017-09-28 RX ADMIN — Medication 40 MILLIEQUIVALENT(S): at 10:58

## 2017-09-28 RX ADMIN — Medication 650 MILLIGRAM(S): at 21:58

## 2017-09-28 RX ADMIN — Medication: at 12:41

## 2017-09-28 RX ADMIN — TAMSULOSIN HYDROCHLORIDE 0.4 MILLIGRAM(S): 0.4 CAPSULE ORAL at 21:58

## 2017-09-28 RX ADMIN — Medication 25 MILLIGRAM(S): at 06:21

## 2017-09-28 RX ADMIN — BRIMONIDINE TARTRATE 1 DROP(S): 2 SOLUTION/ DROPS OPHTHALMIC at 06:24

## 2017-09-28 RX ADMIN — Medication 1: at 17:25

## 2017-09-28 RX ADMIN — Medication 81 MILLIGRAM(S): at 18:46

## 2017-09-28 RX ADMIN — Medication 25 MILLIGRAM(S): at 18:46

## 2017-09-28 RX ADMIN — Medication 50 GRAM(S): at 10:58

## 2017-09-28 RX ADMIN — Medication 50 MILLIGRAM(S): at 18:46

## 2017-09-28 RX ADMIN — Medication 1 GRAM(S): at 06:22

## 2017-09-28 RX ADMIN — HEPARIN SODIUM 5000 UNIT(S): 5000 INJECTION INTRAVENOUS; SUBCUTANEOUS at 06:20

## 2017-09-28 RX ADMIN — BRIMONIDINE TARTRATE 1 DROP(S): 2 SOLUTION/ DROPS OPHTHALMIC at 18:52

## 2017-09-28 NOTE — PROGRESS NOTE ADULT - PROBLEM SELECTOR PLAN 1
-trend cardiac enzymes negative.  -check TTE  -appears more euvolemic on oral lasix   -monitor I/Os and daily weights  -f/u cardiology consult, dr. Lawrence -trend cardiac enzymes negative.  -check TTE  -appears more euvolemic on oral lasix   - check SO2 on Rm air.   -monitor I/Os and daily weights  -f/u cardiology consult, dr. Lawrence

## 2017-09-28 NOTE — ADVANCED PRACTICE NURSE CONSULT - RECOMMEDATIONS
Recommend nutrition consult.    Recommend height and weight.    B/L LE and UE apply sween 24 moisturizing lotion daily.    Sacrum: Cleanse with skin cleanser, pat dry. Apply TRIAD paste twice a day and PRN.    Bilateral posterior lower legs: Cleanse with NS, pat dry. Apply Liquid barrier film to periwound skin. Apply foam with border. Change daily.    Left trochanter: Cleanse with NS, pat dry. Apply Liquid barrier film to periwound skin. Apply foam with border. Change daily.    Continue low air loss bed therapy, continue heel elevation with z-flex boots, continue to turn & reposition q2h, soft pillow between bony prominences, continue moisture management with barrier paste & single breathable pad, continue measures to decrease friction/shear/pressure.     Please call wound care service line is further assistance is needed ([phone]).

## 2017-09-28 NOTE — PROGRESS NOTE ADULT - ATTENDING COMMENTS
discussed above findings and plan of care with HCP Ran Pt need to resume 24 hr home aide prior to discharge. discussed with TYSON.

## 2017-09-28 NOTE — ADVANCED PRACTICE NURSE CONSULT - ASSESSMENT
A&Ox2-3 able to state name,  and place unable to state year but able to state President, patient bedbound, as per neighbor   (at bedside) patient is primarily wheelchair bound, incontinent of urine and stool. Incontinent care provided for patient, linen changed. Skin warm, dry, poor skin turgor, scattered areas hypo- of hyperpigmentation, scattered areas of ecchymosis on bilateral upper extremities (thin/fragile skin). Blanchable erythema on bilateral heels, bilateral lateral midfeet and distal tip of bilateral first metatarsal heads. (+) callus of right plantar foot beneath 1st and 4th metatarsal heads. Scabs on bilateral anterior shins and on dorsal aspect of toes. Healed linear scar on right medial upper thigh.    Bilateral lower extremities with scattered areas of hyper and hypopigmentation. Dry, flaky skin. Thickened-yellow overgrown toenails. Multiple wounds present. No temperature changes noted. No Edema. Capillary refill >3 seconds. B/L DP/PT pulses nonpalpable, with monophasic doppler sounds. (-) pallor on elevation and dependant rubor.    Right lower posterior leg 2 open areas within 4cm of another, Stage 2 Pressure Injury, medial open ulceration measures 5pfq0vxj4.2cm,  from second injury by a 1cm intact epithelial bridge 2.5cmx0.8cmx0.2cm. Wound bases exposed pink moist dermis with fibrin film, scant fibrinous drainage. Periwound skin hypo and hyperpigmentation, no edema, no increased warmth, no erythema. Reabsorbed blood filled blister that is at 12 o'clock in periwound skin that measures [id number]. Goal of care: maintain moist environment for wound healing, autolytic removal of fibrin.    Sacrum 3rar4pom5.2cm, SDTPI slow blanchable erythema with purple discoloration,     Left medial lower leg with multiple scabs    Left hip 9wxb3jsw7.2cm.    Left posterior-lateral lower leg Stage 2 Presure Injury measures 0.2ikg3hno3.2cm. A&Ox2-3 able to state name,  and place, unable to state year but able to state President, patient bedbound, as per neighbor   (at bedside) patient is primarily wheelchair bound, incontinent of urine and stool. Incontinent care provided for patient, linen changed. Skin warm, dry, poor skin turgor, scattered areas of ecchymosis on bilateral upper extremities (thin/fragile skin). Blanchable erythema on bilateral heels, bilateral lateral midfeet and distal tip of bilateral first metatarsal heads. (+) callus of right plantar foot beneath 1st and 4th metatarsal heads. Scabs on bilateral anterior lower legs (shins) and on dorsal aspect of toes and left medial lower leg. Healed linear scar on right medial upper thigh.    Bilateral lower extremities with scattered areas of hyper and hypopigmentation. Dry, flaky skin. Thickened-yellow overgrown toenails. Multiple wounds present. No temperature changes noted. No Edema. Capillary refill >3 seconds. B/L DP/PT pulses nonpalpable, with monophasic doppler sounds. (-) pallor on elevation and dependant rubor.    Right lower posterior leg, Stage 2 Pressure Injury: 2 open areas within 4cm of another: medial open ulceration measures 4nhr3fuc2.2cm,  from second injury by a 1cm intact epithelial bridge, more lateral ulceration 2.5cmx0.8cmx0.2cm. Wound bases 100% exposed pink moist dermis with fibrin film, scant fibrinous drainage, no odor. Periwound skin hypo and hyperpigmentation, no edema, no increased warmth, no erythema. Reabsorbed blood filled blister that is at 12 o'clock in periwound skin that measures [id number]. Goal of care: maintain moist environment for wound healing, autolytic removal of fibrin.    Sacrum SDTPI measures 0uxs1okm2.2cm, irregular borders, entire area of slow blanchable erythema with purple discoloration scattered throughout erythematous area. There are 2 open areas, epidermal separation, largest measures 5aig4tjp6.2cm, with exposed pink, moist dermis. Percentage of entire pressure injury 40% exposed dermis, evolving DTPI, 20% purple discoloration and 40% erythema. Scant serous drainage, no odor. 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 Goal of care: monitor for changes in tissue type, protect from incontinence.    Left hip Stage 2 Pressure Injury, measures 4wpt8evg3.2cm, Wound base 100% exposed pink moist dermis, scant serous drainage, no odor. Periwound blanchable erythema circumferentially extending 0.5cm, no edema, no increased warmth. Goal of care: maintain moist environment for wound healing, manage exudate.    Left posterior-lateral lower leg Stage 2 Pressure Injury, measures 0.3mkc0sew0.2cm. Wound base 100% exposed pink moist dermis with fibrin film, scant fibrinous drainage, no odor. Periwound skin hypo and hyperpigmentation, no edema, no increased warmth, no erythema. Goal of care: decrease/control bioburden, autolytic removal of fibrinous tissue, manage exudate.

## 2017-09-28 NOTE — ADVANCED PRACTICE NURSE CONSULT - REASON FOR CONSULT
DM, HTN, MI, PPM, CKD, PAD s/p  RLE fem pop bypass, p/w SOB.  Patient a poor historian and attempted to HCP Nishant Hong and Saw Zaragoza but neither were available. History obtained via ED provider note:    Pt lives with 24/7 aid and has been weak lately,  neighbors feel that has not been eating well.  He fell out of bed few days ago,  had no head trauma of loss of consciousness. The patient felt short of breath earlier but currently feels a little better. Does not think he had any fevers. Patient seen on skin care rounds after wound care referral received for assessment of skin impairment and recommendations of topical management. Chart reviewed: Serum albumin 3.0g/dL, serum HgbA1C 6.5%, Serum total protein 5.6, Serum WBC 7.45, Obdulio 17, patient interviewed: lives at home with a 24 hour aid, neighbors at bedside, patient reports that he has no family. Patient H/O DM, HTN, MI, PPM, CKD, PAD s/p  RLE femoral popliteal bypass. Presents SOB. As per H&P patient lives with 24/7 aid and has been weak lately, neighbors feel that has not been eating well.  He fell out of bed few days ago,  had no head trauma of loss of consciousness.  Admitted with CHF exacerbation.

## 2017-09-29 ENCOUNTER — TRANSCRIPTION ENCOUNTER (OUTPATIENT)
Age: 82
End: 2017-09-29

## 2017-09-29 LAB
BUN SERPL-MCNC: 37 MG/DL — HIGH (ref 7–23)
CALCIUM SERPL-MCNC: 8.5 MG/DL — SIGNIFICANT CHANGE UP (ref 8.4–10.5)
CHLORIDE SERPL-SCNC: 102 MMOL/L — SIGNIFICANT CHANGE UP (ref 98–107)
CO2 SERPL-SCNC: 24 MMOL/L — SIGNIFICANT CHANGE UP (ref 22–31)
CREAT SERPL-MCNC: 0.98 MG/DL — SIGNIFICANT CHANGE UP (ref 0.5–1.3)
GLUCOSE SERPL-MCNC: 103 MG/DL — HIGH (ref 70–99)
HCT VFR BLD CALC: 28.7 % — LOW (ref 39–50)
HGB BLD-MCNC: 9.4 G/DL — LOW (ref 13–17)
MAGNESIUM SERPL-MCNC: 1.6 MG/DL — SIGNIFICANT CHANGE UP (ref 1.6–2.6)
MCHC RBC-ENTMCNC: 31.9 PG — SIGNIFICANT CHANGE UP (ref 27–34)
MCHC RBC-ENTMCNC: 32.8 % — SIGNIFICANT CHANGE UP (ref 32–36)
MCV RBC AUTO: 97.3 FL — SIGNIFICANT CHANGE UP (ref 80–100)
NRBC # FLD: 0 — SIGNIFICANT CHANGE UP
PLATELET # BLD AUTO: 196 K/UL — SIGNIFICANT CHANGE UP (ref 150–400)
PMV BLD: 11.3 FL — SIGNIFICANT CHANGE UP (ref 7–13)
POTASSIUM SERPL-MCNC: 3.2 MMOL/L — LOW (ref 3.5–5.3)
POTASSIUM SERPL-SCNC: 3.2 MMOL/L — LOW (ref 3.5–5.3)
RBC # BLD: 2.95 M/UL — LOW (ref 4.2–5.8)
RBC # FLD: 13.9 % — SIGNIFICANT CHANGE UP (ref 10.3–14.5)
SODIUM SERPL-SCNC: 139 MMOL/L — SIGNIFICANT CHANGE UP (ref 135–145)
WBC # BLD: 7.66 K/UL — SIGNIFICANT CHANGE UP (ref 3.8–10.5)
WBC # FLD AUTO: 7.66 K/UL — SIGNIFICANT CHANGE UP (ref 3.8–10.5)

## 2017-09-29 PROCEDURE — 99233 SBSQ HOSP IP/OBS HIGH 50: CPT

## 2017-09-29 PROCEDURE — 99232 SBSQ HOSP IP/OBS MODERATE 35: CPT

## 2017-09-29 RX ORDER — POTASSIUM CHLORIDE 20 MEQ
40 PACKET (EA) ORAL EVERY 4 HOURS
Qty: 0 | Refills: 0 | Status: COMPLETED | OUTPATIENT
Start: 2017-09-29 | End: 2017-09-29

## 2017-09-29 RX ADMIN — HEPARIN SODIUM 5000 UNIT(S): 5000 INJECTION INTRAVENOUS; SUBCUTANEOUS at 06:41

## 2017-09-29 RX ADMIN — BRIMONIDINE TARTRATE 1 DROP(S): 2 SOLUTION/ DROPS OPHTHALMIC at 21:34

## 2017-09-29 RX ADMIN — Medication 1 DROP(S): at 06:42

## 2017-09-29 RX ADMIN — Medication 40 MILLIEQUIVALENT(S): at 10:12

## 2017-09-29 RX ADMIN — Medication 650 MILLIGRAM(S): at 18:20

## 2017-09-29 RX ADMIN — Medication 1 GRAM(S): at 06:41

## 2017-09-29 RX ADMIN — Medication 25 MILLIGRAM(S): at 06:43

## 2017-09-29 RX ADMIN — Medication 1 GRAM(S): at 17:19

## 2017-09-29 RX ADMIN — Medication 650 MILLIGRAM(S): at 17:24

## 2017-09-29 RX ADMIN — Medication 81 MILLIGRAM(S): at 10:12

## 2017-09-29 RX ADMIN — Medication 25 MILLIGRAM(S): at 17:20

## 2017-09-29 RX ADMIN — Medication 40 MILLIEQUIVALENT(S): at 16:52

## 2017-09-29 RX ADMIN — Medication 1 DROP(S): at 17:20

## 2017-09-29 RX ADMIN — TAMSULOSIN HYDROCHLORIDE 0.4 MILLIGRAM(S): 0.4 CAPSULE ORAL at 21:34

## 2017-09-29 RX ADMIN — HEPARIN SODIUM 5000 UNIT(S): 5000 INJECTION INTRAVENOUS; SUBCUTANEOUS at 17:19

## 2017-09-29 RX ADMIN — BRIMONIDINE TARTRATE 1 DROP(S): 2 SOLUTION/ DROPS OPHTHALMIC at 06:45

## 2017-09-29 RX ADMIN — BRIMONIDINE TARTRATE 1 DROP(S): 2 SOLUTION/ DROPS OPHTHALMIC at 16:51

## 2017-09-29 RX ADMIN — ATORVASTATIN CALCIUM 40 MILLIGRAM(S): 80 TABLET, FILM COATED ORAL at 21:34

## 2017-09-29 RX ADMIN — PANTOPRAZOLE SODIUM 40 MILLIGRAM(S): 20 TABLET, DELAYED RELEASE ORAL at 06:43

## 2017-09-29 RX ADMIN — Medication 20 MILLIGRAM(S): at 06:43

## 2017-09-29 RX ADMIN — Medication 50 MILLIGRAM(S): at 17:19

## 2017-09-29 RX ADMIN — Medication 50 MILLIGRAM(S): at 06:43

## 2017-09-29 NOTE — DISCHARGE NOTE ADULT - HOSPITAL COURSE
89 year old male pmh of DM, HTN, MI, PPM, CKD, PAD s/p  RLE fem pop bypass, p/w SOB.  Patient a poor historian and attempted to HCP Nishant Hong and Saw Zaragoza but neither were available. History obtained via ED provider note  Hospital Course:   CXR Bilateral pleural effusions again seen obscuring the lung bases,  Concomitant underlying airspace consolidation including pneumonia in the proper clinical context cannot be excluded.  BNP 91513  Trop 0.18 --> 0.16 --> 0.15  Pt Was found to be in acute decompensated Heart failure s/p IV Lasix, Seen By Cardiology  9/27: LE doppler: No evidence of right lower extremity deep venous thrombosis. Previously seen soleal vein thrombosis has resolved.   patient was taken off coumadin at [facility] due to persistent bleeding episodes.   Stage 3 chronic kidney disease, His Creat was monitored   Pt appears euvolemic Now.  Pt was seen by PT, was recommended to go to REhab, However the HCP chose Home with 24 Hr Aid  Case discussed with attending, PT is stable for Discharge Home 89 year old male pmh of DM, HTN, MI, PPM, CKD, PAD s/p  RLE fem pop bypass, p/w SOB.  Patient a poor historian and attempted to HCP Nishant Hong and Saw Zaragoza but neither were available. History obtained via ED provider note  Hospital Course:   CXR Bilateral pleural effusions again seen obscuring the lung bases,  Concomitant underlying airspace consolidation including pneumonia in the proper clinical context cannot be excluded.  BNP 99097  Trop 0.18 --> 0.16 --> 0.15  Pt Was found to be in acute decompensated Heart failure s/p IV Lasix, Seen By Cardiology  9/27: LE doppler: No evidence of right lower extremity deep venous thrombosis. Previously seen soleal vein thrombosis has resolved.   patient was taken off coumadin at [facility] due to persistent bleeding episodes.   Stage 3 chronic kidney disease, His Creat was monitored   Pt appears euvolemic Now, on oral lasix  Pt was seen by PT, was recommended to go to REhab, However the HCP chose Home with 24 Hr Aid  Case discussed with attending, PT is stable for Discharge Home

## 2017-09-29 NOTE — CONSULT NOTE ADULT - SUBJECTIVE AND OBJECTIVE BOX
Cardiology/Vascular Medicine Inpatient Consultation Note    Asked to evaluate patient by Dr. Chen.    HISTORY OF PRESENT ILLNESS:  Patient is a 89 year old man history of DM2, HTN, MI, PPM, CKD, PAD s/p  RLE fem pop bypass, p/w SOB.  Patient a poor historian and attempted to HCP Nishant Hong and Saw Zaragoza but neither were available. History obtained via ED provider note:    Pt lives with 24/7 aid and has been weak lately,  neighbors feel that has not been eating well.  He fell out of bed few days ago,  had no head trauma of loss of consciousness. The patient felt short of breath earlier but currently feels a little better. Does not think he had any fevers. (25 Sep 2017 15:34)      Allergies  No Known Allergies    MEDICATIONS:  aspirin enteric coated 81 milliGRAM(s) Oral daily  tamsulosin 0.4 milliGRAM(s) Oral at bedtime  metoprolol 25 milliGRAM(s) Oral two times a day  hydrALAZINE 50 milliGRAM(s) Oral two times a day  heparin  Injectable 5000 Unit(s) SubCutaneous every 12 hours  furosemide    Tablet 20 milliGRAM(s) Oral daily    acetaminophen   Tablet. 650 milliGRAM(s) Oral every 6 hours PRN    sucralfate suspension 1 Gram(s) Oral two times a day  pantoprazole    Tablet 40 milliGRAM(s) Oral before breakfast    atorvastatin 40 milliGRAM(s) Oral at bedtime  insulin lispro (HumaLOG) corrective regimen sliding scale   SubCutaneous three times a day before meals  insulin lispro (HumaLOG) corrective regimen sliding scale   SubCutaneous at bedtime  dextrose Gel 1 Dose(s) Oral once PRN  dextrose 50% Injectable 12.5 Gram(s) IV Push once  dextrose 50% Injectable 25 Gram(s) IV Push once  dextrose 50% Injectable 25 Gram(s) IV Push once  glucagon  Injectable 1 milliGRAM(s) IntraMuscular once PRN    brimonidine 0.2% Ophthalmic Solution 1 Drop(s) Both EYES three times a day  timolol 0.5% Solution 1 Drop(s) Both EYES two times a day  dextrose 5%. 1000 milliLiter(s) IV Continuous <Continuous>  influenza   Vaccine 0.5 milliLiter(s) IntraMuscular once  potassium chloride    Tablet ER 40 milliEquivalent(s) Oral every 4 hours      PAST MEDICAL & SURGICAL HISTORY:  Peripheral arterial disease  Pacemaker  Chronic kidney disease, unspecified stage  Duodenal ulcer  DM (diabetes mellitus)  HTN (hypertension)  S/P femoral-popliteal bypass surgery: Right Lower Extremity, 3 weeks ago  S/P appendectomy: 50 yrs ago      FAMILY HISTORY:  No pertinent family history in first degree relatives        REVIEW OF SYSTEMS:  CONSTITUTIONAL: No fever, weight loss, or fatigue  EYES: No eye pain, visual disturbances, or discharge  ENMT:  No difficulty hearing, tinnitus, vertigo; No sinus or throat pain  NECK: No pain or stiffness  RESPIRATORY: As above  CARDIOVASCULAR: As above  GASTROINTESTINAL: No abdominal or epigastric pain. No nausea, vomiting, or hematemesis; No diarrhea or constipation. No melena or hematochezia.  GENITOURINARY: No dysuria, frequency, hematuria, or incontinence  NEUROLOGICAL: No headaches, memory loss, loss of strength, numbness, or tremors  SKIN: No itching, burning, rashes, or lesions   LYMPH Nodes: No enlarged glands  ENDOCRINE: No heat or cold intolerance; No hair loss  MUSCULOSKELETAL: No joint pain or swelling; No muscle, back, or extremity pain  PSYCHIATRIC: No depression, anxiety, mood swings, or difficulty sleeping  HEME/LYMPH: No easy bruising, or bleeding gums  ALLERY AND IMMUNOLOGIC: No hives or eczema	      PHYSICAL EXAM:  T(C): 36.2 (09-29-17 @ 06:34), Max: 36.9 (09-28-17 @ 21:46)  HR: 56 (09-29-17 @ 06:34) (56 - 60)  BP: 120/60 (09-29-17 @ 06:34) (118/63 - 122/53)  RR: 20 (09-29-17 @ 06:34) (17 - 20)  SpO2: 98% (09-29-17 @ 06:34) (88% - 100%)  Wt(kg): --  I&O's Summary      Appearance: Normal	  HEENT:   Normal oral mucosa, PERRL, EOMI	  Lymphatic: No lymphadenopathy  Cardiovascular: Normal S1 S2, No JVD, No murmurs, No edema  Respiratory: Lungs clear to auscultation	  Psychiatry: A & O x 3, Mood & affect appropriate  Gastrointestinal:  Soft, Non-tender, + BS	  Skin: No rashes, No ecchymoses, No cyanosis	  Neurologic: Non-focal  Extremities: Normal range of motion, No clubbing, cyanosis or edema  Vascular: Peripheral pulses palpable 2+ bilaterally      LABS:	 	                          9.4    7.66  )-----------( 196      ( 29 Sep 2017 07:40 )             28.7     09-29    139  |  102  |  37<H>  ----------------------------<  103<H>  3.2<L>   |  24  |  0.98  09-28    141  |  104  |  42<H>  ----------------------------<  134<H>  3.4<L>   |  25  |  1.13    Ca    8.5      29 Sep 2017 07:40  Ca    8.3<L>      28 Sep 2017 06:30  Mg     1.6     09-29  Mg     1.5     09-28      < from: Transthoracic Echocardiogram (06.22.17 @ 11:21) >    Patient name: PHILOMENA ANGULO  YOB: 1921   Age: 89 (M)   MR#: [id number]  Study Date: 6/22/2017  Location: [id number]: Alanna King PRACHI  Study quality: Technically good  Referring Physician: Miguelito Aguilar MD  Blood Pressure: 140/54 mmHg  Height: 175 cm  Weight: 64 kg  BSA: 1.8 m2  ------------------------------------------------------------------------  PROCEDURE: Transthoracic echocardiogram with 2-D, M-Mode  and complete spectral and color flow Doppler.  INDICATION: Abnormal electrocardiogram (ECG) (EKG)  (R94.31), Cardiac murmur, unspecified (R01.1)  ------------------------------------------------------------------------  DIMENSIONS:  Dimensions:     Normal Values:  LA:     4.1 cm    2.0 - 4.0 cm  Ao:    3.6 cm    2.0 - 3.8 cm  SEPTUM: 1.0 cm    0.6 - 1.2 cm  PWT:    1.0 cm    0.6 - 1.1 cm  LVIDd:  4.9 cm    3.0 - 5.6 cm  LVIDs:  2.7 cm    1.8 - 4.0 cm  Derived Variables:  LVMI: 99 g/m2  RWT: 0.40  Fractional short: 45 %  Ejection Fraction (Teicholtz): 65 %  ------------------------------------------------------------------------  OBSERVATIONS:  Mitral Valve: Mitral annular calcification and calcified  mitral leaflets with normal diastolic opening. Minimal  mitral regurgitation.  Aortic Root:Normal aortic root.  Aortic Valve: Calcified trileaflet aortic valve with normal  opening. Mild aortic regurgitation.  Left Atrium: Dilated left atrium.  Left Ventricle: Endocardium not well visualized; grossly  normal left ventricular systolic function. Normal left  ventricular internal dimensions and wall thicknesses.  Right Heart: Normal right atrium. Normal right ventricular  size and function. A device wire is noted in the right  heart. Normal tricuspid valve.  Mild tricuspid  regurgitation.Normal pulmonic valve. Minimal pulmonic  regurgitation.  Pericardium/PleuraNormal pericardium with no pericardial  effusion.  Hemodynamic: Estimated right ventricular systolic pressure  equals 47 mm Hg, assuming right atrial pressure equals 10  mm Hg, consistent with mild pulmonary hypertension.  ------------------------------------------------------------------------  CONCLUSIONS:  1. Mitral annular calcification and calcified mitral  leaflets with normal diastolic opening. Minimal mitral  regurgitation.  2. Calcified trileaflet aortic valve with normal opening.  Mild aortic regurgitation.  3. Dilated left atrium.  4. Normal left ventricular internal dimensions and wall  thicknesses.  5. Normal right ventricular size and function. A device  wire is noted in the right heart.  6. Estimated pulmonary artery systolic pressure equals 47  mm Hg, assuming right atrial pressure equals 10  mm Hg,  consistent with mild pulmonary hypertension.  *** No previous Echo exam.  ------------------------------------------------------------------------  Confirmed on  6/22/2017 - 14:11:44 by Olu Lawrence MD, [facility],  [facility], [place]  ------------------------------------------------------------------------    < end of copied text > Cardiology/Vascular Medicine Inpatient Consultation Note    Asked to evaluate patient by Dr. Chen.    HISTORY OF PRESENT ILLNESS:  Patient is a 89 year old man history of DM2, HTN, MI, PPM, CKD, PAD s/p  RLE fem pop bypass, p/w SOB.  Patient a poor historian and attempted to HCP Nishant Hong and Saw Zaragoza but neither were available. History obtained via ED provider note:    Pt lives with 24/7 aid and has been weak lately,  neighbors feel that has not been eating well.  He fell out of bed few days ago,  had no head trauma of loss of consciousness. The patient felt short of breath earlier but currently feels a little better. Does not think he had any fevers. (25 Sep 2017 15:34)    At this time, appears clinically stable and euvolemic on physical examination.  Has no active cardiac complaints.  He is laying flat in bed, and denies having chest pain, dyspnea, or orthopnea.  Physical examination was unremarkable for symptoms of active congestive heart failure or ischemia.    Allergies  No Known Allergies    MEDICATIONS:  aspirin enteric coated 81 milliGRAM(s) Oral daily  tamsulosin 0.4 milliGRAM(s) Oral at bedtime  metoprolol 25 milliGRAM(s) Oral two times a day  hydrALAZINE 50 milliGRAM(s) Oral two times a day  heparin  Injectable 5000 Unit(s) SubCutaneous every 12 hours  furosemide    Tablet 20 milliGRAM(s) Oral daily    acetaminophen   Tablet. 650 milliGRAM(s) Oral every 6 hours PRN    sucralfate suspension 1 Gram(s) Oral two times a day  pantoprazole    Tablet 40 milliGRAM(s) Oral before breakfast    atorvastatin 40 milliGRAM(s) Oral at bedtime  insulin lispro (HumaLOG) corrective regimen sliding scale   SubCutaneous three times a day before meals  insulin lispro (HumaLOG) corrective regimen sliding scale   SubCutaneous at bedtime  dextrose Gel 1 Dose(s) Oral once PRN  dextrose 50% Injectable 12.5 Gram(s) IV Push once  dextrose 50% Injectable 25 Gram(s) IV Push once  dextrose 50% Injectable 25 Gram(s) IV Push once  glucagon  Injectable 1 milliGRAM(s) IntraMuscular once PRN    brimonidine 0.2% Ophthalmic Solution 1 Drop(s) Both EYES three times a day  timolol 0.5% Solution 1 Drop(s) Both EYES two times a day  dextrose 5%. 1000 milliLiter(s) IV Continuous <Continuous>  influenza   Vaccine 0.5 milliLiter(s) IntraMuscular once  potassium chloride    Tablet ER 40 milliEquivalent(s) Oral every 4 hours      PAST MEDICAL & SURGICAL HISTORY:  Peripheral arterial disease  Pacemaker  Chronic kidney disease, unspecified stage  Duodenal ulcer  DM (diabetes mellitus)  HTN (hypertension)  S/P femoral-popliteal bypass surgery: Right Lower Extremity, 3 weeks ago  S/P appendectomy: 50 yrs ago      FAMILY HISTORY:  No pertinent family history in first degree relatives        REVIEW OF SYSTEMS:  CONSTITUTIONAL: No fever, weight loss, or fatigue  EYES: No eye pain, visual disturbances, or discharge  ENMT:  No difficulty hearing, tinnitus, vertigo; No sinus or throat pain  NECK: No pain or stiffness  RESPIRATORY: As above  CARDIOVASCULAR: As above  GASTROINTESTINAL: No abdominal or epigastric pain. No nausea, vomiting, or hematemesis; No diarrhea or constipation. No melena or hematochezia.  GENITOURINARY: No dysuria, frequency, hematuria, or incontinence  NEUROLOGICAL: No headaches, memory loss, loss of strength, numbness, or tremors  SKIN: No itching, burning, rashes, or lesions   LYMPH Nodes: No enlarged glands  ENDOCRINE: No heat or cold intolerance; No hair loss  MUSCULOSKELETAL: No joint pain or swelling; No muscle, back, or extremity pain  PSYCHIATRIC: No depression, anxiety, mood swings, or difficulty sleeping  HEME/LYMPH: No easy bruising, or bleeding gums  ALLERY AND IMMUNOLOGIC: No hives or eczema	      PHYSICAL EXAM:  T(C): 36.2 (09-29-17 @ 06:34), Max: 36.9 (09-28-17 @ 21:46)  HR: 56 (09-29-17 @ 06:34) (56 - 60)  BP: 120/60 (09-29-17 @ 06:34) (118/63 - 122/53)  RR: 20 (09-29-17 @ 06:34) (17 - 20)  SpO2: 98% (09-29-17 @ 06:34) (88% - 100%)  Wt(kg): --  I&O's Summary      Appearance: NAD, laying flat in bed  HEENT:   Normal oral mucosa, PERRL, EOMI	  Lymphatic: No lymphadenopathy  Cardiovascular: Normal S1 S2, No JVD, No murmurs, No edema  Respiratory: Decreased BS b/l  Psychiatry: Awake  Gastrointestinal:  Soft, Non-tender, + BS	  Skin: No rashes, No ecchymoses, No cyanosis	  Neurologic: Non-focal  Extremities: Normal range of motion, No clubbing, cyanosis or edema  Vascular: Peripheral pulses palpable 2+ bilaterally      LABS:	 	                          9.4    7.66  )-----------( 196      ( 29 Sep 2017 07:40 )             28.7     09-29    139  |  102  |  37<H>  ----------------------------<  103<H>  3.2<L>   |  24  |  0.98  09-28    141  |  104  |  42<H>  ----------------------------<  134<H>  3.4<L>   |  25  |  1.13    Ca    8.5      29 Sep 2017 07:40  Ca    8.3<L>      28 Sep 2017 06:30  Mg     1.6     09-29  Mg     1.5     09-28      < from: Transthoracic Echocardiogram (06.22.17 @ 11:21) >    Patient name: PHILOMENA ANGULO  YOB: 1921   Age: 89 (M)   MR#: [id number]  Study Date: 6/22/2017  Location: [id number]: Alanna King PRACHI  Study quality: Technically good  Referring Physician: Miguelito Aguilar MD  Blood Pressure: 140/54 mmHg  Height: 175 cm  Weight: 64 kg  BSA: 1.8 m2  ------------------------------------------------------------------------  PROCEDURE: Transthoracic echocardiogram with 2-D, M-Mode  and complete spectral and color flow Doppler.  INDICATION: Abnormal electrocardiogram (ECG) (EKG)  (R94.31), Cardiac murmur, unspecified (R01.1)  ------------------------------------------------------------------------  DIMENSIONS:  Dimensions:     Normal Values:  LA:     4.1 cm    2.0 - 4.0 cm  Ao:    3.6 cm    2.0 - 3.8 cm  SEPTUM: 1.0 cm    0.6 - 1.2 cm  PWT:    1.0 cm    0.6 - 1.1 cm  LVIDd:  4.9 cm    3.0 - 5.6 cm  LVIDs:  2.7 cm    1.8 - 4.0 cm  Derived Variables:  LVMI: 99 g/m2  RWT: 0.40  Fractional short: 45 %  Ejection Fraction (Teicholtz): 65 %  ------------------------------------------------------------------------  OBSERVATIONS:  Mitral Valve: Mitral annular calcification and calcified  mitral leaflets with normal diastolic opening. Minimal  mitral regurgitation.  Aortic Root:Normal aortic root.  Aortic Valve: Calcified trileaflet aortic valve with normal  opening. Mild aortic regurgitation.  Left Atrium: Dilated left atrium.  Left Ventricle: Endocardium not well visualized; grossly  normal left ventricular systolic function. Normal left  ventricular internal dimensions and wall thicknesses.  Right Heart: Normal right atrium. Normal right ventricular  size and function. A device wire is noted in the right  heart. Normal tricuspid valve.  Mild tricuspid  regurgitation.Normal pulmonic valve. Minimal pulmonic  regurgitation.  Pericardium/PleuraNormal pericardium with no pericardial  effusion.  Hemodynamic: Estimated right ventricular systolic pressure  equals 47 mm Hg, assuming right atrial pressure equals 10  mm Hg, consistent with mild pulmonary hypertension.  ------------------------------------------------------------------------  CONCLUSIONS:  1. Mitral annular calcification and calcified mitral  leaflets with normal diastolic opening. Minimal mitral  regurgitation.  2. Calcified trileaflet aortic valve with normal opening.  Mild aortic regurgitation.  3. Dilated left atrium.  4. Normal left ventricular internal dimensions and wall  thicknesses.  5. Normal right ventricular size and function. A device  wire is noted in the right heart.  6. Estimated pulmonary artery systolic pressure equals 47  mm Hg, assuming right atrial pressure equals 10  mm Hg,  consistent with mild pulmonary hypertension.  *** No previous Echo exam.  ------------------------------------------------------------------------  Confirmed on  6/22/2017 - 14:11:44 by Olu Lawrence MD, [facility],  [place], [place]  ------------------------------------------------------------------------    < end of copied text >

## 2017-09-29 NOTE — DISCHARGE NOTE ADULT - SECONDARY DIAGNOSIS.
Chronic deep vein thrombosis (DVT) of lower extremity, unspecified laterality, unspecified vein DM (diabetes mellitus) HTN (hypertension) Stage 3 chronic kidney disease

## 2017-09-29 NOTE — DISCHARGE NOTE ADULT - COMMUNITY RESOURCES
[facility] [phone] for 12noon Saturday 9/30. 24hr private aide will meet patient @ hospital bedside tomorrow 9/30 @ 11am and will accompany patient home. [facility] [phone] for 12noon Saturday 9/30; trip 133A. 24hr private aide will meet patient @ hospital bedside tomorrow 9/30 @ 11am and will accompany patient home.

## 2017-09-29 NOTE — PROVIDER CONTACT NOTE (OTHER) - ASSESSMENT
On assessment pt standing up with walker with PT at bedside. as per PT patient took "side steps". Pt also had an incontinent episode during PT routine. Pt denies palpitations, denies SOB or chest pain. Pt's HR @60 after pt repositioned back to bed.

## 2017-09-29 NOTE — DISCHARGE NOTE ADULT - PATIENT PORTAL LINK FT
“You can access the [facility] Patient Portal, offered by [facility], by registering with the following website: [email or web address]”

## 2017-09-29 NOTE — DISCHARGE NOTE ADULT - HOME CARE AGENCY
[facility] [phone] for RN PT SW; RN to call to schedule visit day after discharge.

## 2017-09-29 NOTE — DISCHARGE NOTE ADULT - CARE PLAN
Principal Discharge DX:	Acute congestive heart failure, unspecified congestive heart failure type  Goal:	To Remain Euvolemic, Compliance with Meds  Instructions for follow-up, activity and diet:	Follow up with Your Cardiologist or PMD in 1-2 weeks  Secondary Diagnosis:	Chronic deep vein thrombosis (DVT) of lower extremity, unspecified laterality, unspecified vein  Instructions for follow-up, activity and diet:	Your Coumadin was discontinued  Secondary Diagnosis:	DM (diabetes mellitus)  Goal:	Low Sugar diet, Compliance with Meds  Secondary Diagnosis:	HTN (hypertension)  Goal:	Low Salt,  and compliance with Meds  Secondary Diagnosis:	Stage 3 chronic kidney disease Principal Discharge DX:	Acute congestive heart failure, unspecified congestive heart failure type  Goal:	To Remain Euvolemic, Compliance with Meds  Instructions for follow-up, activity and diet:	Follow up with Your Cardiologist or PMD in 1-2 weeks, repeat BMP following up electrolytes on lasix  Secondary Diagnosis:	Chronic deep vein thrombosis (DVT) of lower extremity, unspecified laterality, unspecified vein  Instructions for follow-up, activity and diet:	Your Coumadin was discontinued due to risk of bleeding  Secondary Diagnosis:	DM (diabetes mellitus)  Goal:	Low Sugar diet, Compliance with Meds  Instructions for follow-up, activity and diet:	monitor Finger stick  Secondary Diagnosis:	HTN (hypertension)  Goal:	Low Salt,  and compliance with Meds  Instructions for follow-up, activity and diet:	monitor BP  Secondary Diagnosis:	Stage 3 chronic kidney disease  Goal:	stable renal function  Instructions for follow-up, activity and diet:	Your renal function has been stable on lasix, follow up with PMD to repeat BMP out patient

## 2017-09-29 NOTE — DISCHARGE NOTE ADULT - MEDICATION SUMMARY - MEDICATIONS TO TAKE
I will START or STAY ON the medications listed below when I get home from the hospital:    Aspir 81 oral delayed release tablet  -- 1 tab(s) by mouth once a day  -- Indication: For Pad    Tylenol 325 mg oral tablet  -- 2 tab(s) by mouth every 6 hours  -- Indication: For Pain mangement    Flomax 0.4 mg oral capsule  -- 1 cap(s) by mouth once a day  -- Indication: For bph    metFORMIN 500 mg oral tablet  -- 1 tab(s) by mouth once a day  -- Indication: For Diabetes    atorvastatin 80 mg oral tablet  -- 1 tab(s) by mouth once a day  -- Indication: For Cholesterol    Metoprolol Tartrate 25 mg oral tablet  -- 1 tab(s) by mouth 2 times a day  -- Indication: For HTN (hypertension)    albuterol 2.5 mg/3 mL (0.083%) inhalation solution  -- 3 milliliter(s) inhaled every 6 hours  -- Indication: For Asthma    furosemide 20 mg oral tablet  -- 1 tab(s) by mouth once a day  -- Indication: For ChF    Carafate 1 g/10 mL oral suspension  -- 10 milliliter(s) by mouth 2 times a day  -- Indication: For Duodenal Ulcer    brimonidine 0.2% ophthalmic solution  -- 1 drop(s) to each affected eye 3 times a day  -- Indication: For Glaucoma    Timoptic Ocudose 0.5% ophthalmic solution  -- 1 drop(s) to each affected eye 2 times a day  -- Indication: For Glaucoma    PriLOSEC 20 mg oral delayed release capsule  -- 2 cap(s) by mouth once a day  -- Indication: For GERD    hydrALAZINE 50 mg oral tablet  -- 1 tab(s) by mouth 2 times a day  -- Indication: For HTN (hypertension) I will START or STAY ON the medications listed below when I get home from the hospital:    Aspir 81 oral delayed release tablet  -- 1 tab(s) by mouth once a day  -- Indication: For Pad    Tylenol 325 mg oral tablet  -- 2 tab(s) by mouth every 6 hours  -- Indication: For Pain management    Flomax 0.4 mg oral capsule  -- 1 cap(s) by mouth once a day  -- Indication: For bph    metFORMIN 500 mg oral tablet  -- 1 tab(s) by mouth once a day  -- Indication: For Diabetes    atorvastatin 80 mg oral tablet  -- 1 tab(s) by mouth once a day  -- Indication: For Cholesterol    Metoprolol Tartrate 25 mg oral tablet  -- 1 tab(s) by mouth 2 times a day  -- Indication: For HTN (hypertension)    albuterol 2.5 mg/3 mL (0.083%) inhalation solution  -- 3 milliliter(s) inhaled every 6 hours  -- Indication: For Asthma    furosemide 20 mg oral tablet  -- 1 tab(s) by mouth once a day  -- Indication: For ChF    Carafate 1 g/10 mL oral suspension  -- 10 milliliter(s) by mouth 2 times a day  -- Indication: For Duodenal Ulcer    brimonidine 0.2% ophthalmic solution  -- 1 drop(s) to each affected eye 3 times a day  -- Indication: For Glaucoma    Timoptic Ocudose 0.5% ophthalmic solution  -- 1 drop(s) to each affected eye 2 times a day  -- Indication: For Glaucoma    PriLOSEC 20 mg oral delayed release capsule  -- 2 cap(s) by mouth once a day  -- Indication: For GERD    hydrALAZINE 50 mg oral tablet  -- 1 tab(s) by mouth 2 times a day  -- Indication: For HTN (hypertension)

## 2017-09-29 NOTE — DISCHARGE NOTE ADULT - CARE PROVIDER_API CALL
Olu Lawrence (MD; PhD), Cardiology; Internal Medicine; Vascular Medicine  [address] [place] [address] [address] [place] [id number]  Phone: [phone]  Fax: [phone]

## 2017-09-29 NOTE — DISCHARGE NOTE ADULT - MEDICATION SUMMARY - MEDICATIONS TO STOP TAKING
I will STOP taking the medications listed below when I get home from the hospital:    OxyCONTIN 10 mg oral tablet, extended release  -- 1 tab(s) by mouth once a day

## 2017-09-29 NOTE — DISCHARGE NOTE ADULT - PLAN OF CARE
To Remain Euvolemic, Compliance with Meds Follow up with Your Cardiologist or PMD in 1-2 weeks Your Coumadin was discontinued Low Sugar diet, Compliance with Meds Low Salt,  and compliance with Meds Follow up with Your Cardiologist or PMD in 1-2 weeks, repeat BMP following up electrolytes on lasix Your Coumadin was discontinued due to risk of bleeding monitor Finger stick monitor BP stable renal function Your renal function has been stable on lasix, follow up with PMD to repeat BMP out patient

## 2017-09-29 NOTE — PROGRESS NOTE ADULT - PROBLEM SELECTOR PLAN 1
-trend positive cardiac enzymes down.  -check TTE  -appears more euvolemic on oral lasix   - check SO2 on Rm air 95%   -monitor I/Os and daily weights  -f/u cardiology consult, dr. Lawrence

## 2017-09-30 VITALS
DIASTOLIC BLOOD PRESSURE: 51 MMHG | RESPIRATION RATE: 18 BRPM | TEMPERATURE: 97 F | SYSTOLIC BLOOD PRESSURE: 122 MMHG | OXYGEN SATURATION: 96 % | HEART RATE: 62 BPM

## 2017-09-30 DIAGNOSIS — I50.32 CHRONIC DIASTOLIC (CONGESTIVE) HEART FAILURE: ICD-10-CM

## 2017-09-30 DIAGNOSIS — I10 ESSENTIAL (PRIMARY) HYPERTENSION: ICD-10-CM

## 2017-09-30 DIAGNOSIS — I82.509 CHRONIC EMBOLISM AND THROMBOSIS OF UNSPECIFIED DEEP VEINS OF UNSPECIFIED LOWER EXTREMITY: ICD-10-CM

## 2017-09-30 LAB
BUN SERPL-MCNC: 37 MG/DL — HIGH (ref 7–23)
CALCIUM SERPL-MCNC: 8.7 MG/DL — SIGNIFICANT CHANGE UP (ref 8.4–10.5)
CHLORIDE SERPL-SCNC: 103 MMOL/L — SIGNIFICANT CHANGE UP (ref 98–107)
CO2 SERPL-SCNC: 21 MMOL/L — LOW (ref 22–31)
CREAT SERPL-MCNC: 1.1 MG/DL — SIGNIFICANT CHANGE UP (ref 0.5–1.3)
GLUCOSE SERPL-MCNC: 123 MG/DL — HIGH (ref 70–99)
HCT VFR BLD CALC: 30.8 % — LOW (ref 39–50)
HGB BLD-MCNC: 10.2 G/DL — LOW (ref 13–17)
MAGNESIUM SERPL-MCNC: 1.6 MG/DL — SIGNIFICANT CHANGE UP (ref 1.6–2.6)
MCHC RBC-ENTMCNC: 32 PG — SIGNIFICANT CHANGE UP (ref 27–34)
MCHC RBC-ENTMCNC: 33.1 % — SIGNIFICANT CHANGE UP (ref 32–36)
MCV RBC AUTO: 96.6 FL — SIGNIFICANT CHANGE UP (ref 80–100)
NRBC # FLD: 0 — SIGNIFICANT CHANGE UP
PLATELET # BLD AUTO: 244 K/UL — SIGNIFICANT CHANGE UP (ref 150–400)
PMV BLD: 11.2 FL — SIGNIFICANT CHANGE UP (ref 7–13)
POTASSIUM SERPL-MCNC: 4.2 MMOL/L — SIGNIFICANT CHANGE UP (ref 3.5–5.3)
POTASSIUM SERPL-SCNC: 4.2 MMOL/L — SIGNIFICANT CHANGE UP (ref 3.5–5.3)
RBC # BLD: 3.19 M/UL — LOW (ref 4.2–5.8)
RBC # FLD: 13.8 % — SIGNIFICANT CHANGE UP (ref 10.3–14.5)
SODIUM SERPL-SCNC: 140 MMOL/L — SIGNIFICANT CHANGE UP (ref 135–145)
WBC # BLD: 10.65 K/UL — HIGH (ref 3.8–10.5)
WBC # FLD AUTO: 10.65 K/UL — HIGH (ref 3.8–10.5)

## 2017-09-30 PROCEDURE — 99239 HOSP IP/OBS DSCHRG MGMT >30: CPT

## 2017-09-30 PROCEDURE — 99232 SBSQ HOSP IP/OBS MODERATE 35: CPT

## 2017-09-30 RX ORDER — OXYCODONE HYDROCHLORIDE 5 MG/1
1 TABLET ORAL
Qty: 0 | Refills: 0 | COMMUNITY

## 2017-09-30 RX ORDER — FUROSEMIDE 40 MG
1 TABLET ORAL
Qty: 30 | Refills: 0 | OUTPATIENT
Start: 2017-09-30 | End: 2017-10-30

## 2017-09-30 RX ADMIN — Medication 650 MILLIGRAM(S): at 10:00

## 2017-09-30 RX ADMIN — Medication 650 MILLIGRAM(S): at 08:56

## 2017-09-30 RX ADMIN — HEPARIN SODIUM 5000 UNIT(S): 5000 INJECTION INTRAVENOUS; SUBCUTANEOUS at 05:11

## 2017-09-30 RX ADMIN — Medication 1 DROP(S): at 05:15

## 2017-09-30 RX ADMIN — Medication 20 MILLIGRAM(S): at 05:11

## 2017-09-30 RX ADMIN — BRIMONIDINE TARTRATE 1 DROP(S): 2 SOLUTION/ DROPS OPHTHALMIC at 05:15

## 2017-09-30 RX ADMIN — Medication 25 MILLIGRAM(S): at 05:11

## 2017-09-30 RX ADMIN — PANTOPRAZOLE SODIUM 40 MILLIGRAM(S): 20 TABLET, DELAYED RELEASE ORAL at 05:11

## 2017-09-30 RX ADMIN — Medication 1 GRAM(S): at 05:11

## 2017-09-30 RX ADMIN — Medication 81 MILLIGRAM(S): at 11:53

## 2017-09-30 RX ADMIN — Medication 50 MILLIGRAM(S): at 05:11

## 2017-09-30 NOTE — PROGRESS NOTE ADULT - PROBLEM SELECTOR PROBLEM 4
Stage 3 chronic kidney disease
Stage 3 chronic kidney disease
Prophylactic measure
Stage 3 chronic kidney disease

## 2017-09-30 NOTE — PROGRESS NOTE ADULT - PROBLEM SELECTOR PLAN 5
-hold metformin  -monitoring FSG  -SSI

## 2017-09-30 NOTE — PROGRESS NOTE ADULT - PROBLEM SELECTOR PROBLEM 3
DVT, lower extremity
Essential hypertension
DVT, lower extremity

## 2017-09-30 NOTE — PROGRESS NOTE ADULT - SUBJECTIVE AND OBJECTIVE BOX
Patient is a 89y old  Male who presents with a chief complaint of Shortness of breath (29 Sep 2017 15:54)      SUBJECTIVE / OVERNIGHT EVENTS: no acute event overnight, Pt denied SOB, no CP.     MEDICATIONS  (STANDING):  aspirin enteric coated 81 milliGRAM(s) Oral daily  tamsulosin 0.4 milliGRAM(s) Oral at bedtime  atorvastatin 40 milliGRAM(s) Oral at bedtime  metoprolol 25 milliGRAM(s) Oral two times a day  sucralfate suspension 1 Gram(s) Oral two times a day  brimonidine 0.2% Ophthalmic Solution 1 Drop(s) Both EYES three times a day  timolol 0.5% Solution 1 Drop(s) Both EYES two times a day  pantoprazole    Tablet 40 milliGRAM(s) Oral before breakfast  hydrALAZINE 50 milliGRAM(s) Oral two times a day  insulin lispro (HumaLOG) corrective regimen sliding scale   SubCutaneous three times a day before meals  insulin lispro (HumaLOG) corrective regimen sliding scale   SubCutaneous at bedtime  dextrose 5%. 1000 milliLiter(s) (50 mL/Hr) IV Continuous <Continuous>  dextrose 50% Injectable 12.5 Gram(s) IV Push once  dextrose 50% Injectable 25 Gram(s) IV Push once  dextrose 50% Injectable 25 Gram(s) IV Push once  heparin  Injectable 5000 Unit(s) SubCutaneous every 12 hours  furosemide    Tablet 20 milliGRAM(s) Oral daily    MEDICATIONS  (PRN):  dextrose Gel 1 Dose(s) Oral once PRN Blood Glucose LESS THAN 70 milliGRAM(s)/deciliter  glucagon  Injectable 1 milliGRAM(s) IntraMuscular once PRN Glucose LESS THAN 70 milligrams/deciliter  acetaminophen   Tablet. 650 milliGRAM(s) Oral every 6 hours PRN Mild Pain (1 - 3)      Vital Signs Last 24 Hrs  T(C): 36.3 (30 Sep 2017 11:52), Max: 36.6 (29 Sep 2017 21:32)  T(F): 97.4 (30 Sep 2017 11:52), Max: 97.8 (29 Sep 2017 21:32)  HR: 62 (30 Sep 2017 11:52) (60 - 66)  BP: 122/51 (30 Sep 2017 11:52) (117/57 - 130/66)  BP(mean): --  RR: 18 (30 Sep 2017 11:52) (18 - 20)  SpO2: 96% (30 Sep 2017 11:52) (95% - 96%)  CAPILLARY BLOOD GLUCOSE  123 (30 Sep 2017 08:31)  109 (29 Sep 2017 21:53)  116 (29 Sep 2017 16:45)        I&O's Summary    29 Sep 2017 07:01  -  30 Sep 2017 07:00  --------------------------------------------------------  IN: 300 mL / OUT: 0 mL / NET: 300 mL        PHYSICAL EXAM:  GENERAL: NAD, well-developed  HEAD:  Atraumatic, Normocephalic  EYES: EOMI, PERRLA, conjunctiva and sclera clear  NECK: Supple, No JVD  CHEST/LUNG: Clear to auscultation bilaterally; No wheeze  HEART: Regular rate and rhythm; No murmurs, rubs, or gallops  ABDOMEN: Soft, Nontender, Nondistended; Bowel sounds present  EXTREMITIES:  2+ Peripheral Pulses, No clubbing, cyanosis, or edema, chronic venous statis change.  PSYCH: AAOx3  NEUROLOGY: non-focal  SKIN: No rashes or lesions    LABS:                        10.2   10.65 )-----------( 244      ( 30 Sep 2017 07:30 )             30.8     09-30    140  |  103  |  37<H>  ----------------------------<  123<H>  4.2   |  21<L>  |  1.10    Ca    8.7      30 Sep 2017 07:30  Mg     1.6     09-30                RADIOLOGY & ADDITIONAL TESTS:    Imaging Personally Reviewed:    Consultant(s) Notes Reviewed:      Care Discussed with Consultants/Other Providers:
Cardiology/Vascular Medicine Inpatient Consultation Note    No new events/complaints.    Vital Signs Last 24 Hrs  T(C): 36.5 (30 Sep 2017 05:07), Max: 36.6 (29 Sep 2017 21:32)  T(F): 97.7 (30 Sep 2017 05:07), Max: 97.8 (29 Sep 2017 21:32)  HR: 60 (30 Sep 2017 05:07) (60 - 66)  BP: 121/56 (30 Sep 2017 05:07) (117/57 - 130/66)  BP(mean): --  RR: 20 (30 Sep 2017 05:07) (18 - 20)  SpO2: 96% (30 Sep 2017 05:07) (95% - 96%)    Appearance: NAD, laying flat in bed comfortably  HEENT:   Normal oral mucosa, PERRL, EOMI	  Lymphatic: No lymphadenopathy  Cardiovascular: Normal S1 S2, No JVD, No murmurs, No edema  Respiratory: Lungs clear to auscultation	  Psychiatry: A & O x 3, Mood & affect appropriate  Gastrointestinal:  Soft, Non-tender, + BS	  Skin: No rashes, No ecchymoses, No cyanosis	  Neurologic: Non-focal  Extremities: Normal range of motion, No clubbing, cyanosis or edema  Vascular: Peripheral pulses palpable 2+ bilaterally    MEDICATIONS  (STANDING):  aspirin enteric coated 81 milliGRAM(s) Oral daily  tamsulosin 0.4 milliGRAM(s) Oral at bedtime  atorvastatin 40 milliGRAM(s) Oral at bedtime  metoprolol 25 milliGRAM(s) Oral two times a day  sucralfate suspension 1 Gram(s) Oral two times a day  brimonidine 0.2% Ophthalmic Solution 1 Drop(s) Both EYES three times a day  timolol 0.5% Solution 1 Drop(s) Both EYES two times a day  pantoprazole    Tablet 40 milliGRAM(s) Oral before breakfast  hydrALAZINE 50 milliGRAM(s) Oral two times a day  insulin lispro (HumaLOG) corrective regimen sliding scale   SubCutaneous three times a day before meals  insulin lispro (HumaLOG) corrective regimen sliding scale   SubCutaneous at bedtime  dextrose 5%. 1000 milliLiter(s) (50 mL/Hr) IV Continuous <Continuous>  dextrose 50% Injectable 12.5 Gram(s) IV Push once  dextrose 50% Injectable 25 Gram(s) IV Push once  dextrose 50% Injectable 25 Gram(s) IV Push once  heparin  Injectable 5000 Unit(s) SubCutaneous every 12 hours  furosemide    Tablet 20 milliGRAM(s) Oral daily    MEDICATIONS  (PRN):  dextrose Gel 1 Dose(s) Oral once PRN Blood Glucose LESS THAN 70 milliGRAM(s)/deciliter  glucagon  Injectable 1 milliGRAM(s) IntraMuscular once PRN Glucose LESS THAN 70 milligrams/deciliter  acetaminophen   Tablet. 650 milliGRAM(s) Oral every 6 hours PRN Mild Pain (1 - 3)      LABS:	 	                                   9.4    7.66  )-----------( 196      ( 29 Sep 2017 07:40 )             28.7     09-29    139  |  102  |  37<H>  ----------------------------<  103<H>  3.2<L>   |  24  |  0.98    Ca    8.5      29 Sep 2017 07:40  Mg     1.6     09-29        < from: Transthoracic Echocardiogram (06.22.17 @ 11:21) >    Patient name: PHILOMENA ANGULO  YOB: 1921   Age: 89 (M)   MR#: [id number]  Study Date: 6/22/2017  Location: [id number]: Alanna King RDCS  Study quality: Technically good  Referring Physician: Miguelito Aguilar MD  Blood Pressure: 140/54 mmHg  Height: 175 cm  Weight: 64 kg  BSA: 1.8 m2  ------------------------------------------------------------------------  PROCEDURE: Transthoracic echocardiogram with 2-D, M-Mode  and complete spectral and color flow Doppler.  INDICATION: Abnormal electrocardiogram (ECG) (EKG)  (R94.31), Cardiac murmur, unspecified (R01.1)  ------------------------------------------------------------------------  DIMENSIONS:  Dimensions:     Normal Values:  LA:     4.1 cm    2.0 - 4.0 cm  Ao:    3.6 cm    2.0 - 3.8 cm  SEPTUM: 1.0 cm    0.6 - 1.2 cm  PWT:    1.0 cm    0.6 - 1.1 cm  LVIDd:  4.9 cm    3.0 - 5.6 cm  LVIDs:  2.7 cm    1.8 - 4.0 cm  Derived Variables:  LVMI: 99 g/m2  RWT: 0.40  Fractional short: 45 %  Ejection Fraction (Teicholtz): 65 %  ------------------------------------------------------------------------  OBSERVATIONS:  Mitral Valve: Mitral annular calcification and calcified  mitral leaflets with normal diastolic opening. Minimal  mitral regurgitation.  Aortic Root:Normal aortic root.  Aortic Valve: Calcified trileaflet aortic valve with normal  opening. Mild aortic regurgitation.  Left Atrium: Dilated left atrium.  Left Ventricle: Endocardium not well visualized; grossly  normal left ventricular systolic function. Normal left  ventricular internal dimensions and wall thicknesses.  Right Heart: Normal right atrium. Normal right ventricular  size and function. A device wire is noted in the right  heart. Normal tricuspid valve.  Mild tricuspid  regurgitation.Normal pulmonic valve. Minimal pulmonic  regurgitation.  Pericardium/PleuraNormal pericardium with no pericardial  effusion.  Hemodynamic: Estimated right ventricular systolic pressure  equals 47 mm Hg, assuming right atrial pressure equals 10  mm Hg, consistent with mild pulmonary hypertension.  ------------------------------------------------------------------------  CONCLUSIONS:  1. Mitral annular calcification and calcified mitral  leaflets with normal diastolic opening. Minimal mitral  regurgitation.  2. Calcified trileaflet aortic valve with normal opening.  Mild aortic regurgitation.  3. Dilated left atrium.  4. Normal left ventricular internal dimensions and wall  thicknesses.  5. Normal right ventricular size and function. A device  wire is noted in the right heart.  6. Estimated pulmonary artery systolic pressure equals 47  mm Hg, assuming right atrial pressure equals 10  mm Hg,  consistent with mild pulmonary hypertension.  *** No previous Echo exam.  ------------------------------------------------------------------------  Confirmed on  6/22/2017 - 14:11:44 by Olu Lawrence MD, [facility],  [place], VI  ------------------------------------------------------------------------    < end of copied text >
Patient is a 89y old  Male who presents with a chief complaint of Shortness of breath (25 Sep 2017 15:34)      SUBJECTIVE / OVERNIGHT EVENTS:    MEDICATIONS  (STANDING):  aspirin enteric coated 81 milliGRAM(s) Oral daily  tamsulosin 0.4 milliGRAM(s) Oral at bedtime  atorvastatin 40 milliGRAM(s) Oral at bedtime  metoprolol 25 milliGRAM(s) Oral two times a day  sucralfate suspension 1 Gram(s) Oral two times a day  brimonidine 0.2% Ophthalmic Solution 1 Drop(s) Both EYES three times a day  timolol 0.5% Solution 1 Drop(s) Both EYES two times a day  pantoprazole    Tablet 40 milliGRAM(s) Oral before breakfast  hydrALAZINE 50 milliGRAM(s) Oral two times a day  insulin lispro (HumaLOG) corrective regimen sliding scale   SubCutaneous three times a day before meals  insulin lispro (HumaLOG) corrective regimen sliding scale   SubCutaneous at bedtime  dextrose 5%. 1000 milliLiter(s) (50 mL/Hr) IV Continuous <Continuous>  dextrose 50% Injectable 12.5 Gram(s) IV Push once  dextrose 50% Injectable 25 Gram(s) IV Push once  dextrose 50% Injectable 25 Gram(s) IV Push once  influenza   Vaccine 0.5 milliLiter(s) IntraMuscular once  heparin  Injectable 5000 Unit(s) SubCutaneous every 12 hours  furosemide    Tablet 20 milliGRAM(s) Oral daily    MEDICATIONS  (PRN):  dextrose Gel 1 Dose(s) Oral once PRN Blood Glucose LESS THAN 70 milliGRAM(s)/deciliter  glucagon  Injectable 1 milliGRAM(s) IntraMuscular once PRN Glucose LESS THAN 70 milligrams/deciliter  acetaminophen   Tablet. 650 milliGRAM(s) Oral every 6 hours PRN Mild Pain (1 - 3)      Vital Signs Last 24 Hrs  T(C): 36.9 (28 Sep 2017 05:49), Max: 36.9 (27 Sep 2017 16:56)  T(F): 98.4 (28 Sep 2017 05:49), Max: 98.5 (27 Sep 2017 22:13)  HR: 61 (28 Sep 2017 05:49) (61 - 66)  BP: 116/63 (28 Sep 2017 05:49) (113/57 - 116/63)  BP(mean): --  RR: 18 (28 Sep 2017 05:49) (18 - 20)  SpO2: 97% (28 Sep 2017 05:49) (97% - 100%)  CAPILLARY BLOOD GLUCOSE  155 (28 Sep 2017 12:22)  149 (28 Sep 2017 08:27)  194 (27 Sep 2017 21:39)  179 (27 Sep 2017 16:50)        I&O's Summary      PHYSICAL EXAM:  GENERAL: NAD, well-developed  HEAD:  Atraumatic, Normocephalic  EYES: EOMI, PERRLA, conjunctiva and sclera clear  NECK: Supple, No JVD  CHEST/LUNG: Clear to auscultation bilaterally; No wheeze  HEART: Regular rate and rhythm; No murmurs, rubs, or gallops  ABDOMEN: Soft, Nontender, Nondistended; Bowel sounds present  EXTREMITIES:  2+ Peripheral Pulses, No clubbing, cyanosis, or edema, chronic venous statis skin change.   PSYCH: AAOx1  NEUROLOGY: non-focal  SKIN: No rashes or lesions    LABS:                        9.0    7.45  )-----------( 203      ( 28 Sep 2017 06:30 )             27.8     09-28    141  |  104  |  42<H>  ----------------------------<  134<H>  3.4<L>   |  25  |  1.13    Ca    8.3<L>      28 Sep 2017 06:30  Mg     1.5     09-28        CARDIAC MARKERS ( 28 Sep 2017 06:30 )  x     / 0.15 ng/mL / 37 u/L / x     / x              RADIOLOGY & ADDITIONAL TESTS:    Imaging Personally Reviewed:  < from: US Duplex Venous Lower Ext Ltd, Right (09.27.17 @ 12:00) >  IMPRESSION:     No evidence of right lower extremity deep venous thrombosis. Previously   seen soleal vein thrombosis has resolved.    < end of copied text >      Consultant(s) Notes Reviewed:      Care Discussed with Consultants/Other Providers:
Patient is a 89y old  Male who presents with a chief complaint of Shortness of breath (25 Sep 2017 15:34)      SUBJECTIVE / OVERNIGHT EVENTS: Pt denied CP or SOB, saturated 95% on Rm air.     MEDICATIONS  (STANDING):  aspirin enteric coated 81 milliGRAM(s) Oral daily  tamsulosin 0.4 milliGRAM(s) Oral at bedtime  atorvastatin 40 milliGRAM(s) Oral at bedtime  metoprolol 25 milliGRAM(s) Oral two times a day  sucralfate suspension 1 Gram(s) Oral two times a day  brimonidine 0.2% Ophthalmic Solution 1 Drop(s) Both EYES three times a day  timolol 0.5% Solution 1 Drop(s) Both EYES two times a day  pantoprazole    Tablet 40 milliGRAM(s) Oral before breakfast  hydrALAZINE 50 milliGRAM(s) Oral two times a day  insulin lispro (HumaLOG) corrective regimen sliding scale   SubCutaneous three times a day before meals  insulin lispro (HumaLOG) corrective regimen sliding scale   SubCutaneous at bedtime  dextrose 5%. 1000 milliLiter(s) (50 mL/Hr) IV Continuous <Continuous>  dextrose 50% Injectable 12.5 Gram(s) IV Push once  dextrose 50% Injectable 25 Gram(s) IV Push once  dextrose 50% Injectable 25 Gram(s) IV Push once  heparin  Injectable 5000 Unit(s) SubCutaneous every 12 hours  furosemide    Tablet 20 milliGRAM(s) Oral daily  potassium chloride    Tablet ER 40 milliEquivalent(s) Oral every 4 hours    MEDICATIONS  (PRN):  dextrose Gel 1 Dose(s) Oral once PRN Blood Glucose LESS THAN 70 milliGRAM(s)/deciliter  glucagon  Injectable 1 milliGRAM(s) IntraMuscular once PRN Glucose LESS THAN 70 milligrams/deciliter  acetaminophen   Tablet. 650 milliGRAM(s) Oral every 6 hours PRN Mild Pain (1 - 3)      Vital Signs Last 24 Hrs  T(C): 36.2 (29 Sep 2017 06:34), Max: 36.9 (28 Sep 2017 21:46)  T(F): 97.2 (29 Sep 2017 06:34), Max: 98.5 (28 Sep 2017 21:46)  HR: 56 (29 Sep 2017 06:34) (56 - 60)  BP: 120/60 (29 Sep 2017 06:34) (118/63 - 122/53)  BP(mean): --  RR: 18 (29 Sep 2017 12:29) (17 - 20)  SpO2: 95% (29 Sep 2017 12:29) (88% - 100%)  CAPILLARY BLOOD GLUCOSE  115 (29 Sep 2017 11:44)  104 (29 Sep 2017 08:44)  136 (28 Sep 2017 21:30)  156 (28 Sep 2017 16:29)        I&O's Summary      PHYSICAL EXAM:  GENERAL: NAD, well-developed  HEAD:  Atraumatic, Normocephalic  EYES: EOMI, PERRLA, conjunctiva and sclera clear  NECK: Supple, No JVD  CHEST/LUNG: Clear to auscultation bilaterally; No wheeze  HEART: Regular rate and rhythm; No murmurs, rubs, or gallops  ABDOMEN: Soft, Nontender, Nondistended; Bowel sounds present  EXTREMITIES:  2+ Peripheral Pulses, No clubbing, cyanosis, or edema, chronic venous stasis changes b/l LE.   PSYCH: AAOx1  NEUROLOGY: non-focal  SKIN: No rashes or lesions    LABS:                        9.4    7.66  )-----------( 196      ( 29 Sep 2017 07:40 )             28.7     09-29    139  |  102  |  37<H>  ----------------------------<  103<H>  3.2<L>   |  24  |  0.98    Ca    8.5      29 Sep 2017 07:40  Mg     1.6     09-29        CARDIAC MARKERS ( 28 Sep 2017 06:30 )  x     / 0.15 ng/mL / 37 u/L / x     / x              RADIOLOGY & ADDITIONAL TESTS:    Imaging Personally Reviewed:    Consultant(s) Notes Reviewed:      Care Discussed with Consultants/Other Providers:
CHIEF COMPLAINT  Patient is a 89y old  Male who presents with a chief complaint of Shortness of breath (25 Sep 2017 15:34)    SUBJECTIVE / OVERNIGHT EVENTS:  No overnight events.  SOB better.  Reports bilateral lower extremity pain.  Pt's HCP Ran at bedside.  Says pt was discharged from [facility] rehab last week.  Since pt has been home, he appears more fatigued, eating less.  Was noted to be SOB yesterday and brought patient into [facility] ED.  No recent fevers reports.  No reports of chest pain.      MEDICATIONS  (STANDING):  aspirin enteric coated 81 milliGRAM(s) Oral daily  tamsulosin 0.4 milliGRAM(s) Oral at bedtime  atorvastatin 40 milliGRAM(s) Oral at bedtime  metoprolol 25 milliGRAM(s) Oral two times a day  sucralfate suspension 1 Gram(s) Oral two times a day  brimonidine 0.2% Ophthalmic Solution 1 Drop(s) Both EYES three times a day  timolol 0.5% Solution 1 Drop(s) Both EYES two times a day  pantoprazole    Tablet 40 milliGRAM(s) Oral before breakfast  hydrALAZINE 50 milliGRAM(s) Oral two times a day  insulin lispro (HumaLOG) corrective regimen sliding scale   SubCutaneous three times a day before meals  insulin lispro (HumaLOG) corrective regimen sliding scale   SubCutaneous at bedtime  dextrose 5%. 1000 milliLiter(s) (50 mL/Hr) IV Continuous <Continuous>  dextrose 50% Injectable 12.5 Gram(s) IV Push once  dextrose 50% Injectable 25 Gram(s) IV Push once  dextrose 50% Injectable 25 Gram(s) IV Push once  influenza   Vaccine 0.5 milliLiter(s) IntraMuscular once  heparin  Injectable 5000 Unit(s) SubCutaneous every 12 hours    MEDICATIONS  (PRN):  dextrose Gel 1 Dose(s) Oral once PRN Blood Glucose LESS THAN 70 milliGRAM(s)/deciliter  glucagon  Injectable 1 milliGRAM(s) IntraMuscular once PRN Glucose LESS THAN 70 milligrams/deciliter  acetaminophen   Tablet. 650 milliGRAM(s) Oral every 6 hours PRN Mild Pain (1 - 3)    Vital Signs Last 24 Hrs  T(C): 36.4 (09-26-17 @ 18:07)  T(F): 97.5 (09-26-17 @ 18:07), Max: 98.7 (09-26-17 @ 00:14)  HR: 64 (09-26-17 @ 18:07) (60 - 88)  BP: 115/61 (09-26-17 @ 18:07)  BP(mean): --  RR: 18 (09-26-17 @ 18:07) (14 - 19)  SpO2: 99% (09-26-17 @ 18:07) (97% - 100%)  Wt(kg): --    CAPILLARY BLOOD GLUCOSE  140 (26 Sep 2017 18:36)    PHYSICAL EXAM:  GENERAL: NAD, AAOx1, cachectic, frail  HEAD:  Atraumatic, Normocephalic  NECK: Supple, No JVD  CHEST/LUNG: Clear to auscultation bilaterally  HEART: RRR, S1S2  ABDOMEN: Soft, nontender, nondistended  EXTREMITIES:  Trace edema and mild erythema of RLE, but no warmth    LABS:                        9.3    10.14 )-----------( 250      ( 25 Sep 2017 10:55 )             28.2     09-25    138  |  104  |  57<H>  ----------------------------<  208<H>  4.8   |  19<L>  |  1.67<H>    Ca    8.9      25 Sep 2017 10:55    TPro  5.6<L>  /  Alb  3.0<L>  /  TBili  0.6  /  DBili  x   /  AST  26  /  ALT  22  /  AlkPhos  99  09-25    PT/INR - ( 25 Sep 2017 10:54 )   PT: 13.2 SEC;   INR: 1.17          PTT - ( 25 Sep 2017 10:54 )  PTT:26.6 SEC  CARDIAC MARKERS ( 25 Sep 2017 18:40 )  x     / 0.16 ng/mL / 100 u/L / 4.46 ng/mL / x      CARDIAC MARKERS ( 25 Sep 2017 10:55 )  x     / 0.18 ng/mL / 101 u/L / 3.94 ng/mL / x          RADIOLOGY & ADDITIONAL TESTS:    Imaging Personally Reviewed:    Consultant(s) Notes Reviewed:      Care Discussed with Consultants/Other Providers:
Patient is a 89y old  Male who presents with a chief complaint of Shortness of breath (25 Sep 2017 15:34)      SUBJECTIVE / OVERNIGHT EVENTS: Pt reported breathing improving, no CP, afebrile. Saturated well on NC.     MEDICATIONS  (STANDING):  aspirin enteric coated 81 milliGRAM(s) Oral daily  tamsulosin 0.4 milliGRAM(s) Oral at bedtime  atorvastatin 40 milliGRAM(s) Oral at bedtime  metoprolol 25 milliGRAM(s) Oral two times a day  sucralfate suspension 1 Gram(s) Oral two times a day  brimonidine 0.2% Ophthalmic Solution 1 Drop(s) Both EYES three times a day  timolol 0.5% Solution 1 Drop(s) Both EYES two times a day  pantoprazole    Tablet 40 milliGRAM(s) Oral before breakfast  hydrALAZINE 50 milliGRAM(s) Oral two times a day  insulin lispro (HumaLOG) corrective regimen sliding scale   SubCutaneous three times a day before meals  insulin lispro (HumaLOG) corrective regimen sliding scale   SubCutaneous at bedtime  dextrose 5%. 1000 milliLiter(s) (50 mL/Hr) IV Continuous <Continuous>  dextrose 50% Injectable 12.5 Gram(s) IV Push once  dextrose 50% Injectable 25 Gram(s) IV Push once  dextrose 50% Injectable 25 Gram(s) IV Push once  influenza   Vaccine 0.5 milliLiter(s) IntraMuscular once  heparin  Injectable 5000 Unit(s) SubCutaneous every 12 hours  furosemide    Tablet 20 milliGRAM(s) Oral daily    MEDICATIONS  (PRN):  dextrose Gel 1 Dose(s) Oral once PRN Blood Glucose LESS THAN 70 milliGRAM(s)/deciliter  glucagon  Injectable 1 milliGRAM(s) IntraMuscular once PRN Glucose LESS THAN 70 milligrams/deciliter  acetaminophen   Tablet. 650 milliGRAM(s) Oral every 6 hours PRN Mild Pain (1 - 3)      Vital Signs Last 24 Hrs  T(C): 36.4 (27 Sep 2017 11:06), Max: 36.6 (27 Sep 2017 06:04)  T(F): 97.6 (27 Sep 2017 11:06), Max: 97.9 (27 Sep 2017 06:04)  HR: 61 (27 Sep 2017 11:06) (60 - 64)  BP: 115/58 (27 Sep 2017 11:06) (103/52 - 124/60)  BP(mean): --  RR: 18 (27 Sep 2017 11:06) (18 - 18)  SpO2: 100% (27 Sep 2017 11:06) (99% - 100%)  CAPILLARY BLOOD GLUCOSE  124 (27 Sep 2017 12:22)  117 (27 Sep 2017 08:19)  179 (26 Sep 2017 22:03)  140 (26 Sep 2017 18:36)        I&O's Summary      PHYSICAL EXAM:  GENERAL: NAD, AAOx1, cachectic, frail  HEAD:  Atraumatic, Normocephalic  EYES: EOMI, PERRLA, conjunctiva and sclera clear  NECK: Supple, No JVD  CHEST/LUNG: Clear to auscultation bilaterally; No wheeze  HEART: Regular rate and rhythm; No murmurs, rubs, or gallops  ABDOMEN: Soft, Nontender, Nondistended; Bowel sounds present  EXTREMITIES:  2+ Peripheral Pulses, No clubbing, cyanosis. trace RLE edema, no calf tenderness.   PSYCH: AAOx1, calm  NEUROLOGY: non-focal  SKIN: No rashes or lesions    LABS:                        9.4    9.67  )-----------( 211      ( 26 Sep 2017 22:21 )             29.0     09-26    139  |  103  |  51<H>  ----------------------------<  167<H>  3.7   |  23  |  1.37<H>    Ca    8.6      26 Sep 2017 22:21        CARDIAC MARKERS ( 25 Sep 2017 18:40 )  x     / 0.16 ng/mL / 100 u/L / 4.46 ng/mL / x        Serum Pro-Brain Natriuretic Peptide: 23701: ACUTE CONGESTIVE HEART FAILURE is UNLIKELY if NT-proBNP is < 300 pg/mL.    CONSIDER ACUTE CONGESTIVE HEART FAILURE if:    AGE                NT-proBNP RESULT  ---                -------------  < 50 YEARS      >  450 pg/mL  50 - 75 YEARS      >  900 [id number]: /mL  > 75 YEARS      > 1800 pg/mL    All results require clinical correlation.  Consider obtaining a  baseline or "dry" NT-proBNP level when the patient is stabilized,  so that subsequent levels can be compared to that value.  Patients  with [id number]:  CHF may have elevated NT-proBNP levels.  Acute  failure episodes generally produce levels at least 25% greater  than baseline levels.  The above values are derived from a large  multi-center international study, "IRVIN Nunez, et al, European  Heart K58722: ournal, 2006; 27:330-337."   pg/mL (09.25.17 @ 10:55)          RADIOLOGY & ADDITIONAL TESTS:    Imaging Personally Reviewed:  < from: Xray Chest 1 View AP -PORTABLE-Routine (09.25.17 @ 11:49) >  IMPRESSION:  Bilateral pleural effusions again seen obscuring the lung bases,   concomitant underlying airspace consolidation including pneumonia in the   proper clinical context cannot be excluded.    Slightly prominent and indistinct remaining visualized lung markings and   hazy hilar shadows suggesting component of concomitant congestion with   edema.    Stable left chest wall single-lead pacemaker, enlarged appearing cardiac   and mediastinal silhouettes, and tortuous calcified aorta.    Trachea midline.    Generalized osteopenia again noted.    Surgical clips again seen over upper mid epigastric region.    < end of copied text >      Consultant(s) Notes Reviewed:      Care Discussed with Consultants/Other Providers:

## 2017-09-30 NOTE — PROGRESS NOTE ADULT - PROBLEM SELECTOR PLAN 2
-f/u nutrition consult
-f/u nutrition consult  - encourage oral intake with assistance
Not on anticoagulation.  Continue current medical regimen which includes daily low dose aspirin.
- nutrition consult  - encourage oral intake with assistance

## 2017-09-30 NOTE — PROGRESS NOTE ADULT - PROBLEM SELECTOR PLAN 6
-c/w metoprolol and hydralazine

## 2017-09-30 NOTE — PROGRESS NOTE ADULT - ASSESSMENT
This is a 89 yo M w/ PMH of PAD s/p fempop bypass, CKD, DM, A.fib with PPM, HTN, PUD, recent admission for suspected CVA, Rt soleal DVT who was BIB HCP (neighbor Ran) due to SOB (7 days after being discharged from [facility]).  Found to have pulmonary congestion on CXR with elevated troponin in setting of CKD and suspected Acute decompensated diastolic CHF.
Appears clinically stable. euvolemic from the cardiac standpoint.  Continue with current medical regimen.  Awaiting SW evaluation/possible placement in NH as patient lives alone and relies on neighbor's assistance.
This is a 89 yo M w/ PMH of PAD s/p fempop bypass, CKD, DM, A.fib with PPM, HTN, PUD, recent admission for suspected CVA, Rt soleal DVT who was BIB HCP (neighbor Ran) due to SOB (7 days after being discharged from [facility]).  Found to have pulmonary congestion on CXR with elevated troponin in setting of CKD and suspected Acute decompensated diastolic CHF.
This is a 89 yo M w/ PMH of PAD s/p fempop bypass, CKD, DM, A.fib with PPM, HTN, PUD, recent admission for suspected CVA, Rt soleal DVT who was BIB HCP (neighbor Ran) due to SOB (7 days after being discharged from [facility]).  Found to have pulmonary congestion on CXR with elevated troponin in setting of CKD and suspected Acute decompensated diastolic CHF.
This is a 89 yo M w/ PMH of PAD s/p fempop bypass, CKD, DM, A.fib, HTN, PUD, recent admission for suspected CVA, Rt soleal DVT who was BIB HCP (neighbor Ran) due to SOB (7 days after being discharged from [facility]).  Found to have pulmonary congestion on CXR with elevated troponin in setting of CKD and suspected Acute decompensated diastolic CHF.
This is a 89 yo M w/ PMH of PAD s/p fempop bypass, CKD, DM, A.fib with PPM, HTN, PUD, recent admission for suspected CVA, Rt soleal DVT who was BIB HCP (neighbor Ran) due to SOB (7 days after being discharged from [facility]).  Found to have pulmonary congestion on CXR with elevated troponin in setting of CKD and suspected Acute decompensated diastolic CHF.

## 2017-09-30 NOTE — PROGRESS NOTE ADULT - PROBLEM SELECTOR PROBLEM 1
Acute congestive heart failure, unspecified congestive heart failure type
Chronic diastolic congestive heart failure
Acute congestive heart failure, unspecified congestive heart failure type
Acute congestive heart failure, unspecified congestive heart failure type

## 2017-09-30 NOTE — PROGRESS NOTE ADULT - PROBLEM SELECTOR PROBLEM 2
Malnutrition
Malnutrition
Chronic deep vein thrombosis (DVT) of lower extremity, unspecified laterality, unspecified vein
Malnutrition

## 2017-09-30 NOTE — PROGRESS NOTE ADULT - PROBLEM SELECTOR PLAN 1
Appears euvolemic.  No further change in current medical management.  Reviewed previous echocardiogram from earlier this year.  Continue BP control and oral furosemide at current dose.

## 2017-09-30 NOTE — PROGRESS NOTE ADULT - PROVIDER SPECIALTY LIST ADULT
Cardiology
Internal Medicine

## 2017-09-30 NOTE — PROGRESS NOTE ADULT - PROBLEM SELECTOR PLAN 4
-monitoring creat while on lasix  -avoid nephrotoxins
PT/SW evaluation
-monitoring creat while on lasix  -avoid nephrotoxins

## 2017-09-30 NOTE — PROGRESS NOTE ADULT - PROBLEM SELECTOR PLAN 1
-trend positive cardiac enzymes down.  -f/u TTE  -appears more euvolemic on oral lasix, ECHO in [facility] showed normal LVEF with dilated left atrium, likely diastolic HF.   - check SO2 on Rm air 95%   -monitor I/Os and daily weights  -cardiology consult appreciated. -trend positive cardiac enzymes down.  -appears more euvolemic on oral lasix, ECHO in [facility] showed normal LVEF with dilated left atrium, likely diastolic HF.   - check SO2 on Rm air 95%   -monitor I/Os and daily weights  -cardiology consult appreciated.

## 2017-09-30 NOTE — PROGRESS NOTE ADULT - ATTENDING COMMENTS
f/u with CM for possible home aid vs LTC as per HCP d/c home planning with private hired home aid, discussed above plan with HCP. d/c home planning with private hired home aid, discussed above plan with HCP. follow up with PCP and cardiology out patient.

## 2017-10-05 ENCOUNTER — APPOINTMENT (OUTPATIENT)
Dept: HOME HEALTH SERVICES | Facility: HOME HEALTH | Age: 82
End: 2017-10-05

## 2017-10-05 VITALS
WEIGHT: 158 LBS | BODY MASS INDEX: 23.95 KG/M2 | OXYGEN SATURATION: 93 % | HEART RATE: 60 BPM | HEIGHT: 68 IN | SYSTOLIC BLOOD PRESSURE: 122 MMHG | DIASTOLIC BLOOD PRESSURE: 60 MMHG | TEMPERATURE: 97.7 F | RESPIRATION RATE: 16 BRPM

## 2017-10-06 ENCOUNTER — APPOINTMENT (OUTPATIENT)
Dept: HOME HEALTH SERVICES | Facility: HOME HEALTH | Age: 82
End: 2017-10-06
Payer: MEDICARE

## 2017-10-06 VITALS
DIASTOLIC BLOOD PRESSURE: 64 MMHG | SYSTOLIC BLOOD PRESSURE: 118 MMHG | RESPIRATION RATE: 18 BRPM | TEMPERATURE: 98.1 F | OXYGEN SATURATION: 87 %

## 2017-10-06 DIAGNOSIS — Z71.89 OTHER SPECIFIED COUNSELING: ICD-10-CM

## 2017-10-06 PROCEDURE — 99497 ADVNCD CARE PLAN 30 MIN: CPT

## 2017-10-06 PROCEDURE — G0008: CPT

## 2017-10-06 PROCEDURE — 90662 IIV NO PRSV INCREASED AG IM: CPT

## 2017-10-06 PROCEDURE — 99344 HOME/RES VST NEW MOD MDM 60: CPT | Mod: 25

## 2017-10-06 RX ORDER — COLLAGENASE SANTYL 250 [ARB'U]/G
250 OINTMENT TOPICAL
Qty: 30 | Refills: 0 | Status: ACTIVE | COMMUNITY
Start: 2017-04-14

## 2017-10-06 RX ORDER — SIMVASTATIN 20 MG/1
20 TABLET, FILM COATED ORAL
Refills: 0 | Status: DISCONTINUED | COMMUNITY
End: 2017-10-06

## 2017-10-06 RX ORDER — ACETAMINOPHEN 325 MG/1
325 TABLET, FILM COATED ORAL
Qty: 30 | Refills: 0 | Status: ACTIVE | COMMUNITY
Start: 2017-10-06 | End: 1900-01-01

## 2017-10-06 RX ORDER — CILOSTAZOL 50 MG/1
50 TABLET ORAL
Qty: 60 | Refills: 6 | Status: DISCONTINUED | COMMUNITY
Start: 2017-06-13 | End: 2017-10-06

## 2017-10-07 ENCOUNTER — MOBILE ON CALL (OUTPATIENT)
Age: 82
End: 2017-10-07

## 2017-10-07 ENCOUNTER — CLINICAL ADVICE (OUTPATIENT)
Age: 82
End: 2017-10-07

## 2017-10-07 DIAGNOSIS — R79.81 ABNORMAL BLOOD-GAS LEVEL: ICD-10-CM

## 2017-10-07 DIAGNOSIS — R53.1 WEAKNESS: ICD-10-CM

## 2017-10-08 PROBLEM — R79.81 LOW O2 SATURATION: Status: ACTIVE | Noted: 2017-10-08

## 2017-10-08 PROBLEM — R53.1 GENERALIZED WEAKNESS: Status: ACTIVE | Noted: 2017-10-08

## 2017-10-16 ENCOUNTER — MEDICATION RENEWAL (OUTPATIENT)
Age: 82
End: 2017-10-16

## 2017-10-16 DIAGNOSIS — H40.9 UNSPECIFIED GLAUCOMA: ICD-10-CM

## 2017-10-16 DIAGNOSIS — Z00.00 ENCOUNTER FOR GENERAL ADULT MEDICAL EXAMINATION W/OUT ABNORMAL FINDINGS: ICD-10-CM

## 2017-10-16 DIAGNOSIS — N18.3 DIABETES MELLITUS DUE TO UNDERLYING CONDITION WITH DIABETIC CHRONIC KIDNEY DISEASE: ICD-10-CM

## 2017-10-16 DIAGNOSIS — M62.84 SARCOPENIA: ICD-10-CM

## 2017-10-16 DIAGNOSIS — R53.2 FUNCTIONAL QUADRIPLEGIA: ICD-10-CM

## 2017-10-16 DIAGNOSIS — Z23 ENCOUNTER FOR IMMUNIZATION: ICD-10-CM

## 2017-10-16 DIAGNOSIS — E08.22 DIABETES MELLITUS DUE TO UNDERLYING CONDITION WITH DIABETIC CHRONIC KIDNEY DISEASE: ICD-10-CM

## 2017-10-16 RX ORDER — TIMOLOL MALEATE 5 MG/ML
0.5 SOLUTION OPHTHALMIC
Qty: 1 | Refills: 0 | Status: ACTIVE | COMMUNITY
Start: 2017-09-21 | End: 1900-01-01

## 2017-10-17 ENCOUNTER — RX RENEWAL (OUTPATIENT)
Age: 82
End: 2017-10-17

## 2017-10-17 RX ORDER — BRIMONIDINE TARTRATE 2 MG/MG
0.2 SOLUTION/ DROPS OPHTHALMIC EVERY 8 HOURS
Qty: 2 | Refills: 9 | Status: ACTIVE | COMMUNITY
Start: 2017-09-21 | End: 1900-01-01

## 2017-10-17 RX ORDER — ALBUTEROL SULFATE 2.5 MG/3ML
(2.5 MG/3ML) SOLUTION RESPIRATORY (INHALATION)
Qty: 1 | Refills: 2 | Status: ACTIVE | COMMUNITY
Start: 2017-10-06 | End: 1900-01-01

## 2017-10-17 RX ORDER — TAMSULOSIN HYDROCHLORIDE 0.4 MG/1
0.4 CAPSULE ORAL
Qty: 30 | Refills: 5 | Status: ACTIVE | COMMUNITY
Start: 2017-09-21 | End: 1900-01-01

## 2017-10-17 RX ORDER — HYDRALAZINE HYDROCHLORIDE 50 MG/1
50 TABLET ORAL
Qty: 60 | Refills: 2 | Status: ACTIVE | COMMUNITY
Start: 1900-01-01 | End: 1900-01-01

## 2017-10-17 RX ORDER — METFORMIN HYDROCHLORIDE 500 MG/1
500 TABLET, COATED ORAL
Qty: 30 | Refills: 0 | Status: ACTIVE | COMMUNITY
Start: 2017-09-21 | End: 1900-01-01

## 2017-10-17 RX ORDER — FUROSEMIDE 20 MG/1
20 TABLET ORAL DAILY
Qty: 30 | Refills: 0 | Status: ACTIVE | COMMUNITY
Start: 1900-01-01 | End: 1900-01-01

## 2017-10-17 RX ORDER — METOPROLOL TARTRATE 25 MG/1
25 TABLET, FILM COATED ORAL TWICE DAILY
Qty: 60 | Refills: 3 | Status: ACTIVE | COMMUNITY
Start: 2017-09-21 | End: 1900-01-01

## 2017-10-27 ENCOUNTER — APPOINTMENT (OUTPATIENT)
Dept: HOME HEALTH SERVICES | Facility: HOME HEALTH | Age: 82
End: 2017-10-27
Payer: MEDICARE

## 2017-10-27 DIAGNOSIS — I77.1 STRICTURE OF ARTERY: ICD-10-CM

## 2017-10-27 DIAGNOSIS — E11.22 TYPE 2 DIABETES MELLITUS WITH DIABETIC CHRONIC KIDNEY DISEASE: ICD-10-CM

## 2017-10-27 DIAGNOSIS — N18.3 TYPE 2 DIABETES MELLITUS WITH DIABETIC CHRONIC KIDNEY DISEASE: ICD-10-CM

## 2017-10-27 DIAGNOSIS — L98.499 STRICTURE OF ARTERY: ICD-10-CM

## 2017-10-27 DIAGNOSIS — L89.153 PRESSURE ULCER OF SACRAL REGION, STAGE 3: ICD-10-CM

## 2017-10-27 DIAGNOSIS — I50.9 HEART FAILURE, UNSPECIFIED: ICD-10-CM

## 2017-10-27 DIAGNOSIS — B36.9 SUPERFICIAL MYCOSIS, UNSPECIFIED: ICD-10-CM

## 2017-10-27 DIAGNOSIS — R53.81 OTHER MALAISE: ICD-10-CM

## 2017-10-27 PROCEDURE — 99350 HOME/RES VST EST HIGH MDM 60: CPT | Mod: 25

## 2017-10-27 PROCEDURE — G0506: CPT

## 2017-10-29 VITALS
TEMPERATURE: 97.8 F | SYSTOLIC BLOOD PRESSURE: 122 MMHG | RESPIRATION RATE: 15 BRPM | OXYGEN SATURATION: 93 % | HEART RATE: 62 BPM | DIASTOLIC BLOOD PRESSURE: 70 MMHG

## 2017-10-29 PROBLEM — I50.9 CONGESTIVE HEART FAILURE, UNSPECIFIED CONGESTIVE HEART FAILURE CHRONICITY, UNSPECIFIED CONGESTIVE HEART FAILURE TYPE: Status: ACTIVE | Noted: 2017-10-06

## 2017-10-29 PROBLEM — E11.22 TYPE 2 DIABETES MELLITUS WITH STAGE 3 CHRONIC KIDNEY DISEASE, WITHOUT LONG-TERM CURRENT USE OF INSULIN: Status: ACTIVE | Noted: 2017-10-06

## 2017-10-29 PROBLEM — I77.1 ARTERIAL INSUFFICIENCY WITH ISCHEMIC ULCER: Status: ACTIVE | Noted: 2017-06-13

## 2017-11-06 LAB
ALBUMIN SERPL ELPH-MCNC: 2.7 G/DL
ALP BLD-CCNC: 78 U/L
ALT SERPL-CCNC: 11 U/L
ANION GAP SERPL CALC-SCNC: 15 MMOL/L
AST SERPL-CCNC: 16 U/L
BASOPHILS # BLD AUTO: 0.02 K/UL
BASOPHILS NFR BLD AUTO: 0.2 %
BILIRUB SERPL-MCNC: 0.4 MG/DL
BUN SERPL-MCNC: 30 MG/DL
CALCIUM SERPL-MCNC: 9 MG/DL
CHLORIDE SERPL-SCNC: 100 MMOL/L
CO2 SERPL-SCNC: 23 MMOL/L
CREAT SERPL-MCNC: 1.12 MG/DL
EOSINOPHIL # BLD AUTO: 0.14 K/UL
EOSINOPHIL NFR BLD AUTO: 1.6 %
GLUCOSE SERPL-MCNC: 147 MG/DL
HBA1C MFR BLD HPLC: 6.6 %
HCT VFR BLD CALC: 32.5 %
HGB BLD-MCNC: 10.3 G/DL
IMM GRANULOCYTES NFR BLD AUTO: 0.2 %
LYMPHOCYTES # BLD AUTO: 1.52 K/UL
LYMPHOCYTES NFR BLD AUTO: 17.3 %
MAN DIFF?: NORMAL
MCHC RBC-ENTMCNC: 30.7 PG
MCHC RBC-ENTMCNC: 31.7 GM/DL
MCV RBC AUTO: 97 FL
MONOCYTES # BLD AUTO: 1.03 K/UL
MONOCYTES NFR BLD AUTO: 11.7 %
NEUTROPHILS # BLD AUTO: 6.06 K/UL
NEUTROPHILS NFR BLD AUTO: 69 %
PLATELET # BLD AUTO: 311 K/UL
POTASSIUM SERPL-SCNC: 4.5 MMOL/L
PREALB SERPL NEPH-MCNC: 11 MG/DL
PROT SERPL-MCNC: 5.8 G/DL
RBC # BLD: 3.35 M/UL
RBC # FLD: 16 %
SODIUM SERPL-SCNC: 138 MMOL/L
WBC # FLD AUTO: 8.79 K/UL

## 2017-11-07 ENCOUNTER — CXD DOCUMENT (OUTPATIENT)
Age: 82
End: 2017-11-07

## 2017-11-08 ENCOUNTER — CLINICAL ADVICE (OUTPATIENT)
Age: 82
End: 2017-11-08

## 2017-11-08 DIAGNOSIS — G47.01 INSOMNIA DUE TO MEDICAL CONDITION: ICD-10-CM

## 2017-11-08 RX ORDER — CHLORHEXIDINE GLUCONATE 4 %
5 LIQUID (ML) TOPICAL
Qty: 60 | Refills: 3 | Status: ACTIVE | COMMUNITY
Start: 2017-11-08

## 2017-11-14 ENCOUNTER — MEDICATION RENEWAL (OUTPATIENT)
Age: 82
End: 2017-11-14

## 2017-11-14 RX ORDER — NYSTATIN 100000 1/G
100000 POWDER TOPICAL
Qty: 1 | Refills: 1 | Status: ACTIVE | COMMUNITY
Start: 2017-10-27 | End: 1900-01-01

## 2017-11-27 ENCOUNTER — MEDICATION RENEWAL (OUTPATIENT)
Age: 82
End: 2017-11-27

## 2017-11-27 DIAGNOSIS — J18.9 PNEUMONIA, UNSPECIFIED ORGANISM: ICD-10-CM

## 2017-11-27 RX ORDER — LEVOFLOXACIN 500 MG/1
500 TABLET, FILM COATED ORAL DAILY
Qty: 7 | Refills: 0 | Status: ACTIVE | COMMUNITY
Start: 2017-11-27 | End: 1900-01-01

## 2017-11-27 RX ORDER — TRAMADOL HYDROCHLORIDE 50 MG/1
50 TABLET, COATED ORAL EVERY 8 HOURS
Qty: 20 | Refills: 0 | Status: ACTIVE | COMMUNITY
Start: 2017-10-27 | End: 1900-01-01

## 2017-12-01 ENCOUNTER — CLINICAL ADVICE (OUTPATIENT)
Age: 82
End: 2017-12-01

## 2017-12-01 ENCOUNTER — INPATIENT (INPATIENT)
Facility: HOSPITAL | Age: 82
LOS: 3 days | Discharge: ANOTHER HOSPITAL | End: 2017-12-05
Attending: HOSPITALIST | Admitting: HOSPITALIST
Payer: MEDICARE

## 2017-12-01 VITALS
DIASTOLIC BLOOD PRESSURE: 64 MMHG | HEART RATE: 60 BPM | OXYGEN SATURATION: 96 % | RESPIRATION RATE: 20 BRPM | TEMPERATURE: 98 F | SYSTOLIC BLOOD PRESSURE: 133 MMHG

## 2017-12-01 DIAGNOSIS — J40 BRONCHITIS, NOT SPECIFIED AS ACUTE OR CHRONIC: ICD-10-CM

## 2017-12-01 DIAGNOSIS — J90 PLEURAL EFFUSION, NOT ELSEWHERE CLASSIFIED: ICD-10-CM

## 2017-12-01 DIAGNOSIS — E11.22 TYPE 2 DIABETES MELLITUS WITH DIABETIC CHRONIC KIDNEY DISEASE: ICD-10-CM

## 2017-12-01 DIAGNOSIS — E87.1 HYPO-OSMOLALITY AND HYPONATREMIA: ICD-10-CM

## 2017-12-01 DIAGNOSIS — Z95.828 PRESENCE OF OTHER VASCULAR IMPLANTS AND GRAFTS: Chronic | ICD-10-CM

## 2017-12-01 DIAGNOSIS — Z00.8 ENCOUNTER FOR OTHER GENERAL EXAMINATION: ICD-10-CM

## 2017-12-01 DIAGNOSIS — K26.9 DUODENAL ULCER, UNSPECIFIED AS ACUTE OR CHRONIC, WITHOUT HEMORRHAGE OR PERFORATION: ICD-10-CM

## 2017-12-01 DIAGNOSIS — I50.33 ACUTE ON CHRONIC DIASTOLIC (CONGESTIVE) HEART FAILURE: ICD-10-CM

## 2017-12-01 DIAGNOSIS — Z29.9 ENCOUNTER FOR PROPHYLACTIC MEASURES, UNSPECIFIED: ICD-10-CM

## 2017-12-01 DIAGNOSIS — I10 ESSENTIAL (PRIMARY) HYPERTENSION: ICD-10-CM

## 2017-12-01 DIAGNOSIS — J06.9 ACUTE UPPER RESPIRATORY INFECTION, UNSPECIFIED: ICD-10-CM

## 2017-12-01 LAB
ALBUMIN SERPL ELPH-MCNC: 2.7 G/DL — LOW (ref 3.3–5)
ALP SERPL-CCNC: 49 U/L — SIGNIFICANT CHANGE UP (ref 40–120)
ALT FLD-CCNC: 19 U/L — SIGNIFICANT CHANGE UP (ref 4–41)
APPEARANCE UR: CLEAR — SIGNIFICANT CHANGE UP
APTT BLD: 31.6 SEC — SIGNIFICANT CHANGE UP (ref 27.5–37.4)
AST SERPL-CCNC: 76 U/L — HIGH (ref 4–40)
B PERT DNA SPEC QL NAA+PROBE: SIGNIFICANT CHANGE UP
BASE EXCESS BLDV CALC-SCNC: 1.7 MMOL/L — SIGNIFICANT CHANGE UP
BASE EXCESS BLDV CALC-SCNC: 1.8 MMOL/L — SIGNIFICANT CHANGE UP
BASOPHILS # BLD AUTO: 0.01 K/UL — SIGNIFICANT CHANGE UP (ref 0–0.2)
BASOPHILS NFR BLD AUTO: 0.1 % — SIGNIFICANT CHANGE UP (ref 0–2)
BILIRUB SERPL-MCNC: 0.4 MG/DL — SIGNIFICANT CHANGE UP (ref 0.2–1.2)
BILIRUB UR-MCNC: NEGATIVE — SIGNIFICANT CHANGE UP
BLOOD GAS VENOUS - CREATININE: 1.15 MG/DL — SIGNIFICANT CHANGE UP (ref 0.5–1.3)
BLOOD GAS VENOUS - CREATININE: SIGNIFICANT CHANGE UP MG/DL (ref 0.5–1.3)
BLOOD UR QL VISUAL: NEGATIVE — SIGNIFICANT CHANGE UP
BUN SERPL-MCNC: 31 MG/DL — HIGH (ref 7–23)
C PNEUM DNA SPEC QL NAA+PROBE: NOT DETECTED — SIGNIFICANT CHANGE UP
CALCIUM SERPL-MCNC: 8.7 MG/DL — SIGNIFICANT CHANGE UP (ref 8.4–10.5)
CHLORIDE BLDV-SCNC: 102 MMOL/L — SIGNIFICANT CHANGE UP (ref 96–108)
CHLORIDE BLDV-SCNC: 104 MMOL/L — SIGNIFICANT CHANGE UP (ref 96–108)
CHLORIDE SERPL-SCNC: 98 MMOL/L — SIGNIFICANT CHANGE UP (ref 98–107)
CO2 SERPL-SCNC: 23 MMOL/L — SIGNIFICANT CHANGE UP (ref 22–31)
COLOR SPEC: YELLOW — SIGNIFICANT CHANGE UP
CREAT SERPL-MCNC: 1.15 MG/DL — SIGNIFICANT CHANGE UP (ref 0.5–1.3)
EOSINOPHIL # BLD AUTO: 0.07 K/UL — SIGNIFICANT CHANGE UP (ref 0–0.5)
EOSINOPHIL NFR BLD AUTO: 0.7 % — SIGNIFICANT CHANGE UP (ref 0–6)
FLUAV H1 2009 PAND RNA SPEC QL NAA+PROBE: NOT DETECTED — SIGNIFICANT CHANGE UP
FLUAV H1 RNA SPEC QL NAA+PROBE: NOT DETECTED — SIGNIFICANT CHANGE UP
FLUAV H3 RNA SPEC QL NAA+PROBE: NOT DETECTED — SIGNIFICANT CHANGE UP
FLUAV SUBTYP SPEC NAA+PROBE: SIGNIFICANT CHANGE UP
FLUBV RNA SPEC QL NAA+PROBE: NOT DETECTED — SIGNIFICANT CHANGE UP
GAS PNL BLDV: 134 MMOL/L — LOW (ref 136–146)
GAS PNL BLDV: 136 MMOL/L — SIGNIFICANT CHANGE UP (ref 136–146)
GLUCOSE BLDC GLUCOMTR-MCNC: 126 MG/DL — HIGH (ref 70–99)
GLUCOSE BLDV-MCNC: 106 — HIGH (ref 70–99)
GLUCOSE BLDV-MCNC: 117 — HIGH (ref 70–99)
GLUCOSE SERPL-MCNC: 110 MG/DL — HIGH (ref 70–99)
GLUCOSE UR-MCNC: NEGATIVE — SIGNIFICANT CHANGE UP
HADV DNA SPEC QL NAA+PROBE: NOT DETECTED — SIGNIFICANT CHANGE UP
HCO3 BLDV-SCNC: 24 MMOL/L — SIGNIFICANT CHANGE UP (ref 20–27)
HCO3 BLDV-SCNC: 25 MMOL/L — SIGNIFICANT CHANGE UP (ref 20–27)
HCOV 229E RNA SPEC QL NAA+PROBE: NOT DETECTED — SIGNIFICANT CHANGE UP
HCOV HKU1 RNA SPEC QL NAA+PROBE: NOT DETECTED — SIGNIFICANT CHANGE UP
HCOV NL63 RNA SPEC QL NAA+PROBE: NOT DETECTED — SIGNIFICANT CHANGE UP
HCOV OC43 RNA SPEC QL NAA+PROBE: NOT DETECTED — SIGNIFICANT CHANGE UP
HCT VFR BLD CALC: 31.6 % — LOW (ref 39–50)
HCT VFR BLDV CALC: 28.1 % — LOW (ref 39–51)
HCT VFR BLDV CALC: 33.7 % — LOW (ref 39–51)
HGB BLD-MCNC: 10.3 G/DL — LOW (ref 13–17)
HGB BLDV-MCNC: 10.9 G/DL — LOW (ref 13–17)
HGB BLDV-MCNC: 9.1 G/DL — LOW (ref 13–17)
HMPV RNA SPEC QL NAA+PROBE: NOT DETECTED — SIGNIFICANT CHANGE UP
HPIV1 RNA SPEC QL NAA+PROBE: NOT DETECTED — SIGNIFICANT CHANGE UP
HPIV2 RNA SPEC QL NAA+PROBE: NOT DETECTED — SIGNIFICANT CHANGE UP
HPIV3 RNA SPEC QL NAA+PROBE: NOT DETECTED — SIGNIFICANT CHANGE UP
HPIV4 RNA SPEC QL NAA+PROBE: NOT DETECTED — SIGNIFICANT CHANGE UP
IMM GRANULOCYTES # BLD AUTO: 0.04 # — SIGNIFICANT CHANGE UP
IMM GRANULOCYTES NFR BLD AUTO: 0.4 % — SIGNIFICANT CHANGE UP (ref 0–1.5)
INR BLD: 1.18 — HIGH (ref 0.88–1.17)
KETONES UR-MCNC: NEGATIVE — SIGNIFICANT CHANGE UP
LACTATE BLDV-MCNC: 1.9 MMOL/L — SIGNIFICANT CHANGE UP (ref 0.5–2)
LACTATE BLDV-MCNC: 2.2 MMOL/L — HIGH (ref 0.5–2)
LEUKOCYTE ESTERASE UR-ACNC: HIGH
LYMPHOCYTES # BLD AUTO: 1.44 K/UL — SIGNIFICANT CHANGE UP (ref 1–3.3)
LYMPHOCYTES # BLD AUTO: 15.1 % — SIGNIFICANT CHANGE UP (ref 13–44)
M PNEUMO DNA SPEC QL NAA+PROBE: NOT DETECTED — SIGNIFICANT CHANGE UP
MCHC RBC-ENTMCNC: 32.3 PG — SIGNIFICANT CHANGE UP (ref 27–34)
MCHC RBC-ENTMCNC: 32.6 % — SIGNIFICANT CHANGE UP (ref 32–36)
MCV RBC AUTO: 99.1 FL — SIGNIFICANT CHANGE UP (ref 80–100)
MONOCYTES # BLD AUTO: 0.94 K/UL — HIGH (ref 0–0.9)
MONOCYTES NFR BLD AUTO: 9.9 % — SIGNIFICANT CHANGE UP (ref 2–14)
MUCOUS THREADS # UR AUTO: SIGNIFICANT CHANGE UP
NEUTROPHILS # BLD AUTO: 7.03 K/UL — SIGNIFICANT CHANGE UP (ref 1.8–7.4)
NEUTROPHILS NFR BLD AUTO: 73.8 % — SIGNIFICANT CHANGE UP (ref 43–77)
NITRITE UR-MCNC: NEGATIVE — SIGNIFICANT CHANGE UP
NRBC # FLD: 0 — SIGNIFICANT CHANGE UP
NT-PROBNP SERPL-SCNC: SIGNIFICANT CHANGE UP PG/ML
PCO2 BLDV: 47 MMHG — SIGNIFICANT CHANGE UP (ref 41–51)
PCO2 BLDV: 53 MMHG — HIGH (ref 41–51)
PH BLDV: 7.33 PH — SIGNIFICANT CHANGE UP (ref 7.32–7.43)
PH BLDV: 7.37 PH — SIGNIFICANT CHANGE UP (ref 7.32–7.43)
PH UR: 6 — SIGNIFICANT CHANGE UP (ref 4.6–8)
PLATELET # BLD AUTO: 251 K/UL — SIGNIFICANT CHANGE UP (ref 150–400)
PMV BLD: 10.6 FL — SIGNIFICANT CHANGE UP (ref 7–13)
PO2 BLDV: 24 MMHG — LOW (ref 35–40)
PO2 BLDV: < 24 MMHG — LOW (ref 35–40)
POTASSIUM BLDV-SCNC: 4.4 MMOL/L — SIGNIFICANT CHANGE UP (ref 3.4–4.5)
POTASSIUM BLDV-SCNC: 5.2 MMOL/L — HIGH (ref 3.4–4.5)
POTASSIUM SERPL-MCNC: SIGNIFICANT CHANGE UP MMOL/L (ref 3.5–5.3)
POTASSIUM SERPL-SCNC: SIGNIFICANT CHANGE UP MMOL/L (ref 3.5–5.3)
PROT SERPL-MCNC: 6.2 G/DL — SIGNIFICANT CHANGE UP (ref 6–8.3)
PROT UR-MCNC: NEGATIVE — SIGNIFICANT CHANGE UP
PROTHROM AB SERPL-ACNC: 13.3 SEC — HIGH (ref 9.8–13.1)
RBC # BLD: 3.19 M/UL — LOW (ref 4.2–5.8)
RBC # FLD: 15.5 % — HIGH (ref 10.3–14.5)
RBC CASTS # UR COMP ASSIST: SIGNIFICANT CHANGE UP (ref 0–?)
RSV RNA SPEC QL NAA+PROBE: NOT DETECTED — SIGNIFICANT CHANGE UP
RV+EV RNA SPEC QL NAA+PROBE: NOT DETECTED — SIGNIFICANT CHANGE UP
SAO2 % BLDV: 32.6 % — LOW (ref 60–85)
SAO2 % BLDV: 33.1 % — LOW (ref 60–85)
SODIUM SERPL-SCNC: 131 MMOL/L — LOW (ref 135–145)
SP GR SPEC: 1.01 — SIGNIFICANT CHANGE UP (ref 1–1.03)
UROBILINOGEN FLD QL: NORMAL E.U. — SIGNIFICANT CHANGE UP (ref 0.1–0.2)
WBC # BLD: 9.53 K/UL — SIGNIFICANT CHANGE UP (ref 3.8–10.5)
WBC # FLD AUTO: 9.53 K/UL — SIGNIFICANT CHANGE UP (ref 3.8–10.5)
WBC UR QL: HIGH (ref 0–?)

## 2017-12-01 PROCEDURE — 99223 1ST HOSP IP/OBS HIGH 75: CPT | Mod: GC

## 2017-12-01 PROCEDURE — 71010: CPT | Mod: 26

## 2017-12-01 RX ORDER — TAMSULOSIN HYDROCHLORIDE 0.4 MG/1
1 CAPSULE ORAL
Qty: 0 | Refills: 0 | COMMUNITY

## 2017-12-01 RX ORDER — DEXTROSE 50 % IN WATER 50 %
25 SYRINGE (ML) INTRAVENOUS ONCE
Qty: 0 | Refills: 0 | Status: DISCONTINUED | OUTPATIENT
Start: 2017-12-01 | End: 2017-12-05

## 2017-12-01 RX ORDER — ATORVASTATIN CALCIUM 80 MG/1
1 TABLET, FILM COATED ORAL
Qty: 0 | Refills: 0 | COMMUNITY

## 2017-12-01 RX ORDER — HEPARIN SODIUM 5000 [USP'U]/ML
5000 INJECTION INTRAVENOUS; SUBCUTANEOUS EVERY 8 HOURS
Qty: 0 | Refills: 0 | Status: DISCONTINUED | OUTPATIENT
Start: 2017-12-01 | End: 2017-12-02

## 2017-12-01 RX ORDER — TIMOLOL 0.5 %
1 DROPS OPHTHALMIC (EYE)
Qty: 0 | Refills: 0 | COMMUNITY

## 2017-12-01 RX ORDER — TAMSULOSIN HYDROCHLORIDE 0.4 MG/1
0.4 CAPSULE ORAL AT BEDTIME
Qty: 0 | Refills: 0 | Status: DISCONTINUED | OUTPATIENT
Start: 2017-12-01 | End: 2017-12-05

## 2017-12-01 RX ORDER — SUCRALFATE 1 G
1 TABLET ORAL
Qty: 0 | Refills: 0 | Status: DISCONTINUED | OUTPATIENT
Start: 2017-12-01 | End: 2017-12-05

## 2017-12-01 RX ORDER — OMEPRAZOLE 10 MG/1
2 CAPSULE, DELAYED RELEASE ORAL
Qty: 0 | Refills: 0 | COMMUNITY

## 2017-12-01 RX ORDER — GLUCAGON INJECTION, SOLUTION 0.5 MG/.1ML
1 INJECTION, SOLUTION SUBCUTANEOUS ONCE
Qty: 0 | Refills: 0 | Status: DISCONTINUED | OUTPATIENT
Start: 2017-12-01 | End: 2017-12-05

## 2017-12-01 RX ORDER — SODIUM CHLORIDE 9 MG/ML
1000 INJECTION, SOLUTION INTRAVENOUS
Qty: 0 | Refills: 0 | Status: DISCONTINUED | OUTPATIENT
Start: 2017-12-01 | End: 2017-12-05

## 2017-12-01 RX ORDER — ACETAMINOPHEN 500 MG
650 TABLET ORAL ONCE
Qty: 0 | Refills: 0 | Status: COMPLETED | OUTPATIENT
Start: 2017-12-01 | End: 2017-12-01

## 2017-12-01 RX ORDER — PANTOPRAZOLE SODIUM 20 MG/1
40 TABLET, DELAYED RELEASE ORAL
Qty: 0 | Refills: 0 | Status: DISCONTINUED | OUTPATIENT
Start: 2017-12-01 | End: 2017-12-05

## 2017-12-01 RX ORDER — METOPROLOL TARTRATE 50 MG
25 TABLET ORAL
Qty: 0 | Refills: 0 | Status: DISCONTINUED | OUTPATIENT
Start: 2017-12-01 | End: 2017-12-05

## 2017-12-01 RX ORDER — ASPIRIN/CALCIUM CARB/MAGNESIUM 324 MG
81 TABLET ORAL DAILY
Qty: 0 | Refills: 0 | Status: DISCONTINUED | OUTPATIENT
Start: 2017-12-01 | End: 2017-12-05

## 2017-12-01 RX ORDER — INSULIN LISPRO 100/ML
VIAL (ML) SUBCUTANEOUS
Qty: 0 | Refills: 0 | Status: DISCONTINUED | OUTPATIENT
Start: 2017-12-01 | End: 2017-12-05

## 2017-12-01 RX ORDER — INSULIN LISPRO 100/ML
VIAL (ML) SUBCUTANEOUS AT BEDTIME
Qty: 0 | Refills: 0 | Status: DISCONTINUED | OUTPATIENT
Start: 2017-12-01 | End: 2017-12-05

## 2017-12-01 RX ORDER — LANOLIN ALCOHOL/MO/W.PET/CERES
5 CREAM (GRAM) TOPICAL AT BEDTIME
Qty: 0 | Refills: 0 | Status: DISCONTINUED | OUTPATIENT
Start: 2017-12-01 | End: 2017-12-02

## 2017-12-01 RX ORDER — BRIMONIDINE TARTRATE 2 MG/MG
1 SOLUTION/ DROPS OPHTHALMIC THREE TIMES A DAY
Qty: 0 | Refills: 0 | Status: DISCONTINUED | OUTPATIENT
Start: 2017-12-01 | End: 2017-12-05

## 2017-12-01 RX ORDER — SUCRALFATE 1 G
10 TABLET ORAL
Qty: 0 | Refills: 0 | COMMUNITY

## 2017-12-01 RX ORDER — LANOLIN ALCOHOL/MO/W.PET/CERES
1 CREAM (GRAM) TOPICAL
Qty: 0 | Refills: 0 | COMMUNITY

## 2017-12-01 RX ORDER — BRIMONIDINE TARTRATE 2 MG/MG
1 SOLUTION/ DROPS OPHTHALMIC
Qty: 0 | Refills: 0 | COMMUNITY

## 2017-12-01 RX ORDER — SODIUM CHLORIDE 9 MG/ML
500 INJECTION INTRAMUSCULAR; INTRAVENOUS; SUBCUTANEOUS ONCE
Qty: 0 | Refills: 0 | Status: COMPLETED | OUTPATIENT
Start: 2017-12-01 | End: 2017-12-01

## 2017-12-01 RX ORDER — DEXTROSE 50 % IN WATER 50 %
12.5 SYRINGE (ML) INTRAVENOUS ONCE
Qty: 0 | Refills: 0 | Status: DISCONTINUED | OUTPATIENT
Start: 2017-12-01 | End: 2017-12-05

## 2017-12-01 RX ORDER — TIMOLOL 0.5 %
1 DROPS OPHTHALMIC (EYE)
Qty: 0 | Refills: 0 | Status: DISCONTINUED | OUTPATIENT
Start: 2017-12-01 | End: 2017-12-05

## 2017-12-01 RX ORDER — DEXTROSE 50 % IN WATER 50 %
1 SYRINGE (ML) INTRAVENOUS ONCE
Qty: 0 | Refills: 0 | Status: DISCONTINUED | OUTPATIENT
Start: 2017-12-01 | End: 2017-12-05

## 2017-12-01 RX ORDER — ACETAMINOPHEN 500 MG
2 TABLET ORAL
Qty: 0 | Refills: 0 | COMMUNITY

## 2017-12-01 RX ORDER — IPRATROPIUM/ALBUTEROL SULFATE 18-103MCG
3 AEROSOL WITH ADAPTER (GRAM) INHALATION ONCE
Qty: 0 | Refills: 0 | Status: COMPLETED | OUTPATIENT
Start: 2017-12-01 | End: 2017-12-01

## 2017-12-01 RX ORDER — HYDRALAZINE HCL 50 MG
50 TABLET ORAL
Qty: 0 | Refills: 0 | Status: DISCONTINUED | OUTPATIENT
Start: 2017-12-01 | End: 2017-12-02

## 2017-12-01 RX ORDER — IPRATROPIUM/ALBUTEROL SULFATE 18-103MCG
3 AEROSOL WITH ADAPTER (GRAM) INHALATION EVERY 6 HOURS
Qty: 0 | Refills: 0 | Status: DISCONTINUED | OUTPATIENT
Start: 2017-12-01 | End: 2017-12-05

## 2017-12-01 RX ORDER — METOPROLOL TARTRATE 50 MG
1 TABLET ORAL
Qty: 0 | Refills: 0 | COMMUNITY

## 2017-12-01 RX ORDER — FUROSEMIDE 40 MG
20 TABLET ORAL DAILY
Qty: 0 | Refills: 0 | Status: DISCONTINUED | OUTPATIENT
Start: 2017-12-01 | End: 2017-12-02

## 2017-12-01 RX ORDER — ACETAMINOPHEN 500 MG
650 TABLET ORAL EVERY 6 HOURS
Qty: 0 | Refills: 0 | Status: DISCONTINUED | OUTPATIENT
Start: 2017-12-01 | End: 2017-12-05

## 2017-12-01 RX ORDER — ALBUTEROL 90 UG/1
3 AEROSOL, METERED ORAL
Qty: 0 | Refills: 0 | COMMUNITY

## 2017-12-01 RX ORDER — ASPIRIN/CALCIUM CARB/MAGNESIUM 324 MG
1 TABLET ORAL
Qty: 0 | Refills: 0 | COMMUNITY

## 2017-12-01 RX ADMIN — Medication 650 MILLIGRAM(S): at 18:45

## 2017-12-01 RX ADMIN — Medication 650 MILLIGRAM(S): at 19:48

## 2017-12-01 RX ADMIN — Medication 650 MILLIGRAM(S): at 23:35

## 2017-12-01 RX ADMIN — Medication 1 GRAM(S): at 23:34

## 2017-12-01 RX ADMIN — SODIUM CHLORIDE 500 MILLILITER(S): 9 INJECTION INTRAMUSCULAR; INTRAVENOUS; SUBCUTANEOUS at 16:56

## 2017-12-01 RX ADMIN — Medication 3 MILLILITER(S): at 17:28

## 2017-12-01 NOTE — ED PROVIDER NOTE - ATTENDING CONTRIBUTION TO CARE
89yo M PMH: HTN, DM, CKD, PAD s/p pacer presenting p/w cough. Pt has hx of bronchitis, cough for weeks. Evaluated by ED SW while in ED and recommending subacute rehab  On exam awake & alert, mild distress, PERRL, lungs diffuse wheeze, dyspnea, RRR, abdomen soft NT/ND, 1+ pitting edema b/l LE, no calf tenderness, small superficial ulcer b/l LE, 2+ pulses b/l, neuro A&Ox3, no focal deficits, skin warm and dry

## 2017-12-01 NOTE — H&P ADULT - PROBLEM SELECTOR PLAN 4
- Continue PPI, and Carafate Ha1c 6.5 09/17  - ISS and FS  - Holding home metformin Ha1c 6.5 09/17  - ISS and FS  - Holding home metformin due to HF

## 2017-12-01 NOTE — H&P ADULT - PROBLEM SELECTOR PLAN 1
RVP negative.   - Symptomatic treatment with duoneb treatments q6hrs  - Vital signs per routine, assess for fevers bilateral pleural effusions on CXR  - Will obtain CT Chest  - Will do cardiac work up to rule out cardiac etiology: Echo, cardiac enzymes, EKG  - Concern for malignancy given previous smoking history  - TSH pending bilateral pleural effusions on CXR, likely cardiogenic etiology given no fever and no leukocytosis;   -Hold off aggressive workup given MOLST form wishes;   -Reach out to HCP in am to confirm comfort care measures   -Hold of on Abx

## 2017-12-01 NOTE — PROVIDER CONTACT NOTE (OTHER) - ASSESSMENT
Notified by [facility] that patient is on their program.  Patient was recently approved for [facility] with service to begin today, but there was a problem with it.    [facility] ROYAL Ball  ([phone]) to look into what happened and try and straighten it out.  Patient may also benefit from STR, pending PT eval.    SW to follow.  [facility] will follow-up with unit SW and CM with updated information.

## 2017-12-01 NOTE — H&P ADULT - PROBLEM SELECTOR PLAN 6
- Nutrition team consulted   - Fall and aspiration precautions   - PT consulted   - Replete electrolytes daily - Continue hydralazine

## 2017-12-01 NOTE — H&P ADULT - PROBLEM SELECTOR PROBLEM 3
Type 2 diabetes mellitus with stage 3 chronic kidney disease, without long-term current use of insulin R/O Heart failure with normal ejection fraction Heart failure with normal ejection fraction

## 2017-12-01 NOTE — H&P ADULT - PROBLEM SELECTOR PLAN 9
DVT ppx: heparin SQ  Diet: DASH, CC  Dispo: Case management assistance for home health aid or other placement options

## 2017-12-01 NOTE — H&P ADULT - PROBLEM SELECTOR PLAN 2
- Will continue furosemide 20mg qd   - Will be difficult to assess urine output as patient is incontinent, will check daily weights 2/2 dehydration vs possible malignancy?  s/p 500 cc bolus   - Will encourage PO intake   - Repeat Na in AM Likely multifactorial 2/2 Lasix regimen and possible malignancy;   s/p 500 cc bolus   - Will encourage PO intake   - Repeat Na in AM  - Hold additional IV fluids due to pleural effusion and acute HF.

## 2017-12-01 NOTE — H&P ADULT - PROBLEM SELECTOR PLAN 7
DVT ppx: heparin SQ  Diet: DASH, CC  Dispo: Case management assistance for home health aid or other placement options - Nutrition team consulted   - Fall and aspiration precautions   - PT consulted   - Replete electrolytes daily

## 2017-12-01 NOTE — H&P ADULT - PROBLEM SELECTOR PLAN 3
Ha1c 6.5 09/17  - ISS and FS  - Holding home metformin Previous echo this year showed EF 65%  - Will repeat echo  - Will continue home dose of lasix 20mg qd  - Continue aspirin and metoprolol Previous echo this year showed EF 65%  - Hold off on TTE  - Hold off aggressive workup given MOLST form wishes;   - Reach out to HCP in am to confirm comfort care measures   - Will continue home dose of lasix 20mg qd  - Continue aspirin and metoprolol Previous echo this year, June, 2017, showed EF 65%  - Hold off on TTE  - Hold off aggressive workup given MOLST form wishes;   - Reach out to HCP in am to confirm comfort care measures   - Will continue home dose of lasix 20mg qd  - Continue aspirin and metoprolol

## 2017-12-01 NOTE — H&P ADULT - NSHPSOCIALHISTORY_GEN_ALL_CORE
Former smoker- 1ppd for many years   Social alcohol use   No drug use   Lives alone with 24hr home health aid

## 2017-12-01 NOTE — H&P ADULT - NSHPPHYSICALEXAM_GEN_ALL_CORE
PHYSICAL EXAM:  GENERAL: NAD, cachectic  HEAD:  Atraumatic, Normocephalic  EYES: EOMI, PERRLA, conjunctiva and sclera clear  ENMT: No tonsillar erythema, exudates, or enlargement; Moist mucous membranes, Good dentition, No lesions  NECK: Supple, No JVD, Normal thyroid  CHEST/LUNG: Clear to percussion bilaterally; No rales, rhonchi, wheezing, or rubs  HEART: Regular rate and rhythm; No murmurs, rubs, or gallops  ABDOMEN: Soft, Nontender, Nondistended; Bowel sounds present  EXTREMITIES:  2+ Peripheral Pulses, No clubbing, cyanosis, or edema  LYMPH: No lymphadenopathy noted  SKIN: No rashes or lesions  NERVOUS SYSTEM:  Alert & Oriented X3, Good concentration; Motor Strength 5/5 B/L upper and lower extremities; DTRs 2+ intact and symmetric PHYSICAL EXAM:  GENERAL: NAD, cachectic  HEAD:  Atraumatic, Normocephalic  EYES: EOMI, PERRLA, clear conjunctiva   ENMT: No tonsillar erythema, exudates, or enlargement; Moist mucous membranes  CHEST/LUNG: Coarse breath sounds, expiratory wheezes diffusely   HEART: Regular rate and rhythm; No murmurs, rubs, or gallops  ABDOMEN: Soft, Nontender, Nondistended; Bowel sounds present  EXTREMITIES:  2+ Peripheral Pulses, No clubbing, cyanosis, or edema  SKIN: L shin ulcer- wound care   NERVOUS SYSTEM:  Good concentration, bed bound PHYSICAL EXAM:  GENERAL: NAD, cachectic  HEAD:  Atraumatic, Normocephalic  EYES: EOMI, PERRLA, clear conjunctiva   ENMT: No tonsillar erythema, exudates, or enlargement; Moist mucous membranes  CHEST/LUNG: Coarse breath sounds, expiratory wheezes diffusely   HEART: Regular rate and rhythm; No murmurs, rubs, or gallops  ABDOMEN: Soft, Nontender, Nondistended; Bowel sounds present  EXTREMITIES:  2+ Peripheral Pulses, No clubbing, cyanosis, or edema  SKIN: Lt shin ulcer- wound care   NERVOUS SYSTEM:  Good concentration, AOx2, not lethargic, follows commands, grossly 5/5 flexion-extension b/l UE and 3/5 b/l LE , bed bound

## 2017-12-01 NOTE — PATIENT PROFILE ADULT. - MEDICATION, ABILITY TO FOLLOW SCHEDULE, PROFILE
lack of knowledge regarding managing health concern/lack of knowledge regarding medication purpose/inability to prepare and administer dose correctly

## 2017-12-01 NOTE — H&P ADULT - NSHPLABSRESULTS_GEN_ALL_CORE
10.3   9.53  )-----------( 251      ( 01 Dec 2017 16:39 )             31.6     12-01    131<L>  |  98  |  31<H>  ----------------------------<  110<H>  Test not performed SPECIMEN GROSSLY HEMOLYZED   |  23  |  1.15    Ca    8.7      01 Dec 2017 16:39    TPro  6.2  /  Alb  2.7<L>  /  TBili  0.4  /  DBili  x   /  AST  76<H>  /  ALT  19  /  AlkPhos  49  12-01    < from: Xray Chest 1 View AP/PA (12.01.17 @ 17:59) >      INTERPRETATION:  b/l pelural effusions    < end of copied text > EKG, V-paced rhythm, 60bpm - my reading     10.3   9.53  )-----------( 251      ( 01 Dec 2017 16:39 )             31.6     12-01    131<L>  |  98  |  31<H>  ----------------------------<  110<H>  Test not performed SPECIMEN GROSSLY HEMOLYZED   |  23  |  1.15    Ca    8.7      01 Dec 2017 16:39    TPro  6.2  /  Alb  2.7<L>  /  TBili  0.4  /  DBili  x   /  AST  76<H>  /  ALT  19  /  AlkPhos  49  12-01    < from: Xray Chest 1 View AP/PA (12.01.17 @ 17:59) >      INTERPRETATION:  b/l pelural effusions - my reading     < end of copied text > EKG, V-paced rhythm, 60bpm - my reading     10.3   9.53  )-----------( 251      ( 01 Dec 2017 16:39 )             31.6     12-01    131<L>  |  98  |  31<H>  ----------------------------<  110<H>  Test not performed SPECIMEN GROSSLY HEMOLYZED   |  23  |  1.15    Ca    8.7      01 Dec 2017 16:39    TPro  6.2  /  Alb  2.7<L>  /  TBili  0.4  /  DBili  x   /  AST  76<H>  /  ALT  19  /  AlkPhos  49  12-01    < from: Xray Chest 1 View AP/PA (12.01.17 @ 17:59) >      INTERPRETATION:  b/l pelural effusions - my reading     < end of copied text >    TTE, 6/22/17:  Ejection Fraction (Teicholtz): 65 %  OBSERVATIONS:  Mitral Valve: Mitral annular calcification and calcified  mitral leaflets with normal diastolic opening. Minimal  mitral regurgitation.  Aortic Root: Normal aortic root.  Aortic Valve: Calcified trileaflet aortic valve with normal  opening. Mild aortic regurgitation.  Left Atrium: Dilated left atrium.  Left Ventricle: Endocardium not well visualized; grossly  normal left ventricular systolic function. Normal left  ventricular internal dimensions and wall thicknesses.  Right Heart: Normal right atrium. Normal right ventricular  size and function. A device wire is noted in the right  heart. Normal tricuspid valve.  Mild tricuspid  regurgitation. Normal pulmonic valve. Minimal pulmonic  regurgitation.  Pericardium/PleuraNormal pericardium with no pericardial  effusion.  Hemodynamic: Estimated right ventricular systolic pressure  equals 47 mm Hg, assuming right atrial pressure equals 10  mm Hg, consistent with mild pulmonary hypertension.  CONCLUSIONS:  1. Mitral annular calcification and calcified mitral  leaflets with normal diastolic opening. Minimal mitral  regurgitation.  2. Calcified trileaflet aortic valve with normal opening.  Mild aortic regurgitation.  3. Dilated left atrium.  4. Normal left ventricular internal dimensions and wall  thicknesses.  5. Normal right ventricular size and function. A device  wire is noted in the right heart.  6. Estimated pulmonary artery systolic pressure equals 47  mm Hg, assuming right atrial pressure equals 10  mm Hg,  consistent with mild pulmonary hypertension.  *** No previous Echo exam.

## 2017-12-01 NOTE — ED ADULT TRIAGE NOTE - CHIEF COMPLAINT QUOTE
pt arrives from home. reported cough.  ems called by neighbors who are health care proxies. 2 hha's at house. pt alert,oriented person,place. denies c/o pain. c/o continued cough,denies fever. productive cough  noted pt arrives from home. reported cough.  ems called by neighbors who are health care proxies. 2 hha's at house. pt alert,oriented person,place. denies c/o pain. c/o continued cough,denies fever. productive cough  noted. ems reports pt awaiting medicaid " today last day for hha" pt arrives from home. reported cough.  ems called by neighbors who are health care proxies. 2 hha's at house. pt alert,oriented person,place. denies c/o pain. c/o continued cough,denies fever. productive cough  noted. ems reports pt awaiting medicaid " today last day for hha"  case management  made aware of pt in ed

## 2017-12-01 NOTE — H&P ADULT - HISTORY OF PRESENT ILLNESS
89yo Male with hx of HTN, DMT2, PAD s/p fempop bypass, CKD, CAD, s/p PPM presenting with cough. Patient reports he has been having this cough for a long time. Associated with light yellow sputum production. Reports he occasionally has lower extremity swelling. Denies PND, orthopnea, fevers, chills, nausea, vomiting, URI symptoms, chest pain, SOB, abdominal pain, diarrhea, dysuria. Patient reports he is bed bound and today was the last day of his home health care services. His health care proxy brought him to the hospital.     In the ED, Tmax 99.2 HR 60 /64 RR 20 SpO2 99 on 2L NC. Patient was given acetaminophen, 500 cc bolus, and Duoneb treatment 89yo Male, bedbound, with hx of HTN, DM Type 2, PAD s/p fempop bypass, CKD, CAD, s/p PPM presenting with cough. Patient reports he has been having this cough for a long time. Associated with light yellow sputum production. Reports he occasionally has lower extremity swelling. Denies PND, orthopnea, fevers, chills, nausea, vomiting, URI symptoms, chest pain, SOB, abdominal pain, diarrhea, dysuria. Patient reports he is bed bound and today was the last day of his home health care services. His neighbor, Mr. Nishant Cheung (has a power of , HCP per MOLST)  brought him to the hospital.     Original MOLST form included in the chart, signed 10/6/2017; Sections selected: HCP, DNR, Comfort measures only, DNI, Do not send to hospital unless severe pain or severe symptoms, No feeding tube, No IV fluids, Limited abx use;    In the ED, Tmax 99.2 HR 60 /64 RR 20 SpO2 99 on 2L NC. Patient was given acetaminophen, 500 cc bolus, and Duoneb treatment 89yo Male, bedbound, with hx of HTN, DM Type 2, PAD s/p fempop bypass, CKD, CAD, s/p PPM presenting with cough. Patient reports he has been having this cough for a long time. Associated with light yellow sputum production. Reports he occasionally has lower extremity swelling. Denies PND, orthopnea, fevers, chills, nausea, vomiting, URI symptoms, chest pain, SOB, abdominal pain, diarrhea, dysuria. Patient reports he is bed bound and today was the last day of his home health care services. His neighbor, Mr. Nishant Cheung (has a power of , HCP per MOLST)  brought him to the hospital.  Original MOLST form included in the chart, signed 10/6/2017; Sections selected: HCP, DNR, Comfort measures only, DNI, Do not send to hospital unless severe pain or severe symptoms, No feeding tube, No IV fluids, Limited abx use;    In the ED, Tmax 99.2 HR 60 /64 RR 20 SpO2 99 on 2L NC. Patient was given acetaminophen, 500 cc bolus, and Duoneb treatment 89yo Male, bedbound, with hx of HTN, DM Type 2, PAD s/p fempop bypass, CKD, CAD, s/p PPM, admission in September, 2017 for acute decompensated HF with preserved EF, presenting with cough. Patient reports he has been having this cough for a long time. Associated with light yellow sputum production. Reports he occasionally has lower extremity swelling. Denies PND, orthopnea, fevers, chills, nausea, vomiting, URI symptoms, chest pain, SOB, abdominal pain, diarrhea, dysuria. Patient reports he is bed bound and today was the last day of his home health care services. His neighbor, Mr. Nishant Cheung (has a power of , HCP per MOLST)  brought him to the hospital.  Original MOLST form included in the chart, signed 10/6/2017; Sections selected: HCP, DNR, Comfort measures only, DNI, Do not send to hospital unless severe pain or severe symptoms, No feeding tube, No IV fluids, Limited abx use;    In the ED, Tmax 99.2 HR 60 /64 RR 20 SpO2 99 on 2L NC. Patient was given acetaminophen, 500 cc bolus, and Duoneb treatment

## 2017-12-01 NOTE — ED PROVIDER NOTE - OBJECTIVE STATEMENT
Pt is a 89M HTN DM CKD PAD s/p pacer presenting with a cc of cough. Pt has hx of bronchitis, cough for weeks. Limited hx from pt. Per pt healthcare proxy Ena (see pt info) pt currently lives at home w/ x2 HHA, unable to ambulate at baseline, bowel incontinence, needs help with all ADLs, about to lose insurance, pt will require placement. Per proxy denies recent n/v/f/c/cp/. Denies syncope, lightheadedness, Denies chest palpitations, abdominal pain. Denies dysuria, hematuria, hematochezia, BRBPR, tarry stools, diarrhea, constipation.

## 2017-12-01 NOTE — H&P ADULT - ASSESSMENT
89yo Male with hx of HTN, DMT2, PAD s/p fempop bypass, CKD, CAD, s/p PPM presenting with cough likely due to diastolic heart failure with superimposed viral infection 89yo Male with hx of HTN, DMT2, PAD s/p fempop bypass, CKD, CAD, s/p PPM presenting with bilateral pleural effusions 89yo Male, bedbound, with hx of HTN, DM Type 2, PAD s/p fempop bypass, CKD, CAD, s/p PPM a/w likely preserved EF acute on chronic HF c/b bilateral pleural effusions, Rt >Lt, multifactorial hyponatremia; Found to be dehydrated;

## 2017-12-01 NOTE — ED PROVIDER NOTE - MEDICAL DECISION MAKING DETAILS
89M w/ a hx of HTN DM CKD PAD s/p pacer presenting with cough hx of bronchitis, pt insurance expiring SW on board, requires subacute placement, unable to ambulate perform ADLs independently, on exam vss, diffuse wheeze, appears dry, will get labs cxr urine, give duonebs, reassess, likely social admit for sub acute placement

## 2017-12-01 NOTE — H&P ADULT - NSHPREVIEWOFSYSTEMS_GEN_ALL_CORE
REVIEW OF SYSTEMS:  CONSTITUTIONAL: No fever  EYES: chronic visual disturbance  ENMT: ; No sinus or throat pain  RESPIRATORY: No shortness of breath  CARDIOVASCULAR: No chest pain, palpitations  GASTROINTESTINAL: No abdominal or epigastric pain. No nausea, vomiting, or hematemesis; No diarrhea or constipation. No melena or hematochezia.  GENITOURINARY: incontinence  NEUROLOGICAL: No headaches  SKIN: back ulcers  MUSCULOSKELETAL: hip pain  HEME/LYMPH: No easy bruising, or bleeding gums REVIEW OF SYSTEMS:  CONSTITUTIONAL: No fever, no chills  EYES: chronic visual disturbance  ENMT; No sinus or throat pain  RESPIRATORY: No shortness of breath  CARDIOVASCULAR: No chest pain, palpitations  GASTROINTESTINAL: No abdominal or epigastric pain. No nausea, vomiting, or hematemesis; No diarrhea or constipation. No melena or hematochezia.  GENITOURINARY: incontinence  NEUROLOGICAL: No headaches  SKIN: back ulcers  MUSCULOSKELETAL: hip pain  HEME/LYMPH: No easy bruising, or bleeding gums

## 2017-12-01 NOTE — ED ADULT NURSE NOTE - CHIEF COMPLAINT QUOTE
pt arrives from home. reported cough.  ems called by neighbors who are health care proxies. 2 hha's at house. pt alert,oriented person,place. denies c/o pain. c/o continued cough,denies fever. productive cough  noted. ems reports pt awaiting medicaid " today last day for hha"  case management  made aware of pt in ed

## 2017-12-01 NOTE — PATIENT PROFILE ADULT. - FALL HARM RISK
other/hx of fall/age(85 years old or older)/coagulation(Bleeding disorder R/T clinical cond/anti-coags)

## 2017-12-01 NOTE — H&P ADULT - PROBLEM SELECTOR PLAN 8
DVT ppx: heparin SQ  Diet: DASH, CC  Dispo: Case management assistance for home health aid or other placement options Multiple pressure ulcers as above  -wound care

## 2017-12-01 NOTE — ED PROVIDER NOTE - CPE EDP GASTRO NORM
From: Hans Villegas  Sent: 7/25/2017 9:46 AM CDT  Subject: Medication Renewal Request    Hans Villegas would like a refill of the following medications:  GLIMEPIRIDE 2 MG Oral Tab Alexa Handley MD]    Preferred pharmacy: [address] [place] [address] 0 normal...

## 2017-12-01 NOTE — ED ADULT NURSE NOTE - OBJECTIVE STATEMENT
pt AO x2-3, unable to ambulate, c/o SOB with productive cough since this morning. Denies any pain at this time. Afebrile on arrival. Denies chest pain, dizziness and n/v at this time. VSS with 2L O2 supply. Found pressure ulcer on his sacrum area. stage 1, 5x6cm and stage 3 on two spots 1x1 each. on his left heel was wrapped but pt didn't know the reason. Pending evaluation by provider. cardiac monitor in place. Awaiting further study. will continue to monitor. pt AO x2, unable to ambulate, c/o SOB with productive cough since this morning. Denies any pain at this time. Afebrile on arrival. Denies chest pain, dizziness and n/v at this time. VSS with 2L O2 supply. Found pressure ulcer on his sacrum area. stage 1, 5x6cm and stage 3 on two spots 1x1 each. on his left heel was wrapped but pt didn't know the reason. Pending evaluation by provider. cardiac monitor in place. Awaiting further study. will continue to monitor.

## 2017-12-02 DIAGNOSIS — I50.30 UNSPECIFIED DIASTOLIC (CONGESTIVE) HEART FAILURE: ICD-10-CM

## 2017-12-02 DIAGNOSIS — L89.153 PRESSURE ULCER OF SACRAL REGION, STAGE 3: ICD-10-CM

## 2017-12-02 LAB
BUN SERPL-MCNC: 31 MG/DL — HIGH (ref 7–23)
CALCIUM SERPL-MCNC: 8.4 MG/DL — SIGNIFICANT CHANGE UP (ref 8.4–10.5)
CHLORIDE SERPL-SCNC: 102 MMOL/L — SIGNIFICANT CHANGE UP (ref 98–107)
CK MB BLD-MCNC: 2.48 NG/ML — SIGNIFICANT CHANGE UP (ref 1–6.6)
CK MB BLD-MCNC: 2.58 NG/ML — SIGNIFICANT CHANGE UP (ref 1–6.6)
CK MB BLD-MCNC: 2.76 NG/ML — SIGNIFICANT CHANGE UP (ref 1–6.6)
CK SERPL-CCNC: 130 U/L — SIGNIFICANT CHANGE UP (ref 30–200)
CK SERPL-CCNC: 219 U/L — HIGH (ref 30–200)
CK SERPL-CCNC: 41 U/L — SIGNIFICANT CHANGE UP (ref 30–200)
CO2 SERPL-SCNC: 24 MMOL/L — SIGNIFICANT CHANGE UP (ref 22–31)
CREAT SERPL-MCNC: 1.13 MG/DL — SIGNIFICANT CHANGE UP (ref 0.5–1.3)
GLUCOSE BLDC GLUCOMTR-MCNC: 100 MG/DL — HIGH (ref 70–99)
GLUCOSE BLDC GLUCOMTR-MCNC: 123 MG/DL — HIGH (ref 70–99)
GLUCOSE BLDC GLUCOMTR-MCNC: 126 MG/DL — HIGH (ref 70–99)
GLUCOSE BLDC GLUCOMTR-MCNC: 151 MG/DL — HIGH (ref 70–99)
GLUCOSE SERPL-MCNC: 101 MG/DL — HIGH (ref 70–99)
HCT VFR BLD CALC: 27.5 % — LOW (ref 39–50)
HGB BLD-MCNC: 9.3 G/DL — LOW (ref 13–17)
MAGNESIUM SERPL-MCNC: 1.3 MG/DL — LOW (ref 1.6–2.6)
MCHC RBC-ENTMCNC: 33.5 PG — SIGNIFICANT CHANGE UP (ref 27–34)
MCHC RBC-ENTMCNC: 33.8 % — SIGNIFICANT CHANGE UP (ref 32–36)
MCV RBC AUTO: 98.9 FL — SIGNIFICANT CHANGE UP (ref 80–100)
NRBC # FLD: 0 — SIGNIFICANT CHANGE UP
NT-PROBNP SERPL-SCNC: SIGNIFICANT CHANGE UP PG/ML
PHOSPHATE SERPL-MCNC: 3 MG/DL — SIGNIFICANT CHANGE UP (ref 2.5–4.5)
PLATELET # BLD AUTO: 194 K/UL — SIGNIFICANT CHANGE UP (ref 150–400)
PMV BLD: 11 FL — SIGNIFICANT CHANGE UP (ref 7–13)
POTASSIUM SERPL-MCNC: 3.4 MMOL/L — LOW (ref 3.5–5.3)
POTASSIUM SERPL-SCNC: 3.4 MMOL/L — LOW (ref 3.5–5.3)
RBC # BLD: 2.78 M/UL — LOW (ref 4.2–5.8)
RBC # FLD: 15.5 % — HIGH (ref 10.3–14.5)
SODIUM SERPL-SCNC: 139 MMOL/L — SIGNIFICANT CHANGE UP (ref 135–145)
TROPONIN T SERPL-MCNC: 0.08 NG/ML — HIGH (ref 0–0.06)
TROPONIN T SERPL-MCNC: 0.08 NG/ML — HIGH (ref 0–0.06)
TROPONIN T SERPL-MCNC: < 0.06 NG/ML — SIGNIFICANT CHANGE UP (ref 0–0.06)
TSH SERPL-MCNC: 3.63 UIU/ML — SIGNIFICANT CHANGE UP (ref 0.27–4.2)
WBC # BLD: 6.16 K/UL — SIGNIFICANT CHANGE UP (ref 3.8–10.5)
WBC # FLD AUTO: 6.16 K/UL — SIGNIFICANT CHANGE UP (ref 3.8–10.5)

## 2017-12-02 PROCEDURE — 99222 1ST HOSP IP/OBS MODERATE 55: CPT | Mod: GC

## 2017-12-02 PROCEDURE — 99232 SBSQ HOSP IP/OBS MODERATE 35: CPT

## 2017-12-02 PROCEDURE — 93010 ELECTROCARDIOGRAM REPORT: CPT

## 2017-12-02 RX ORDER — LANOLIN ALCOHOL/MO/W.PET/CERES
4.5 CREAM (GRAM) TOPICAL AT BEDTIME
Qty: 0 | Refills: 0 | Status: DISCONTINUED | OUTPATIENT
Start: 2017-12-02 | End: 2017-12-05

## 2017-12-02 RX ORDER — AMLODIPINE BESYLATE 2.5 MG/1
2.5 TABLET ORAL DAILY
Qty: 0 | Refills: 0 | Status: DISCONTINUED | OUTPATIENT
Start: 2017-12-02 | End: 2017-12-05

## 2017-12-02 RX ORDER — POTASSIUM CHLORIDE 20 MEQ
20 PACKET (EA) ORAL ONCE
Qty: 0 | Refills: 0 | Status: COMPLETED | OUTPATIENT
Start: 2017-12-02 | End: 2017-12-02

## 2017-12-02 RX ORDER — IPRATROPIUM/ALBUTEROL SULFATE 18-103MCG
3 AEROSOL WITH ADAPTER (GRAM) INHALATION ONCE
Qty: 0 | Refills: 0 | Status: COMPLETED | OUTPATIENT
Start: 2017-12-02 | End: 2017-12-02

## 2017-12-02 RX ORDER — MAGNESIUM SULFATE 500 MG/ML
1 VIAL (ML) INJECTION ONCE
Qty: 0 | Refills: 0 | Status: COMPLETED | OUTPATIENT
Start: 2017-12-02 | End: 2017-12-02

## 2017-12-02 RX ADMIN — Medication 20 MILLIGRAM(S): at 05:30

## 2017-12-02 RX ADMIN — Medication 1 DROP(S): at 18:27

## 2017-12-02 RX ADMIN — Medication 4.5 MILLIGRAM(S): at 00:49

## 2017-12-02 RX ADMIN — TAMSULOSIN HYDROCHLORIDE 0.4 MILLIGRAM(S): 0.4 CAPSULE ORAL at 21:25

## 2017-12-02 RX ADMIN — BRIMONIDINE TARTRATE 1 DROP(S): 2 SOLUTION/ DROPS OPHTHALMIC at 11:41

## 2017-12-02 RX ADMIN — BRIMONIDINE TARTRATE 1 DROP(S): 2 SOLUTION/ DROPS OPHTHALMIC at 05:29

## 2017-12-02 RX ADMIN — Medication 3 MILLILITER(S): at 19:07

## 2017-12-02 RX ADMIN — Medication 3 MILLILITER(S): at 22:22

## 2017-12-02 RX ADMIN — Medication 1 GRAM(S): at 05:30

## 2017-12-02 RX ADMIN — BRIMONIDINE TARTRATE 1 DROP(S): 2 SOLUTION/ DROPS OPHTHALMIC at 21:25

## 2017-12-02 RX ADMIN — Medication 20 MILLIEQUIVALENT(S): at 11:40

## 2017-12-02 RX ADMIN — Medication 25 MILLIGRAM(S): at 05:29

## 2017-12-02 RX ADMIN — Medication 50 MILLIGRAM(S): at 05:29

## 2017-12-02 RX ADMIN — Medication 650 MILLIGRAM(S): at 00:35

## 2017-12-02 RX ADMIN — PANTOPRAZOLE SODIUM 40 MILLIGRAM(S): 20 TABLET, DELAYED RELEASE ORAL at 05:29

## 2017-12-02 RX ADMIN — Medication 25 MILLIGRAM(S): at 18:27

## 2017-12-02 RX ADMIN — Medication 1 GRAM(S): at 18:27

## 2017-12-02 RX ADMIN — Medication 81 MILLIGRAM(S): at 11:40

## 2017-12-02 RX ADMIN — Medication 1 GRAM(S): at 11:40

## 2017-12-02 RX ADMIN — Medication 3 MILLILITER(S): at 04:31

## 2017-12-02 RX ADMIN — Medication 1 DROP(S): at 05:29

## 2017-12-02 RX ADMIN — Medication 100 GRAM(S): at 11:40

## 2017-12-02 NOTE — PHYSICAL THERAPY INITIAL EVALUATION ADULT - ADDITIONAL COMMENTS
Patient reports he lives alone in a private home. Pt reports he previously received 24 hour care from 2 home health aides. Patient reports he has been bedbound "for a while now". Patient requires assistance with all ADLs.

## 2017-12-02 NOTE — PROGRESS NOTE ADULT - SUBJECTIVE AND OBJECTIVE BOX
Chief Complaint: Patient is a 89y old  Male who presents with a chief complaint of Cough (01 Dec 2017 23:05)      INTERVAL HPI/OVERNIGHT EVENTS:   No events overnight      MEDICATIONS  (STANDING):  ALBUTerol/ipratropium for Nebulization 3 milliLiter(s) Nebulizer every 6 hours  ALBUTerol/ipratropium for Nebulization. 3 milliLiter(s) Nebulizer once  aspirin enteric coated 81 milliGRAM(s) Oral daily  brimonidine 0.2% Ophthalmic Solution 1 Drop(s) Both EYES three times a day  dextrose 5%. 1000 milliLiter(s) (50 mL/Hr) IV Continuous <Continuous>  dextrose 50% Injectable 12.5 Gram(s) IV Push once  dextrose 50% Injectable 25 Gram(s) IV Push once  dextrose 50% Injectable 25 Gram(s) IV Push once  furosemide    Tablet 20 milliGRAM(s) Oral daily  hydrALAZINE 50 milliGRAM(s) Oral two times a day  insulin lispro (HumaLOG) corrective regimen sliding scale   SubCutaneous three times a day before meals  insulin lispro (HumaLOG) corrective regimen sliding scale   SubCutaneous at bedtime  metoprolol     tartrate 25 milliGRAM(s) Oral two times a day  pantoprazole    Tablet 40 milliGRAM(s) Oral before breakfast  sucralfate suspension 1 Gram(s) Oral four times a day  tamsulosin 0.4 milliGRAM(s) Oral at bedtime  timolol 0.5% Solution 1 Drop(s) Both EYES two times a day    MEDICATIONS  (PRN):  acetaminophen   Tablet. 650 milliGRAM(s) Oral every 6 hours PRN mild to severe pain  dextrose Gel 1 Dose(s) Oral once PRN Blood Glucose LESS THAN 70 milliGRAM(s)/deciliter  glucagon  Injectable 1 milliGRAM(s) IntraMuscular once PRN Glucose LESS THAN 70 milligrams/deciliter  melatonin 4.5 milliGRAM(s) Oral at bedtime PRN Insomnia      Vital Signs Last 24 Hrs  T(C): 36.3 (02 Dec 2017 15:15), Max: 36.8 (02 Dec 2017 05:28)  T(F): 97.3 (02 Dec 2017 15:15), Max: 98.2 (02 Dec 2017 05:28)  HR: 66 (02 Dec 2017 15:15) (63 - 78)  BP: 140/70 (02 Dec 2017 15:15) (134/58 - 148/58)  BP(mean): --  RR: 17 (02 Dec 2017 15:15) (17 - 25)  SpO2: 100% (02 Dec 2017 15:15) (97% - 100%)      I&O's Detail    CAPILLARY BLOOD GLUCOSE      POCT Blood Glucose.: 126 mg/dL (02 Dec 2017 17:55)  POCT Blood Glucose.: 123 mg/dL (02 Dec 2017 12:19)  POCT Blood Glucose.: 100 mg/dL (02 Dec 2017 08:47)  POCT Blood Glucose.: 126 mg/dL (01 Dec 2017 23:11)      PHYSICAL EXAM:    GENERAL: NAD  Pulm: CTA b/l  CV: S1&S2+, rrr  ABDOMEN: bs+, soft, nt, nd,   EXTREMITIES:  Multiple decubitus ulcers, including wound on RLE  Neuro: Awake, alert      LABS:                        9.3    6.16  )-----------( 194      ( 02 Dec 2017 06:16 )             27.5     12-02    139  |  102  |  31<H>  ----------------------------<  101<H>  3.4<L>   |  24  |  1.13    Ca    8.4      02 Dec 2017 06:16  Phos  3.0     12-02  Mg     1.3     12-02    TPro  6.2  /  Alb  2.7<L>  /  TBili  0.4  /  DBili  x   /  AST  76<H>  /  ALT  19  /  AlkPhos  49  12-01    LIVER FUNCTIONS - ( 01 Dec 2017 16:39 )  Alb: 2.7 g/dL / Pro: 6.2 g/dL / ALK PHOS: 49 u/L / ALT: 19 u/L / AST: 76 u/L / GGT: x     / T. Bili 0.4 mg/dL / D. Bili x         PT/INR - ( 01 Dec 2017 16:39 )   PT: 13.3 SEC;   INR: 1.18          PTT - ( 01 Dec 2017 16:39 )  PTT:31.6 SEC  Urinalysis Basic - ( 01 Dec 2017 18:36 )    Color: YELLOW / Appearance: CLEAR / S.012 / pH: 6.0  Gluc: NEGATIVE / Ketone: NEGATIVE  / Bili: NEGATIVE / Urobili: NORMAL E.U.   Blood: NEGATIVE / Protein: NEGATIVE / Nitrite: NEGATIVE   Leuk Esterase: SMALL / RBC: 0-2 / WBC 5-10   Sq Epi: x / Non Sq Epi: x / Bacteria: x        ( 02 Dec 2017 06:16 )CARDIAC MARKERS    u/L   CKMB 2.48 ng/mL   CKMB Units x       Troponin T 0.08 ng/mL<H>  ( 02 Dec 2017 00:44 )CARDIAC MARKERS   CK 41 u/L   CKMB 2.76 ng/mL   CKMB Units x       Troponin T 0.08 ng/mL<H>  ( 01 Dec 2017 16:39 )CARDIAC MARKERS    u/L<H>   CKMB 2.58 ng/mL   CKMB Units x       Troponin T < 0.06 ng/mL      Urinalysis Basic - ( 01 Dec 2017 18:36 )    Color: YELLOW / Appearance: CLEAR / S.012 / pH: 6.0  Gluc: NEGATIVE / Ketone: NEGATIVE  / Bili: NEGATIVE / Urobili: NORMAL E.U.   Blood: NEGATIVE / Protein: NEGATIVE / Nitrite: NEGATIVE   Leuk Esterase: SMALL / RBC: 0-2 / WBC 5-10   Sq Epi: x / Non Sq Epi: x / Bacteria: x      Microbiology:    RADIOLOGY & ADDITIONAL TESTS:    Imaging Personally Reviewed: CXR: B/l Pleural effusions    Consultant(s) Notes Reviewed:  Cardiology    Care Discussed with Consultants/Other Providers Cardiology

## 2017-12-02 NOTE — PROGRESS NOTE ADULT - ASSESSMENT
89yo Male, bedbound, with hx of HTN, DM Type 2, PAD s/p fempop bypass, CKD, CAD, s/p PPM a/w likely preserved EF acute on chronic HF c/b bilateral pleural effusions, Rt >Lt, multifactorial hyponatremia; Found to be dehydrated;

## 2017-12-02 NOTE — PHYSICAL THERAPY INITIAL EVALUATION ADULT - DISCHARGE DISPOSITION, PT EVAL
home/patient performing at baseline level-->recommending home with previous services home w/ home PT/d/c home; recommending home with home PT to ensure safe wheelchair transfers. patient performing at baseline level-->recommending home with continued previous services. Patient will benefit from PT intervention while inpatient at [facility] to prevent deconditioning.

## 2017-12-02 NOTE — PHYSICAL THERAPY INITIAL EVALUATION ADULT - PERTINENT HX OF CURRENT PROBLEM, REHAB EVAL
Patient is a 89 year old male admitted to [facility] on 12/1 with a cough. PMH includes: HTN, DM Type 2, PAD s/p fempop bypass, CKD, CAD, s/p PPM.

## 2017-12-02 NOTE — PROGRESS NOTE ADULT - PROBLEM SELECTOR PLAN 7
- Nutrition team consulted   - Fall and aspiration precautions   - PT consulted   - Replete electrolytes daily

## 2017-12-02 NOTE — CONSULT NOTE ADULT - ATTENDING COMMENTS
Jack Jurado is a 89 year old man, mostly bedbound, with history of hypertension, chronic diastolic heart failure, PVC (s/p fem – pop bypass), CAD (no PCI), diabetes and PPM. He was admitted to [facility] I September 2017 with acute on chronic diastolic heart failure. At that time, serum pro BNP was 64,005 pg/mL. Echocardiogram done June 2017 noted normal LV size and systolic function with LVEF = 65%. He now presented with cough and wheezing. Current serum pro BNP 17,973 pg/mL.  Physical examination noted no JVC or peripheral edema. There were decreased breath sounds at bilateral bases, with scattered rhonchi. Chest X-ray on 12/1/17 noted bilateral pleural effusions, right greater than left with associated atelectasis and no pneumothorax. Two serial sets cardiac isoenzymes from 12/2/17 noted troponin T 0.08 ng/mL with normal CK and CKMB. BP was 134/58 mm Hg on admission. Antihypertensive edication upon consultation included furosemide 20 mg by mouth daily, hydralazine 50 mg 2X daily and metoprolol tartrate 25 mg twice daily. Labs noted serum creatinine 1.13 mg/dL on 12/2/17. ECG showed ventricular pacing at 60 bpm. The following is recommended: discontinue furosemide, maintain metoprolol tartrate 25 mg twice daily, discontinue hydralazine, start amlodipine (Norvasc) 2.5 mg tablets, one tablet daily. Treat lower lung atelectasis with incentive spirometry as tolerable and chest physical therapy.

## 2017-12-02 NOTE — CONSULT NOTE ADULT - SUBJECTIVE AND OBJECTIVE BOX
Patient seen and evaluated at bedside    HPI:    89yo M, bedbound, with hx of HTN, DM Type 2, PAD s/p fempop bypass, CKD, CAD, with PPM, HFpEF, presenting with cough. Patient reports he has been having this cough for a long time. Associated with light yellow sputum production. Reports he occasionally has lower extremity swelling. Denies PND, orthopnea, fevers, chills, nausea, vomiting, URI symptoms, chest pain, SOB, abdominal pain, diarrhea, dysuria. Patient reports he is bed bound and today was the last day of his home health care services.       PMH:   Peripheral arterial disease  Pacemaker  Chronic kidney disease, unspecified stage  Duodenal ulcer  DM (diabetes mellitus)  HTN (hypertension)      PSH:   S/P femoral-popliteal bypass surgery  S/P appendectomy      Medications:     acetaminophen   Tablet. 650 milliGRAM(s) Oral every 6 hours PRN  ALBUTerol/ipratropium for Nebulization 3 milliLiter(s) Nebulizer every 6 hours  aspirin enteric coated 81 milliGRAM(s) Oral daily  brimonidine 0.2% Ophthalmic Solution 1 Drop(s) Both EYES three times a day  dextrose 5%. 1000 milliLiter(s) IV Continuous <Continuous>  dextrose 50% Injectable 12.5 Gram(s) IV Push once  dextrose 50% Injectable 25 Gram(s) IV Push once  dextrose 50% Injectable 25 Gram(s) IV Push once  dextrose Gel 1 Dose(s) Oral once PRN  furosemide    Tablet 20 milliGRAM(s) Oral daily  glucagon  Injectable 1 milliGRAM(s) IntraMuscular once PRN  hydrALAZINE 50 milliGRAM(s) Oral two times a day  insulin lispro (HumaLOG) corrective regimen sliding scale   SubCutaneous three times a day before meals  insulin lispro (HumaLOG) corrective regimen sliding scale   SubCutaneous at bedtime  melatonin 4.5 milliGRAM(s) Oral at bedtime PRN  metoprolol     tartrate 25 milliGRAM(s) Oral two times a day  pantoprazole    Tablet 40 milliGRAM(s) Oral before breakfast  sucralfate suspension 1 Gram(s) Oral four times a day  tamsulosin 0.4 milliGRAM(s) Oral at bedtime  timolol 0.5% Solution 1 Drop(s) Both EYES two times a day      Allergies:  No Known Allergies      Physical Exam:  T(F): 98.2 (12-02), Max: 99.2 (12-01)  HR: 63 (12-02) (60 - 78)  BP: 147/70 (12-02) (133/64 - 148/58)  RR: 18 (12-02)  SpO2: 100% (12-02)    GENERAL: No acute distress, well-developed  HEAD:  Atraumatic, Normocephalic  ENT: EOMI, PERRLA, conjunctiva and sclera clear, Neck supple, No JVD, moist mucosa  CHEST/LUNG: wheezing b/l decreased breath sounds at base.   BACK: No spinal tenderness  HEART: Regular rate and rhythm; No murmurs, rubs, or gallops  ABDOMEN: Soft, Nontender, Nondistended; Bowel sounds present  EXTREMITIES:  No clubbing, cyanosis, or edema  PSYCH: Nl behavior, nl affect  NEUROLOGY: AAOx3, non-focal, cranial nerves intact  SKIN: Normal color, No rashes or lesions  LINES:    Cardiovascular Diagnostic Testing:    ECG: Personally reviewed  vpaced at HR 60     Echo:    Stress Testing:    Cath:    Interpretation of Telemetry:    Imaging:    Labs: Personally reviewed                        9.3    6.16  )-----------( 194      ( 02 Dec 2017 06:16 )             27.5     12-02    139  |  102  |  31<H>  ----------------------------<  101<H>  3.4<L>   |  24  |  1.13    Ca    8.4      02 Dec 2017 06:16  Phos  3.0     12-02  Mg     1.3     12-02    TPro  6.2  /  Alb  2.7<L>  /  TBili  0.4  /  DBili  x   /  AST  76<H>  /  ALT  19  /  AlkPhos  49  12-01    PT/INR - ( 01 Dec 2017 16:39 )   PT: 13.3 SEC;   INR: 1.18          PTT - ( 01 Dec 2017 16:39 )  PTT:31.6 SEC  CARDIAC MARKERS ( 02 Dec 2017 06:16 )  x     / 0.08 ng/mL / 130 u/L / 2.48 ng/mL / x      CARDIAC MARKERS ( 02 Dec 2017 00:44 )  x     / 0.08 ng/mL / 41 u/L / 2.76 ng/mL / x      CARDIAC MARKERS ( 01 Dec 2017 16:39 )  x     / < 0.06 ng/mL / 219 u/L / 2.58 ng/mL / x          Serum Pro-Brain Natriuretic Peptide: 45713 pg/mL (12-02 @ 06:16)  Serum Pro-Brain Natriuretic Peptide: 60986 pg/mL (12-01 @ 16:39)        Thyroid Stimulating Hormone, Serum: 3.63 uIU/mL (12-01 @ 16:39)

## 2017-12-02 NOTE — CONSULT NOTE ADULT - ASSESSMENT
A/P:  89yo M, bedbound, with hx of HTN, DM Type 2, PAD s/p fempop bypass, CKD, CAD, with PPM, HFpEF, presenting with cough. On exam patient is noted to have b/l wheezing, otherwise his JVP is 5cm, and LE dry. Patient otherwise appears clinically euvolemic on exam. Labs are otherwise unremarkable, except pro-BNP which is 17K that is decreased from 60K from Sept 2017. This is likely chronic diastolic heart failure with no exacerabation. THe cough and wheezing is likely from being bedbound probably related to atelectasis.     Plan  #chronic diastolic failure  - continue Lasix 20mg daily  - continue Metoprolol daily  - consider a repeat TTE on monday   - no need to trend troponins furhter.  - start nebulizer treatment for wheezing   -cardiology will continue to follow.     Cj Logan MD  Cardiology Fellow A/P:  89yo M, bedbound, with hx of HTN, DM Type 2, PAD s/p fempop bypass, CKD, CAD, with PPM, HFpEF, presenting with cough. On exam patient is noted to have b/l wheezing, otherwise his JVP is 5cm, and LE dry. Patient otherwise appears clinically euvolemic on exam. Labs are otherwise unremarkable, except pro-BNP which is 17K that is decreased from 60K from Sept 2017. This is likely chronic diastolic heart failure with no exacerabation. THe cough and wheezing is likely from being bedbound probably related to atelectasis.     Plan  #chronic diastolic failure and HTN   -hold Lasix   - continue Metoprolol tartrate 25 mg BID  - d/c hydralazine   - start Amlodipine 2.5 mg daily   - consider a repeat TTE on monday   - no need to trend troponins furhter.  - start nebulizer treatment for wheezing  -incentive spirometry and chest PT    -cardiology will continue to follow.     Cj Logan MD  Cardiology Fellow

## 2017-12-03 LAB
BACTERIA UR CULT: SIGNIFICANT CHANGE UP
BUN SERPL-MCNC: 26 MG/DL — HIGH (ref 7–23)
CALCIUM SERPL-MCNC: 8.5 MG/DL — SIGNIFICANT CHANGE UP (ref 8.4–10.5)
CHLORIDE SERPL-SCNC: 103 MMOL/L — SIGNIFICANT CHANGE UP (ref 98–107)
CK MB BLD-MCNC: 2.56 NG/ML — SIGNIFICANT CHANGE UP (ref 1–6.6)
CK MB BLD-MCNC: SIGNIFICANT CHANGE UP (ref 0–2.5)
CK SERPL-CCNC: 26 U/L — LOW (ref 30–200)
CO2 SERPL-SCNC: 25 MMOL/L — SIGNIFICANT CHANGE UP (ref 22–31)
CREAT SERPL-MCNC: 0.92 MG/DL — SIGNIFICANT CHANGE UP (ref 0.5–1.3)
GLUCOSE BLDC GLUCOMTR-MCNC: 112 MG/DL — HIGH (ref 70–99)
GLUCOSE BLDC GLUCOMTR-MCNC: 118 MG/DL — HIGH (ref 70–99)
GLUCOSE BLDC GLUCOMTR-MCNC: 138 MG/DL — HIGH (ref 70–99)
GLUCOSE BLDC GLUCOMTR-MCNC: 181 MG/DL — HIGH (ref 70–99)
GLUCOSE SERPL-MCNC: 99 MG/DL — SIGNIFICANT CHANGE UP (ref 70–99)
HCT VFR BLD CALC: 31.1 % — LOW (ref 39–50)
HGB BLD-MCNC: 10.2 G/DL — LOW (ref 13–17)
MAGNESIUM SERPL-MCNC: 1.5 MG/DL — LOW (ref 1.6–2.6)
MCHC RBC-ENTMCNC: 32.2 PG — SIGNIFICANT CHANGE UP (ref 27–34)
MCHC RBC-ENTMCNC: 32.8 % — SIGNIFICANT CHANGE UP (ref 32–36)
MCV RBC AUTO: 98.1 FL — SIGNIFICANT CHANGE UP (ref 80–100)
NRBC # FLD: 0 — SIGNIFICANT CHANGE UP
PHOSPHATE SERPL-MCNC: 3 MG/DL — SIGNIFICANT CHANGE UP (ref 2.5–4.5)
PLATELET # BLD AUTO: 229 K/UL — SIGNIFICANT CHANGE UP (ref 150–400)
PMV BLD: 10.4 FL — SIGNIFICANT CHANGE UP (ref 7–13)
POTASSIUM SERPL-MCNC: 3.7 MMOL/L — SIGNIFICANT CHANGE UP (ref 3.5–5.3)
POTASSIUM SERPL-SCNC: 3.7 MMOL/L — SIGNIFICANT CHANGE UP (ref 3.5–5.3)
RBC # BLD: 3.17 M/UL — LOW (ref 4.2–5.8)
RBC # FLD: 15.4 % — HIGH (ref 10.3–14.5)
SODIUM SERPL-SCNC: 139 MMOL/L — SIGNIFICANT CHANGE UP (ref 135–145)
SPECIMEN SOURCE: SIGNIFICANT CHANGE UP
TROPONIN T SERPL-MCNC: 0.07 NG/ML — HIGH (ref 0–0.06)
WBC # BLD: 6.23 K/UL — SIGNIFICANT CHANGE UP (ref 3.8–10.5)
WBC # FLD AUTO: 6.23 K/UL — SIGNIFICANT CHANGE UP (ref 3.8–10.5)

## 2017-12-03 PROCEDURE — 71250 CT THORAX DX C-: CPT | Mod: 26

## 2017-12-03 PROCEDURE — 99232 SBSQ HOSP IP/OBS MODERATE 35: CPT

## 2017-12-03 RX ORDER — ONDANSETRON 8 MG/1
4 TABLET, FILM COATED ORAL ONCE
Qty: 0 | Refills: 0 | Status: COMPLETED | OUTPATIENT
Start: 2017-12-03 | End: 2017-12-03

## 2017-12-03 RX ORDER — ALBUTEROL 90 UG/1
1.25 AEROSOL, METERED ORAL EVERY 6 HOURS
Qty: 0 | Refills: 0 | Status: DISCONTINUED | OUTPATIENT
Start: 2017-12-03 | End: 2017-12-03

## 2017-12-03 RX ORDER — ENOXAPARIN SODIUM 100 MG/ML
30 INJECTION SUBCUTANEOUS DAILY
Qty: 0 | Refills: 0 | Status: DISCONTINUED | OUTPATIENT
Start: 2017-12-03 | End: 2017-12-05

## 2017-12-03 RX ORDER — MAGNESIUM SULFATE 500 MG/ML
2 VIAL (ML) INJECTION ONCE
Qty: 0 | Refills: 0 | Status: COMPLETED | OUTPATIENT
Start: 2017-12-03 | End: 2017-12-03

## 2017-12-03 RX ORDER — ALBUTEROL 90 UG/1
2.5 AEROSOL, METERED ORAL EVERY 6 HOURS
Qty: 0 | Refills: 0 | Status: DISCONTINUED | OUTPATIENT
Start: 2017-12-03 | End: 2017-12-03

## 2017-12-03 RX ADMIN — Medication 650 MILLIGRAM(S): at 17:17

## 2017-12-03 RX ADMIN — Medication 1 DROP(S): at 18:29

## 2017-12-03 RX ADMIN — Medication 4.5 MILLIGRAM(S): at 22:32

## 2017-12-03 RX ADMIN — Medication 81 MILLIGRAM(S): at 11:41

## 2017-12-03 RX ADMIN — Medication 3 MILLILITER(S): at 23:08

## 2017-12-03 RX ADMIN — Medication 650 MILLIGRAM(S): at 16:17

## 2017-12-03 RX ADMIN — AMLODIPINE BESYLATE 2.5 MILLIGRAM(S): 2.5 TABLET ORAL at 05:48

## 2017-12-03 RX ADMIN — Medication 1 GRAM(S): at 00:07

## 2017-12-03 RX ADMIN — Medication 1 GRAM(S): at 11:41

## 2017-12-03 RX ADMIN — BRIMONIDINE TARTRATE 1 DROP(S): 2 SOLUTION/ DROPS OPHTHALMIC at 05:43

## 2017-12-03 RX ADMIN — Medication 3 MILLILITER(S): at 10:51

## 2017-12-03 RX ADMIN — BRIMONIDINE TARTRATE 1 DROP(S): 2 SOLUTION/ DROPS OPHTHALMIC at 21:02

## 2017-12-03 RX ADMIN — Medication 1 DROP(S): at 05:44

## 2017-12-03 RX ADMIN — Medication 650 MILLIGRAM(S): at 22:32

## 2017-12-03 RX ADMIN — Medication 25 MILLIGRAM(S): at 05:44

## 2017-12-03 RX ADMIN — Medication 1 GRAM(S): at 23:45

## 2017-12-03 RX ADMIN — Medication 1 GRAM(S): at 05:44

## 2017-12-03 RX ADMIN — Medication 650 MILLIGRAM(S): at 23:31

## 2017-12-03 RX ADMIN — Medication 3 MILLILITER(S): at 16:29

## 2017-12-03 RX ADMIN — PANTOPRAZOLE SODIUM 40 MILLIGRAM(S): 20 TABLET, DELAYED RELEASE ORAL at 05:44

## 2017-12-03 RX ADMIN — Medication 3 MILLILITER(S): at 04:30

## 2017-12-03 RX ADMIN — Medication 1 GRAM(S): at 18:28

## 2017-12-03 RX ADMIN — Medication 25 MILLIGRAM(S): at 18:28

## 2017-12-03 RX ADMIN — TAMSULOSIN HYDROCHLORIDE 0.4 MILLIGRAM(S): 0.4 CAPSULE ORAL at 21:03

## 2017-12-03 RX ADMIN — ENOXAPARIN SODIUM 30 MILLIGRAM(S): 100 INJECTION SUBCUTANEOUS at 16:19

## 2017-12-03 RX ADMIN — BRIMONIDINE TARTRATE 1 DROP(S): 2 SOLUTION/ DROPS OPHTHALMIC at 11:42

## 2017-12-03 RX ADMIN — ONDANSETRON 4 MILLIGRAM(S): 8 TABLET, FILM COATED ORAL at 18:28

## 2017-12-03 RX ADMIN — Medication 50 GRAM(S): at 11:40

## 2017-12-03 RX ADMIN — Medication 100 MILLIGRAM(S): at 18:29

## 2017-12-03 NOTE — SWALLOW BEDSIDE ASSESSMENT ADULT - SWALLOW EVAL: DIAGNOSIS
1. The patient demonstrated a mild oral dysphagia for puree, honey thick, and nectar thick liquids marked by delayed bolus collection, transfer, and posterior transport. 2. The patient demonstrated a mild-moderate oral dysphagia for thin liquids marked by delayed bolus collection and transfer with suspected posterior loss over the base of tongue. 3. The patient demonstrated a mild pharyngeal dysphagia for puree, honey thick, and nectar thick liquids marked by delayed swallow trigger with reduced hyolaryngeal elevation w/o evidence of airway penetration. 4. The patient demonstrated a severe pharyngeal dysphagia for thin liquids marked by delayed swallow trigger with reduced hyolaryngeal elevation resulting in coughing post PO trials suggesting airway penetration.

## 2017-12-03 NOTE — DIETITIAN INITIAL EVALUATION ADULT. - PROBLEM SELECTOR PLAN 1
bilateral pleural effusions on CXR, likely cardiogenic etiology given no fever and no leukocytosis;   -Hold off aggressive workup given MOLST form wishes;   -Reach out to HCP in am to confirm comfort care measures   -Hold of on Abx

## 2017-12-03 NOTE — SWALLOW BEDSIDE ASSESSMENT ADULT - PHARYNGEAL PHASE
Delayed pharyngeal swallow/Decreased laryngeal elevation Decreased laryngeal elevation/Delayed pharyngeal swallow Delayed pharyngeal swallow/Cough post oral intake/Decreased laryngeal elevation

## 2017-12-03 NOTE — DIETITIAN INITIAL EVALUATION ADULT. - PROBLEM SELECTOR PLAN 3
Previous echo this year, June, 2017, showed EF 65%  - Hold off on TTE  - Hold off aggressive workup given MOLST form wishes;   - Reach out to HCP in am to confirm comfort care measures   - Will continue home dose of lasix 20mg qd  - Continue aspirin and metoprolol

## 2017-12-03 NOTE — PROGRESS NOTE ADULT - SUBJECTIVE AND OBJECTIVE BOX
Chief Complaint: Patient is a 89y old  Male who presents with a chief complaint of Cough (01 Dec 2017 23:05)      INTERVAL HPI/OVERNIGHT EVENTS:     Patient believes cough is worse. Insists he started an unknown antibiotic 2-3 days prior to discharge. Patient is adamant that he has recurrent bronchitis that only improves with antibiotics.     MEDICATIONS  (STANDING):  ALBUTerol/ipratropium for Nebulization 3 milliLiter(s) Nebulizer every 6 hours  amLODIPine   Tablet 2.5 milliGRAM(s) Oral daily  aspirin enteric coated 81 milliGRAM(s) Oral daily  brimonidine 0.2% Ophthalmic Solution 1 Drop(s) Both EYES three times a day  dextrose 5%. 1000 milliLiter(s) (50 mL/Hr) IV Continuous <Continuous>  dextrose 50% Injectable 12.5 Gram(s) IV Push once  dextrose 50% Injectable 25 Gram(s) IV Push once  dextrose 50% Injectable 25 Gram(s) IV Push once  enoxaparin Injectable 30 milliGRAM(s) SubCutaneous daily  insulin lispro (HumaLOG) corrective regimen sliding scale   SubCutaneous three times a day before meals  insulin lispro (HumaLOG) corrective regimen sliding scale   SubCutaneous at bedtime  levoFLOXacin  Tablet 750 milliGRAM(s) Oral every 48 hours  metoprolol     tartrate 25 milliGRAM(s) Oral two times a day  pantoprazole    Tablet 40 milliGRAM(s) Oral before breakfast  sucralfate suspension 1 Gram(s) Oral four times a day  tamsulosin 0.4 milliGRAM(s) Oral at bedtime  timolol 0.5% Solution 1 Drop(s) Both EYES two times a day    MEDICATIONS  (PRN):  acetaminophen   Tablet. 650 milliGRAM(s) Oral every 6 hours PRN mild to severe pain  dextrose Gel 1 Dose(s) Oral once PRN Blood Glucose LESS THAN 70 milliGRAM(s)/deciliter  glucagon  Injectable 1 milliGRAM(s) IntraMuscular once PRN Glucose LESS THAN 70 milligrams/deciliter  guaiFENesin   Syrup  (Sugar-Free) 100 milliGRAM(s) Oral every 6 hours PRN Cough  melatonin 4.5 milliGRAM(s) Oral at bedtime PRN Insomnia      Vital Signs Last 24 Hrs  T(C): 36.3 (03 Dec 2017 15:04), Max: 36.7 (02 Dec 2017 21:33)  T(F): 97.4 (03 Dec 2017 15:04), Max: 98 (02 Dec 2017 21:33)  HR: 66 (03 Dec 2017 16:29) (62 - 75)  BP: 117/57 (03 Dec 2017 15:04) (117/57 - 146/52)  BP(mean): --  RR: 20 (03 Dec 2017 15:04) (18 - 20)  SpO2: 97% (03 Dec 2017 16:29) (96% - 100%)      I&O's Detail    CAPILLARY BLOOD GLUCOSE      POCT Blood Glucose.: 118 mg/dL (03 Dec 2017 12:22)  POCT Blood Glucose.: 112 mg/dL (03 Dec 2017 08:29)  POCT Blood Glucose.: 151 mg/dL (02 Dec 2017 22:18)  POCT Blood Glucose.: 126 mg/dL (02 Dec 2017 17:55)      PHYSICAL EXAM:    GENERAL: NAD  Pulm: Rhonchi in bilateral lung bases  CV: S1&S2+, rrr  ABDOMEN: bs+, soft, nt, nd,   EXTREMITIES:  2+ peripheral pulses, no appreciable edema in b/l LE  Neuro: Awake, alert      LABS:                        10.2   6.23  )-----------( 229      ( 03 Dec 2017 05:55 )             31.1     12-03    139  |  103  |  26<H>  ----------------------------<  99  3.7   |  25  |  0.92    Ca    8.5      03 Dec 2017 05:55  Phos  3.0     12-03  Mg     1.5     12-03      LIVER FUNCTIONS - ( 01 Dec 2017 16:39 )  Alb: 2.7 g/dL / Pro: 6.2 g/dL / ALK PHOS: 49 u/L / ALT: 19 u/L / AST: 76 u/L / GGT: x     / T. Bili 0.4 mg/dL / D. Bili x           Urinalysis Basic - ( 01 Dec 2017 18:36 )    Color: YELLOW / Appearance: CLEAR / S.012 / pH: 6.0  Gluc: NEGATIVE / Ketone: NEGATIVE  / Bili: NEGATIVE / Urobili: NORMAL E.U.   Blood: NEGATIVE / Protein: NEGATIVE / Nitrite: NEGATIVE   Leuk Esterase: SMALL / RBC: 0-2 / WBC 5-10   Sq Epi: x / Non Sq Epi: x / Bacteria: x        ( 03 Dec 2017 05:55 )CARDIAC MARKERS   CK 26 u/L<L>   CKMB 2.56 ng/mL   CKMB Units x       Troponin T 0.07 ng/mL<H>  ( 02 Dec 2017 06:16 )CARDIAC MARKERS    u/L   CKMB 2.48 ng/mL   CKMB Units x       Troponin T 0.08 ng/mL<H>  ( 02 Dec 2017 00:44 )CARDIAC MARKERS   CK 41 u/L   CKMB 2.76 ng/mL   CKMB Units x       Troponin T 0.08 ng/mL<H>      Urinalysis Basic - ( 01 Dec 2017 18:36 )    Color: YELLOW / Appearance: CLEAR / S.012 / pH: 6.0  Gluc: NEGATIVE / Ketone: NEGATIVE  / Bili: NEGATIVE / Urobili: NORMAL E.U.   Blood: NEGATIVE / Protein: NEGATIVE / Nitrite: NEGATIVE   Leuk Esterase: SMALL / RBC: 0-2 / WBC 5-10   Sq Epi: x / Non Sq Epi: x / Bacteria: x      Microbiology:    RADIOLOGY & ADDITIONAL TESTS:    Imaging Personally Reviewed: CT chest    Consultant(s) Notes Reviewed:  Cardiology    Care Discussed with Consultants/Other Providers

## 2017-12-03 NOTE — SWALLOW BEDSIDE ASSESSMENT ADULT - COMMENTS
The patient was seen this AM for initial assessment of swallow function, at which time he was alert and communicative. Patient currently with supplemental O2 via NC in place. Per charting, the patient is a 89 y/o Male with hx of HTN, DMT2, PAD s/p fempop bypass, CKD, CAD, and s/p PPM. He presented to [facility] with bilateral pleural effusions. Discussed results and recommendations from this evaluation with the patient and call out to MD.

## 2017-12-03 NOTE — DIETITIAN INITIAL EVALUATION ADULT. - OTHER INFO
Pt was admitted to [facility] in Sept '17. At that time, Pt's weight was 143.7 lbs. which was 14 lbs less than his usual weight.  Pt currently admitted at 146.7 lbs, 11 lbs less than his usual weight. Pt has a pressure ulcer on his sacrum and left heel and venous ulcers on his right leg and ankle. Pt had a Speech and Swallow evaluation which recommended a DYS I, NC diet. Pt is edentulous and has dentures but stated that he does not like to use them. Pt refused oral supplements but is willing to try No Carb Pro Source. Pt refuses tube feeding as stated in MOLST. Pt has 1+ generalized edema and 2+edema in L ankle and R ankle. This may account for increase in weight from last admission. Pt had no complaints of GI distress.

## 2017-12-03 NOTE — DIETITIAN INITIAL EVALUATION ADULT. - PROBLEM SELECTOR PLAN 2
Likely multifactorial 2/2 Lasix regimen and possible malignancy;   s/p 500 cc bolus   - Will encourage PO intake   - Repeat Na in AM  - Hold additional IV fluids due to pleural effusion and acute HF.

## 2017-12-03 NOTE — SWALLOW BEDSIDE ASSESSMENT ADULT - SWALLOW EVAL: RECOMMENDED FEEDING/EATING TECHNIQUES
maintain upright posture during/after eating for 30 mins/no straws/oral hygiene/alternate food with liquid/hard swallow w/ each bite or sip/position upright (90 degrees)/allow for swallow between intakes/check mouth frequently for oral residue/pocketing/crush medication (when feasible)/small sips/bites

## 2017-12-03 NOTE — SWALLOW BEDSIDE ASSESSMENT ADULT - ASR SWALLOW ASPIRATION MONITOR
oral hygiene/fever/throat clearing/upper respiratory infection/gurgly voice/pneumonia/change of breathing pattern/position upright (90Y)/cough

## 2017-12-03 NOTE — CHART NOTE - NSCHARTNOTEFT_GEN_A_CORE
NUTRITION SERVICES                                                                                  MALNUTRITION ALERT     Attention Health Care Provider: Upon nutritional assessment by the Registered Dietitian your patient was determined to meet criteria / has evidence of the following diagnosis/diagnoses:    [ ] Mild Protein Calorie Malnutrition   [ ] Moderate Protein Calorie Malnutrition   [ X] Severe Protein Calorie Malnutrition   [ ] Unspecified Protein Calorie Malnutrition   [ ] Underweight / BMI <19  [ ] Morbid Obesity / BMI >40            By signing this assessment you are acknowledging the diagnosis/diagnoses.       PLAN OF CARE: Refer to Initial Dietitian Evaluation or Nutrition Follow-Up Documentation for Nutritional Recommendations.

## 2017-12-04 ENCOUNTER — TRANSCRIPTION ENCOUNTER (OUTPATIENT)
Age: 82
End: 2017-12-04

## 2017-12-04 ENCOUNTER — APPOINTMENT (OUTPATIENT)
Dept: HOME HEALTH SERVICES | Facility: HOME HEALTH | Age: 82
End: 2017-12-04

## 2017-12-04 LAB
BUN SERPL-MCNC: 28 MG/DL — HIGH (ref 7–23)
CALCIUM SERPL-MCNC: 8.4 MG/DL — SIGNIFICANT CHANGE UP (ref 8.4–10.5)
CHLORIDE SERPL-SCNC: 104 MMOL/L — SIGNIFICANT CHANGE UP (ref 98–107)
CO2 SERPL-SCNC: 26 MMOL/L — SIGNIFICANT CHANGE UP (ref 22–31)
CREAT SERPL-MCNC: 0.97 MG/DL — SIGNIFICANT CHANGE UP (ref 0.5–1.3)
GLUCOSE BLDC GLUCOMTR-MCNC: 121 MG/DL — HIGH (ref 70–99)
GLUCOSE BLDC GLUCOMTR-MCNC: 136 MG/DL — HIGH (ref 70–99)
GLUCOSE BLDC GLUCOMTR-MCNC: 161 MG/DL — HIGH (ref 70–99)
GLUCOSE BLDC GLUCOMTR-MCNC: 172 MG/DL — HIGH (ref 70–99)
GLUCOSE SERPL-MCNC: 137 MG/DL — HIGH (ref 70–99)
HCT VFR BLD CALC: 29.5 % — LOW (ref 39–50)
HGB BLD-MCNC: 9.9 G/DL — LOW (ref 13–17)
MCHC RBC-ENTMCNC: 33.3 PG — SIGNIFICANT CHANGE UP (ref 27–34)
MCHC RBC-ENTMCNC: 33.6 % — SIGNIFICANT CHANGE UP (ref 32–36)
MCV RBC AUTO: 99.3 FL — SIGNIFICANT CHANGE UP (ref 80–100)
NRBC # FLD: 0 — SIGNIFICANT CHANGE UP
PLATELET # BLD AUTO: 212 K/UL — SIGNIFICANT CHANGE UP (ref 150–400)
PMV BLD: 10.7 FL — SIGNIFICANT CHANGE UP (ref 7–13)
POTASSIUM SERPL-MCNC: 4.5 MMOL/L — SIGNIFICANT CHANGE UP (ref 3.5–5.3)
POTASSIUM SERPL-SCNC: 4.5 MMOL/L — SIGNIFICANT CHANGE UP (ref 3.5–5.3)
RBC # BLD: 2.97 M/UL — LOW (ref 4.2–5.8)
RBC # FLD: 15.3 % — HIGH (ref 10.3–14.5)
SODIUM SERPL-SCNC: 140 MMOL/L — SIGNIFICANT CHANGE UP (ref 135–145)
WBC # BLD: 6.83 K/UL — SIGNIFICANT CHANGE UP (ref 3.8–10.5)
WBC # FLD AUTO: 6.83 K/UL — SIGNIFICANT CHANGE UP (ref 3.8–10.5)

## 2017-12-04 PROCEDURE — 99232 SBSQ HOSP IP/OBS MODERATE 35: CPT

## 2017-12-04 PROCEDURE — 99233 SBSQ HOSP IP/OBS HIGH 50: CPT

## 2017-12-04 RX ADMIN — Medication 81 MILLIGRAM(S): at 11:47

## 2017-12-04 RX ADMIN — Medication 1 GRAM(S): at 18:32

## 2017-12-04 RX ADMIN — TAMSULOSIN HYDROCHLORIDE 0.4 MILLIGRAM(S): 0.4 CAPSULE ORAL at 21:56

## 2017-12-04 RX ADMIN — Medication 25 MILLIGRAM(S): at 05:40

## 2017-12-04 RX ADMIN — Medication 1 DROP(S): at 18:32

## 2017-12-04 RX ADMIN — PANTOPRAZOLE SODIUM 40 MILLIGRAM(S): 20 TABLET, DELAYED RELEASE ORAL at 06:06

## 2017-12-04 RX ADMIN — Medication 3 MILLILITER(S): at 09:01

## 2017-12-04 RX ADMIN — Medication 1 GRAM(S): at 11:47

## 2017-12-04 RX ADMIN — AMLODIPINE BESYLATE 2.5 MILLIGRAM(S): 2.5 TABLET ORAL at 05:40

## 2017-12-04 RX ADMIN — Medication 25 MILLIGRAM(S): at 18:32

## 2017-12-04 RX ADMIN — Medication 650 MILLIGRAM(S): at 22:29

## 2017-12-04 RX ADMIN — Medication 1: at 12:34

## 2017-12-04 RX ADMIN — ENOXAPARIN SODIUM 30 MILLIGRAM(S): 100 INJECTION SUBCUTANEOUS at 12:34

## 2017-12-04 RX ADMIN — Medication 3 MILLILITER(S): at 15:33

## 2017-12-04 RX ADMIN — Medication 1 GRAM(S): at 22:33

## 2017-12-04 RX ADMIN — Medication 1 DROP(S): at 05:40

## 2017-12-04 RX ADMIN — Medication 3 MILLILITER(S): at 04:16

## 2017-12-04 RX ADMIN — BRIMONIDINE TARTRATE 1 DROP(S): 2 SOLUTION/ DROPS OPHTHALMIC at 14:02

## 2017-12-04 RX ADMIN — BRIMONIDINE TARTRATE 1 DROP(S): 2 SOLUTION/ DROPS OPHTHALMIC at 21:55

## 2017-12-04 RX ADMIN — Medication 650 MILLIGRAM(S): at 21:55

## 2017-12-04 RX ADMIN — Medication 1 GRAM(S): at 05:40

## 2017-12-04 RX ADMIN — Medication 4.5 MILLIGRAM(S): at 21:55

## 2017-12-04 RX ADMIN — BRIMONIDINE TARTRATE 1 DROP(S): 2 SOLUTION/ DROPS OPHTHALMIC at 05:41

## 2017-12-04 RX ADMIN — Medication 3 MILLILITER(S): at 21:40

## 2017-12-04 NOTE — DISCHARGE NOTE ADULT - ADDITIONAL INSTRUCTIONS
Wound care:Right posterior lower leg (calf) & Left heel & Right lateral malleolus: Cleanse with SAF-clens, rinse with NS, pat dry. Apply Liquid barrier film to periwound skin. apply Medihoney gel to wound base, cover with foam with border. Change daily.    Sacrum and Coccyx: Cleanse with skin cleanser, pat dry. Apply TRIAD paste twice a day and PRN with incontinent episodes.    Left shin: Cleanse with NS, pat dry. Apply Liquid barrier film to periwound skin. Apply foam with border. Change every 3 days.

## 2017-12-04 NOTE — DISCHARGE NOTE ADULT - MEDICATION SUMMARY - MEDICATIONS TO TAKE
I will START or STAY ON the medications listed below when I get home from the hospital:    Aspir 81 oral delayed release tablet  -- 1 tab(s) by mouth once a day  -- Indication: For Prophlaxis    Tylenol 325 mg oral tablet  -- 2 tab(s) by mouth every 6 hours  -- Indication: For Pain    Flomax 0.4 mg oral capsule  -- 1 cap(s) by mouth once a day  -- Indication: For BPH    enoxaparin  -- 30 milligram(s) subcutaneous once a day  -- Indication: For Prophylaxis    insulin lispro 100 units/mL subcutaneous solution  --  subcutaneous 3 times a day (before meals); 1 Unit(s) if Glucose 151 - 200  2 Unit(s) if Glucose 201 - 250  3 Unit(s) if Glucose 251 - 300  4 Unit(s) if Glucose 301 - 350  5 Unit(s) if Glucose 351 - 400  6 Unit(s) if Glucose Greater Than 400  -- Indication: For Type 2 diabetes mellitus with stage 3 chronic kidney disease, without long-term current use of insulin    insulin lispro 100 units/mL subcutaneous solution  --  subcutaneous once a day (at bedtime); 0 Unit(s) if Glucose 0 - 250  1 Unit(s) if Glucose 251 - 300  2 Unit(s) if Glucose 301 - 350  3 Unit(s) if Glucose 351 - 400  4 Unit(s) if Glucose Greater Than 400  -- Indication: For Type 2 diabetes mellitus with stage 3 chronic kidney disease, without long-term current use of insulin    Metoprolol Tartrate 25 mg oral tablet  -- 1 tab(s) by mouth 2 times a day  -- Indication: For HTN (hypertension)    albuterol 2.5 mg/3 mL (0.083%) inhalation solution  -- 3 milliliter(s) inhaled every 6 hours  -- Indication: For Viral upper respiratory tract infection with cough    amLODIPine 2.5 mg oral tablet  -- 1 tab(s) by mouth once a day  -- Indication: For HTN (hypertension)    guaiFENesin 100 mg/5 mL oral liquid  -- 5 milliliter(s) by mouth every 6 hours, As needed, Cough  -- Indication: For Cough    Carafate 1 g/10 mL oral suspension  -- 10 milliliter(s) by mouth 4 times a day  -- Indication: For Duodenal ulcer    Melatonin 5 mg oral tablet  -- 1 tab(s) by mouth once a day (at bedtime), As Needed  -- Indication: For Sleep Aide    Timoptic Ocudose 0.5% ophthalmic solution  -- 1 drop(s) to each affected eye 2 times a day  -- Indication: For eye drops    brimonidine 0.2% ophthalmic solution  -- 1 drop(s) to each affected eye 3 times a day  -- Indication: For eye drops    PriLOSEC 20 mg oral delayed release capsule  -- 2 cap(s) by mouth once a day  -- Indication: For Duodenal ulcer    levoFLOXacin 750 mg oral tablet  -- 1 tab(s) by mouth every 48 hours    -- Indication: For Bronchitis

## 2017-12-04 NOTE — DISCHARGE NOTE ADULT - MEDICATION SUMMARY - MEDICATIONS TO STOP TAKING
I will STOP taking the medications listed below when I get home from the hospital:    hydrALAZINE 50 mg oral tablet  -- 1 tab(s) by mouth 2 times a day    metFORMIN 500 mg oral tablet  -- 1 tab(s) by mouth once a day    furosemide 20 mg oral tablet  -- 1 tab(s) by mouth once a day

## 2017-12-04 NOTE — DISCHARGE NOTE ADULT - PATIENT PORTAL LINK FT
“You can access the [facility] Patient Portal, offered by [facility], by registering with the following website: [email or web address]”

## 2017-12-04 NOTE — DISCHARGE NOTE ADULT - CARE PLAN
Principal Discharge DX:	Pleural effusion  Goal:	without difficulty breathing  Instructions for follow-up, activity and diet:	follow up with PCP at Rehab facility  Secondary Diagnosis:	Hyponatremia  Goal:	remain stable  Secondary Diagnosis:	Heart failure with normal ejection fraction  Goal:	remain stable  Instructions for follow-up, activity and diet:	continue Metoprolol  Secondary Diagnosis:	DM (diabetes mellitus)  Goal:	Blood glucose 110-180  Instructions for follow-up, activity and diet:	continue low carb diet  Secondary Diagnosis:	Duodenal ulcer  Instructions for follow-up, activity and diet:	continue Protonix and Carafate  Secondary Diagnosis:	HTN (hypertension)  Goal:	SBP<140  Instructions for follow-up, activity and diet:	continue Metoprolol and amlodipine Principal Discharge DX:	Pleural effusion  Goal:	without difficulty breathing  Instructions for follow-up, activity and diet:	follow up with PCP at Rehab facility  c/w incentive spirometry and Chest PT  Secondary Diagnosis:	Hyponatremia  Goal:	remain stable  Instructions for follow-up, activity and diet:	Resolved  Secondary Diagnosis:	Heart failure with normal ejection fraction  Goal:	remain stable  Instructions for follow-up, activity and diet:	continue Metoprolol  Secondary Diagnosis:	DM (diabetes mellitus)  Goal:	Blood glucose 110-180  Instructions for follow-up, activity and diet:	continue low carb diet  Secondary Diagnosis:	Duodenal ulcer  Instructions for follow-up, activity and diet:	continue Prilosec and Carafate  Secondary Diagnosis:	HTN (hypertension)  Goal:	SBP<140  Instructions for follow-up, activity and diet:	continue Metoprolol and amlodipine  Secondary Diagnosis:	Cough  Instructions for follow-up, activity and diet:	Continue levaquin Q 48 hours. Last dose 12/3 at 18:00.   Next dose due 12/5 at 18:00.

## 2017-12-04 NOTE — ADVANCED PRACTICE NURSE CONSULT - RECOMMEDATIONS
Recommend follow up care at [facility] ([phone], [address] [place] [address]).     Right posterior lower leg (calf) & Left heel & Right lateral malleolus: Cleanse with SAF-clens, rinse with NS, pat dry. Apply Liquid barrier film to periwound skin. apply Medihoney gel to wound base, cover with foam with border. Change daily.    Sacrum and Coccyx: Cleanse with skin cleanser, pat dry. Apply TRIAD paste twice a day and PRN with incontinent episodes.    Left shin: Cleanse with NS, pat dry. Apply Liquid barrier film to periwound skin. Apply foam with border. Change every 3 days.    Continue low air loss bed therapy, continue heel elevation with z-flex boots, continue use of fluidized positioning device for pressure redistribution and to assist to turn & reposition q2h, continue moisture management with barrier paste & single breathable pad, continue measures to decrease friction/shear/pressure. Continue with nutritional support as per dietary/orders.     Please call wound care service line is further assistance is needed ([phone]).

## 2017-12-04 NOTE — ADVANCED PRACTICE NURSE CONSULT - ASSESSMENT
A&Ox2-3 able to state name,  and place, unable to state year but able to state President, patient bedbound during assessment, as per previous assessment patient was primarily wheelchair bound at home, incontinent of urine and stool. Skin warm, dry, poor skin turgor, scattered areas of ecchymosis on bilateral upper extremities (thin/fragile skin). Blanchable erythema on bilateral heels, right lateral fifth metatarsal head. (+) callus of right plantar foot beneath 1st and 4th metatarsal heads. Left hip and left posterior lower leg with hypopigmentation, areas of healed injuries.    Bilateral lower extremities Dry skin. Thickened-yellow toenails. Multiple wounds present on b/l le and feet. No temperature changes noted. No Edema. Capillary refill >3 seconds. B/L DP/PT pulses weak palpable, with monophasic doppler sounds. (-) pallor on elevation and dependant rubor.    Left anterior lower leg (shin): Category 3 skin tear 1.5cmx1.2cmx0.1cm. Exposed dermis, scant serous drainage. Goal of care: maintain moist environment for wound healing.  Right lower posterior leg, deteriorated since previous admission, Unstageable Pressure Injury, measures [id number] (unable to determine complete anatomical depth due to necrotic tissue). Wound bases 100% yellow/tan slough, that is firmly attached to wound base. Periwound skin hypo, no edema, no increased warmth, (+) blanchable erythema that extends 0.5cm. Small amount of purulent drainage, no odor. Goal of care: autolytic debridement of necrotic tissue, manage exudate, decrease/control bioburden, protect periwound skin, monitor for changes in tissue type.    Sacrum previous admission with SDTPI, currently presents with area of blanchable erythema of sacrum/coccyx area with Stage 3 Pressure Injury, open ulceration over sacrum, measures 1cmx0.5cmx0.2cm, borders regular. Wound base tissue type 100% yellow/tan fibrin film with islets of red/pink, friable tissue. Periwound skin blanchable erythema that extends 1.5cm, no induration, no increased warmth. Coccyx injury 3cm distal to sacrum injury. Scant serous drainage, no odor. Goal of care: decrease/control bioburden, autolytic debridement of fibrinous tissue, protect periwound skin.    Coccyx Stage 3 Pressure Injury measures 0.4cmx0.5cmx0.5cm, wound borders are regular. Wound base tissue type 100% flat, agranular, pale pink tissue. Periwound skin (+) maceration circumferential and blanchable erythema circumferential that extends 1.5cm, no induration, no increased warmth. Sacrum injury is 3cm proximal to coccyx wound. Scant serous drainage, no odor. Goal of care: decrease/control bioburden, manage exudate, protect periwound skin.    Right lateral malleolus Stage 3 Pressure Injury measures 0.5cmx0.3cmx0.2cm (unable to determine complete anatomical depth due to necrotic tissue). Wound base 100% slough, yellow/tan, dry, firmly attached to wound base. Periwound skin blanchable erythema extends 0.5cm, no induration, no increased warmth. Scant serous drainage, no odor. Goal of care: autolytic debridement of necrotic tissue, protect periwound skin, monitor for changes tissue type.    Left medial heel Unstageable Pressure Injury measures [id number] (unable to determine complete anatomical depth due to necrotic tissue). Undermining present circumferentially that extends 02.cm. Wound base tissue type 50% slough, yellow/tan moist firmly attached and 50% flat, pale pink, agranular tissue. Wound borders are regular (punch out). Periwound skin circumferential hypopigmentation, no induration, increased warmth, no erythema. Scant serous drainage, no odor. Goal of care: autolytic debridement of necrotic tissue, manage exudate, protect periwound skin, monitor for changes in tissue type.    Incontinence associated dermatitis of b/l buttock, blanchable erythema noted. Skin intact of lower buttock. (2 injuries sacrum and coccyx from pressure).

## 2017-12-04 NOTE — PROGRESS NOTE ADULT - ASSESSMENT
89yo Male, bedbound, with hx of HTN, DM Type 2, PAD s/p fempop bypass, CKD, CAD, s/p PPM a/w likely preserved EF acute on chronic HF c/b bilateral pleural effusions, Rt >Lt, multifactorial hyponatremia; Found to be dehydrated; Patient comfort care measures with poor prognosis. Dispo issues do to inability to afford home care. Social work on case. 89yo Male, bedbound, with hx of HTN, DM Type 2, PAD s/p fempop bypass, CKD, CAD, s/p PPM a/w likely preserved EF acute on chronic HF c/b bilateral pleural effusions, Rt >Lt, multifactorial hyponatremia; Found to be dehydrated; Patient comfort care measures with poor prognosis. Dispo Patient for [facility] re[facility]. D/C 40 minutes.

## 2017-12-04 NOTE — DISCHARGE NOTE ADULT - PLAN OF CARE
without difficulty breathing follow up with PCP at Rehab facility remain stable continue Metoprolol Blood glucose 110-180 continue low carb diet continue Protonix and Carafate SBP<140 continue Metoprolol and amlodipine follow up with PCP at Rehab facility  c/w incentive spirometry and Chest PT Resolved continue Prilosec and Carafate Continue levaquin Q 48 hours. Last dose 12/3 at 18:00.   Next dose due 12/5 at 18:00.

## 2017-12-04 NOTE — PROGRESS NOTE ADULT - PROBLEM SELECTOR PLAN 7
- Nutrition team consulted   - Fall and aspiration precautions   - PT consulted   - Replete electrolytes daily c/w amlodipine, and metoprolol

## 2017-12-04 NOTE — ADVANCED PRACTICE NURSE CONSULT - REASON FOR CONSULT
Patient seen on skin care rounds after wound care referral received for assessment of skin impairment and recommendations of topical management. Patient known to [facility] service line from previous admission. Chart reviewed: Serum albumin 2.7g/dL, serum HgbA1C 6.5%, Serum WBC 6.83, urine culture negative, DVT RLE negative, Obdulio 11, BMI 22.4kg/m2, DNR status. Patient interviewed: lives at home with a 24 hour aid. Patient H/O DM, HTN, MI, PPM, CKD, PAD s/p  RLE femoral popliteal bypass. Admitted with pleural effusions. DM, HTN, MI, PPM, CKD, PAD s/p  RLE fem pop bypass, p/w SOB.  Patient a poor historian and attempted to HCP Nishant Hong and Saw Zaragoza but neither were available. History obtained via ED provider note:    Pt lives with 24/7 aid and has been weak lately,  neighbors feel that has not been eating well.  He fell out of bed few days ago,  had no head trauma of loss of consciousness. The patient felt short of breath earlier but currently feels a little better. Does not think he had any fevers.

## 2017-12-04 NOTE — PROGRESS NOTE ADULT - SUBJECTIVE AND OBJECTIVE BOX
Patient is a 89y old  Male who presents with a chief complaint of Cough (01 Dec 2017 23:05)      SUBJECTIVE / OVERNIGHT EVENTS:  Patient has no new complaints. Denies cp, SOB, abdominal pain, N/V/D.    MEDICATIONS  (STANDING):  ALBUTerol/ipratropium for Nebulization 3 milliLiter(s) Nebulizer every 6 hours  amLODIPine   Tablet 2.5 milliGRAM(s) Oral daily  aspirin enteric coated 81 milliGRAM(s) Oral daily  brimonidine 0.2% Ophthalmic Solution 1 Drop(s) Both EYES three times a day  dextrose 5%. 1000 milliLiter(s) (50 mL/Hr) IV Continuous <Continuous>  dextrose 50% Injectable 12.5 Gram(s) IV Push once  dextrose 50% Injectable 25 Gram(s) IV Push once  dextrose 50% Injectable 25 Gram(s) IV Push once  enoxaparin Injectable 30 milliGRAM(s) SubCutaneous daily  insulin lispro (HumaLOG) corrective regimen sliding scale   SubCutaneous three times a day before meals  insulin lispro (HumaLOG) corrective regimen sliding scale   SubCutaneous at bedtime  levoFLOXacin  Tablet 750 milliGRAM(s) Oral every 48 hours  metoprolol     tartrate 25 milliGRAM(s) Oral two times a day  pantoprazole    Tablet 40 milliGRAM(s) Oral before breakfast  sucralfate suspension 1 Gram(s) Oral four times a day  tamsulosin 0.4 milliGRAM(s) Oral at bedtime  timolol 0.5% Solution 1 Drop(s) Both EYES two times a day    MEDICATIONS  (PRN):  acetaminophen   Tablet. 650 milliGRAM(s) Oral every 6 hours PRN mild to severe pain  dextrose Gel 1 Dose(s) Oral once PRN Blood Glucose LESS THAN 70 milliGRAM(s)/deciliter  glucagon  Injectable 1 milliGRAM(s) IntraMuscular once PRN Glucose LESS THAN 70 milligrams/deciliter  guaiFENesin   Syrup  (Sugar-Free) 100 milliGRAM(s) Oral every 6 hours PRN Cough  melatonin 4.5 milliGRAM(s) Oral at bedtime PRN Insomnia      Vital Signs Last 24 Hrs  T(C): 36.5 (04 Dec 2017 05:00), Max: 36.8 (03 Dec 2017 17:55)  T(F): 97.7 (04 Dec 2017 05:00), Max: 98.2 (03 Dec 2017 17:55)  HR: 61 (04 Dec 2017 09:01) (60 - 68)  BP: 135/65 (04 Dec 2017 05:00) (117/57 - 137/65)  BP(mean): --  RR: 16 (04 Dec 2017 05:00) (16 - 20)  SpO2: 98% (04 Dec 2017 05:00) (96% - 100%)  CAPILLARY BLOOD GLUCOSE      POCT Blood Glucose.: 172 mg/dL (04 Dec 2017 12:14)  POCT Blood Glucose.: 121 mg/dL (04 Dec 2017 08:30)  POCT Blood Glucose.: 181 mg/dL (03 Dec 2017 21:51)  POCT Blood Glucose.: 138 mg/dL (03 Dec 2017 18:11)    I&O's Summary      PHYSICAL EXAM:  GENERAL: NAD, well-developed  HEAD:  Atraumatic, Normocephalic  EYES: EOMI, PERRLA, conjunctiva and sclera clear  NECK: Supple, No JVD  CHEST/LUNG: decreased BS bilaterally; B/L diffuse wheezing  HEART: Regular rate and rhythm; No murmurs, rubs, or gallops  ABDOMEN: Soft, Nontender, Nondistended; Bowel sounds present  EXTREMITIES:  2+ Peripheral Pulses, No clubbing, cyanosis, or edema  PSYCH: AAOx3  NEUROLOGY: non-focal  SKIN: No rashes or lesions    LABS:                        9.9    6.83  )-----------( 212      ( 04 Dec 2017 06:24 )             29.5     12-04    140  |  104  |  28<H>  ----------------------------<  137<H>  4.5   |  26  |  0.97    Ca    8.4      04 Dec 2017 06:17  Phos  3.0     12-03  Mg     1.5     12-03        CARDIAC MARKERS ( 03 Dec 2017 05:55 )  x     / 0.07 ng/mL / 26 u/L / 2.56 ng/mL / x              RADIOLOGY & ADDITIONAL TESTS:    Imaging Personally Reviewed:    Consultant(s) Notes Reviewed:      Care Discussed with Consultants/Other Providers: NP

## 2017-12-04 NOTE — DISCHARGE NOTE ADULT - SECONDARY DIAGNOSIS.
Hyponatremia Heart failure with normal ejection fraction DM (diabetes mellitus) Duodenal ulcer HTN (hypertension) Cough

## 2017-12-04 NOTE — PROGRESS NOTE ADULT - SUBJECTIVE AND OBJECTIVE BOX
Patient seen and examined at bedside.    Overnight Events: JEAN CLAUDE.    Review Of Systems: No chest pain, shortness of breath, or palpitations            Medications:  acetaminophen   Tablet. 650 milliGRAM(s) Oral every 6 hours PRN  ALBUTerol/ipratropium for Nebulization 3 milliLiter(s) Nebulizer every 6 hours  amLODIPine   Tablet 2.5 milliGRAM(s) Oral daily  aspirin enteric coated 81 milliGRAM(s) Oral daily  brimonidine 0.2% Ophthalmic Solution 1 Drop(s) Both EYES three times a day  dextrose 5%. 1000 milliLiter(s) IV Continuous <Continuous>  dextrose 50% Injectable 12.5 Gram(s) IV Push once  dextrose 50% Injectable 25 Gram(s) IV Push once  dextrose 50% Injectable 25 Gram(s) IV Push once  dextrose Gel 1 Dose(s) Oral once PRN  enoxaparin Injectable 30 milliGRAM(s) SubCutaneous daily  glucagon  Injectable 1 milliGRAM(s) IntraMuscular once PRN  guaiFENesin   Syrup  (Sugar-Free) 100 milliGRAM(s) Oral every 6 hours PRN  insulin lispro (HumaLOG) corrective regimen sliding scale   SubCutaneous three times a day before meals  insulin lispro (HumaLOG) corrective regimen sliding scale   SubCutaneous at bedtime  levoFLOXacin  Tablet 750 milliGRAM(s) Oral every 48 hours  melatonin 4.5 milliGRAM(s) Oral at bedtime PRN  metoprolol     tartrate 25 milliGRAM(s) Oral two times a day  pantoprazole    Tablet 40 milliGRAM(s) Oral before breakfast  sucralfate suspension 1 Gram(s) Oral four times a day  tamsulosin 0.4 milliGRAM(s) Oral at bedtime  timolol 0.5% Solution 1 Drop(s) Both EYES two times a day      PAST MEDICAL & SURGICAL HISTORY:  Peripheral arterial disease  Pacemaker  Chronic kidney disease, unspecified stage  Duodenal ulcer  DM (diabetes mellitus)  HTN (hypertension)  S/P femoral-popliteal bypass surgery: Right Lower Extremity, 3 weeks ago  S/P appendectomy: 50 yrs ago      Vitals:  T(F): 97.5 (12-04), Max: 97.8 (12-03)  HR: 60 (12-04) (60 - 65)  BP: 117/55 (12-04) (117/55 - 135/65)  RR: 19 (12-04)  SpO2: 99% (12-04)  I&O's Summary      Physical Exam:  GENERAL: No acute distress, well-developed  HEAD:  Atraumatic, Normocephalic  ENT: EOMI, PERRLA, conjunctiva and sclera clear, Neck supple, No JVD, moist mucosa  CHEST/LUNG: wheezing b/l decreased breath sounds at base.   BACK: No spinal tenderness  HEART: Regular rate and rhythm; No murmurs, rubs, or gallops  ABDOMEN: Soft, Nontender, Nondistended; Bowel sounds present  EXTREMITIES:  No clubbing, cyanosis, or edema  PSYCH: Nl behavior, nl affect  NEUROLOGY: AAOx3, non-focal, cranial nerves intact  SKIN: Normal color, No rashes or lesions                          9.9    6.83  )-----------( 212      ( 04 Dec 2017 06:24 )             29.5     12-04    140  |  104  |  28<H>  ----------------------------<  137<H>  4.5   |  26  |  0.97    Ca    8.4      04 Dec 2017 06:17  Phos  3.0     12-03  Mg     1.5     12-03        CARDIAC MARKERS ( 03 Dec 2017 05:55 )  x     / 0.07 ng/mL / 26 u/L / 2.56 ng/mL / x          Serum Pro-Brain Natriuretic Peptide: 01774 pg/mL (12-02 @ 06:16)  Serum Pro-Brain Natriuretic Peptide: 72152 pg/mL (12-01 @ 16:39)      A/P:  89yo M, bedbound, with hx of HTN, DM Type 2, PAD s/p fempop bypass, CKD, CAD, with PPM, HFpEF, presenting with cough. On exam patient is noted to have b/l wheezing, otherwise his JVP is 5cm, and LE dry. Patient otherwise appears clinically euvolemic on exam. Labs     #chronic diastolic failure, compensated   - cont to hold Lasix   - continue Metoprolol tartrate 25 mg BID  - will f/u TTE

## 2017-12-04 NOTE — DISCHARGE NOTE ADULT - HOSPITAL COURSE
89yo Male, bedbound, with hx of HTN, DM Type 2, PAD s/p fempop bypass, CKD, CAD, s/p PPM, admission in September, 2017 for acute decompensated HF with preserved EF, presenting with cough. 89yo Male, bedbound, with hx of HTN, DM Type 2, PAD s/p fempop bypass, CKD, CAD, s/p PPM, admission in September, 2017 for acute decompensated HF with preserved EF, presenting with cough.  Pleural effusion:  -Likely cardiogenic etiology given no fever and no leukocytosis;   -Held off aggressive workup given MOLST form wishes,  Confirmed with HCP   -Hold of on Abx.   -Speech & swallow - dysphagia 1 with nectar thick fluids   -CXR - B/L pleural effusions  -CT chest - showed large B/L pleural effusions with complete atelectasis of right middle and B/L lower lobes      Hyponatremia:  -Likely multifactorial 2/2 Lasix regimen and possible malignancy;   s/p 500 cc bolus   - Will encourage PO intake   -Trend NA+  - Hold additional IV fluids due to pleural effusion and acute HF.       Heart failure with normal ejection fraction:  - R/O Heart failure with normal ejection fraction Previous echo this year, June, 2017, showed EF 65%  - TTE--_______________________________  - Hold off aggressive workup given MOLST form wishes;   - Reach out to HCP in am to confirm comfort care measures   - Will continue home dose of lasix 20mg qd  - Continue aspirin and metoprolol.   - House Cardiology Cx - continue Metoprolol, start Amlodipine, D/C Hydralazine     Type 2 diabetes mellitus with stage 3 chronic kidney disease, without long-term current use of insulin:  -Ha1c 6.5 09/17  - ISS and FS  - Holding home metformin due to HF.       Duodenal ulcer:  - Continue PPI, and Carafate.       HTN (hypertension):  - Continue hydralazine.      Nutritional assessment:  - Nutrition team consulted   - Fall and aspiration precautions   - PT consulted:  home with home PT    Decubitus ulcer of sacral region, stage 3:  -Multiple pressure ulcers as above  -Wound care consulted    -PT - home with home PT   Case management assistance for home health aid or other placement options. Patient states he has ran out of money. Would prefer to go home with another home aide at discharge versus facility.                 Patient is medically stable for discharge to [facility] 89yo Male, bedbound, with hx of HTN, DM Type 2, PAD s/p fempop bypass, CKD, CAD, s/p PPM, admission in September, 2017 for acute decompensated HF with preserved EF, presenting with cough.  Pleural effusion:  -Likely cardiogenic etiology given no fever and no leukocytosis;   -Held off aggressive workup given MOLST form wishes,  ections selected: HCP, DNR, Comfort measures only, DNI, Do not send to hospital unless severe pain or severe symptoms, No feeding tube, No IV fluids, Limited abx use;Confirmed with HCP   -Hold of on Abx.   -Speech & swallow - dysphagia 1 with nectar thick fluids   -CXR - B/L pleural effusions  -CT chest - showed large B/L pleural effusions with complete atelectasis of right middle and B/L lower lobes      Hyponatremia:  -Likely multifactorial 2/2 Lasix regimen and possible malignancy;   s/p 500 cc bolus   - Will encourage PO intake   -Trend NA+  - Hold additional IV fluids due to pleural effusion and acute HF.       Heart failure with normal ejection fraction:  - R/O Heart failure with normal ejection fraction Previous echo this year, June, 2017, showed EF 65%  - TTE--_______________________________  - Hold off aggressive workup given MOLST form wishes;   -Cardiology consulted  and rec no need to trend troponins further  hold lasix, continue Metoprolol tartrate 25 mg BID, d/c hydralazine  and start Amlodipine 2.5 mg daily  - Continue aspirin and metoprolol.   - House Cardiology Cx - continue Metoprolol, start Amlodipine, D/C Hydralazine     Type 2 diabetes mellitus with stage 3 chronic kidney disease, without long-term current use of insulin:  -Ha1c 6.5 09/17  - ISS and FS  - Holding home metformin due to HF.       Duodenal ulcer:  - Continue PPI, and Carafate.       HTN (hypertension):  - Continue hydralazine.      Nutritional assessment:  - Nutrition team consulted   - Fall and aspiration precautions   - PT consulted:  home with home PT    Decubitus ulcer of sacral region, stage 3:  -Multiple pressure ulcers as above  -Wound care consulted    -PT - home with home PT   Case management assistance for home health aid or other placement options. Patient states he has ran out of money. Would prefer to go home with another home aide at discharge versus facility.                 Patient is medically stable for discharge to [facility] 89yo Male, bedbound, with hx of HTN, DM Type 2, PAD s/p fempop bypass, CKD, CAD, s/p PPM, admission in September, 2017 for acute decompensated HF with preserved EF, presenting with cough.  Pleural effusion:  -Likely cardiogenic etiology given no fever and no leukocytosis;   -Held off aggressive workup given MOLST form wishes,  ections selected: HCP, DNR, Comfort measures only, DNI, Do not send to hospital unless severe pain or severe symptoms, No feeding tube, No IV fluids, Limited abx use;Confirmed with HCP    -Speech & swallow - dysphagia 1 with nectar thick fluids   -CXR - B/L pleural effusions  -CT chest - showed large B/L pleural effusions with complete atelectasis of right middle and B/L lower lobes      Hyponatremia:  -Likely multifactorial 2/2 Lasix regimen and possible malignancy;   -s/p 500 cc bolus   - Will encourage PO intake   - Hold additional IV fluids due to pleural effusion and acute HF.       Heart failure with normal ejection fraction:  - R/O Heart failure with normal ejection fraction Previous echo this year, June, 2017, showed EF 65%  - Hold off aggressive workup given MOLST form wishes;   -Cardiology consulted  and rec no need to trend troponins further  hold lasix, continue Metoprolol tartrate 25 mg BID, d/c hydralazine  and start Amlodipine 2.5 mg daily  - Continue aspirin and metoprolol.   - House Cardiology Cx - continue Metoprolol and start Amlodipine    Type 2 diabetes mellitus with stage 3 chronic kidney disease, without long-term current use of insulin:  -Ha1c 6.5 09/17  - ISS and FS  - Holding home metformin due to HF.       Duodenal ulcer:  - Continue PPI, and Carafate.       HTN (hypertension):  - Continue amlodipine and metoprolol      Nutritional assessment:  - Nutrition team consulted   - Fall and aspiration precautions   - PT consulted:  home with home PT  -Case management assistance for home health aid or other placement options. Patient states he has ran out of money. Would prefer to go home with another home aide at discharge versus facility.     Decubitus ulcer of sacral region, stage 3:  -Multiple pressure ulcers as above  -Wound care consulted    - Wound care recommendations;Wound care:Right posterior lower leg (calf) & Left heel & Right lateral malleolus: Cleanse with SAF-clens, rinse with NS, pat dry. Apply Liquid barrier film to periwound skin. apply Medihoney gel to wound base, cover with foam with border. Change daily.  Sacrum and Coccyx: Cleanse with skin cleanser, pat dry. Apply TRIAD paste twice a day and PRN with incontinent episodes.  Left shin: Cleanse with NS, pat dry. Apply Liquid barrier film to periwound skin. Apply foam with border. Change every 3 days.        Patient is medically stable for discharge to [facility]

## 2017-12-05 VITALS — OXYGEN SATURATION: 93 %

## 2017-12-05 LAB
BUN SERPL-MCNC: 28 MG/DL — HIGH (ref 7–23)
CALCIUM SERPL-MCNC: 8.2 MG/DL — LOW (ref 8.4–10.5)
CHLORIDE SERPL-SCNC: 104 MMOL/L — SIGNIFICANT CHANGE UP (ref 98–107)
CO2 SERPL-SCNC: 27 MMOL/L — SIGNIFICANT CHANGE UP (ref 22–31)
CREAT SERPL-MCNC: 0.92 MG/DL — SIGNIFICANT CHANGE UP (ref 0.5–1.3)
GLUCOSE BLDC GLUCOMTR-MCNC: 117 MG/DL — HIGH (ref 70–99)
GLUCOSE SERPL-MCNC: 132 MG/DL — HIGH (ref 70–99)
HCT VFR BLD CALC: 29.5 % — LOW (ref 39–50)
HGB BLD-MCNC: 9.3 G/DL — LOW (ref 13–17)
MCHC RBC-ENTMCNC: 31.5 % — LOW (ref 32–36)
MCHC RBC-ENTMCNC: 31.6 PG — SIGNIFICANT CHANGE UP (ref 27–34)
MCV RBC AUTO: 100.3 FL — HIGH (ref 80–100)
NRBC # FLD: 0 — SIGNIFICANT CHANGE UP
PLATELET # BLD AUTO: 183 K/UL — SIGNIFICANT CHANGE UP (ref 150–400)
PMV BLD: 10.4 FL — SIGNIFICANT CHANGE UP (ref 7–13)
POTASSIUM SERPL-MCNC: 3.9 MMOL/L — SIGNIFICANT CHANGE UP (ref 3.5–5.3)
POTASSIUM SERPL-SCNC: 3.9 MMOL/L — SIGNIFICANT CHANGE UP (ref 3.5–5.3)
RBC # BLD: 2.94 M/UL — LOW (ref 4.2–5.8)
RBC # FLD: 15.4 % — HIGH (ref 10.3–14.5)
SODIUM SERPL-SCNC: 140 MMOL/L — SIGNIFICANT CHANGE UP (ref 135–145)
WBC # BLD: 7.16 K/UL — SIGNIFICANT CHANGE UP (ref 3.8–10.5)
WBC # FLD AUTO: 7.16 K/UL — SIGNIFICANT CHANGE UP (ref 3.8–10.5)

## 2017-12-05 PROCEDURE — 99239 HOSP IP/OBS DSCHRG MGMT >30: CPT

## 2017-12-05 RX ORDER — INSULIN LISPRO 100/ML
0 VIAL (ML) SUBCUTANEOUS
Qty: 0 | Refills: 0 | COMMUNITY
Start: 2017-12-05

## 2017-12-05 RX ORDER — NYSTATIN CREAM 100000 [USP'U]/G
1 CREAM TOPICAL
Qty: 0 | Refills: 0 | COMMUNITY

## 2017-12-05 RX ORDER — TRAMADOL HYDROCHLORIDE 50 MG/1
0 TABLET ORAL
Qty: 0 | Refills: 0 | COMMUNITY

## 2017-12-05 RX ORDER — METFORMIN HYDROCHLORIDE 850 MG/1
1 TABLET ORAL
Qty: 0 | Refills: 0 | COMMUNITY

## 2017-12-05 RX ORDER — HYDRALAZINE HCL 50 MG
1 TABLET ORAL
Qty: 0 | Refills: 0 | COMMUNITY

## 2017-12-05 RX ORDER — CIPROFLOXACIN LACTATE 400MG/40ML
1 VIAL (ML) INTRAVENOUS
Qty: 0 | Refills: 0 | COMMUNITY
Start: 2017-12-05 | End: 2017-12-09

## 2017-12-05 RX ORDER — CIPROFLOXACIN LACTATE 400MG/40ML
1 VIAL (ML) INTRAVENOUS
Qty: 0 | Refills: 0 | COMMUNITY
Start: 2017-12-05

## 2017-12-05 RX ORDER — ENOXAPARIN SODIUM 100 MG/ML
30 INJECTION SUBCUTANEOUS
Qty: 0 | Refills: 0 | COMMUNITY
Start: 2017-12-05

## 2017-12-05 RX ORDER — AMLODIPINE BESYLATE 2.5 MG/1
1 TABLET ORAL
Qty: 0 | Refills: 0 | COMMUNITY
Start: 2017-12-05

## 2017-12-05 RX ADMIN — BRIMONIDINE TARTRATE 1 DROP(S): 2 SOLUTION/ DROPS OPHTHALMIC at 05:05

## 2017-12-05 RX ADMIN — Medication 3 MILLILITER(S): at 11:06

## 2017-12-05 RX ADMIN — AMLODIPINE BESYLATE 2.5 MILLIGRAM(S): 2.5 TABLET ORAL at 06:06

## 2017-12-05 RX ADMIN — Medication 1 GRAM(S): at 11:34

## 2017-12-05 RX ADMIN — Medication 3 MILLILITER(S): at 03:52

## 2017-12-05 RX ADMIN — Medication 650 MILLIGRAM(S): at 06:00

## 2017-12-05 RX ADMIN — Medication 3 MILLILITER(S): at 16:35

## 2017-12-05 RX ADMIN — PANTOPRAZOLE SODIUM 40 MILLIGRAM(S): 20 TABLET, DELAYED RELEASE ORAL at 06:05

## 2017-12-05 RX ADMIN — Medication 81 MILLIGRAM(S): at 11:34

## 2017-12-05 RX ADMIN — Medication 25 MILLIGRAM(S): at 06:05

## 2017-12-05 RX ADMIN — Medication 650 MILLIGRAM(S): at 05:00

## 2017-12-05 RX ADMIN — ENOXAPARIN SODIUM 30 MILLIGRAM(S): 100 INJECTION SUBCUTANEOUS at 11:34

## 2017-12-05 RX ADMIN — Medication 1 DROP(S): at 05:05

## 2017-12-05 RX ADMIN — Medication 1 GRAM(S): at 06:05

## 2017-12-05 RX ADMIN — BRIMONIDINE TARTRATE 1 DROP(S): 2 SOLUTION/ DROPS OPHTHALMIC at 13:27

## 2017-12-05 NOTE — PROGRESS NOTE ADULT - PROBLEM SELECTOR PLAN 3
Previous echo this year, June, 2017, showed EF 65%  - f/u TTE  - Hold off aggressive workup given MOLST form wishes  -will hold home dose of lasix and hydralazine  - Continue aspirin and metoprolol
Likely multifactorial 2/2 Lasix regimen and possible malignancy  Resolved  s/p 500 cc bolus   Will encourage PO intake
Previous echo this year, June, 2017, showed EF 65%  - f/u TTE  - Hold off aggressive workup given MOLST form wishes;   - appreciate cardiology recs  -will hold home dose of lasix and hydralazine  -start amlodipine  - Continue aspirin and metoprolol
Previous echo this year, June, 2017, showed EF 65%  - f/u TTE  - Hold off aggressive workup given MOLST form wishes;   - appreciate cardiology recs  -will hold home dose of lasix and hydralazine  -start amlodipine  - Continue aspirin and metoprolol

## 2017-12-05 NOTE — PROGRESS NOTE ADULT - PROBLEM SELECTOR PLAN 8
Multiple pressure ulcers as above  -wound care

## 2017-12-05 NOTE — PROGRESS NOTE ADULT - PROBLEM SELECTOR PLAN 2
Likely multifactorial 2/2 Lasix regimen and possible malignancy  Resolved  s/p 500 cc bolus   Will encourage PO intake
Likely multifactorial 2/2 Lasix regimen and possible malignancy;   s/p 500 cc bolus   - Will encourage PO intake   - Now resolved
Likely multifactorial 2/2 Lasix regimen and possible malignancy;   s/p 500 cc bolus   - Will encourage PO intake   - Repeat Na in AM  - Hold additional IV fluids due to pleural effusion and acute HF.
Previous echo this year, June, 2017, showed EF 65%  - f/u TTE  - Hold off aggressive workup given MOLST form wishes  -will hold home dose of lasix and hydralazine  - Continue aspirin and metoprolol

## 2017-12-05 NOTE — PROGRESS NOTE ADULT - PROBLEM SELECTOR PLAN 5
- Continue PPI, and Carafate

## 2017-12-05 NOTE — PROGRESS NOTE ADULT - SUBJECTIVE AND OBJECTIVE BOX
Patient is a 89y old  Male who presents with a chief complaint of Cough (04 Dec 2017 17:36)      SUBJECTIVE / OVERNIGHT EVENTS:  Patient has no new complaints. Denies cp, SOB, abdominal pain, N/V/D     MEDICATIONS  (STANDING):  ALBUTerol/ipratropium for Nebulization 3 milliLiter(s) Nebulizer every 6 hours  amLODIPine   Tablet 2.5 milliGRAM(s) Oral daily  aspirin enteric coated 81 milliGRAM(s) Oral daily  brimonidine 0.2% Ophthalmic Solution 1 Drop(s) Both EYES three times a day  dextrose 5%. 1000 milliLiter(s) (50 mL/Hr) IV Continuous <Continuous>  dextrose 50% Injectable 12.5 Gram(s) IV Push once  dextrose 50% Injectable 25 Gram(s) IV Push once  dextrose 50% Injectable 25 Gram(s) IV Push once  enoxaparin Injectable 30 milliGRAM(s) SubCutaneous daily  insulin lispro (HumaLOG) corrective regimen sliding scale   SubCutaneous three times a day before meals  insulin lispro (HumaLOG) corrective regimen sliding scale   SubCutaneous at bedtime  levoFLOXacin  Tablet 750 milliGRAM(s) Oral every 48 hours  metoprolol     tartrate 25 milliGRAM(s) Oral two times a day  pantoprazole    Tablet 40 milliGRAM(s) Oral before breakfast  sucralfate suspension 1 Gram(s) Oral four times a day  tamsulosin 0.4 milliGRAM(s) Oral at bedtime  timolol 0.5% Solution 1 Drop(s) Both EYES two times a day    MEDICATIONS  (PRN):  acetaminophen   Tablet. 650 milliGRAM(s) Oral every 6 hours PRN mild to severe pain  dextrose Gel 1 Dose(s) Oral once PRN Blood Glucose LESS THAN 70 milliGRAM(s)/deciliter  glucagon  Injectable 1 milliGRAM(s) IntraMuscular once PRN Glucose LESS THAN 70 milligrams/deciliter  guaiFENesin   Syrup  (Sugar-Free) 100 milliGRAM(s) Oral every 6 hours PRN Cough  melatonin 4.5 milliGRAM(s) Oral at bedtime PRN Insomnia      Vital Signs Last 24 Hrs  T(C): 36.6 (05 Dec 2017 04:58), Max: 36.6 (04 Dec 2017 20:46)  T(F): 97.8 (05 Dec 2017 04:58), Max: 97.8 (04 Dec 2017 20:46)  HR: 62 (05 Dec 2017 04:58) (59 - 66)  BP: 126/54 (05 Dec 2017 04:58) (117/55 - 133/64)  BP(mean): --  RR: 18 (05 Dec 2017 04:58) (18 - 19)  SpO2: 96% (05 Dec 2017 04:58) (95% - 99%)  CAPILLARY BLOOD GLUCOSE      POCT Blood Glucose.: 117 mg/dL (05 Dec 2017 08:40)  POCT Blood Glucose.: 161 mg/dL (04 Dec 2017 22:14)  POCT Blood Glucose.: 136 mg/dL (04 Dec 2017 18:06)  POCT Blood Glucose.: 172 mg/dL (04 Dec 2017 12:14)    I&O's Summary      PHYSICAL EXAM:  GENERAL: NAD, well-developed  HEAD:  Atraumatic, Normocephalic  EYES: EOMI, PERRLA, conjunctiva and sclera clear  NECK: Supple, No JVD  CHEST/LUNG: Clear to auscultation bilaterally; No wheeze  HEART: Regular rate and rhythm; No murmurs, rubs, or gallops  ABDOMEN: Soft, Nontender, Nondistended; Bowel sounds present  EXTREMITIES:  2+ Peripheral Pulses, No clubbing, cyanosis, or edema  PSYCH: AAOx3  NEUROLOGY: non-focal  SKIN: No rashes or lesions    LABS:                        9.3    7.16  )-----------( 183      ( 05 Dec 2017 07:25 )             29.5     12-05    140  |  104  |  28<H>  ----------------------------<  132<H>  3.9   |  27  |  0.92    Ca    8.2<L>      05 Dec 2017 07:25                RADIOLOGY & ADDITIONAL TESTS:    Imaging Personally Reviewed:    Consultant(s) Notes Reviewed:      Care Discussed with Consultants/Other Providers: NP Patient is a 89y old  Male who presents with a chief complaint of Cough (04 Dec 2017 17:36)      SUBJECTIVE / OVERNIGHT EVENTS:  Patient has no new complaints. Denies cp, SOB, abdominal pain, N/V/D     MEDICATIONS  (STANDING):  ALBUTerol/ipratropium for Nebulization 3 milliLiter(s) Nebulizer every 6 hours  amLODIPine   Tablet 2.5 milliGRAM(s) Oral daily  aspirin enteric coated 81 milliGRAM(s) Oral daily  brimonidine 0.2% Ophthalmic Solution 1 Drop(s) Both EYES three times a day  dextrose 5%. 1000 milliLiter(s) (50 mL/Hr) IV Continuous <Continuous>  dextrose 50% Injectable 12.5 Gram(s) IV Push once  dextrose 50% Injectable 25 Gram(s) IV Push once  dextrose 50% Injectable 25 Gram(s) IV Push once  enoxaparin Injectable 30 milliGRAM(s) SubCutaneous daily  insulin lispro (HumaLOG) corrective regimen sliding scale   SubCutaneous three times a day before meals  insulin lispro (HumaLOG) corrective regimen sliding scale   SubCutaneous at bedtime  levoFLOXacin  Tablet 750 milliGRAM(s) Oral every 48 hours  metoprolol     tartrate 25 milliGRAM(s) Oral two times a day  pantoprazole    Tablet 40 milliGRAM(s) Oral before breakfast  sucralfate suspension 1 Gram(s) Oral four times a day  tamsulosin 0.4 milliGRAM(s) Oral at bedtime  timolol 0.5% Solution 1 Drop(s) Both EYES two times a day    MEDICATIONS  (PRN):  acetaminophen   Tablet. 650 milliGRAM(s) Oral every 6 hours PRN mild to severe pain  dextrose Gel 1 Dose(s) Oral once PRN Blood Glucose LESS THAN 70 milliGRAM(s)/deciliter  glucagon  Injectable 1 milliGRAM(s) IntraMuscular once PRN Glucose LESS THAN 70 milligrams/deciliter  guaiFENesin   Syrup  (Sugar-Free) 100 milliGRAM(s) Oral every 6 hours PRN Cough  melatonin 4.5 milliGRAM(s) Oral at bedtime PRN Insomnia      Vital Signs Last 24 Hrs  T(C): 36.6 (05 Dec 2017 04:58), Max: 36.6 (04 Dec 2017 20:46)  T(F): 97.8 (05 Dec 2017 04:58), Max: 97.8 (04 Dec 2017 20:46)  HR: 62 (05 Dec 2017 04:58) (59 - 66)  BP: 126/54 (05 Dec 2017 04:58) (117/55 - 133/64)  BP(mean): --  RR: 18 (05 Dec 2017 04:58) (18 - 19)  SpO2: 96% (05 Dec 2017 04:58) (95% - 99%)  CAPILLARY BLOOD GLUCOSE      POCT Blood Glucose.: 117 mg/dL (05 Dec 2017 08:40)  POCT Blood Glucose.: 161 mg/dL (04 Dec 2017 22:14)  POCT Blood Glucose.: 136 mg/dL (04 Dec 2017 18:06)  POCT Blood Glucose.: 172 mg/dL (04 Dec 2017 12:14)    I&O's Summary      PHYSICAL EXAM:  GENERAL: NAD, well-developed  HEAD:  Atraumatic, Normocephalic  EYES: EOMI, PERRLA, conjunctiva and sclera clear  NECK: Supple, No JVD  CHEST/LUNG: decreased BS bilaterally; No wheezing  HEART: Regular rate and rhythm; No murmurs, rubs, or gallops  ABDOMEN: Soft, Nontender, Nondistended; Bowel sounds present  EXTREMITIES:  2+ Peripheral Pulses, No clubbing, cyanosis, or edema  PSYCH: AAOx3  NEUROLOGY: non-focal  SKIN: No rashes or lesions    LABS:                        9.3    7.16  )-----------( 183      ( 05 Dec 2017 07:25 )             29.5     12-05    140  |  104  |  28<H>  ----------------------------<  132<H>  3.9   |  27  |  0.92    Ca    8.2<L>      05 Dec 2017 07:25                RADIOLOGY & ADDITIONAL TESTS:    Imaging Personally Reviewed:    Consultant(s) Notes Reviewed:      Care Discussed with Consultants/Other Providers: NP

## 2017-12-05 NOTE — PROGRESS NOTE ADULT - PROBLEM SELECTOR PLAN 9
DVT ppx: heparin SQ  Diet: DASH, CC  Dispo: Case management assistance for home health aid or other placement options. Patient states he has ran out of money. Would prefer to go home with another home aide at discharge versus facility

## 2017-12-05 NOTE — PROGRESS NOTE ADULT - PROBLEM SELECTOR PROBLEM 8
Decubitus ulcer of sacral region, stage 3

## 2017-12-05 NOTE — PROGRESS NOTE ADULT - ASSESSMENT
89yo Male, bedbound, with hx of HTN, DM Type 2, PAD s/p fempop bypass, CKD, CAD, s/p PPM a/w likely preserved EF acute on chronic diastolic HF c/b bilateral pleural effusions, Rt >Lt, multifactorial hyponatremia; Found to be dehydrated; Patient comfort care measures with poor prognosis. Dispo issues do to inability to afford home care. Social work on case. 89yo Male, bedbound, with hx of HTN, DM Type 2, PAD s/p fempop bypass, CKD, CAD, s/p PPM a/w likely preserved EF acute on chronic diastolic HF c/b bilateral pleural effusions, Rt >Lt, multifactorial hyponatremia; Found to be dehydrated; Patient comfort care measures with poor prognosis. Dispo issues do to inability to afford home care. Social work on case and patient to be discharged to [facility]. d/c 40 minutes.

## 2017-12-05 NOTE — PROGRESS NOTE ADULT - PROBLEM SELECTOR PROBLEM 3
Heart failure with normal ejection fraction
Hyponatremia

## 2017-12-05 NOTE — PROGRESS NOTE ADULT - PROBLEM SELECTOR PLAN 1
bilateral pleural effusions on CXR, appreciate cardiology recs  -will monitor off lasix  c/w incentive spirometry and Chest PT  -f/u TTE  -Hold off aggressive workup given MOLST form wishes;    wife and confirmed comfort care measures   -patient ran out of money and unable to afford home health care, patient has no family  Saw Ran(emergency contact) and Nishant Cheung(HCP) are the patient neighbors  -Patient insists he has underlying bronchitis. Has rhonchorous breath sounds on exam, CT chest only shows atelectasis and pleural effusion.   c/wlevrohituin, ekaterina, and robitussin for cough.
bilateral pleural effusions on CXR, appreciate cardiology recs  -will monitor off lasix  -incentive spirometry and Chest PT  -f/u TTE  -Hold off aggressive workup given MOLST form wishes;   Called HCP Nishant Cheung on 12/2 unable to reach, phone continued to ring without going to voicemail  -Called emergency contact Saw Ran, spoke to wife and confirmed comfort care measures   -patient ran out of money and unable to afford home health care, patient has no family  Saw Tong(emergency contact) and Nishant Cheung(HCP) are the patient neighbors  -Patient insists he has underlying bronchitis. Has rhonchorous breath sounds on exam, CT chest only shows atelectasis and pleural effusion. Will give trial of levaquin. C/w duonebs, start robitussin for cough.
bilateral pleural effusions on CXR, appreciate cardiology recs  -will monitor off lasix  -incentive spirometry and Chest PT  -f/u TTE  -Hold off aggressive workup given MOLST form wishes;   Called HCP Nishant Cheung unable to reach, phone continued to ring without going to voicemail  -Called emergency contact Saw Tong, spoke to wife and confirmed comfort care measures   -patient ran out of money and unable to afford home health care, patient has no family  Saw Tong(emergency contact) and Nishant Cheung(HCP) are the patient neighbors  -Hold off on Abx
bilateral pleural effusions on CXR, appreciate cardiology recs  -will monitor off lasix  c/w incentive spirometry and Chest PT  -f/u TTE  -Hold off aggressive workup given MOLST form wishes;    wife and confirmed comfort care measures   -patient ran out of money and unable to afford home health care, patient has no family  Saw Ran(emergency contact) and Nishant Cheung(HCP) are the patient neighbors  -Patient insists he has underlying bronchitis. Has rhonchorous breath sounds on exam, CT chest only shows atelectasis and pleural effusion.   c/wlevrohituin, ekaterina, and robitussin for cough.

## 2017-12-05 NOTE — PROGRESS NOTE ADULT - PROBLEM SELECTOR PLAN 4
Ha1c 6.5 09/17  - ISS and FS  - Holding home metformin due to HF

## 2017-12-05 NOTE — PROGRESS NOTE ADULT - PROBLEM SELECTOR PROBLEM 4
Type 2 diabetes mellitus with stage 3 chronic kidney disease, without long-term current use of insulin

## 2017-12-06 DIAGNOSIS — E43 UNSPECIFIED SEVERE PROTEIN-CALORIE MALNUTRITION: ICD-10-CM

## 2017-12-07 PROBLEM — R53.81 DEBILITY: Status: ACTIVE | Noted: 2017-12-07

## 2018-01-19 NOTE — PROGRESS NOTE ADULT - PROBLEM/PLAN-4
DISPLAY PLAN FREE TEXT
Alert and oriented to person, place, time/situation. normal mood and affect. no apparent risk to self or others.

## 2018-02-01 NOTE — OCCUPATIONAL THERAPY INITIAL EVALUATION ADULT - NAME OF CLINICIAN
Pupils are equal, round, and reactive to light. Right eye exhibits no discharge. Left eye exhibits no discharge. Neck: Neck supple. Cardiovascular: Normal rate, regular rhythm and normal heart sounds. Exam reveals no gallop and no friction rub. No murmur heard. Pulmonary/Chest: Breath sounds normal. No respiratory distress. He has no wheezes. Abdominal: Soft. Bowel sounds are normal. He exhibits no mass. There is no rebound. Musculoskeletal: Normal range of motion. He exhibits no edema or tenderness. Neurological: He is alert and oriented to person, place, and time. No cranial nerve deficit. He exhibits normal muscle tone. Skin: Skin is warm. No rash noted. No erythema. Psychiatric: His behavior is normal. Thought content normal.       DIAGNOSTIC RESULTS     EKG: All EKG's are interpreted by the Emergency Department Physician who either signs or Co-signs this chart in the absence of a cardiologist.        RADIOLOGY:   Non-plain film images such as CT, Ultrasound and MRI are read by the radiologist. Plain radiographic images are visualized and preliminarily interpreted by the emergency physician with the below findings:        Interpretation per the Radiologist below, if available at the time of this note:    No orders to display         ED BEDSIDE ULTRASOUND:   Performed by ED Physician - none    LABS:  Labs Reviewed   RAPID INFLUENZA A/B ANTIGENS - Abnormal; Notable for the following:        Result Value    Rapid Influenza B Ag POSITIVE (*)     All other components within normal limits   RAPID STREP SCREEN   CULTURE BETA STREP CONFIRM PLATE       All other labs were within normal range or not returned as of this dictation.     EMERGENCY DEPARTMENT COURSE and DIFFERENTIAL DIAGNOSIS/MDM:   Vitals:    Vitals:    02/01/18 0124 02/01/18 0125   BP:  (!) 163/100   Pulse:  116   Resp:  18   Temp:  101.7 °F (38.7 °C)   TempSrc:  Oral   SpO2:  96%   Weight: 210 lb (95.3 kg)    Height: 5' 9\" (1.753 m) TRUNG Pelletier

## 2018-04-02 NOTE — ED PROVIDER NOTE - OBJECTIVE STATEMENT
89 M w PVD DM HTN p/w 2 wk of RLE redness and swelling. Was seen by visiting doctor who d/w Dr. Aguilar who recommended pt come to the hospital for an angioplasty. Denies pain, fever or chills.
H/O total hysterectomy

## 2018-05-03 NOTE — DIETITIAN INITIAL EVALUATION ADULT. - 25 CAL
1795

## 2018-07-28 PROBLEM — I87.2 VENOUS INSUFFICIENCY: Status: ACTIVE | Noted: 2017-06-13

## 2018-09-03 PROBLEM — L89.153 DECUBITUS ULCER OF SACRAL REGION, STAGE 3: Status: ACTIVE | Noted: 2017-10-06

## 2018-09-06 NOTE — PATIENT PROFILE ADULT. - FUNCTIONAL SCREEN CURRENT LEVEL: TRANSFERRING, MLM
(2) assistive person Mercedes Flap Text: The defect edges were debeveled with a #15 scalpel blade.  Given the location of the defect, shape of the defect and the proximity to free margins a Mercedes flap was deemed most appropriate.  Using a sterile surgical marker, an appropriate advancement flap was drawn incorporating the defect and placing the expected incisions within the relaxed skin tension lines where possible. The area thus outlined was incised deep to adipose tissue with a #15 scalpel blade.  The skin margins were undermined to an appropriate distance in all directions utilizing iris scissors.

## 2018-11-19 NOTE — SWALLOW BEDSIDE ASSESSMENT ADULT - ASR SWALLOW REFERRAL
Signed Prescriptions:                        Disp   Refills    simvastatin (ZOCOR) 40 MG tablet           90 tab*3        Sig: Take 1 tablet (40 mg) by mouth daily  Authorizing Provider: YUSUF NICE  Ordering User: MALU BLACKWOOD RN refilled medication per [facility] Refill Protocol.     Malu Blackwood RN    
ENT/to assess abovementioned hoarseness

## 2018-11-28 NOTE — H&P ADULT - MINUTES
Partially impaired: cannot see medication labels or newsprint, but can see obstacles in path, and the surrounding layout; can count fingers at arm's length
70

## 2018-12-20 NOTE — DIETITIAN INITIAL EVALUATION ADULT. - PROBLEM SELECTOR PROBLEM 4
2
Type 2 diabetes mellitus with stage 3 chronic kidney disease, without long-term current use of insulin

## 2019-01-01 NOTE — CONSULT NOTE ADULT - ASSESSMENT
89 y/o M w/ multiple vascular risk factors presenting w/ left arm numbness and weakness, PE remarkable for weakness in left arm, most notably in hand. CTH w/ no evidence of acute infarct, however, with chronic right frontal MCA territory infarct, which would correlate with findings. Patient is out of the window for tPA, not an endovascular candidate. Myself

## 2019-07-03 NOTE — DISCHARGE NOTE ADULT - NS DC ANGIO PCI YN
Subjective:      Patient: Tyrone Leon, MRN: [id number]  Requesting Physician:  Dr. Zena Tafoya     Chief Complaint   Patient presents with    Heart Problem     pulmonary insufficiency; h/o TOF s/p repair       Surgical CONSULT/EVALUATION: Patient presents for surgical consultation    Diagnosis:      ICD-10-CM ICD-9-CM   1. Pulmonary insufficiency J98.4 518.82   2. Tetralogy of Fallot Q21.3 745.2   3. Personal history of surgery to heart and great vessels, presenting hazards to health Z98.890 V15.1   4. Right ventricular dilation I51.7 429.3       HPI:   Tyrone Leon is a 9 y.o. patient with a history of tetralogy of Fallot. Based on review of documentation of the pediatric cardiologist in [place] (Dr. Alfred Richardson) Tyrone underwent trans-annular patch repair of tetralogy of Fallot in December 2009 at [place].  He has since been followed in [place], [place], and was last evaluated by Dr. Richardson on 10/14/2018, at which time he was doing well.  An echocardiogram revealed good biventricular function, mild RV enlargement and a PFO.  He is known to have a right aortic arch. They have since moved to the area and he has been followed in cardiology by Dr. Tafoya. At the time of the first visit, the father mentioned that pulmonary valve replacement was anticipated at approximately 10 years of age. They had reported that Tyrone lately had been tiring out more with activity. He was scheduled for cardiac MRI to evaluate RV size and function. Findings showed:    Cardiac MRI 4/11/19:  1. Increased right ventricular volumes. (RVEDV 169 cc/m2 for BSA, RVESV 81 cc/m2 for BSA).  RVEF 52 %. The RV volumes are 2 times that of the LV.   2. Normal left ventricular volume with LVEF 54 %.  3. Free pulmonary insufficiency with regurgitation fraction of 54%.   4. The MPA is low normal in size. The RPA is low normal in size, and the LPA is normal in size.  5. Mild enlargement of the aortic root.   6. Right  arch, mirror image branching.     His data was discussed at the CV surgery and cardiology conference on May 3. The team agreed to recommend pulmonary valve replacement, either by surgical or percutaneous approach. Dr. Tafoya discussed with the the family and they decided to proceed with surgical pulmonary valve replacement. He presents today for surgical consultation.     He has been doing well at home with no recent illnesses. Denies any fever, cough, congestion, rhinorrhea, diarrhea, vomiting, rash. No other cardiovascular or medical concerns are reported.     ROS   GENERAL: No fever, chills, fatigability, malaise  or weight loss.  CHEST: Denies dyspnea on exertion, cyanosis, wheezing, cough, sputum production or shortness of breath.  CARDIOVASCULAR: Denies chest pain, palpitations, diaphoresis, shortness of breath, or reduced exercise tolerance.  ABDOMEN: Appetite fine. No weight loss. Denies diarrhea, abdominal pain, nausea or vomiting.  PERIPHERAL VASCULAR: No edema, varicosities, or cyanosis.  NEUROLOGIC: no dizziness, no history of syncope by report, no headache   MUSCULOSKELETAL: Denies any muscle weakness or cramping  PSYCHOLOGICAL/BAHAVIORAL: Denies any anxiety, stress, confusion  SKIN: Denies any rashes or color change  HEMATOLOGIC: Denies any abnormal bruising or bleeding  ALLERGY/IMMUNOLOGIC: Denies any environmental allergies.       History:    Past Medical History:   Diagnosis Date    ADHD     Right aortic arch     TOF (tetralogy of Fallot)        Past Surgical History:   Procedure Laterality Date    MRI (MAGNETIC RESONANCE IMAGING) N/A 4/11/2019    [facility]    TETRALOGY OF FALLOT REPAIR  12/2009    [place]       Family History   Problem Relation Age of Onset    No Known Problems Mother     No Known Problems Father     No Known Problems Sister     No Known Problems Brother     Congenital heart disease Neg Hx     Pacemaker/defibrilator Neg Hx     Early death  "Neg Hx        Social History     Socioeconomic History    Marital status: Single     Spouse name: Not on file    Number of children: Not on file    Years of education: Not on file    Highest education level: Not on file   Occupational History    Not on file   Social Needs    Financial resource strain: Not on file    Food insecurity:     Worry: Not on file     Inability: Not on file    Transportation needs:     Medical: Not on file     Non-medical: Not on file   Tobacco Use    Smoking status: Passive Smoke Exposure - Never Smoker    Smokeless tobacco: Never Used   Substance and Sexual Activity    Alcohol use: Not on file    Drug use: Not on file    Sexual activity: Not on file   Lifestyle    Physical activity:     Days per week: Not on file     Minutes per session: Not on file    Stress: Not on file   Relationships    Social connections:     Talks on phone: Not on file     Gets together: Not on file     Attends Spiritism service: Not on file     Active member of club or organization: Not on file     Attends meetings of clubs or organizations: Not on file     Relationship status: Not on file   Other Topics Concern    Not on file   Social History Narrative    Lives with parents and siblings.         Objective:      Physical Exam    BP (!) 113/53 (BP Location: Right arm, Patient Position: Sitting)   Pulse 88   Ht 4' 5.66" (1.363 m)   Wt 31.5 kg (69 lb 8.9 oz)   SpO2 98%   BMI 16.98 kg/m²       Constitutional: He appears well-developed and well-nourished. He is active.   HENT:   Nose: Nose normal. No nasal discharge.   Mouth/Throat: Mucous membranes are moist. Oropharynx is clear.   Eyes: Conjunctivae and EOM are normal.   Neck: Neck supple. No neck adenopathy.   Cardiovascular: Normal rate, regular rhythm, S1 normal and S2 normal. Pulses are palpable.   No murmur heard.  2/6 BETSEY auscultated best at the LUSB.  Short diastolic murmur heard at the same location.   Pulmonary/Chest: Effort normal and " breath sounds normal. There is normal air entry. No respiratory distress. He exhibits no retraction.   Abdominal: Soft. Bowel sounds are normal. He exhibits no distension. There is no hepatosplenomegaly. There is no tenderness.   Musculoskeletal: Normal range of motion. He exhibits no edema or tenderness.   Neurological: He is alert. No cranial nerve deficit. He exhibits normal muscle tone.   Skin: Skin is warm and dry. No cyanosis.   Well healed sternotomy scar.     Studies:  Echocardiogram today:   Dilated right ventricle, moderate.  Normal left ventricle structure and size.  The right ventricular systolic function appears to be at least mildly decreased.  Normal left ventricular systolic function.  Paradoxical motion of the interventricular septum noted.  No pericardial effusion.  There is possibly a trivial left to right shunt across a patent foramen ovale.  No ventricular shunt.  Mild tricuspid valve insufficiency.  Right ventricle systolic pressure estimate normal.  Normal pulmonic valve velocity.  Unrestricted pulmonary insufficiency.    Cardiac MRI 4/11/19);  1. Increased right ventricular volumes. (RVEDV 169 cc/m2 for BSA, RVESV 81 cc/m2 for BSA).  RVEF 52 %. The RV volumes are 2 times that of the LV.   2. Normal left ventricular volume with LVEF 54 %.  3. Free pulmonary insufficiency with regurgitation fraction of 54%.   4. The MPA is low normal in size. The RPA is low normal in size, and the LPA is normal in size.  5. Mild enlargement of the aortic root.   6. Right arch, mirror image branching.    All physician notes and studies have been reviewed in detail.    Assessment & Plan:       Tyrone Leon is a 9 y.o. with a history of tetralogy of Fallot, s/p trans-annular patch repair with free PI and right ventricular dilation. He would benefit from a pulmonary valve replacement at this time. Lupe Schilling and Kyra have discussed the diagnosis and procedure in detail today. Informed consent  was obtained and signed. The risks, benefits, and alternatives to the procedure were discussed. They understand that there is a risk of bleeding, infection, stroke, and the risk of anesthesia. They understand that there is a one percent risk of mortality associated with this procedure. They understand that the valve will not last his lifetime, and further surgeries/procedures to replace the valve will be indicated in the future. A surgical date of 7/9/19 has been set. Tyrone Leon will undergo pre-operative testing including CXR, echo, EKG, labs - CBC, type and cross, MRSA screening - prior to the operation. All questions and concerns were addressed.            no

## 2020-01-15 NOTE — PROGRESS NOTE ADULT - PROBLEM/PLAN-2
DISPLAY PLAN FREE TEXT
Diabetes mellitus type 2 in obese

## 2020-03-11 NOTE — ED PROVIDER NOTE - RESPIRATORY, MLM
Speaking in incomplete sentences. Accessory muscle use. No wheezes/rales/ronchi. Diminshed breath sounds on the RLL Cyclophosphamide Pregnancy And Lactation Text: This medication is Pregnancy Category D and it isn't considered safe during pregnancy. This medication is excreted in breast milk.

## 2020-07-08 NOTE — OCCUPATIONAL THERAPY INITIAL EVALUATION ADULT - STANDING BALANCE: STATIC
Patient received dietary handouts and education. Goals in preparing for bariatric surgery    You should be giving up all beverages that have carbonation, sugar, and caffeine. (Refer to the approved liquids list provided at initial visit)   You should be drinking 64 ounces of calorie free fluids per day. Suggestions include:  o Water (you may add fresh lemon or lime)  o Crystal Light  o Phillips Liquid Water Enhancer  o Propel Zero  o Powerade Zero  o Isopure  o Kymep5H  o SOBE Lifewater Zero  o Vitamin Water Zero  o Sugar Free Doug-Aid      You should be eating 4-6 times per day.  Three small meals plus 1-2 snacks per day is your goal. This balances your calories and nutrients evenly throughout the day and helps to boost your metabolism. Snacking is a good thing if you are choosing healthful options. Refer to the snack list provided at your initial visit. Aim for a protein at every snack, plus a fruit, vegetable or starch. You should be eating protein at every meal and snack.  Protein is typically found in animal sources, i.e. chicken, beef, pork, fish and seafood, eggs. It is also found in low-fat dairy sources such as skim or 1% milk, low-fat yogurt, low-fat cheese, and low-fat cottage cheese. Plant based sources of protein include peanut butter, beans, and soy. You should be utilizing the 9-inch plate method.  Eating on a smaller plate will help you control portion size. But what you put on your plate counts also. Make ¼ of your plate protein, ¼ starch and ½ the plate non-starchy vegetables. You should be eliminating caffeine.  Caffeine is dehydrating. After surgery, its very important to stay hydrated. Giving up caffeine before surgery will help you focus on the changes necessary to be successful after surgery. There are many decaffeinated coffee and tea products available in grocery stores. You should be reducing added fat and sugar in your diet.    Frying foods adds too much fat and calories. Baking, broiling, or grilling meats add flavor without unhealthy fats. Using cooking oil spray and spray butter products are also healthful changes that will aid in your weight loss. Foods high in sugar are often also high in calories and low in nutrients. Eating habits after surgery will have to be a permanent and long-term change. Eating habits are so ingrained that it can be difficult to change. It is important to practice new eating habits prior to surgery to mentally prepare yourself for the challenge ahead. Also remember that overall health, age, and genetics make each persons weight loss progress different. Do not compare your progress (pre or post-operatively), the amount you eat, or exercise to other patients. In addition, it is the responsibility of the patient to schedule and follow up on labs and tests completed during the pre-operative period. Results will be reviewed at each visit. poor balance

## 2020-10-29 NOTE — ED PROVIDER NOTE - SKIN TEMP
Late entry    Stephanie Vyas is a 71 y.o. female who presents for routine immunizations. She denies any symptoms , reactions or allergies that would exclude them from being immunized today. Risks and adverse reactions were discussed and the VIS was given to them. All questions were addressed. She refused to stay in the office for observation, was in no distress when they left.      Left deltoid warm

## 2021-01-18 NOTE — PROGRESS NOTE ADULT - PROBLEM SELECTOR PROBLEM 1
Cellulitis of leg, right ENT ISSUE/POD: epistaxis    HPI: 71 yo female with significant cardiac history on coumadin and well known by ENT for epistaxis and multiple packing placements / cauterizations, presents with new episode of right nare epistaxis s/p cautery and packed with dissolvable nasopore and surgicel. Pt denies further bleeding since. INR 2.85      PAST MEDICAL & SURGICAL HISTORY:  COPD (chronic obstructive pulmonary disease)    Hypertension    CHF (congestive heart failure)    Tricuspid valve replaced    Mitral valve replaced    Gout    HTN (hypertension)    CHF (congestive heart failure)    COPD (chronic obstructive pulmonary disease)  on home O2    MIGUEL (obstructive sleep apnea)    Pulmonary hypertension    Atrial fibrillation  S/P Ablation    No significant past surgical history    Tricuspid valve replaced  mechanical    History of mitral valve replacement with mechanical valve      Allergies    No Known Allergies    Intolerances      MEDICATIONS  (STANDING):  allopurinol 100 milliGRAM(s) Oral daily  amoxicillin  500 milliGRAM(s)/clavulanate 1 Tablet(s) Oral two times a day  BACItracin   Ointment 1 Application(s) Topical two times a day  budesonide 160 MICROgram(s)/formoterol 4.5 MICROgram(s) Inhaler 2 Puff(s) Inhalation two times a day  buMETAnide 2 milliGRAM(s) Oral two times a day  metoprolol succinate ER 25 milliGRAM(s) Oral daily  simvastatin 10 milliGRAM(s) Oral at bedtime  warfarin 6 milliGRAM(s) Oral once    MEDICATIONS  (PRN):  sodium chloride 0.65% Nasal 1 Spray(s) Both Nostrils two times a day PRN Nasal Congestion    ROS:   ENT: all negative except as noted in HPI   Pulm: denies SOB, cough, hemoptysis  Neuro: denies numbness/tingling, loss of sensation  Endo: denies heat/cold intolerance, excessive sweating      Vital Signs Last 24 Hrs  T(C): 36.8 (18 Jan 2021 04:11), Max: 36.8 (17 Jan 2021 11:56)  T(F): 98.2 (18 Jan 2021 04:11), Max: 98.2 (17 Jan 2021 11:56)  HR: 60 (18 Jan 2021 05:48) (53 - 72)  BP: 117/66 (18 Jan 2021 04:11) (108/66 - 155/80)  BP(mean): --  RR: 18 (18 Jan 2021 05:48) (18 - 18)  SpO2: 98% (18 Jan 2021 05:48) (93% - 100%)                          8.3    6.04  )-----------( 369      ( 17 Jan 2021 07:35 )             29.3         PT/INR - ( 18 Jan 2021 07:26 )   PT: 32.5 sec;   INR: 2.85 ratio             PHYSICAL EXAM:  Gen: NAD  Skin: No rashes, bruises, or lesions  Head: Normocephalic, Atraumatic  Face: no edema, erythema, or fluctuance. Parotid glands soft without mass  Eyes: no scleral injection  Nose: Right nare: packed with nasopore wrapped in surgicel coated in bacitracin Left nare: no active bleeding or discharge.   Mouth: No bleeding in posterior pharynx. No Stridor / Drooling / Trismus.  Mucosa moist, tongue/uvula midline, oropharynx clear  Neck: Flat, supple, no lymphadenopathy, trachea midline, no masses  Lymphatic: No lymphadenopathy  Resp: breathing easily, no stridor  Neuro: facial nerve intact, no facial droop

## 2021-04-02 NOTE — ED ADULT NURSE NOTE - RESPIRATORY RATE (BREATHS/MIN)
Marva Gerard Patient Age: 57 year old  MESSAGE: Interpreting service used: No      IM/FP- Symptom-  Adult-  Other-     Symptom(s): SORE THROAT. PT STATES SHE WAS EXPOSED TO SOMEONE WHO HAS COVID AND DID A COVID TEST AT  [facility] WHERE THEY HANDED HER THE SWAB AND TOLD HER WHAT TO DO. TEST CAME BACK NEGATIVE BUT PT ASKING HOW ACCURATE TESTS ARE.          Appointment: COVID?    Site: Lori- Route message to provider's clinical support pool       ALLERGIES:  Patient has no known allergies.  Current Outpatient Medications   Medication   • zoster vaccine recomb adjuvanted (SHINGRIX) 50 MCG/0.5ML injection   • TETRAHYDROZOLINE HCL OP     No current facility-administered medications for this visit.     PHARMACY to use: SEE BELOW           Pharmacy preference(s) on file:   [id number] - [place], [place] - [address] [facility] [address]  [address] [place] [address]  Phone: [phone] Fax: [phone]      CALL BACK INFO: Ok to leave response (including medical information) on answering machine      PCP: Marlen Short MD         INS: Payor: BLUE [employer]OSS [employer]UE [employer]ELD [place] / Plan: PPO [id number] / Product Type: PPO MISC   PATIENT ADDRESS:  [address] [place] [id number]       20

## 2021-08-06 NOTE — SWALLOW BEDSIDE ASSESSMENT ADULT - SWALLOW EVAL: DIAGNOSIS
1- functional oral management given puree, honey thick liquid, nectar thick liquid and thin liquid textures marked by adequate bolus collection, transfer and transport. 2- mild oral dysphagia given solid textures marked by decreased mastication ability resulting in delayed oral preparation/AP transit. 3- mild pharyngeal dysphagia given solid, puree, honey thick liquid and nectar thick liquid textures marked by delayed swallow trigger and reduced hyolaryngeal excursion without any overt s/s of penetration/aspiration noted. 4- moderate pharyngeal dysphagia given thin liquid textures marked by delayed swallow trigger and reduced hyolaryngeal excursion resulting in observed multiple swallows, a wet vocal quality to a 'wet' tone post PO consumption and an increased work of breathing all suggestive of possible penetration/aspiration and/or pharyngeal stasis.
[id number]

## 2022-01-01 NOTE — PATIENT PROFILE ADULT. - AS SC BRADEN MOBILITY
Site: Sternum (10 Dec 2022 23:30)  Bilirubin: 5.9 (10 Dec 2022 23:30)  Bilirubin: 6 (10 Dec 2022 12:09)  Site: Sternum (10 Dec 2022 12:09)  
(2) very limited

## 2022-03-23 NOTE — PHYSICAL THERAPY INITIAL EVALUATION ADULT - TRANSFER SKILLS, REHAB EVAL
-- Message is from the [facility]--    Called and left voicemail to inform the patient the procedure listed for their [facility] appointment is out of scope.    ACC AGENT: If the patient calls back, please assist in scheduling at a different location.            needed assist/needs device

## 2023-04-05 NOTE — ED PROVIDER NOTE - TEMPLATE, MLM
[MRI] : MRI [Left] : left [Shoulder] : shoulder [I reviewed the films/CD and agree] : I reviewed the films/CD and agree [Report was reviewed and noted in the chart] : The report was reviewed and noted in the chart General

## 2023-06-09 NOTE — PROGRESS NOTE ADULT - PROVIDER SPECIALTY LIST ADULT
Vascular Surgery Quality 226: Preventive Care And Screening: Tobacco Use: Screening And Cessation Intervention: Patient screened for tobacco use and is an ex/non-smoker Quality 47: Advance Care Plan: Advance Care Planning discussed and documented in the medical record; patient did not wish or was not able to name a surrogate decision maker or provide an advance care plan. Quality 111:Pneumonia Vaccination Status For Older Adults: Pneumococcal vaccine (PPSV23) was not administered on or after patientâs 60th birthday and before the end of the measurement period, reason not otherwise specified Detail Level: Detailed Quality 431: Preventive Care And Screening: Unhealthy Alcohol Use - Screening: Patient not identified as an unhealthy alcohol user when screened for unhealthy alcohol use using a systematic screening method Quality 130: Documentation Of Current Medications In The Medical Record: Current Medications Documented Quality 110: Preventive Care And Screening: Influenza Immunization: Influenza immunization was not ordered or administered, reason not given

## 2023-08-29 NOTE — DISCHARGE NOTE ADULT - INSTRUCTIONS
2021 diagnosed/Yes
pt is hemodynamically stable. denies chest pain and SOB, Vitals stable. Right leg incision c/d/i.

## 2023-10-31 NOTE — PROGRESS NOTE ADULT - PROBLEM SELECTOR PLAN 3
-check doppler u/s RLE  -patient was taken off coumadin at [facility] due to persistent bleeding episodes
-check doppler u/s RLE  -patient was taken off coumadin at [facility] due to persistent bleeding episodes
-doppler u/s RLE negative.  -patient was taken off coumadin at [facility] due to persistent bleeding episodes
-doppler u/s RLE negative.  -patient was taken off coumadin at [facility] due to persistent bleeding episodes
BP controlled.
Bed/Stretcher in lowest position, wheels locked, appropriate side rails in place/Call bell, personal items and telephone in reach/Instruct patient to call for assistance before getting out of bed/chair/stretcher/Non-slip footwear applied when patient is off stretcher/[place] to call system/Physically safe environment - no spills, clutter or unnecessary equipment/Purposeful proactive rounding/Room/bathroom lighting operational, light cord in reach
-doppler u/s RLE negative.  -patient was taken off coumadin at [facility] due to persistent bleeding episodes

## 2023-11-22 NOTE — PROGRESS NOTE ADULT - ASSESSMENT
This message is to notify you that we recently sent a prescription refill request for the medication Skyrizi Pen 150mg/ml for the attached patient and have not yet received a response. In order to avoid gaps in therapy, kindly take action on the refill request or send a new e-prescription to [id number]. If we should not expect a refill or if you have any questions, please feel free to reply-all to this message (make [address] is included as a recipient to your reply).         Thank you,        [facility]   [facility] Pool: [facility]   Email: [email or web address]  Phone: [phone] 89M with acute CVA with carotid artery stenosis complicated by provoked soleal DVT from recent fem-pop bypass for PAD, CAD/MI with PPM, DM type 2.

## 2024-09-09 NOTE — ED PROVIDER NOTE - MOUTH NORMAL
Head, normocephalic, atraumatic, Face, Face within normal limits, Ears, External ears within normal limits abnormal/expand...
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
## 2025-05-12 NOTE — PATIENT PROFILE ADULT. - TEACHING/LEARNING LEARNING PREFERENCES
PCP: Glenys Henriquez APRN - NP    LAST APPT:4/1/2025    Future Appointments   Date Time Provider Department Center   8/5/2025  8:30 AM [facility]       Requested Prescriptions     Pending Prescriptions Disp Refills    lisinopril (PRINIVIL;ZESTRIL) 10 MG tablet [Pharmacy Med Name: LISINOPRIL 10MG TABLETS] 30 tablet 0     Sig: TAKE 1 TABLET BY MOUTH DAILY       
verbal instruction/video/audio

## 2025-07-24 NOTE — ED ADULT TRIAGE NOTE - TEMPERATURE IN FAHRENHEIT (DEGREES F)
Detail Level: Detailed
Add 1585x Cpt? (Do Not Bill If You Billed For The Procedure Placing The Sutures. This Is An Add-On Code That Must Be Billed With An E/M Visit Code): No
97.6